# Patient Record
Sex: FEMALE | Race: WHITE | NOT HISPANIC OR LATINO | Employment: OTHER | URBAN - NONMETROPOLITAN AREA
[De-identification: names, ages, dates, MRNs, and addresses within clinical notes are randomized per-mention and may not be internally consistent; named-entity substitution may affect disease eponyms.]

---

## 2017-02-20 DIAGNOSIS — I10 BENIGN ESSENTIAL HYPERTENSION: ICD-10-CM

## 2017-02-20 RX ORDER — LISINOPRIL 20 MG/1
20 TABLET ORAL DAILY
Qty: 30 TABLET | Refills: 0 | Status: SHIPPED | OUTPATIENT
Start: 2017-02-20 | End: 2017-08-04

## 2017-02-20 NOTE — TELEPHONE ENCOUNTER
linsinopril (PRINIVIL, ZESTRIL) 20 MG Tablet      Last Written Prescription Date: 11/25/2016  Last Fill Quantity: 30, # refills: 0  Last Office Visit with G, P or [facility] prescribing provider: 08/19/2016  Next 5 appointments (look out 90 days)     Feb 23, 2017 10:40 AM CST   Office Visit with Addis Del Rio MD   [facility] ([facility])    [address] [place] [id number]   [phone]                   Potassium   Date Value Ref Range Status   10/13/2015 4.2 3.4 - 5.3 mmol/L Final     Creatinine   Date Value Ref Range Status   10/13/2015 0.67 0.52 - 1.04 mg/dL Final     BP Readings from Last 3 Encounters:   10/11/16 126/81   10/07/16 134/76   09/02/16 130/74

## 2017-02-22 ENCOUNTER — RADIANT APPOINTMENT (OUTPATIENT)
Dept: MAMMOGRAPHY | Facility: CLINIC | Age: 67
End: 2017-02-22
Attending: FAMILY MEDICINE
Payer: COMMERCIAL

## 2017-02-22 DIAGNOSIS — Z12.31 VISIT FOR SCREENING MAMMOGRAM: ICD-10-CM

## 2017-02-22 PROCEDURE — G0202 SCR MAMMO BI INCL CAD: HCPCS | Mod: TC

## 2017-02-23 ENCOUNTER — OFFICE VISIT (OUTPATIENT)
Dept: FAMILY MEDICINE | Facility: CLINIC | Age: 67
End: 2017-02-23
Payer: COMMERCIAL

## 2017-02-23 VITALS
BODY MASS INDEX: 32.25 KG/M2 | HEART RATE: 83 BPM | HEIGHT: 63 IN | OXYGEN SATURATION: 98 % | DIASTOLIC BLOOD PRESSURE: 84 MMHG | WEIGHT: 182 LBS | TEMPERATURE: 98.4 F | SYSTOLIC BLOOD PRESSURE: 128 MMHG

## 2017-02-23 DIAGNOSIS — I10 BENIGN ESSENTIAL HYPERTENSION: ICD-10-CM

## 2017-02-23 DIAGNOSIS — J20.9 ACUTE BRONCHITIS WITH SYMPTOMS > 10 DAYS: ICD-10-CM

## 2017-02-23 DIAGNOSIS — I47.10 PAROXYSMAL SUPRAVENTRICULAR TACHYCARDIA (H): Primary | ICD-10-CM

## 2017-02-23 PROCEDURE — 99214 OFFICE O/P EST MOD 30 MIN: CPT | Performed by: FAMILY MEDICINE

## 2017-02-23 RX ORDER — AZITHROMYCIN 250 MG/1
TABLET, FILM COATED ORAL
Qty: 6 TABLET | Refills: 0 | Status: SHIPPED | OUTPATIENT
Start: 2017-02-23 | End: 2017-07-18

## 2017-02-23 NOTE — PROGRESS NOTES
SUBJECTIVE:                                                    Rosita King is a 66 year old female who presents to clinic today for the following health issues:      Hypertension Follow-up      Outpatient blood pressures are not being checked.    Low Salt Diet: not monitoring salt   On  lisinopril and metoprolol-gets lightheaded at times when stands. Just doesn't feel well.     Paroxysmal supraventricular tachycardia- She has seen cardiology, was put on metoprolol. She still has symptoms ,which varies. Can last hours or days, can go a week without. She saw Dr Saravia EP specialist and they discussed doing an ablation. She would like to discuss this with me as she doesn't quite understand it. She has the booklet they gave her.    BP Readings from Last 6 Encounters:   02/23/17 128/84   10/11/16 126/81   10/07/16 134/76   09/02/16 130/74   08/19/16 136/86   08/16/16 116/66       RESPIRATORY SYMPTOMS      Duration: 2 weeks    Description  nasal congestion, rhinorrhea, sore throat, facial pain/pressure, cough, chills, fatigue/malaise, hoarse voice, conjunctival irritation, nausea and diarrhea    Severity: moderate    Accompanying signs and symptoms: None    History (predisposing factors):  none    Precipitating or alleviating factors: None    Therapies tried and outcome:  rest and fluids, nyquil, sara selzter plus , Vicks   For the past two weeks she has had nasal congestion and a productive cough. She feels cold all the time and is short of breath. However, it has started to get better the last few days. No fever.     Problem list and histories reviewed & adjusted, as indicated.  Additional history: as documented    BP Readings from Last 3 Encounters:   02/23/17 128/84   10/11/16 126/81   10/07/16 134/76    Wt Readings from Last 3 Encounters:   02/23/17 182 lb (82.6 kg)   10/11/16 182 lb 3.2 oz (82.6 kg)   08/19/16 179 lb 12.8 oz (81.6 kg)            ROS:  C: NEGATIVE for fever, chills, change in weight  E/M:  "NEGATIVE for ear, mouth and throat problems  R: NEGATIVE for significant cough or SOB  CV: NEGATIVE for chest pain, palpitations or peripheral edema    OBJECTIVE:                                                    /84 (BP Location: Left arm, Patient Position: Chair, Cuff Size: Adult Regular)  Pulse 83  Temp 98.4  F (36.9  C) (Tympanic)  Ht 5' 3\" (1.6 m)  Wt 182 lb (82.6 kg)  SpO2 98%  BMI 32.24 kg/m2  Body mass index is 32.24 kg/(m^2).  GENERAL: alert and no distress, chills  HENT: Left ear canal and TM normal, right side has cerumen but ok. nose without ulcers or lesions. A few small ulcerations on inferior lip.   NECK: no adenopathy, no asymmetry, masses, or scars and thyroid normal to palpation  RESP: lungs clear to auscultation - no rales, rhonchi or wheezes  CV: regular rate and rhythm, normal S1 S2, no S3 or S4, no murmur, click or rub,  MS: no gross musculoskeletal defects noted, no edema         ASSESSMENT/PLAN:                                                    Rosita was seen today for hypertension and uri.    Diagnoses and all orders for this visit:    Paroxysmal supraventricular tachycardia (H)  Discussed what ablation entails, with pictures.   Have asked her to keep a diary of symptoms and see me back    Benign essential hypertension  I think this is a little too well controlled. See orthostatic blood pressures.  Will stop lisinopril and see how she does    Acute bronchitis with symptoms > 10 days  -     azithromycin (ZITHROMAX) 250 MG tablet; Two tablets first day, then one tablet daily for four days.      Patient Instructions   Restart the metoprolol at 50 mg a day  Stop the lisinopril    Keep a diary of your heart -chart when you feel the tachycardia come on and how long it lasts and how you feel    Antibiotic for your cough    Recheck 4-6 weeks, bring diary in      This document serves as a record of the services and decisions personally performed and made by Addis Del Rio MD. It was " created on her behalf by Megha Topete, a trained medical scribe. The creation of this document is based the provider's statements to the medical scribe.  Megha Topete 11:46 AM 2/23/2017    Provider:   The information in this document, created by the medical scribe for me, accurately reflects the services I personally performed and the decisions made by me. I have reviewed and approved this document for accuracy prior to leaving the patient care area.  Addis Del Rio MD 11:46 AM 2/23/2017      Addis Del Rio MD  [facility]

## 2017-02-23 NOTE — PATIENT INSTRUCTIONS
Restart the metoprolol at 50 mg a day  Stop the lisinopril    Keep a diary of your heart -chart when you feel the tachycardia come on and how long it lasts and how you feel    Antibiotic for your cough    Recheck 4-6 weeks, bring diary in

## 2017-02-23 NOTE — MR AVS SNAPSHOT
After Visit Summary   2/23/2017    Rosita King    MRN: [id number]           Patient Information     Date Of Birth          1950        Visit Information        Provider Department      2/23/2017 10:40 AM Addis Del Rio MD [facility]        Today's Diagnoses     Paroxysmal supraventricular tachycardia (H)    -  1    Benign essential hypertension        Acute bronchitis with symptoms > 10 days          Care Instructions    Restart the metoprolol at 50 mg a day  Stop the lisinopril    Keep a diary of your heart -chart when you feel the tachycardia come on and how long it lasts and how you feel    Antibiotic for your cough    Recheck 4-6 weeks, bring diary in        Follow-ups after your visit        Who to contact     If you have questions or need follow up information about today's clinic visit or your schedule please contact [facility] directly at [phone].  Normal or non-critical lab and imaging results will be communicated to you by MyChart, letter or phone within 4 business days after the clinic has received the results. If you do not hear from us within 7 days, please contact the clinic through [employer] artshart or phone. If you have a critical or abnormal lab result, we will notify you by phone as soon as possible.  Submit refill requests through [employer] or call your pharmacy and they will forward the refill request to us. Please allow 3 business days for your refill to be completed.          Additional Information About Your Visit        MyChart Information     [employer] gives you secure access to your electronic health record. If you see a primary care provider, you can also send messages to your care team and make appointments. If you have questions, please call your primary care clinic.  If you do not have a primary care provider, please call [phone] and they will assist you.        Care EveryWhere ID     This is your Care EveryWhere ID. This could  "be used by other organizations to access your [facility] medical records  [id number]        Your Vitals Were     Pulse Temperature Height Pulse Oximetry BMI (Body Mass Index)       83 98.4  F (36.9  C) (Tympanic) 5' 3\" (1.6 m) 98% 32.24 kg/m2        Blood Pressure from Last 3 Encounters:   02/23/17 128/84   10/11/16 126/81   10/07/16 134/76    Weight from Last 3 Encounters:   02/23/17 182 lb (82.6 kg)   10/11/16 182 lb 3.2 oz (82.6 kg)   08/19/16 179 lb 12.8 oz (81.6 kg)              Today, you had the following     No orders found for display         Today's Medication Changes          These changes are accurate as of: 2/23/17 11:50 AM.  If you have any questions, ask your nurse or doctor.               Start taking these medicines.        Dose/Directions    azithromycin 250 MG tablet   Commonly known as:  ZITHROMAX   Used for:  Acute bronchitis with symptoms > 10 days   Started by:  Addis Del Rio MD        Two tablets first day, then one tablet daily for four days.   Quantity:  6 tablet   Refills:  0            Where to get your medicines      These medications were sent to [facility] [address] [place] [address]  [address] [place], [place] [id number]     Phone:  [phone]     azithromycin 250 MG tablet                Primary Care Provider Office Phone # Fax #    Addis Del Rio [phone]       [facility] [address] [facility] [address]        Thank you!     Thank you for choosing [facility]  for your care. Our goal is always to provide you with excellent care. Hearing back from our patients is one way we can continue to improve our services. Please take a few minutes to complete the written survey that you may receive in the mail after your visit with us. Thank you!             Your Updated Medication List - Protect others around you: Learn how to safely use, store and throw away your medicines at www.disposemymeds.org.          This " list is accurate as of: 2/23/17 11:50 AM.  Always use your most recent med list.                   Brand Name Dispense Instructions for use    ALEVE 220 MG capsule   Generic drug:  naproxen sodium      Take 220 mg by mouth 2 times daily (with meals) Reported on 2/23/2017       azithromycin 250 MG tablet    ZITHROMAX    6 tablet    Two tablets first day, then one tablet daily for four days.       CENTRUM SILVER per tablet      Take 1 tablet by mouth daily       lisinopril 20 MG tablet    PRINIVIL/ZESTRIL    30 tablet    Take 1 tablet (20 mg) by mouth daily (Needs lab work)       metoprolol 50 MG 24 hr tablet    TOPROL-XL    93 tablet    Take 1 tablet (50 mg) by mouth daily       omeprazole 20 MG CR capsule    priLOSEC    90 capsule    Take 1 capsule (20 mg) by mouth daily       VITAMIN D (CHOLECALCIFEROL) PO      Take 500 Units by mouth daily

## 2017-02-23 NOTE — NURSING NOTE
"Chief Complaint   Patient presents with     Hypertension     went off medication     URI       Initial /84 (BP Location: Left arm, Patient Position: Chair, Cuff Size: Adult Regular)  Pulse 83  Temp 98.4  F (36.9  C) (Tympanic)  Ht 5' 3\" (1.6 m)  Wt 182 lb (82.6 kg)  SpO2 98%  BMI 32.24 kg/m2 Estimated body mass index is 32.24 kg/(m^2) as calculated from the following:    Height as of this encounter: 5' 3\" (1.6 m).    Weight as of this encounter: 182 lb (82.6 kg).  Medication Reconciliation: complete    Health Maintenance that is potentially due pending provider review:  NONE    n/a    "

## 2017-07-18 ENCOUNTER — OFFICE VISIT (OUTPATIENT)
Dept: FAMILY MEDICINE | Facility: CLINIC | Age: 67
End: 2017-07-18
Payer: COMMERCIAL

## 2017-07-18 VITALS
HEART RATE: 66 BPM | BODY MASS INDEX: 33.48 KG/M2 | RESPIRATION RATE: 18 BRPM | SYSTOLIC BLOOD PRESSURE: 138 MMHG | TEMPERATURE: 98.3 F | DIASTOLIC BLOOD PRESSURE: 82 MMHG | WEIGHT: 189 LBS

## 2017-07-18 DIAGNOSIS — B37.2 YEAST DERMATITIS: ICD-10-CM

## 2017-07-18 DIAGNOSIS — A69.20 ERYTHEMA MIGRANS (LYME DISEASE): Primary | ICD-10-CM

## 2017-07-18 PROCEDURE — 99214 OFFICE O/P EST MOD 30 MIN: CPT | Performed by: NURSE PRACTITIONER

## 2017-07-18 RX ORDER — DOXYCYCLINE HYCLATE 100 MG
100 TABLET ORAL 2 TIMES DAILY
Qty: 28 TABLET | Refills: 0 | Status: SHIPPED | OUTPATIENT
Start: 2017-07-18 | End: 2017-08-01

## 2017-07-18 NOTE — PATIENT INSTRUCTIONS
Will treat for lyme disease with doxycycline twice a day for 14 days     See me back in 2 weeks for a recheck will get labs then     Sooner if worsening symptoms       Doxycycline twice a day for 2 weeks

## 2017-07-18 NOTE — NURSING NOTE
"Chief Complaint   Patient presents with     Insect Bites     abdomen x 3-4 days       Initial /82  Pulse 66  Temp 98.3  F (36.8  C) (Tympanic)  Resp 18  Wt 189 lb (85.7 kg)  Breastfeeding? No  BMI 33.48 kg/m2 Estimated body mass index is 33.48 kg/(m^2) as calculated from the following:    Height as of 2/23/17: 5' 3\" (1.6 m).    Weight as of this encounter: 189 lb (85.7 kg).  Medication Reconciliation: complete    Health Maintenance that is potentially due pending provider review:  DEXA, Hep C Screen, Advanced Directive, PCV 13    Possibly completing today per provider review.    "

## 2017-07-18 NOTE — MR AVS SNAPSHOT
After Visit Summary   7/18/2017    Rosita King    MRN: [id number]           Patient Information     Date Of Birth          1950        Visit Information        Provider Department      7/18/2017 12:00 PM Jenn Manzano NP [facility]        Today's Diagnoses     Erythema migrans (Lyme disease)    -  1    Yeast dermatitis          Care Instructions    Will treat for lyme disease with doxycycline twice a day for 14 days     See me back in 2 weeks for a recheck will get labs then     Sooner if worsening symptoms       Doxycycline twice a day for 2 weeks           Follow-ups after your visit        Who to contact     If you have questions or need follow up information about today's clinic visit or your schedule please contact [facility] directly at [phone].  Normal or non-critical lab and imaging results will be communicated to you by [employer], letter or phone within 4 business days after the clinic has received the results. If you do not hear from us within 7 days, please contact the clinic through [employer] or phone. If you have a critical or abnormal lab result, we will notify you by phone as soon as possible.  Submit refill requests through [employer] or call your pharmacy and they will forward the refill request to us. Please allow 3 business days for your refill to be completed.          Additional Information About Your Visit        MyChart Information     [employer] gives you secure access to your electronic health record. If you see a primary care provider, you can also send messages to your care team and make appointments. If you have questions, please call your primary care clinic.  If you do not have a primary care provider, please call [phone] and they will assist you.        Care EveryWhere ID     This is your Care EveryWhere ID. This could be used by other organizations to access your [place] medical records  [id number]        Your Vitals  Were     Pulse Temperature Respirations Breastfeeding? BMI (Body Mass Index)       66 98.3  F (36.8  C) (Tympanic) 18 No 33.48 kg/m2        Blood Pressure from Last 3 Encounters:   07/18/17 138/82   02/23/17 128/84   10/11/16 126/81    Weight from Last 3 Encounters:   07/18/17 189 lb (85.7 kg)   02/23/17 182 lb (82.6 kg)   10/11/16 182 lb 3.2 oz (82.6 kg)              Today, you had the following     No orders found for display         Today's Medication Changes          These changes are accurate as of: 7/18/17 12:20 PM.  If you have any questions, ask your nurse or doctor.               Start taking these medicines.        Dose/Directions    doxycycline 100 MG tablet   Commonly known as:  VIBRA-TABS   Used for:  Erythema migrans (Lyme disease)   Started by:  Jenn Maznano NP        Dose:  100 mg   Take 1 tablet (100 mg) by mouth 2 times daily for 14 days   Quantity:  28 tablet   Refills:  0            Where to get your medicines      These medications were sent to [facility] [address] [place] [address]  [address] [address]     Phone:  [phone]     doxycycline 100 MG tablet                Primary Care Provider Office Phone # Fax #    Addis Del Rio [phone]       [facility] [address] [place] [address]        Equal Access to Services     GERA LUBIN AH: Hadii jd tidwell hadasho Soniranjan, waaxda luqadaha, qaybta kaalmada adeegyada, waxay vincent terrazas . So [facility] [phone].    ATENCIÓN: Si habla español, tiene a adams disposición servicios gratuitos de asistencia lingüística. Mona al [phone].    We comply with applicable federal civil rights laws and [place] laws. We do not discriminate on the basis of race, color, national origin, age, disability sex, sexual orientation or gender identity.            Thank you!     Thank you for choosing [facility]  for your care. Our goal is always to  provide you with excellent care. Hearing back from our patients is one way we can continue to improve our services. Please take a few minutes to complete the written survey that you may receive in the mail after your visit with us. Thank you!             Your Updated Medication List - Protect others around you: Learn how to safely use, store and throw away your medicines at www.disposemymeds.org.          This list is accurate as of: 7/18/17 12:20 PM.  Always use your most recent med list.                   Brand Name Dispense Instructions for use Diagnosis    ALEVE 220 MG capsule   Generic drug:  naproxen sodium      Take 220 mg by mouth 2 times daily (with meals) Reported on 2/23/2017        CENTRUM SILVER per tablet      Take 1 tablet by mouth daily        doxycycline 100 MG tablet    VIBRA-TABS    28 tablet    Take 1 tablet (100 mg) by mouth 2 times daily for 14 days    Erythema migrans (Lyme disease)       lisinopril 20 MG tablet    PRINIVIL/ZESTRIL    30 tablet    Take 1 tablet (20 mg) by mouth daily (Needs lab work)    Benign essential hypertension       metoprolol 50 MG 24 hr tablet    TOPROL-XL    93 tablet    Take 1 tablet (50 mg) by mouth daily    Benign essential hypertension       omeprazole 20 MG CR capsule    priLOSEC    90 capsule    Take 1 capsule (20 mg) by mouth daily    Gastroesophageal reflux disease without esophagitis       VITAMIN D (CHOLECALCIFEROL) PO      Take 500 Units by mouth daily

## 2017-07-18 NOTE — PROGRESS NOTES
SUBJECTIVE:                                                    Rosita King is a 66 year old female who presents to clinic today for the following health issues:       Insect Bite      Duration: 3-4 days    Description (location/character/radiation): red cierra on abdomen thinks due to insect    Intensity:  mild    Accompanying signs and symptoms: none    History (similar episodes/previous evaluation): None    Precipitating or alleviating factors: None    Therapies tried and outcome: None       Her with spouse  She does not recall removing tick   Reports mild headache  Afebrile  No muscle or joint pain   Otherwise feels well   Problem list and histories reviewed & adjusted, as indicated.  Additional history: as documented    Patient Active Problem List   Diagnosis     CARDIOVASCULAR SCREENING; LDL GOAL LESS THAN 130     Vitamin D deficiency     ASHLEY (obstructive sleep apnea)     Generalized anxiety disorder     Hyperlipidemia LDL goal <130     Diverticulosis of large intestine without hemorrhage     H. pylori infection     Essential hypertension     Essential hypertension with goal blood pressure less than 140/90     Gastroesophageal reflux disease without esophagitis     Paroxysmal supraventricular tachycardia (H)     Past Surgical History:   Procedure Laterality Date     CARPAL TUNNEL RELEASE RT/LT Bilateral 2006, 2009     COLONOSCOPY N/A 11/25/2015    Procedure: COLONOSCOPY;  Surgeon: Mina Roy MD;  Location: [facility]     ESOPHAGOSCOPY, GASTROSCOPY, DUODENOSCOPY (EGD), COMBINED N/A 11/25/2015    Procedure: COMBINED ESOPHAGOSCOPY, GASTROSCOPY, DUODENOSCOPY (EGD), BIOPSY SINGLE OR MULTIPLE;  Surgeon: Mina Roy MD;  Location: [facility]     TONSILLECTOMY & ADENOIDECTOMY  age 13       Social History   Substance Use Topics     Smoking status: Former Smoker     Smokeless tobacco: Never Used     Alcohol use Yes      Comment: ocassional     Family History   Problem Relation Age of Onset     Liver  Disease Mother      Depression/Anxiety Father      DIABETES Father      DIABETES Sister      Liver Disease Sister              Reviewed and updated as needed this visit by clinical staffTobacco  Allergies  Med Hx  Surg Hx  Fam Hx  Soc Hx      Reviewed and updated as needed this visit by Provider         ROS:  Constitutional, HEENT, cardiovascular, pulmonary, gi and gu systems are negative, except as otherwise noted.      OBJECTIVE:                                                    /82  Pulse 66  Temp 98.3  F (36.8  C) (Tympanic)  Resp 18  Wt 189 lb (85.7 kg)  Breastfeeding? No  BMI 33.48 kg/m2  Body mass index is 33.48 kg/(m^2).  GENERAL APPEARANCE: healthy, alert and no distress  HENT: ear canals and TM's normal and nose and mouth without ulcers or lesions  RESP: lungs clear to auscultation - no rales, rhonchi or wheezes  CV: regular rates and rhythm, normal S1 S2, no S3 or S4 and no murmur, click or rub  ABDOMEN: soft, nontender, without hepatosplenomegaly or masses and bowel sounds normal  MS: extremities normal- no gross deformities noted  SKIN: she has an annular rash on her lower abdomen with central clearing, in the center there is a small indurated area consistent with a insect bite  She also has a some maceration in this same area which is consistent with a yeast dermatitis             Diagnostic test results:  Diagnostic Test Results:  none        ASSESSMENT/PLAN:                                                    1. Erythema migrans (Lyme disease)  Will treat with doxycycline 14 day course  She will see me back in 2 weeks and will check lyme test at this time   - doxycycline (VIBRA-TABS) 100 MG tablet; Take 1 tablet (100 mg) by mouth 2 times daily for 14 days  Dispense: 28 tablet; Refill: 0    2. Yeast dermatitis  She will use nystatin that she has at home      Patient Instructions   Will treat for lyme disease with doxycycline twice a day for 14 days     See me back in 2 weeks for a  recheck will get labs then     Sooner if worsening symptoms       Doxycycline twice a day for 2 weeks       Jenn Manzano NP  [facility]

## 2017-08-04 ENCOUNTER — OFFICE VISIT (OUTPATIENT)
Dept: FAMILY MEDICINE | Facility: CLINIC | Age: 67
End: 2017-08-04
Payer: COMMERCIAL

## 2017-08-04 VITALS
OXYGEN SATURATION: 98 % | DIASTOLIC BLOOD PRESSURE: 82 MMHG | HEART RATE: 51 BPM | WEIGHT: 193 LBS | BODY MASS INDEX: 34.19 KG/M2 | TEMPERATURE: 98.1 F | SYSTOLIC BLOOD PRESSURE: 136 MMHG | RESPIRATION RATE: 16 BRPM

## 2017-08-04 DIAGNOSIS — Z11.59 NEED FOR HEPATITIS C SCREENING TEST: ICD-10-CM

## 2017-08-04 DIAGNOSIS — A69.20 ERYTHEMA MIGRANS (LYME DISEASE): Primary | ICD-10-CM

## 2017-08-04 DIAGNOSIS — I47.10 PAROXYSMAL SUPRAVENTRICULAR TACHYCARDIA (H): ICD-10-CM

## 2017-08-04 DIAGNOSIS — I10 BENIGN ESSENTIAL HYPERTENSION: ICD-10-CM

## 2017-08-04 LAB
ANION GAP SERPL CALCULATED.3IONS-SCNC: 6 MMOL/L (ref 3–14)
BUN SERPL-MCNC: 17 MG/DL (ref 7–30)
CALCIUM SERPL-MCNC: 9.1 MG/DL (ref 8.5–10.1)
CHLORIDE SERPL-SCNC: 104 MMOL/L (ref 94–109)
CO2 SERPL-SCNC: 27 MMOL/L (ref 20–32)
CREAT SERPL-MCNC: 0.65 MG/DL (ref 0.52–1.04)
GFR SERPL CREATININE-BSD FRML MDRD: ABNORMAL ML/MIN/1.7M2
GLUCOSE SERPL-MCNC: 124 MG/DL (ref 70–99)
POTASSIUM SERPL-SCNC: 4 MMOL/L (ref 3.4–5.3)
SODIUM SERPL-SCNC: 137 MMOL/L (ref 133–144)

## 2017-08-04 PROCEDURE — 36415 COLL VENOUS BLD VENIPUNCTURE: CPT | Performed by: NURSE PRACTITIONER

## 2017-08-04 PROCEDURE — 86803 HEPATITIS C AB TEST: CPT | Performed by: NURSE PRACTITIONER

## 2017-08-04 PROCEDURE — 86618 LYME DISEASE ANTIBODY: CPT | Performed by: NURSE PRACTITIONER

## 2017-08-04 PROCEDURE — 80048 BASIC METABOLIC PNL TOTAL CA: CPT | Performed by: NURSE PRACTITIONER

## 2017-08-04 PROCEDURE — 99213 OFFICE O/P EST LOW 20 MIN: CPT | Performed by: NURSE PRACTITIONER

## 2017-08-04 NOTE — MR AVS SNAPSHOT
After Visit Summary   8/4/2017    Rosita King    MRN: [id number]           Patient Information     Date Of Birth          1950        Visit Information        Provider Department      8/4/2017 8:00 AM Jenn Manzano NP [facility]        Today's Diagnoses     Erythema migrans (Lyme disease)    -  1    Paroxysmal supraventricular tachycardia (H)        Need for hepatitis C screening test        Benign essential hypertension          Care Instructions    Rash is resolved   Will check lyme test     Recommend seeing cardiology again for the sinus V tach         Schedule full physical with Dr. Alcocer       Will get labs today           Follow-ups after your visit        Who to contact     If you have questions or need follow up information about today's clinic visit or your schedule please contact [facility] directly at [phone].  Normal or non-critical lab and imaging results will be communicated to you by ViaSathart, letter or phone within 4 business days after the clinic has received the results. If you do not hear from us within 7 days, please contact the clinic through ViaSathart or phone. If you have a critical or abnormal lab result, we will notify you by phone as soon as possible.  Submit refill requests through Cookapp or call your pharmacy and they will forward the refill request to us. Please allow 3 business days for your refill to be completed.          Additional Information About Your Visit        [employer] Information     [employer] gives you secure access to your electronic health record. If you see a primary care provider, you can also send messages to your care team and make appointments. If you have questions, please call your primary care clinic.  If you do not have a primary care provider, please call [phone] and they will assist you.        Care EveryWhere ID     This is your Care EveryWhere ID. This could be used by other organizations to  access your [place] medical records  [id number]        Your Vitals Were     Pulse Temperature Respirations Pulse Oximetry BMI (Body Mass Index)       51 98.1  F (36.7  C) (Tympanic) 16 98% 34.19 kg/m2        Blood Pressure from Last 3 Encounters:   08/04/17 136/82   07/18/17 138/82   02/23/17 128/84    Weight from Last 3 Encounters:   08/04/17 193 lb (87.5 kg)   07/18/17 189 lb (85.7 kg)   02/23/17 182 lb (82.6 kg)              We Performed the Following     **Hepatitis C Screen Reflex to RNA FUTURE anytime     Basic metabolic panel  (Ca, Cl, CO2, Creat, Gluc, K, Na, BUN)     Lyme Disease Kylah with reflex to WB Serum          Today's Medication Changes          These changes are accurate as of: 8/4/17  8:20 AM.  If you have any questions, ask your nurse or doctor.               Stop taking these medicines if you haven't already. Please contact your care team if you have questions.     lisinopril 20 MG tablet   Commonly known as:  PRINIVIL/ZESTRIL   Stopped by:  Jenn Manzano, NAKUL                    Primary Care Provider Office Phone # Fax #    Addis Del Rio [phone]       [facility] [address] [place] [id number]        Equal Access to Services     GERA LUBIN AH: Hadii jd ku hadasho Soomaali, waaxda luqadaha, qaybta kaalmada adeegyada, jane kaur hayporsche corrigan. So [facility] [phone].    ATENCIÓN: Si habla español, tiene a adams disposición servicios gratuitos de asistencia lingüística. Llame al [phone].    We comply with applicable federal civil rights laws and [place] laws. We do not discriminate on the basis of race, color, national origin, age, disability sex, sexual orientation or gender identity.            Thank you!     Thank you for choosing [facility]  for your care. Our goal is always to provide you with excellent care. Hearing back from our patients is one way we can continue to improve our services. Please take a few  minutes to complete the written survey that you may receive in the mail after your visit with us. Thank you!             Your Updated Medication List - Protect others around you: Learn how to safely use, store and throw away your medicines at www.disposemymeds.org.          This list is accurate as of: 8/4/17  8:20 AM.  Always use your most recent med list.                   Brand Name Dispense Instructions for use Diagnosis    ALEVE 220 MG capsule   Generic drug:  naproxen sodium      Take 220 mg by mouth 2 times daily (with meals) Reported on 2/23/2017        CENTRUM SILVER per tablet      Take 1 tablet by mouth daily        metoprolol 50 MG 24 hr tablet    TOPROL-XL    93 tablet    Take 1 tablet (50 mg) by mouth daily    Benign essential hypertension       omeprazole 20 MG CR capsule    priLOSEC    90 capsule    Take 1 capsule (20 mg) by mouth daily    Gastroesophageal reflux disease without esophagitis       VITAMIN D (CHOLECALCIFEROL) PO      Take 500 Units by mouth daily

## 2017-08-04 NOTE — NURSING NOTE
"Chief Complaint   Patient presents with     RECHECK       Initial /82 (BP Location: Right arm, Patient Position: Chair, Cuff Size: Adult Large)  Pulse 51  Temp 98.1  F (36.7  C) (Tympanic)  Resp 16  Wt 193 lb (87.5 kg)  SpO2 98%  BMI 34.19 kg/m2 Estimated body mass index is 34.19 kg/(m^2) as calculated from the following:    Height as of 2/23/17: 5' 3\" (1.6 m).    Weight as of this encounter: 193 lb (87.5 kg).  Medication Reconciliation: complete    Health Maintenance that is potentially due pending provider review:  Hep C, Advanced Directive, Dexa Scan, Pneumococcal, Tetanus    Will discuss with provider.    Is there anyone who you would like to be able to receive your results? No  If yes have patient fill out SU      "

## 2017-08-04 NOTE — PROGRESS NOTES
SUBJECTIVE:                                                    Rosita King is a 66 year old female who presents to clinic today for the following health issues:      Recheck for lymes  Patient is feeling better, finished antibiotics.  Rash resolved 2 days after starting the antibiotic     Patient reports that she has symptoms supraventricular tachycardia despite betablocker therapy   She needs to see cardiology again regarding this  She denies any chest pain       Problem list and histories reviewed & adjusted, as indicated.  Additional history: as documented    Patient Active Problem List   Diagnosis     CARDIOVASCULAR SCREENING; LDL GOAL LESS THAN 130     Vitamin D deficiency     ASHLEY (obstructive sleep apnea)     Generalized anxiety disorder     Hyperlipidemia LDL goal <130     Diverticulosis of large intestine without hemorrhage     H. pylori infection     Essential hypertension     Essential hypertension with goal blood pressure less than 140/90     Gastroesophageal reflux disease without esophagitis     Paroxysmal supraventricular tachycardia (H)     Past Surgical History:   Procedure Laterality Date     CARPAL TUNNEL RELEASE RT/LT Bilateral 2006, 2009     COLONOSCOPY N/A 11/25/2015    Procedure: COLONOSCOPY;  Surgeon: Mina Roy MD;  Location: [facility]     ESOPHAGOSCOPY, GASTROSCOPY, DUODENOSCOPY (EGD), COMBINED N/A 11/25/2015    Procedure: COMBINED ESOPHAGOSCOPY, GASTROSCOPY, DUODENOSCOPY (EGD), BIOPSY SINGLE OR MULTIPLE;  Surgeon: Mina Roy MD;  Location: [facility]     TONSILLECTOMY & ADENOIDECTOMY  age 13       Social History   Substance Use Topics     Smoking status: Former Smoker     Smokeless tobacco: Never Used     Alcohol use Yes      Comment: ocassional     Family History   Problem Relation Age of Onset     Liver Disease Mother      Depression/Anxiety Father      DIABETES Father      DIABETES Sister      Liver Disease Sister              Reviewed and updated as needed this  visit by clinical staff       Reviewed and updated as needed this visit by Provider         ROS:  Constitutional, HEENT, cardiovascular, pulmonary, gi and gu systems are negative, except as otherwise noted.      OBJECTIVE:                                                    /82 (BP Location: Right arm, Patient Position: Chair, Cuff Size: Adult Large)  Pulse 51  Temp 98.1  F (36.7  C) (Tympanic)  Resp 16  Wt 193 lb (87.5 kg)  SpO2 98%  BMI 34.19 kg/m2  Body mass index is 34.19 kg/(m^2).  GENERAL APPEARANCE: healthy, alert and no distress  RESP: lungs clear to auscultation - no rales, rhonchi or wheezes  CV: regular rates and rhythm, normal S1 S2, no S3 or S4 and no murmur, click or rub  SKIN: no suspicious lesions or rashes    Diagnostic test results:  Diagnostic Test Results:  Pending        ASSESSMENT/PLAN:                                                    1. Erythema migrans (Lyme disease)  Rash resolved  - Lyme Disease Kylah with reflex to WB Serum    2. Paroxysmal supraventricular tachycardia (H)  Still with symptoms   Recommend that she cardiology again     3. Need for hepatitis C screening test  Labs today   - **Hepatitis C Screen Reflex to RNA FUTURE anytime    4. Benign essential hypertension  Labs today   - Basic metabolic panel  (Ca, Cl, CO2, Creat, Gluc, K, Na, BUN)      Patient Instructions   Rash is resolved   Will check lyme test     Recommend seeing cardiology again for the sinus V tach         Schedule full physical with Dr. Alcocer       Will get labs today       Jenn Manzano NP  [facility]

## 2017-08-04 NOTE — PATIENT INSTRUCTIONS
Rash is resolved   Will check lyme test     Recommend seeing cardiology again for the sinus V tach         Schedule full physical with Dr. Alcocer       Will get labs today

## 2017-08-07 LAB
B BURGDOR IGG+IGM SER QL: 0.06 (ref 0–0.89)
HCV AB SERPL QL IA: NORMAL

## 2017-08-24 ENCOUNTER — OFFICE VISIT (OUTPATIENT)
Dept: FAMILY MEDICINE | Facility: CLINIC | Age: 67
End: 2017-08-24
Payer: COMMERCIAL

## 2017-08-24 VITALS
HEART RATE: 62 BPM | DIASTOLIC BLOOD PRESSURE: 84 MMHG | RESPIRATION RATE: 16 BRPM | BODY MASS INDEX: 33.83 KG/M2 | OXYGEN SATURATION: 97 % | SYSTOLIC BLOOD PRESSURE: 138 MMHG | WEIGHT: 191 LBS | TEMPERATURE: 97.4 F

## 2017-08-24 DIAGNOSIS — Z00.01 ENCOUNTER FOR GENERAL ADULT MEDICAL EXAMINATION WITH ABNORMAL FINDINGS: Primary | ICD-10-CM

## 2017-08-24 DIAGNOSIS — I10 ESSENTIAL HYPERTENSION WITH GOAL BLOOD PRESSURE LESS THAN 140/90: ICD-10-CM

## 2017-08-24 DIAGNOSIS — I47.10 PAROXYSMAL SUPRAVENTRICULAR TACHYCARDIA (H): ICD-10-CM

## 2017-08-24 DIAGNOSIS — L90.0 LICHEN SCLEROSUS: ICD-10-CM

## 2017-08-24 DIAGNOSIS — N39.41 URGE INCONTINENCE OF URINE: ICD-10-CM

## 2017-08-24 DIAGNOSIS — R73.9 ELEVATED BLOOD SUGAR: ICD-10-CM

## 2017-08-24 DIAGNOSIS — N89.8 VAGINAL ITCHING: ICD-10-CM

## 2017-08-24 DIAGNOSIS — G47.33 OSA (OBSTRUCTIVE SLEEP APNEA): ICD-10-CM

## 2017-08-24 DIAGNOSIS — Z13.6 CARDIOVASCULAR SCREENING; LDL GOAL LESS THAN 130: ICD-10-CM

## 2017-08-24 DIAGNOSIS — K21.9 GASTROESOPHAGEAL REFLUX DISEASE WITHOUT ESOPHAGITIS: ICD-10-CM

## 2017-08-24 LAB
ALBUMIN UR-MCNC: NEGATIVE MG/DL
APPEARANCE UR: CLEAR
BILIRUB UR QL STRIP: NEGATIVE
CHOLEST SERPL-MCNC: 272 MG/DL
COLOR UR AUTO: YELLOW
GLUCOSE UR STRIP-MCNC: NEGATIVE MG/DL
HBA1C MFR BLD: 5.5 % (ref 4.3–6)
HDLC SERPL-MCNC: 57 MG/DL
HGB UR QL STRIP: NEGATIVE
KETONES UR STRIP-MCNC: NEGATIVE MG/DL
LDLC SERPL CALC-MCNC: 192 MG/DL
LEUKOCYTE ESTERASE UR QL STRIP: NEGATIVE
NITRATE UR QL: NEGATIVE
NONHDLC SERPL-MCNC: 215 MG/DL
PH UR STRIP: 6.5 PH (ref 5–7)
SOURCE: NORMAL
SP GR UR STRIP: 1.01 (ref 1–1.03)
SPECIMEN SOURCE: NORMAL
TRIGL SERPL-MCNC: 117 MG/DL
UROBILINOGEN UR STRIP-ACNC: 0.2 EU/DL (ref 0.2–1)
WET PREP SPEC: NORMAL

## 2017-08-24 PROCEDURE — G0438 PPPS, INITIAL VISIT: HCPCS | Performed by: FAMILY MEDICINE

## 2017-08-24 PROCEDURE — 83036 HEMOGLOBIN GLYCOSYLATED A1C: CPT | Performed by: FAMILY MEDICINE

## 2017-08-24 PROCEDURE — G0009 ADMIN PNEUMOCOCCAL VACCINE: HCPCS | Performed by: FAMILY MEDICINE

## 2017-08-24 PROCEDURE — 90670 PCV13 VACCINE IM: CPT | Performed by: FAMILY MEDICINE

## 2017-08-24 PROCEDURE — 36415 COLL VENOUS BLD VENIPUNCTURE: CPT | Performed by: FAMILY MEDICINE

## 2017-08-24 PROCEDURE — 99214 OFFICE O/P EST MOD 30 MIN: CPT | Mod: 25 | Performed by: FAMILY MEDICINE

## 2017-08-24 PROCEDURE — 80061 LIPID PANEL: CPT | Performed by: FAMILY MEDICINE

## 2017-08-24 PROCEDURE — 87210 SMEAR WET MOUNT SALINE/INK: CPT | Performed by: FAMILY MEDICINE

## 2017-08-24 PROCEDURE — 81003 URINALYSIS AUTO W/O SCOPE: CPT | Performed by: FAMILY MEDICINE

## 2017-08-24 RX ORDER — CLOBETASOL PROPIONATE 0.5 MG/G
CREAM TOPICAL
Qty: 30 G | Refills: 1 | Status: SHIPPED | OUTPATIENT
Start: 2017-08-24 | End: 2017-08-24

## 2017-08-24 RX ORDER — TRIAMCINOLONE ACETONIDE 1 MG/G
OINTMENT TOPICAL
Qty: 45 G | Refills: 1 | Status: SHIPPED | OUTPATIENT
Start: 2017-08-24 | End: 2019-02-21

## 2017-08-24 NOTE — PROGRESS NOTES
SUBJECTIVE:   Rosita King is a 66 year old female who presents for Preventive Visit.    Are you in the first 12 months of your Medicare Part B coverage?  No    Healthy Habits:    Do you get at least three servings of calcium containing foods daily (dairy, green leafy vegetables, etc.)? yes    Amount of exercise or daily activities, outside of work: 15 minutes    Problems taking medications regularly No    Medication side effects: No    Have you had an eye exam in the past two years? yes    Do you see a dentist twice per year? yes    Do you have sleep apnea, excessive snoring or daytime drowsiness?yes    COGNITIVE SCREEN  1) Repeat 3 items (Banana, Sunrise, Chair)    2) Clock draw: NORMAL  3) 3 item recall: Recalls 2 objects   Results: NORMAL clock, 1-2 items recalled: COGNITIVE IMPAIRMENT LESS LIKELY    Mini-CogTM Copyright SEAN Mora. Licensed by the author for use in [facility]; reprinted with permission (pearl@[facility]). All rights reserved.        Reflux- omeprazole  She takes her omeprazole once a day, and it works well. She has tried going off of it, but her reflux comes back.       Tachycardia/Hypertension- Metoprolol 50 mg  BP Readings from Last 6 Encounters:   08/24/17 138/84   08/04/17 136/82   07/18/17 138/82   02/23/17 128/84   10/11/16 126/81   10/07/16 134/76     Pulse Readings from Last 6 Encounters:   08/24/17 62   08/04/17 51   07/18/17 66   02/23/17 83   10/11/16 57   10/07/16 60   She is to follow with a cardiologist, but has not made an appointment. Last appointment was 1 year ago, and ablation therapy was considered.   She has been monitoring her BPs at home, but they have been elevated at home (150-160). She brought her machine in to be calibrated today, and it was different (higher) from the clinic machine.   She states that she frequently gets headaches from her blood pressures.  She notes that she was previously on lisinopril as well, but stopped it as her orthostatics were  too low.        Hyperlipidemia-  Recent Labs   Lab Test  10/13/15   0945   CHOL  218*   HDL  58   LDL  143*   TRIG  84   CHOLHDLRATIO  3.8   Labs drawn today.     Elevated blood sugar-  2017: 124 (was not fasting)  2015: 101  Labs drawn today. She notes a family history of diabetes, including her father and sister.     Vaginal Symptoms      Duration: 1 year    Description  itching and burning    Intensity:  moderate    Accompanying signs and symptoms (fever/dysuria/abdominal or back pain): None    History  Sexually active: yes, single partner, contraception - not needed  Possibility of pregnancy: No  Recent antibiotic use: YES    Precipitating or alleviating factors: None    Therapies tried and outcome: none   Outcome: none    She has been having a lot of itching in her vaginal area. She has a rash that seems to be getting worse.     Urinary frequency-  She notes that she cannot hold her urine when she has to go, and has started to leak. She denies any stress incontinence.     Sleep apnea-  She uses her CPAP 1-2 times a week. She doesn't like how loud it is and how uncomfortable it is. She has not seen a sleep doctor in about 10 years.     Reviewed and updated as needed this visit by clinical staffTobacco  Allergies  Meds  Problems  Med Hx  Surg Hx  Fam Hx  Soc Hx          Reviewed and updated as needed this visit by Provider  Allergies  Meds  Problems        Social History   Substance Use Topics     Smoking status: Former Smoker     Smokeless tobacco: Never Used     Alcohol use Yes      Comment: ocassional       The patient does not drink >3 drinks per day nor >7 drinks per week.    Today's PHQ-2 Score:   PHQ-2 ( 1999 Pfizer) 8/24/2017 2/23/2017   Q1: Little interest or pleasure in doing things 0 0   Q2: Feeling down, depressed or hopeless 0 0   PHQ-2 Score 0 0       Do you feel safe in your environment - Yes    Do you have a Health Care Directive?: No: Advance care planning was reviewed with patient;  patient declined at this time.      Current providers sharing in care for this patient include: Patient Care Team:  Addis Del Rio MD as PCP - General (Family Practice)      Hearing impairment: No    Ability to successfully perform activities of daily living: Yes, no assistance needed     Fall risk:       Home safety:  none identified      The following health maintenance items are reviewed in Epic and correct as of today:  Health Maintenance   Topic Date Due     ADVANCE DIRECTIVE PLANNING Q5 YRS  10/17/2005     DEXA SCAN SCREENING (SYSTEM ASSIGNED)  10/17/2015     TETANUS IMMUNIZATION (SYSTEM ASSIGNED)  06/09/2016     INFLUENZA VACCINE (SYSTEM ASSIGNED)  09/01/2017     FALL RISK ASSESSMENT  02/23/2018     PNEUMOCOCCAL (2 of 2 - PPSV23) 08/24/2018     MAMMO SCREEN Q2 YR (SYSTEM ASSIGNED)  02/22/2019     LIPID SCREEN Q5 YR FEMALE (SYSTEM ASSIGNED)  08/24/2022     COLON CANCER SCREEN (SYSTEM ASSIGNED)  11/25/2025     HEPATITIS C SCREENING  Completed     BP Readings from Last 3 Encounters:   08/24/17 138/84   08/04/17 136/82   07/18/17 138/82    Wt Readings from Last 3 Encounters:   08/24/17 191 lb (86.6 kg)   08/04/17 193 lb (87.5 kg)   07/18/17 189 lb (85.7 kg)            Mammogram Screening: Patient over age 50, mutual decision to screen reflected in health maintenance.      ROS:  C: NEGATIVE for fever, chills, change in weight  INTEGUMENTARY/SKIN: NEGATIVE for worrisome rashes, moles or lesions  E/M: NEGATIVE for ear, mouth and throat problems  R: NEGATIVE for significant cough or SOB  CV: NEGATIVE for chest pain, palpitations or peripheral edema  GI: NEGATIVE for nausea, abdominal pain, heartburn, or change in bowel habits  : POSITIVE for frequency, urgency, vaginal itching.   MUSCULOSKELETAL: NEGATIVE for significant arthralgias or myalgia  Neuro: POSITIVE for headaches.    OBJECTIVE:   /84 (BP Location: Right arm, Patient Position: Chair, Cuff Size: Adult Large)  Pulse 62  Temp 97.4  F (36.3  C)  "(Tympanic)  Resp 16  Wt 191 lb (86.6 kg)  SpO2 97%  BMI 33.83 kg/m2 Estimated body mass index is 33.83 kg/(m^2) as calculated from the following:    Height as of 2/23/17: 5' 3\" (1.6 m).    Weight as of this encounter: 191 lb (86.6 kg).  EXAM:   GENERAL: healthy, alert and no distress  EYES: Eyes grossly normal to inspection, PERRL and conjunctivae and sclerae normal  HENT: ear canals and TM's normal, nose and mouth without ulcers or lesions  NECK: no adenopathy, no asymmetry, masses, or scars and thyroid normal to palpation  RESP: lungs clear to auscultation - no rales, rhonchi or wheezes  BREAST: normal without masses, tenderness or nipple discharge and no palpable axillary masses or adenopathy  CV: regular rate and rhythm, normal S1 S2, no S3 or S4, no murmur, click or rub, no peripheral edema and peripheral pulses strong  ABDOMEN: soft, nontender, no hepatosplenomegaly, no masses and bowel sounds normal   (female): normal cervix, adnexae, and uterus without masses. Whitish skin in perineum with pink, cracked skin surrounding.    and rectal exam: Erythema around anus.   MS: no gross musculoskeletal defects noted, no edema  SKIN: no suspicious lesions or rashes  NEURO: Normal strength and tone, mentation intact and speech normal  PSYCH: mentation appears normal, affect normal/bright    ASSESSMENT / PLAN:   Rosita was seen today for physical.    Diagnoses and all orders for this visit:    Encounter for general adult medical examination with abnormal findings  -     Pneumococcal vaccine 13 valent PCV13 IM (Prevnar) [66813]  -     ADMIN: Vaccine, Initial (83736)    Paroxysmal supraventricular tachycardia (H)  Continue metoprolol, see cardiology    ASHLEY (obstructive sleep apnea)  -     SLEEP EVALUATION & MANAGEMENT REFERRAL - ADULT; Future    Vaginal itching  -     *UA reflex to Microscopic and Culture ([facility] (except [place] and [place])  -     Wet prep  -     triamcinolone (KENALOG) 0.1 % " "ointment; Apply sparingly to affected area two times daily for 14 days.    Essential hypertension with goal blood pressure less than 140/90  Continue metoprolol    Urge incontinence of urine  -     PHYSICAL THERAPY REFERRAL    Gastroesophageal reflux disease without esophagitis  Continue PPI    CARDIOVASCULAR SCREENING; LDL GOAL LESS THAN 130  -     Lipid panel reflex to direct LDL    Elevated blood sugar  -     Hemoglobin A1c    Lichen sclerosus  -     Discontinue: clobetasol (TEMOVATE) 0.05 % cream; Apply sparingly to affected area 1-2 tmes daily, if expensive for patient, will change to triamcinolone (contact me)  -     triamcinolone (KENALOG) 0.1 % ointment; Apply sparingly to affected area two times daily for 14 days.        End of Life Planning:  Patient currently has an advanced directive: No.  I have verified the patient's ability to prepare an advanced directive/make health care decisions.  Literature was provided to assist patient in preparing an advanced directive.    COUNSELING:  Reviewed preventive health counseling, as reflected in patient instructions       Regular exercise       Vision screening       Immunizations    Vaccinated for: Pneumococcal           Advanced Planning           Estimated body mass index is 33.83 kg/(m^2) as calculated from the following:    Height as of 2/23/17: 5' 3\" (1.6 m).    Weight as of this encounter: 191 lb (86.6 kg).  Weight management plan: Discussed healthy diet and exercise guidelines and patient will follow up in 12 months in clinic to re-evaluate.   reports that she has quit smoking. She has never used smokeless tobacco.    Appropriate preventive services were discussed with this patient, including applicable screening as appropriate for cardiovascular disease, diabetes, osteopenia/osteoporosis, and glaucoma.  As appropriate for age/gender, discussed screening for colorectal cancer, prostate cancer, breast cancer, and cervical cancer. Checklist reviewing " preventive services available has been given to the patient.    Reviewed patients plan of care and provided an AVS. The Basic Care Plan (routine screening as documented in Health Maintenance) for Rosita meets the Care Plan requirement. This Care Plan has been established and reviewed with the Patient.    Counseling Resources:  ATP IV Guidelines  Pooled Cohorts Equation Calculator  Breast Cancer Risk Calculator  FRAX Risk Assessment  ICSI Preventive Guidelines  Dietary Guidelines for Americans, 2010  USDA's MyPlate  ASA Prophylaxis  Lung CA Screening    This document serves as a record of the services and decisions personally performed and made by Addis Del Rio MD. It was created on her behalf by Megha Topete, a trained medical scribe. The creation of this document is based the provider's statements to the medical scribe.  Megha Topete 9:55 AM 8/24/2017    Provider:   The information in this document, created by the medical scribe for me, accurately reflects the services I personally performed and the decisions made by me. I have reviewed and approved this document for accuracy prior to leaving the patient care area.  Addis Del Rio MD 9:55 AM 8/24/2017    Addis Del Rio MD  [facility]

## 2017-08-24 NOTE — PATIENT INSTRUCTIONS
Schedule Appointment with Sleep doctor to update CPAP.   Make appointment with EP specialist at Cardiology. I recommend Dr. Glynn.     Urge Incontinence- We have some medications to help with this if it is really disrupting your daily activities.  You can also try doing keggle exercises when you have the urge to go.    Schedule PT for pelvic floor strengthening.    Complete advanced directives paperwork and bring into clinic. Resources are available on the [employer] website.     Apply the antifungal cream and steroid cream to creases in groin, around anus, and in vaginal area. Use this as needed in the future as well.       Preventive Health Recommendations  Female Ages 65 +    Yearly exam:     See your health care provider every year in order to  o Review health changes.   o Discuss preventive care.    o Review your medicines if your doctor has prescribed any.      You no longer need a yearly Pap test unless you've had an abnormal Pap test in the past 10 years. If you have vaginal symptoms, such as bleeding or discharge, be sure to talk with your provider about a Pap test.      Every 1 to 2 years, have a mammogram.  If you are over 69, talk with your health care provider about whether or not you want to continue having screening mammograms.      Every 10 years, have a colonoscopy. Or, have a yearly FIT test (stool test). These exams will check for colon cancer.       Have a cholesterol test every 5 years, or more often if your doctor advises it.       Have a diabetes test (fasting glucose) every three years. If you are at risk for diabetes, you should have this test more often.       At age 65, have a bone density scan (DEXA) to check for osteoporosis (brittle bone disease).    Shots:    Get a flu shot each year.    Get a tetanus shot every 10 years.    Talk to your doctor about your pneumonia vaccines. There are now two you should receive - Pneumovax (PPSV 23) and Prevnar (PCV 13).    Talk to your doctor about the  shingles vaccine.    Talk to your doctor about the hepatitis B vaccine.    Nutrition:     Eat at least 5 servings of fruits and vegetables each day.      Eat whole-grain bread, whole-wheat pasta and brown rice instead of white grains and rice.      Talk to your provider about Calcium and Vitamin D.     Lifestyle    Exercise at least 150 minutes a week (30 minutes a day, 5 days a week). This will help you control your weight and prevent disease.      Limit alcohol to one drink per day.      No smoking.       Wear sunscreen to prevent skin cancer.       See your dentist twice a year for an exam and cleaning.    See your eye doctor every 1 to 2 years to screen for conditions such as glaucoma, macular degeneration, cataracts, etc   What Are Snoring and Obstructive Sleep Apnea?  If you ve ever had a stuffed-up nose, you know the feeling of trying to breathe through a very narrow passageway. This is what happens in your throat when you snore. While you sleep, structures in your throat partially block your air passage, making the passage narrow and hard to breathe through. If the entire passage becomes blocked and you can t breathe at all, you have sleep apnea.      Snoring Obstructive sleep apnea   Snoring  If your throat structures are too large or the muscles relax too much during sleep, the air passage may be partially blocked. As air from the nose or mouth passes around this blockage, the throat structures vibrate, causing the familiar sound of snoring. At times, this sound can be so loud that snorers wake up others, or even themselves, during the night. Snoring gets worse as more and more of the air passage is blocked.  Obstructive sleep apnea  If the structures completely block the throat, air can t flow to the lungs at all. This is called apnea (meaning  no breathing ). Since the lungs aren t getting fresh air, the brain tells the body to wake up just enough to tighten the muscles and unblock the air passage. With  a loud gasp, breathing begins again. This process may be repeated over and over again throughout the night, making your sleep fragmented with a lighter stage of sleep. Even though you do not remember waking up many times during the night to a lighter sleep, you feel tired the next day. The lack of sleep and fresh air can also strain your lungs, heart, and other organs, leading to problems such as high blood pressure, heart attack, or stroke.  Problems in the nose and jaw  Problems in the structure of the nose may obstruct breathing. A crooked (deviated) septum or swollen turbinates can make snoring worse or lead to apnea. Also, a receding jaw may make the tongue sit too far back, so it s more likely to block the airway when you re asleep.        Date Last Reviewed: 7/18/2015 2000-2017 [employer]. [address] [place] [address]. All rights reserved. This information is not intended as a substitute for professional medical care. Always follow your healthcare professional's instructions.        Obstructive Sleep Apnea  Obstructive sleep apnea is a condition that causes your air passages to become narrowed or blocked during sleep. As a result, breathing stops for short periods. Your body wakes up enough for breathing to begin again, though you don't remember it. The cycle of stopped breathing and brief awakenings can repeat dozens of times a night. This prevents the body from getting to the deeper stages of sleep that are needed for good rest and may cause your body's oxygen level to fall.  Signs of sleep apnea include loud snoring, noisy breathing, and gasping sounds during sleep. Daytime symptoms include waking up tired after a full night's sleep, waking up with headaches, feeling very sleepy or falling asleep during the day, and having problems with memory or concentration.  Risk factors for sleep apnea include:  Being overweight  Being a man, or a woman in menopause  Smoking  Using  alcohol or sedating medicines  Having enlarged structures in the nose or throat  Home care  Lifestyle changes that can help treat snoring and sleep apnea include the following:  If you are overweight, lose weight. Talk to your healthcare provider about a weight-loss plan for you.  Avoid alcohol for 3 to 4 hours before bedtime. Avoid sedating medications. Ask your healthcare provider about the medicines you take.  If you smoke, talk to your healthcare provider about ways to quit.  Sleep on your side. This can help prevent gravity from pulling relaxed throat tissues into your breathing passages.  If you have allergies or sinus problems that block your nose, ask your healthcare provider for help.  Follow-up care  Follow up with your healthcare provider, or as advised. A diagnosis of sleep apnea is made with a sleep study. Your healthcare provider can tell you more about this test.  When to seek medical advice  Sleep apnea can make you more likely to have certain health problems. These include high blood pressure, heart attack, stroke, and sexual dysfunction. If you have sleep apnea, talk to your healthcare provider about the best treatments for you.  Date Last Reviewed: 4/1/2017 2000-2017 [employer]. [address] [place] [id number]. All rights reserved. This information is not intended as a substitute for professional medical care. Always follow your healthcare professional's instructions.        Treating Incontinence in Women: Nonsurgical Methods    The best treatment for you will depend on the type of incontinence you have. Your symptoms, age, and any underlying problems that are found also affect your treatment. While some types of incontinence may eventually require surgery, nonsurgical treatments may be effective in many cases. Nonsurgical treatments include lifestyle changes, muscle-strengthening exercises, and medicines.  Nonsurgical Treatments  Treatment for stress urinary  incontinence includes:  Bladder training  Lifestyle changes such as weight loss and increased activity if incontinence is due to being overweight  Medicines, if bladder training has not helped  Pelvic floor muscle exercises  Lifestyle changes  Losing weight. Excess weight puts extra pressure on the pelvic floor muscles. Exercising and eating right can help you lose weight. This helps other treatments work better.  Making certain diet changes. Some foods may make you need to urinate more, so it may be good to avoid them. These include caffeinated drinks and alcohol. Ask your healthcare provider whether these or other diet changes might be helpful.  Quitting smoking. Smoking can lead to a chronic cough that strains pelvic floor muscles. Smoking may also damage the bladder and urethra.  Pelvic floor musle exercises  There are exercises you can do to help strengthen your pelvic floor muscles. The pelvic floor muscles act as a sling to help hold the bladder and urethra in place. These muscles also help keep the urethra closed. Weak pelvic floor muscles may allow urine to leak. To strengthen the pelvic floor muscles, do the exercises daily. In a few months, the muscles will be stronger and tighter. This can help prevent urine leakage.  Date Last Reviewed: 1/1/2017 2000-2017 [employer]. [address] [place] [address]. All rights reserved. This information is not intended as a substitute for professional medical care. Always follow your healthcare professional's instructions.        Treating Incontinence in Women: Special Therapies     During biofeedback, sensors send signals from your pelvic floor muscles to a computer screen.     Your doctor will discuss your options for treating your urinary incontinence. These depend on the cause of your problem and any other health issues you have. Usually behavioral changes are tried first, followed by various medicines. If these methods are unsuccessful,  one or more of the therapies described below may be part of your treatment plan.  Biofeedback  This technique is taught by a nurse or physical therapist. During the therapy, a small sensor is placed in your vagina or rectum. Another sensor is placed on your stomach. Other types of sensors are also available. These sensors read signals from the pelvic floor muscles. When you contract or relax your muscles, these signals are shown as images on a computer screen. Using the images, you can learn to relax or contract certain muscles. This can help you strengthen and better control these muscles. And it can help you learn pelvic floor muscle exercises.  Electrical stimulation  This is a painless therapy that uses a tiny amount of electric current. It helps strengthen very weak or damaged pelvic floor muscles. The electric current is sent through the muscles of the pelvic floor and bladder. This causes the muscles to contract. In time, this helps make the muscles stronger.  Stimulator implants  This technique is used to treat urge incontinence. A small device is implanted under the skin near the stomach. This device gives off mild electrical signals. These block extra signals that are being sent to the bladder muscle. This helps the bladder work more normally.  Date Last Reviewed: 1/1/2017 2000-2017 [employer]. [address] [place] [address], [place] [address]. All rights reserved. This information is not intended as a substitute for professional medical care. Always follow your healthcare professional's instructions.

## 2017-08-24 NOTE — NURSING NOTE
"Chief Complaint   Patient presents with     Physical       Initial /84 (BP Location: Right arm, Patient Position: Chair, Cuff Size: Adult Large)  Pulse 62  Temp 97.4  F (36.3  C) (Tympanic)  Resp 16  Wt 191 lb (86.6 kg)  SpO2 97%  BMI 33.83 kg/m2 Estimated body mass index is 33.83 kg/(m^2) as calculated from the following:    Height as of 2/23/17: 5' 3\" (1.6 m).    Weight as of this encounter: 191 lb (86.6 kg).  Medication Reconciliation: complete    Health Maintenance that is potentially due pending provider review:    Advanced Directive, Dexa Scan, Pneumococcal, tetnus  Will discuss with provider.    Is there anyone who you would like to be able to receive your results? No  If yes have patient fill out SU      "

## 2017-08-24 NOTE — NURSING NOTE
Screening Questionnaire for Adult Immunization    Are you sick today?   No   Do you have allergies to medications, food, a vaccine component or latex?   Penicillins, Pravastatin, Sumvastin Sulfa drugs   Have you ever had a serious reaction after receiving a vaccination?   No   Do you have a long-term health problem with heart disease, lung disease, asthma, kidney disease, metabolic disease (e.g. diabetes), anemia, or other blood disorder?   No   Do you have cancer, leukemia, HIV/AIDS, or any other immune system problem?   No   In the past 3 months, have you taken medications that affect  your immune system, such as prednisone, other steroids, or anticancer drugs; drugs for the treatment of rheumatoid arthritis, Crohn s disease, or psoriasis; or have you had radiation treatments?   No   Have you had a seizure, or a brain or other nervous system problem?   No   During the past year, have you received a transfusion of blood or blood     products, or been given immune (gamma) globulin or antiviral drug?   No   For women: Are you pregnant or is there a chance you could become        pregnant during the next month?   No   Have you received any vaccinations in the past 4 weeks?   No     Immunization questionnaire was positive for at least one answer.  Notified Dr. Alcocer.        Per orders of Dr. Alcocer, injection of PREVAR 13 given by Julia Linton. Patient instructed to remain in clinic for 15 minutes afterwards, and to report any adverse reaction to me immediately.       Screening performed by Julia Linton on 8/24/2017 at 10:50 AM.

## 2017-08-24 NOTE — MR AVS SNAPSHOT
After Visit Summary   8/24/2017    Rosita King    MRN: [id number]           Patient Information     Date Of Birth          1950        Visit Information        Provider Department      8/24/2017 9:40 AM Addis Del Rio MD [facility] [place] [facility] [place]        Today's Diagnoses     Encounter for general adult medical examination with abnormal findings    -  1    Paroxysmal supraventricular tachycardia (H)        ASHLEY (obstructive sleep apnea)        Vaginal itching        Essential hypertension with goal blood pressure less than 140/90        Urge incontinence of urine        Gastroesophageal reflux disease without esophagitis        CARDIOVASCULAR SCREENING; LDL GOAL LESS THAN 130        Elevated blood sugar        Lichen sclerosus          Care Instructions    Schedule Appointment with Sleep doctor to update CPAP.   Make appointment with EP specialist at Cardiology. I recommend Dr. Glynn.     Urge Incontinence- We have some medications to help with this if it is really disrupting your daily activities.  You can also try doing keggle exercises when you have the urge to go.      Complete advanced directives paperwork and bring into clinic. Resources are available on the [employer] website.     Apply the antifungal cream and steroid cream to creases in groin, around anus, and in vaginal area.       Preventive Health Recommendations  Female Ages 65 +    Yearly exam:     See your health care provider every year in order to  o Review health changes.   o Discuss preventive care.    o Review your medicines if your doctor has prescribed any.      You no longer need a yearly Pap test unless you've had an abnormal Pap test in the past 10 years. If you have vaginal symptoms, such as bleeding or discharge, be sure to talk with your provider about a Pap test.      Every 1 to 2 years, have a mammogram.  If you are over 69, talk with your health care provider about whether or not you want to continue having  screening mammograms.      Every 10 years, have a colonoscopy. Or, have a yearly FIT test (stool test). These exams will check for colon cancer.       Have a cholesterol test every 5 years, or more often if your doctor advises it.       Have a diabetes test (fasting glucose) every three years. If you are at risk for diabetes, you should have this test more often.       At age 65, have a bone density scan (DEXA) to check for osteoporosis (brittle bone disease).    Shots:    Get a flu shot each year.    Get a tetanus shot every 10 years.    Talk to your doctor about your pneumonia vaccines. There are now two you should receive - Pneumovax (PPSV 23) and Prevnar (PCV 13).    Talk to your doctor about the shingles vaccine.    Talk to your doctor about the hepatitis B vaccine.    Nutrition:     Eat at least 5 servings of fruits and vegetables each day.      Eat whole-grain bread, whole-wheat pasta and brown rice instead of white grains and rice.      Talk to your provider about Calcium and Vitamin D.     Lifestyle    Exercise at least 150 minutes a week (30 minutes a day, 5 days a week). This will help you control your weight and prevent disease.      Limit alcohol to one drink per day.      No smoking.       Wear sunscreen to prevent skin cancer.       See your dentist twice a year for an exam and cleaning.    See your eye doctor every 1 to 2 years to screen for conditions such as glaucoma, macular degeneration, cataracts, etc   What Are Snoring and Obstructive Sleep Apnea?  If you ve ever had a stuffed-up nose, you know the feeling of trying to breathe through a very narrow passageway. This is what happens in your throat when you snore. While you sleep, structures in your throat partially block your air passage, making the passage narrow and hard to breathe through. If the entire passage becomes blocked and you can t breathe at all, you have sleep apnea.      Snoring Obstructive sleep apnea   Snoring  If your throat  structures are too large or the muscles relax too much during sleep, the air passage may be partially blocked. As air from the nose or mouth passes around this blockage, the throat structures vibrate, causing the familiar sound of snoring. At times, this sound can be so loud that snorers wake up others, or even themselves, during the night. Snoring gets worse as more and more of the air passage is blocked.  Obstructive sleep apnea  If the structures completely block the throat, air can t flow to the lungs at all. This is called apnea (meaning  no breathing ). Since the lungs aren t getting fresh air, the brain tells the body to wake up just enough to tighten the muscles and unblock the air passage. With a loud gasp, breathing begins again. This process may be repeated over and over again throughout the night, making your sleep fragmented with a lighter stage of sleep. Even though you do not remember waking up many times during the night to a lighter sleep, you feel tired the next day. The lack of sleep and fresh air can also strain your lungs, heart, and other organs, leading to problems such as high blood pressure, heart attack, or stroke.  Problems in the nose and jaw  Problems in the structure of the nose may obstruct breathing. A crooked (deviated) septum or swollen turbinates can make snoring worse or lead to apnea. Also, a receding jaw may make the tongue sit too far back, so it s more likely to block the airway when you re asleep.        Date Last Reviewed: 7/18/2015 2000-2017 [employer]. [address] [place] [address], [place] [address]. All rights reserved. This information is not intended as a substitute for professional medical care. Always follow your healthcare professional's instructions.        Obstructive Sleep Apnea  Obstructive sleep apnea is a condition that causes your air passages to become narrowed or blocked during sleep. As a result, breathing stops for short periods. Your body  wakes up enough for breathing to begin again, though you don't remember it. The cycle of stopped breathing and brief awakenings can repeat dozens of times a night. This prevents the body from getting to the deeper stages of sleep that are needed for good rest and may cause your body's oxygen level to fall.  Signs of sleep apnea include loud snoring, noisy breathing, and gasping sounds during sleep. Daytime symptoms include waking up tired after a full night's sleep, waking up with headaches, feeling very sleepy or falling asleep during the day, and having problems with memory or concentration.  Risk factors for sleep apnea include:  Being overweight  Being a man, or a woman in menopause  Smoking  Using alcohol or sedating medicines  Having enlarged structures in the nose or throat  Home care  Lifestyle changes that can help treat snoring and sleep apnea include the following:  If you are overweight, lose weight. Talk to your healthcare provider about a weight-loss plan for you.  Avoid alcohol for 3 to 4 hours before bedtime. Avoid sedating medications. Ask your healthcare provider about the medicines you take.  If you smoke, talk to your healthcare provider about ways to quit.  Sleep on your side. This can help prevent gravity from pulling relaxed throat tissues into your breathing passages.  If you have allergies or sinus problems that block your nose, ask your healthcare provider for help.  Follow-up care  Follow up with your healthcare provider, or as advised. A diagnosis of sleep apnea is made with a sleep study. Your healthcare provider can tell you more about this test.  When to seek medical advice  Sleep apnea can make you more likely to have certain health problems. These include high blood pressure, heart attack, stroke, and sexual dysfunction. If you have sleep apnea, talk to your healthcare provider about the best treatments for you.  Date Last Reviewed: 4/1/2017 2000-2017 [employer]  [address] [place] [address]. All rights reserved. This information is not intended as a substitute for professional medical care. Always follow your healthcare professional's instructions.        Treating Incontinence in Women: Nonsurgical Methods    The best treatment for you will depend on the type of incontinence you have. Your symptoms, age, and any underlying problems that are found also affect your treatment. While some types of incontinence may eventually require surgery, nonsurgical treatments may be effective in many cases. Nonsurgical treatments include lifestyle changes, muscle-strengthening exercises, and medicines.  Nonsurgical Treatments  Treatment for stress urinary incontinence includes:  Bladder training  Lifestyle changes such as weight loss and increased activity if incontinence is due to being overweight  Medicines, if bladder training has not helped  Pelvic floor muscle exercises  Lifestyle changes  Losing weight. Excess weight puts extra pressure on the pelvic floor muscles. Exercising and eating right can help you lose weight. This helps other treatments work better.  Making certain diet changes. Some foods may make you need to urinate more, so it may be good to avoid them. These include caffeinated drinks and alcohol. Ask your healthcare provider whether these or other diet changes might be helpful.  Quitting smoking. Smoking can lead to a chronic cough that strains pelvic floor muscles. Smoking may also damage the bladder and urethra.  Pelvic floor musle exercises  There are exercises you can do to help strengthen your pelvic floor muscles. The pelvic floor muscles act as a sling to help hold the bladder and urethra in place. These muscles also help keep the urethra closed. Weak pelvic floor muscles may allow urine to leak. To strengthen the pelvic floor muscles, do the exercises daily. In a few months, the muscles will be stronger and tighter. This can help prevent urine  leakage.  Date Last Reviewed: 1/1/2017 2000-2017 [employer]. [address] [place] [address], [place] [address]. All rights reserved. This information is not intended as a substitute for professional medical care. Always follow your healthcare professional's instructions.        Treating Incontinence in Women: Special Therapies     During biofeedback, sensors send signals from your pelvic floor muscles to a computer screen.     Your doctor will discuss your options for treating your urinary incontinence. These depend on the cause of your problem and any other health issues you have. Usually behavioral changes are tried first, followed by various medicines. If these methods are unsuccessful, one or more of the therapies described below may be part of your treatment plan.  Biofeedback  This technique is taught by a nurse or physical therapist. During the therapy, a small sensor is placed in your vagina or rectum. Another sensor is placed on your stomach. Other types of sensors are also available. These sensors read signals from the pelvic floor muscles. When you contract or relax your muscles, these signals are shown as images on a computer screen. Using the images, you can learn to relax or contract certain muscles. This can help you strengthen and better control these muscles. And it can help you learn pelvic floor muscle exercises.  Electrical stimulation  This is a painless therapy that uses a tiny amount of electric current. It helps strengthen very weak or damaged pelvic floor muscles. The electric current is sent through the muscles of the pelvic floor and bladder. This causes the muscles to contract. In time, this helps make the muscles stronger.  Stimulator implants  This technique is used to treat urge incontinence. A small device is implanted under the skin near the stomach. This device gives off mild electrical signals. These block extra signals that are being sent to the bladder muscle. This  "helps the bladder work more normally.  Date Last Reviewed: 1/1/2017 2000-2017 [employer]. [address] [place] [address], [place] [address]. All rights reserved. This information is not intended as a substitute for professional medical care. Always follow your healthcare professional's instructions.                  Follow-ups after your visit        Additional Services     PHYSICAL THERAPY REFERRAL       *This therapy referral will be filtered to a centralized scheduling office at [facility] and the patient will receive a call to schedule an appointment at a [facility] location most convenient for them. *     [facility] provides Physical Therapy evaluation and treatment and many specialty services across the [facility] system.  If requesting a specialty program, please choose from the list below.    If you have not heard from the scheduling office within 2 business days, please call [phone] for all locations, with the exception of [place], please call [phone].  Treatment: Evaluation & Treatment  Special Instructions/Modalities:   Special Programs: Incontinence Pelvic Floor Program    Please be aware that coverage of these services is subject to the terms and limitations of your health insurance plan.  Call member services at your health plan with any benefit or coverage questions.      **Note to Provider:  If you are referring outside of [facility] for the therapy appointment, please list the name of the location in the \"special instructions\" above, print the referral and give to the patient to schedule the appointment.            SLEEP EVALUATION & MANAGEMENT REFERRAL - ADULT       Please be aware that coverage of these services is subject to the terms and limitations of your health insurance plan.  Call member services at your health plan with any benefit or coverage questions.      Please bring the following to your appointment:    >>   List of current " medications   >>   This referral request   >>   Any documents/labs given to you for this referral    [facility] Lab  Ph [phone] (Age 2 and up)                  Future tests that were ordered for you today     Open Future Orders        Priority Expected Expires Ordered    SLEEP EVALUATION & MANAGEMENT REFERRAL - ADULT Routine  8/24/2018 8/24/2017            Who to contact     If you have questions or need follow up information about today's clinic visit or your schedule please contact [facility] directly at [phone].  Normal or non-critical lab and imaging results will be communicated to you by [email or web address], letter or phone within 4 business days after the clinic has received the results. If you do not hear from us within 7 days, please contact the clinic through [employer] or phone. If you have a critical or abnormal lab result, we will notify you by phone as soon as possible.  Submit refill requests through [employer] or call your pharmacy and they will forward the refill request to us. Please allow 3 business days for your refill to be completed.          Additional Information About Your Visit        [email or web address] Information     [employer] gives you secure access to your electronic health record. If you see a primary care provider, you can also send messages to your care team and make appointments. If you have questions, please call your primary care clinic.  If you do not have a primary care provider, please call [phone] and they will assist you.        Care EveryWhere ID     This is your Care EveryWhere ID. This could be used by other organizations to access your [facility] medical records  [id number]        Your Vitals Were     Pulse Temperature Respirations Pulse Oximetry BMI (Body Mass Index)       62 97.4  F (36.3  C) (Tympanic) 16 97% 33.83 kg/m2        Blood Pressure from Last 3 Encounters:   08/24/17 138/84   08/04/17 136/82   07/18/17 138/82    Weight from Last 3  Encounters:   08/24/17 191 lb (86.6 kg)   08/04/17 193 lb (87.5 kg)   07/18/17 189 lb (85.7 kg)              We Performed the Following     *UA reflex to Microscopic and Culture ([facility] (except [place] and [place])     Hemoglobin A1c     Lipid panel reflex to direct LDL     PHYSICAL THERAPY REFERRAL     Wet prep          Today's Medication Changes          These changes are accurate as of: 8/24/17 10:43 AM.  If you have any questions, ask your nurse or doctor.               Start taking these medicines.        Dose/Directions    clobetasol 0.05 % cream   Commonly known as:  TEMOVATE   Used for:  Lichen sclerosus   Started by:  Addis Del Rio MD        Apply sparingly to affected area 1-2 tmes daily, if expensive for patient, will change to triamcinolone (contact me)   Quantity:  30 g   Refills:  1            Where to get your medicines      These medications were sent to [facility] [address] [place] [address]  [address] [facility] [address]     Phone:  [phone]     clobetasol 0.05 % cream                Primary Care Provider Office Phone # Fax #    Addis Del Rio [phone]       [address] [facility] [address]        Equal Access to Services     GERA LUBIN AH: Hadii jd ku hadasho Soomaali, waaxda luqadaha, qaybta kaalmada adeegyada, waxay darylin haymorgann ogpal corrigan. So [facility] [phone].    ATENCIÓN: Si habla español, tiene a adams disposición servicios gratuitos de asistencia lingüística. Llame al [phone].    We comply with applicable federal civil rights laws and [place] laws. We do not discriminate on the basis of race, color, national origin, age, disability sex, sexual orientation or gender identity.            Thank you!     Thank you for choosing [facility]  for your care. Our goal is always to provide you with excellent care. Hearing back from our patients is one way we can continue to improve our  services. Please take a few minutes to complete the written survey that you may receive in the mail after your visit with us. Thank you!             Your Updated Medication List - Protect others around you: Learn how to safely use, store and throw away your medicines at www.disposemymeds.org.          This list is accurate as of: 8/24/17 10:43 AM.  Always use your most recent med list.                   Brand Name Dispense Instructions for use Diagnosis    ALEVE 220 MG capsule   Generic drug:  naproxen sodium      Take 220 mg by mouth 2 times daily (with meals) Reported on 2/23/2017        CENTRUM SILVER per tablet      Take 1 tablet by mouth daily        clobetasol 0.05 % cream    TEMOVATE    30 g    Apply sparingly to affected area 1-2 tmes daily, if expensive for patient, will change to triamcinolone (contact me)    Lichen sclerosus       metoprolol 50 MG 24 hr tablet    TOPROL-XL    93 tablet    Take 1 tablet (50 mg) by mouth daily    Benign essential hypertension       omeprazole 20 MG CR capsule    priLOSEC    90 capsule    Take 1 capsule (20 mg) by mouth daily    Gastroesophageal reflux disease without esophagitis       VITAMIN D (CHOLECALCIFEROL) PO      Take 500 Units by mouth daily

## 2017-10-16 DIAGNOSIS — K21.9 GASTROESOPHAGEAL REFLUX DISEASE WITHOUT ESOPHAGITIS: ICD-10-CM

## 2017-10-17 DIAGNOSIS — I10 BENIGN ESSENTIAL HYPERTENSION: ICD-10-CM

## 2017-10-17 RX ORDER — METOPROLOL SUCCINATE 50 MG/1
50 TABLET, EXTENDED RELEASE ORAL DAILY
Qty: 93 TABLET | Refills: 1 | Status: SHIPPED | OUTPATIENT
Start: 2017-10-17 | End: 2018-11-08

## 2017-10-17 NOTE — TELEPHONE ENCOUNTER
Toprol XL       Last Written Prescription Date: 6/17/16  Last Fill Quantity: 93, # refills: 3    Last Office Visit with [facility] or [facility] prescribing provider:  8/24/17 Dr. House- RX was last written by Dr. DAQUAN Kelsey Cardiologist  [facility]     Future Office Visit:        BP Readings from Last 3 Encounters:   08/24/17 138/84   08/04/17 136/82   07/18/17 138/82     
Clear bilaterally, pupils equal, round and reactive to light.

## 2017-11-27 ENCOUNTER — ALLIED HEALTH/NURSE VISIT (OUTPATIENT)
Dept: FAMILY MEDICINE | Facility: CLINIC | Age: 67
End: 2017-11-27
Payer: COMMERCIAL

## 2017-11-27 DIAGNOSIS — Z23 NEED FOR PROPHYLACTIC VACCINATION AND INOCULATION AGAINST INFLUENZA: Primary | ICD-10-CM

## 2017-11-27 PROCEDURE — 99207 ZZC NO CHARGE NURSE ONLY: CPT

## 2017-11-27 PROCEDURE — 90471 IMMUNIZATION ADMIN: CPT

## 2017-11-27 PROCEDURE — 90662 IIV NO PRSV INCREASED AG IM: CPT

## 2017-11-27 NOTE — MR AVS SNAPSHOT
After Visit Summary   11/27/2017    Rosita King    MRN: [id number]           Patient Information     Date Of Birth          1950        Visit Information        Provider Department      11/27/2017 11:30 AM FL PI [facility]CO/LPN [facility]        Today's Diagnoses     Need for prophylactic vaccination and inoculation against influenza    -  1       Follow-ups after your visit        Who to contact     If you have questions or need follow up information about today's clinic visit or your schedule please contact [facility] directly at [phone].  Normal or non-critical lab and imaging results will be communicated to you by [employer], letter or phone within 4 business days after the clinic has received the results. If you do not hear from us within 7 days, please contact the clinic through [employer] or phone. If you have a critical or abnormal lab result, we will notify you by phone as soon as possible.  Submit refill requests through [employer] or call your pharmacy and they will forward the refill request to us. Please allow 3 business days for your refill to be completed.          Additional Information About Your Visit        MyChart Information     [employer] gives you secure access to your electronic health record. If you see a primary care provider, you can also send messages to your care team and make appointments. If you have questions, please call your primary care clinic.  If you do not have a primary care provider, please call [phone] and they will assist you.        Care EveryWhere ID     This is your Care EveryWhere ID. This could be used by other organizations to access your [facility] medical records  [id number]         Blood Pressure from Last 3 Encounters:   08/24/17 138/84   08/04/17 136/82   07/18/17 138/82    Weight from Last 3 Encounters:   08/24/17 191 lb (86.6 kg)   08/04/17 193 lb (87.5 kg)   07/18/17 189 lb (85.7 kg)              We Performed the  Following     FLU VACCINE, INCREASED ANTIGEN, PRESV FREE, AGE 65+ [61906]     Vaccine Administration, Initial [[id number]]        Primary Care Provider Office Phone # Fax #    Addis Del Rio [phone] [address] [place] [address]        Equal Access to Services     PASQUALEKIARA AMEE : Hadii aad ku hadasho Soomaali, waaxda luqadaha, qaybta kaalmada adeegyada, waxay darylin haymorgann adekathe chip laigor corrigan. So [facility] [phone].    ATENCIÓN: Si habla español, tiene a adams disposición servicios gratuitos de asistencia lingüística. [facility] [phone].    We comply with applicable federal civil rights laws and [place] laws. We do not discriminate on the basis of race, color, national origin, age, disability, sex, sexual orientation, or gender identity.            Thank you!     Thank you for choosing [facility]  for your care. Our goal is always to provide you with excellent care. Hearing back from our patients is one way we can continue to improve our services. Please take a few minutes to complete the written survey that you may receive in the mail after your visit with us. Thank you!             Your Updated Medication List - Protect others around you: Learn how to safely use, store and throw away your medicines at www.disposemymeds.org.          This list is accurate as of: 11/27/17 11:30 AM.  Always use your most recent med list.                   Brand Name Dispense Instructions for use Diagnosis    ALEVE 220 MG capsule   Generic drug:  naproxen sodium      Take 220 mg by mouth 2 times daily (with meals) Reported on 2/23/2017        CENTRUM SILVER per tablet      Take 1 tablet by mouth daily        metoprolol 50 MG 24 hr tablet    TOPROL-XL    93 tablet    Take 1 tablet (50 mg) by mouth daily    Benign essential hypertension       omeprazole 20 MG CR capsule    priLOSEC    90 capsule    TAKE ONE CAPSULE BY MOUTH ONCE DAILY    Gastroesophageal reflux disease without esophagitis        triamcinolone 0.1 % ointment    KENALOG    45 g    Apply sparingly to affected area two times daily for 14 days.    Vaginal itching, Lichen sclerosus       VITAMIN D (CHOLECALCIFEROL) PO      Take 500 Units by mouth daily

## 2018-01-08 ENCOUNTER — MYC MEDICAL ADVICE (OUTPATIENT)
Dept: FAMILY MEDICINE | Facility: CLINIC | Age: 68
End: 2018-01-08

## 2018-01-08 DIAGNOSIS — L29.9 ITCHY SCALP: Primary | ICD-10-CM

## 2018-01-08 RX ORDER — FLUOCINONIDE TOPICAL SOLUTION USP, 0.05% 0.5 MG/ML
SOLUTION TOPICAL
Qty: 60 ML | Refills: 3 | Status: SHIPPED | OUTPATIENT
Start: 2018-01-08 | End: 2018-06-06

## 2018-04-10 ENCOUNTER — HOSPITAL ENCOUNTER (OUTPATIENT)
Dept: CARDIOLOGY | Facility: CLINIC | Age: 68
Discharge: HOME | End: 2018-04-10
Attending: INTERNAL MEDICINE | Admitting: INTERNAL MEDICINE
Payer: MEDICARE

## 2018-04-10 ENCOUNTER — OFFICE VISIT (OUTPATIENT)
Dept: CARDIOLOGY | Facility: CLINIC | Age: 68
End: 2018-04-10
Payer: COMMERCIAL

## 2018-04-10 VITALS
DIASTOLIC BLOOD PRESSURE: 70 MMHG | OXYGEN SATURATION: 95 % | SYSTOLIC BLOOD PRESSURE: 127 MMHG | HEART RATE: 68 BPM | WEIGHT: 196 LBS | BODY MASS INDEX: 34.72 KG/M2

## 2018-04-10 DIAGNOSIS — I47.10 PAROXYSMAL SUPRAVENTRICULAR TACHYCARDIA (H): ICD-10-CM

## 2018-04-10 DIAGNOSIS — I47.10 PAROXYSMAL SUPRAVENTRICULAR TACHYCARDIA (H): Primary | ICD-10-CM

## 2018-04-10 PROCEDURE — 93005 ELECTROCARDIOGRAM TRACING: CPT

## 2018-04-10 PROCEDURE — 99213 OFFICE O/P EST LOW 20 MIN: CPT | Performed by: INTERNAL MEDICINE

## 2018-04-10 PROCEDURE — 93010 ELECTROCARDIOGRAM REPORT: CPT | Performed by: INTERNAL MEDICINE

## 2018-04-10 NOTE — PROGRESS NOTES
Service Date: 04/10/2018      Addis Del Rio MD    [facility]    [address] [address]      RE: Rosita King   MRN: [id number]   : 1950      Dear Dr. Del Rio:      Thank you for allowing me to participate in the care of this very delightful patient.  As you know, Jenna is a 67-year-old female with a history of palpitation along with other nonspecific symptoms such as headache and shortness of breath and was documented to have a narrow-complex tachycardia that lasted for about 4 hours found on Zio Patch monitor.  She appeared to be in atrial tachycardia, rate of 130-150 beats per minute.  She saw my partner, Dr. Saravia, who recommended her to be on beta blocker and consider ablation if it was not effective.      Since then, she has continued to have symptoms as described above, especially after given the fact that her  was diagnosed with atrial fibrillation and stroke.  The patient thought she may have atrial fibrillation as well.  I told her that she does not have atrial fibrillation but I think it is quite reasonable to repeat another Zio Patch monitor as her symptoms occur on a daily basis but she does not know for sure.  Sometimes she complains of shortness of breath as well.  The patient strikes me as quite anxious and after this discussion, especially the fact that she does not have atrial fibrillation from the monitor she wore, she was quite reassured.  I asked her to write all the symptoms that she was having when she wore a Zio Patch monitor and to see the correlation between her symptoms and the arrhythmias.  The patient did complain of palpitations today at this office visit but EKG demonstrated sinus rhythm.  I asked her to continue beta blocker and will call the patient regarding result of the Zio Patch monitor.      Sincerely,         JOSE BUTLER MD             D: 04/10/2018   T: 04/10/2018   MT: FRAN      Name:     ROSITA KING   MRN:       [id number]        Account:      [id number]   :      1950           Service Date: 04/10/2018      Document: [id number]

## 2018-04-10 NOTE — MR AVS SNAPSHOT
After Visit Summary   4/10/2018    Rosita King    MRN: [id number]           Patient Information     Date Of Birth          1950        Visit Information        Provider Department      4/10/2018 10:30 AM Manuel Glynn MD [facility]        Today's Diagnoses     Paroxysmal supraventricular tachycardia (H)    -  1       Follow-ups after your visit        Additional Services     Follow-Up with Cardiac Advanced Practice Provider                 Your next 10 appointments already scheduled     Apr 10, 2018  1:15 PM CDT   ecg with [facility]   [facility] ([facility])    [address] [address]   [phone]            Apr 17, 2018  1:00 PM CDT   ZIOPATCH MONITOR with [facility]   [facility] ([facility])    [address] [address]   [phone]            Apr 25, 2018  9:30 AM CDT   Women's Health Eval with Padma Barnes, PT   [facility] ([facility])    [address] [place] [address]   [phone]              Future tests that were ordered for you today     Open Future Orders        Priority Expected Expires Ordered    Follow-Up with Cardiac Advanced Practice Provider Routine 4/10/2019 4/11/2019 4/10/2018    Zio Patch Monitor Routine 4/17/2018 4/10/2019 4/10/2018    EKG 12-LEAD CLINIC READ ([facility])- to be scheduled Routine 4/10/2018 4/10/2019 4/10/2018            Who to contact     If you have questions or need follow up information about today's clinic visit or your schedule please contact [facility] directly at [phone].  Normal or non-critical lab and imaging results will be communicated to you by MyChart, letter or phone within 4 business days after the clinic has received the results. If you do not hear from us  within 7 days, please contact the clinic through [employer] or phone. If you have a critical or abnormal lab result, we will notify you by phone as soon as possible.  Submit refill requests through [employer] or call your pharmacy and they will forward the refill request to us. Please allow 3 business days for your refill to be completed.          Additional Information About Your Visit        [employer] Information     [employer] gives you secure access to your electronic health record. If you see a primary care provider, you can also send messages to your care team and make appointments. If you have questions, please call your primary care clinic.  If you do not have a primary care provider, please call [phone] and they will assist you.        Care EveryWhere ID     This is your Care EveryWhere ID. This could be used by other organizations to access your [facility] medical records  [id number]        Your Vitals Were     Pulse Pulse Oximetry BMI (Body Mass Index)             68 95% 34.72 kg/m2          Blood Pressure from Last 3 Encounters:   04/10/18 127/70   08/24/17 138/84   08/04/17 136/82    Weight from Last 3 Encounters:   04/10/18 88.9 kg (196 lb)   08/24/17 86.6 kg (191 lb)   08/04/17 87.5 kg (193 lb)               Primary Care Provider Office Phone # Fax #    Addis Del Rio [phone]       [address] [place] [id number]        Equal Access to Services     [facility] AH: Hadii aad ku hadasho Soomaali, waaxda luqadaha, qaybta kaalmada adeegyada, jane terrazas . So [facility] [phone].    ATENCIÓN: Si habla español, tiene a adams disposición servicios gratuitos de asistencia lingüística. Llame al [phone].    We comply with applicable federal civil rights laws and [place] laws. We do not discriminate on the basis of race, color, national origin, age, disability, sex, sexual orientation, or gender identity.            Thank you!     Thank you for choosing  [facility]  for your care. Our goal is always to provide you with excellent care. Hearing back from our patients is one way we can continue to improve our services. Please take a few minutes to complete the written survey that you may receive in the mail after your visit with us. Thank you!             Your Updated Medication List - Protect others around you: Learn how to safely use, store and throw away your medicines at www.disposemymeds.org.          This list is accurate as of 4/10/18 12:30 PM.  Always use your most recent med list.                   Brand Name Dispense Instructions for use Diagnosis    ALEVE 220 MG capsule   Generic drug:  naproxen sodium      Take 220 mg by mouth 2 times daily (with meals) Reported on 2/23/2017        CENTRUM SILVER per tablet      Take 1 tablet by mouth daily        fluocinonide 0.05 % solution    LIDEX    60 mL    Apply sparingly to affected area twice daily as needed.  Do not apply to face.    Itchy scalp       metoprolol succinate 50 MG 24 hr tablet    TOPROL-XL    93 tablet    Take 1 tablet (50 mg) by mouth daily    Benign essential hypertension       omeprazole 20 MG CR capsule    priLOSEC    90 capsule    TAKE ONE CAPSULE BY MOUTH ONCE DAILY    Gastroesophageal reflux disease without esophagitis       triamcinolone 0.1 % ointment    KENALOG    45 g    Apply sparingly to affected area two times daily for 14 days.    Vaginal itching, Lichen sclerosus       VITAMIN D (CHOLECALCIFEROL) PO      Take 500 Units by mouth daily

## 2018-04-10 NOTE — LETTER
4/10/2018      Addis Del Rio MD  [address] [address]      RE: Rosita King       Dear Colleague,    I had the pleasure of seeing Rosita King in the [facility].    Service Date: 04/10/2018      Addis Del Rio MD    [facility]    [address] [address]      RE: Rosita King   MRN: [id number]   : 1950      Dear Dr. Del Rio:      Thank you for allowing me to participate in the care of this very delightful patient.  As you know, Jenna is a 67-year-old female with a history of palpitation along with other nonspecific symptoms such as headache and shortness of breath and was documented to have a narrow-complex tachycardia that lasted for about 4 hours found on Zio Patch monitor.  She appeared to be in atrial tachycardia, rate of 130-150 beats per minute.  She saw my partner, Dr. Saravia, who recommended her to be on beta blocker and consider ablation if it was not effective.      Since then, she has continued to have symptoms as described above, especially after given the fact that her  was diagnosed with atrial fibrillation and stroke.  The patient thought she may have atrial fibrillation as well.  I told her that she does not have atrial fibrillation but I think it is quite reasonable to repeat another Zio Patch monitor as her symptoms occur on a daily basis but she does not know for sure.  Sometimes she complains of shortness of breath as well.  The patient strikes me as quite anxious and after this discussion, especially the fact that she does not have atrial fibrillation from the monitor she wore, she was quite reassured.  I asked her to write all the symptoms that she was having when she wore a Zio Patch monitor and to see the correlation between her symptoms and the arrhythmias.  The patient did complain of palpitations today at this office visit but EKG demonstrated sinus rhythm.  I asked her to continue beta  blocker and will call the patient regarding result of the Zio Patch monitor.      Sincerely,         JOSE BUTLER MD             D: 04/10/2018   T: 04/10/2018   MT: FRAN      Name:     MICHAEL MUNOZ   MRN:      [id number]        Account:      [id number]   :      1950           Service Date: 04/10/2018      Document: [id number]           Outpatient Encounter Prescriptions as of 4/10/2018   Medication Sig Dispense Refill     fluocinonide (LIDEX) 0.05 % solution Apply sparingly to affected area twice daily as needed.  Do not apply to face. 60 mL 3     metoprolol (TOPROL-XL) 50 MG 24 hr tablet Take 1 tablet (50 mg) by mouth daily 93 tablet 1     omeprazole (PRILOSEC) 20 MG CR capsule TAKE ONE CAPSULE BY MOUTH ONCE DAILY 90 capsule 1     triamcinolone (KENALOG) 0.1 % ointment Apply sparingly to affected area two times daily for 14 days. 45 g 1     naproxen sodium (ALEVE) 220 MG capsule Take 220 mg by mouth 2 times daily (with meals) Reported on 2017       Multiple Vitamins-Minerals (CENTRUM SILVER) per tablet Take 1 tablet by mouth daily       VITAMIN D, CHOLECALCIFEROL, PO Take 500 Units by mouth daily        No facility-administered encounter medications on file as of 4/10/2018.        Again, thank you for allowing me to participate in the care of your patient.      Sincerely,    Jose Reyes MD     [facility]

## 2018-04-10 NOTE — PROGRESS NOTES
HPI and Plan:   See dictation  [id number]  Orders Placed This Encounter   Procedures     EKG 12-LEAD CLINIC READ ([facility])- to be scheduled     Zio Patch Monitor       No orders of the defined types were placed in this encounter.      There are no discontinued medications.      Encounter Diagnosis   Name Primary?     Paroxysmal supraventricular tachycardia (H) Yes       CURRENT MEDICATIONS:  Current Outpatient Prescriptions   Medication Sig Dispense Refill     fluocinonide (LIDEX) 0.05 % solution Apply sparingly to affected area twice daily as needed.  Do not apply to face. 60 mL 3     metoprolol (TOPROL-XL) 50 MG 24 hr tablet Take 1 tablet (50 mg) by mouth daily 93 tablet 1     omeprazole (PRILOSEC) 20 MG CR capsule TAKE ONE CAPSULE BY MOUTH ONCE DAILY 90 capsule 1     triamcinolone (KENALOG) 0.1 % ointment Apply sparingly to affected area two times daily for 14 days. 45 g 1     naproxen sodium (ALEVE) 220 MG capsule Take 220 mg by mouth 2 times daily (with meals) Reported on 2/23/2017       Multiple Vitamins-Minerals (CENTRUM SILVER) per tablet Take 1 tablet by mouth daily       VITAMIN D, CHOLECALCIFEROL, PO Take 500 Units by mouth daily          ALLERGIES     Allergies   Allergen Reactions     Penicillins      Pravastatin      Other reaction(s): Myalgia     Simvastatin      Other reaction(s): Myalgia     Sulfa Drugs        PAST MEDICAL HISTORY:  No past medical history on file.    PAST SURGICAL HISTORY:  Past Surgical History:   Procedure Laterality Date     CARPAL TUNNEL RELEASE RT/LT Bilateral 2006, 2009     COLONOSCOPY N/A 11/25/2015    Procedure: COLONOSCOPY;  Surgeon: Mina Roy MD;  Location: [facility]     ESOPHAGOSCOPY, GASTROSCOPY, DUODENOSCOPY (EGD), COMBINED N/A 11/25/2015    Procedure: COMBINED ESOPHAGOSCOPY, GASTROSCOPY, DUODENOSCOPY (EGD), BIOPSY SINGLE OR MULTIPLE;  Surgeon: Mina Roy MD;  Location: [facility]     TONSILLECTOMY & ADENOIDECTOMY  age 13       FAMILY  HISTORY:  Family History   Problem Relation Age of Onset     Liver Disease Mother      Depression/Anxiety Father      DIABETES Father      DIABETES Sister      Liver Disease Sister        SOCIAL HISTORY:  Social History     Social History     Marital status:      Spouse name: N/A     Number of children: N/A     Years of education: N/A     Social History Main Topics     Smoking status: Former Smoker     Smokeless tobacco: Never Used     Alcohol use Yes      Comment: ocassional     Drug use: No     Sexual activity: No     Other Topics Concern     Parent/Sibling W/ Cabg, Mi Or Angioplasty Before 65f 55m? Yes     Father with MI at 55     Social History Narrative       Review of Systems:  Skin:  Positive for itching     Eyes:  Positive for visual blurring;glasses    ENT:  Negative      Respiratory:  Positive for shortness of breath;dyspnea on exertion     Cardiovascular:    Positive for;dizziness;fatigue    Gastroenterology: Positive for heartburn    Genitourinary:  Negative      Musculoskeletal:  Negative      Neurologic:  Negative      Psychiatric:  Negative      Heme/Lymph/Imm:  Negative      Endocrine:  Negative        Physical Exam:  Vitals: /70 (BP Location: Right arm, Patient Position: Sitting, Cuff Size: Adult Regular)  Pulse 68  Wt 88.9 kg (196 lb)  SpO2 95%  BMI 34.72 kg/m2    Constitutional:           Skin:             Head:           Eyes:           Lymph:      ENT:           Neck:           Respiratory:            Cardiac:                                                           GI:           Extremities and Muscular Skeletal:                 Neurological:           Psych:           CC  No referring provider defined for this encounter.

## 2018-04-10 NOTE — LETTER
4/10/2018    Addis Del Rio MD  [address] [facility] [address]    RE: Rosita King       Dear Colleague,    I had the pleasure of seeing Rosita King in the [facility].    HPI and Plan:   See dictation  [id number]  Orders Placed This Encounter   Procedures     EKG 12-LEAD CLINIC READ ([facility])- to be scheduled     Zio Patch Monitor       No orders of the defined types were placed in this encounter.      There are no discontinued medications.      Encounter Diagnosis   Name Primary?     Paroxysmal supraventricular tachycardia (H) Yes       CURRENT MEDICATIONS:  Current Outpatient Prescriptions   Medication Sig Dispense Refill     fluocinonide (LIDEX) 0.05 % solution Apply sparingly to affected area twice daily as needed.  Do not apply to face. 60 mL 3     metoprolol (TOPROL-XL) 50 MG 24 hr tablet Take 1 tablet (50 mg) by mouth daily 93 tablet 1     omeprazole (PRILOSEC) 20 MG CR capsule TAKE ONE CAPSULE BY MOUTH ONCE DAILY 90 capsule 1     triamcinolone (KENALOG) 0.1 % ointment Apply sparingly to affected area two times daily for 14 days. 45 g 1     naproxen sodium (ALEVE) 220 MG capsule Take 220 mg by mouth 2 times daily (with meals) Reported on 2/23/2017       Multiple Vitamins-Minerals (CENTRUM SILVER) per tablet Take 1 tablet by mouth daily       VITAMIN D, CHOLECALCIFEROL, PO Take 500 Units by mouth daily          ALLERGIES     Allergies   Allergen Reactions     Penicillins      Pravastatin      Other reaction(s): Myalgia     Simvastatin      Other reaction(s): Myalgia     Sulfa Drugs        PAST MEDICAL HISTORY:  No past medical history on file.    PAST SURGICAL HISTORY:  Past Surgical History:   Procedure Laterality Date     CARPAL TUNNEL RELEASE RT/LT Bilateral 2006, 2009     COLONOSCOPY N/A 11/25/2015    Procedure: COLONOSCOPY;  Surgeon: Mina Roy MD;  [facility]: [facility]     ESOPHAGOSCOPY, GASTROSCOPY, DUODENOSCOPY (EGD), COMBINED N/A  11/25/2015    Procedure: COMBINED ESOPHAGOSCOPY, GASTROSCOPY, DUODENOSCOPY (EGD), BIOPSY SINGLE OR MULTIPLE;  Surgeon: Mina Roy MD;  Location: [facility]     TONSILLECTOMY & ADENOIDECTOMY  age 13       FAMILY HISTORY:  Family History   Problem Relation Age of Onset     Liver Disease Mother      Depression/Anxiety Father      DIABETES Father      DIABETES Sister      Liver Disease Sister        SOCIAL HISTORY:  Social History     Social History     Marital status:      Spouse name: N/A     Number of children: N/A     Years of education: N/A     Social History Main Topics     Smoking status: Former Smoker     Smokeless tobacco: Never Used     Alcohol use Yes      Comment: ocassional     Drug use: No     Sexual activity: No     Other Topics Concern     Parent/Sibling W/ Cabg, Mi Or Angioplasty Before 65f 55m? Yes     Father with MI at 55     Social History Narrative       Review of Systems:  Skin:  Positive for itching     Eyes:  Positive for visual blurring;glasses    ENT:  Negative      Respiratory:  Positive for shortness of breath;dyspnea on exertion     Cardiovascular:    Positive for;dizziness;fatigue    Gastroenterology: Positive for heartburn    Genitourinary:  Negative      Musculoskeletal:  Negative      Neurologic:  Negative      Psychiatric:  Negative      Heme/Lymph/Imm:  Negative      Endocrine:  Negative        Physical Exam:  Vitals: /70 (BP Location: Right arm, Patient Position: Sitting, Cuff Size: Adult Regular)  Pulse 68  Wt 88.9 kg (196 lb)  SpO2 95%  BMI 34.72 kg/m2    Constitutional:           Skin:             Head:           Eyes:           Lymph:      ENT:           Neck:           Respiratory:            Cardiac:                                                           GI:           Extremities and Muscular Skeletal:                 Neurological:           Psych:           CC  No referring provider defined for this encounter.                Thank you for allowing me  to participate in the care of your patient.      Sincerely,     Manuel Reyes MD     [facility]    cc:   No referring provider defined for this encounter.

## 2018-04-17 ENCOUNTER — HOSPITAL ENCOUNTER (OUTPATIENT)
Dept: CARDIOLOGY | Facility: CLINIC | Age: 68
Discharge: HOME | End: 2018-04-17
Attending: INTERNAL MEDICINE | Admitting: INTERNAL MEDICINE
Payer: MEDICARE

## 2018-04-17 DIAGNOSIS — I47.10 PAROXYSMAL SUPRAVENTRICULAR TACHYCARDIA (H): ICD-10-CM

## 2018-04-17 PROCEDURE — 0296T ZIO PATCH HOLTER: CPT

## 2018-04-17 PROCEDURE — 0298T ZZC EXT ECG > 48HR TO 21 DAY REVIEW AND INTERPRETATN: CPT | Performed by: INTERNAL MEDICINE

## 2018-04-25 ENCOUNTER — HOSPITAL ENCOUNTER (OUTPATIENT)
Dept: PHYSICAL THERAPY | Facility: CLINIC | Age: 68
Setting detail: THERAPIES SERIES
End: 2018-04-25
Attending: FAMILY MEDICINE
Payer: MEDICARE

## 2018-04-25 PROCEDURE — 97161 PT EVAL LOW COMPLEX 20 MIN: CPT | Mod: GP | Performed by: PHYSICAL THERAPIST

## 2018-04-25 PROCEDURE — G8988 SELF CARE GOAL STATUS: HCPCS | Mod: GP,CI | Performed by: PHYSICAL THERAPIST

## 2018-04-25 PROCEDURE — 40000841 ZZH STATISTIC WOMEN'S HEALTH VISIT: Performed by: PHYSICAL THERAPIST

## 2018-04-25 PROCEDURE — G8987 SELF CARE CURRENT STATUS: HCPCS | Mod: GP,CL | Performed by: PHYSICAL THERAPIST

## 2018-04-25 PROCEDURE — 97110 THERAPEUTIC EXERCISES: CPT | Mod: GP | Performed by: PHYSICAL THERAPIST

## 2018-04-25 PROCEDURE — 97535 SELF CARE MNGMENT TRAINING: CPT | Mod: GP | Performed by: PHYSICAL THERAPIST

## 2018-04-25 NOTE — PROGRESS NOTES
[facility]          OUTPATIENT PHYSICAL THERAPY ORTHOPEDIC EVALUATION  PLAN OF TREATMENT FOR OUTPATIENT REHABILITATION  (COMPLETE FOR INITIAL CLAIMS ONLY)  Patient's Last Name, First Name, M.I.  YOB: 1950  TyraraiRosita  MATT    Provider s Name:  [facility]   Medical Record No.  [id number]   Start of Care Date:  04/25/18   Onset Date:  08/24/17   Type:     _X__PT   ___OT   ___SLP Medical Diagnosis:  Urge incontinence of urine     PT Diagnosis:  Impaired function at the Pelvic Floor Muscle   Visits from SOC:  1      _________________________________________________________________________________  Plan of Treatment/Functional Goals:  joint mobilization, manual therapy, motor coordination training, neuromuscular re-education, strengthening, stretching     Biofeedback, Electrical stimulation     Goals  Goal Identifier: STG  Goal Description: 1) Pt will improve use of PFM to report making it to bathroom for 60% of strong urges in daytime, in 3 weeks.  Target Date: 05/16/18    Goal Identifier: STG  Goal Description: 2)Pt will report no leaking overnite with getting to bathroom, in 4 weeks.  Target Date: 05/23/18    Goal Identifier: STG  Goal Description: 3)Pt will report drinking 16+ oz of water per day, in 4 weeks.  Target Date: 05/23/18    Goal Identifier: LTG  Goal Description: 4) Pt will report 6/7 days with no leaking in 8 weeks.  Target Date: 06/20/18    Goal Identifier: LTG  Goal Description: 5)Pt will be indep in HEP to prevent return of symptoms in 8 weeks.  Target Date: 06/20/18             Therapy Frequency:  1 time/week  Predicted Duration of Therapy Intervention:  8 weeks, weaning to every other week for up to 6 sessions    Padma Barnes, PT                 I CERTIFY THE NEED FOR THESE SERVICES FURNISHED UNDER        THIS PLAN OF TREATMENT AND WHILE UNDER MY CARE     (Physician co-signature of this document indicates review and certification of  the therapy plan).                       Certification Date From:  04/25/18   Certification Date To:  06/20/18    Referring Provider:  Addis Del Rio MD    Initial Assessment        See Epic Evaluation Start of Care Date: 04/25/18

## 2018-04-25 NOTE — PROGRESS NOTES
"Physical Therapy Initial Pelvic Floor Muscle Evaluation   04/25/18 0900   General Information   Type of Visit Initial OP Ortho PT Evaluation   Start of Care Date 04/25/18   Referring Physician Addis Del Rio MD   Patient/Family Goals Statement Get rid of leaking   Orders Evaluate and Treat   Date of Order 08/24/17   Insurance Type Medicare  ([employer])   Medical Diagnosis Urge Incontinence of Urine   Surgical/Medical history reviewed Yes   Precautions/Limitations no known precautions/limitations   General Information Comments PMHx: Carpal Tunnel surgery; bladder control, smoking in past, Cardiac \"issues\" not sure what is going on - Afib?   Body Part(s)   Body Part(s) Lumbar Spine/SI;Pelvic Floor Dysfunction   Presentation and Etiology   Pertinent history of current problem (include personal factors and/or comorbidities that impact the POC) States at least a 1 yr h/o bladder leaking, to the point of wearing a pad all the time.  Did speak to MD about this at well check up 6 mos ago, and was unable to fit into her schedule until now.   Impairments P. Bowel or bladder problems   Functional Limitations perform activities of daily living;perform desired leisure / sports activities   Symptom Location Pelvic Floor Muscles   How/Where did it occur From insidious onset   Onset date of current episode/exacerbation 08/24/17   Chronicity Chronic   Frequency of pain/symptoms B. Intermittent   Pain/symptoms are: Worse during the night   Pain/symptoms exacerbated by M. Other   Pain exacerbation comment Strong urges; starts dripping when feels an urge   Pain/symptoms eased by A. Sitting   Progression of symptoms since onset: Worsened   Current Level of Function   Current Community Support Family/friend caregiver   Patient role/employment history C. Homemaker   Living environment [place]   Fall Risk Screen   Fall screen completed by PT   Have you fallen 2 or more times in the past year? No   Have you fallen and had an injury in " "the past year? No   Is patient a fall risk? No   Lumbar Spine/SI Objective Findings   Observation No acute distress   Integumentary WNL, no anomalies   Posture Mild Fwd Head, o/w WFL   Gait/Locomotion WNL   Flexion ROM WFL - no c/o pain   Extension ROM 20* - does naturally upon standing d/t \"stiffness\"   Pelvic Floor Dysfunction Questions   Regular exercise No   Fluid intake-glasses/day (one glass/cup = 8oz Water = 8 oz/day   Caffeinated beverages-glasses/day Coffee = 4-5 c/day,  7Up = 12oz/day   Alcoholic beverages - glasses/day Beer = 12 cans/week (\"depends on activity\")   Recent diet change? No   How long can you delay the need to urinate?  3-5 mins   How many times do you wake to urinate at night?   2   How often do you urinate during the day?   Eveyr 1-2 hours   Can you stop the flow of urine when on the toilet?  No   Is the volume of urine passed usually  Medium   Do you have the sensation that you need to go to the toilet?  Yes   Do you empty your bladder frequently, before you experience the urge to pass urine?  Yes   Do you have \"triggers\" that make you feel you can't wait to go to the toilet?  Yes   Number of bladder infections last year?  0   Frequency of bowel movements:  Daily   Consistency of stool?  Soft   Do you ignore the urge to defecate?  No   Pelvic Floor Dysfunction Objective Findings   Pain-pelvic dysfunction (Dull back pain)   Type of Storage Problem frequency;urgency;urge incontinence;stress incontinence   Type of Emptying Problem incomplete emptying   Protection needed Pad;Number of pads per day   Power (MMT at [facility]) Formal and manual assessment of PFM not completed d/t time constraints of session.    Medications Meds for urge incontinence   Pad Poise - stage 2   Number of pads 1   Meds for urge incontinence Pt was not given any meds for her urges   Planned Therapy Interventions   Planned Therapy Interventions joint mobilization;manual therapy;motor coordination training;neuromuscular " re-education;strengthening;stretching   Planned Modality Interventions   Planned Modality Interventions Biofeedback;Electrical stimulation   Clinical Impression   Criteria for Skilled Therapeutic Interventions Met yes, treatment indicated   PT Diagnosis Impaired function at the Pelvic Floor Muscle   Influenced by the following impairments functional weakness (per subjective report of symptoms), poor knowledge of how to use PFM functionally, poor fluid intake habits, poor void habits   Functional limitations due to impairments Urge Incontinence primary, and stress incontinence with sneezing   Clinical Presentation Evolving/Changing   Clinical Presentation Rationale Long term issue with leaking, fairly predictable pattern to leaking, minimal comorbidities   Clinical Decision Making (Complexity) Low complexity   Therapy Frequency 1 time/week   Predicted Duration of Therapy Intervention (days/wks) 8 weeks, weaning to every other week for up to 6 sessions   Risk & Benefits of therapy have been explained Yes   Patient, Family & other staff in agreement with plan of care Yes   Clinical Impression Comments Pt presents with long h/o (> 1 yr) of leaking urine with stronger urges.  Has had significant problem making it when gets up at nite, and has worsened to point where she needs to wear a pad all day long now. Pt could benefit from skilled PT to learn helpful tips and routines to gain continence, and to improve fuinctional use and strength in PFM to meet set goals.   Education Assessment   Preferred Learning Style Listening;Reading;Demonstration;Pictures/video   Barriers to Learning No barriers   Ortho Goal 1   Goal Identifier STG   Goal Description 1) Pt will improve use of PFM to report making it to bathroom for 60% of strong urges in daytime, in 3 weeks.   Target Date 05/16/18   Ortho Goal 2   Goal Identifier STG   Goal Description 2)Pt will report no leaking overnite with getting to bathroom, in 4 weeks.   Target Date  05/23/18   Ortho Goal 3   Goal Identifier STG   Goal Description 3)Pt will report drinking 16+ oz of water per day, in 4 weeks.   Target Date 05/23/18   Ortho Goal 4   Goal Identifier LTG   Goal Description 4) Pt will report 6/7 days with no leaking in 8 weeks.   Target Date 06/20/18   Ortho Goal 5   Goal Identifier LTG   Goal Description 5)Pt will be indep in HEP to prevent return of symptoms in 8 weeks.   Target Date 06/20/18   Total Evaluation Time   Total Evaluation Time 35   Therapy Certification   Certification date from 04/25/18   Certification date to 06/20/18   Medical Diagnosis Urge incontinence of urine   Thank you for the referral of this patient.  Padma Barnes, PT, MA  #[id number]

## 2018-05-08 ENCOUNTER — TELEPHONE (OUTPATIENT)
Dept: CARDIOLOGY | Facility: CLINIC | Age: 68
End: 2018-05-08

## 2018-05-08 NOTE — TELEPHONE ENCOUNTER
"Message  Received: Today       Manuel Glynn MD  P [facility] Heart Ep Nurse                     Please inform pt of unremarkable zio. No evidence of AF nor any significant arrhythmias.      Writer called pt with results. Pt asked if she should continue BB. Per Dr. Glynn's last clinical note on 4/10/18:  \" asked her to continue beta blocker and will call the patient regarding result of the Zio Patch monitor.\" Pt will continue BB and verbalized understanding. SEDA Joshua RN.  "

## 2018-05-09 ENCOUNTER — HOSPITAL ENCOUNTER (OUTPATIENT)
Dept: PHYSICAL THERAPY | Facility: CLINIC | Age: 68
Setting detail: THERAPIES SERIES
End: 2018-05-09
Attending: FAMILY MEDICINE
Payer: MEDICARE

## 2018-05-09 PROCEDURE — 97110 THERAPEUTIC EXERCISES: CPT | Mod: GP,XU | Performed by: PHYSICAL THERAPIST

## 2018-05-09 PROCEDURE — 90911 ZZHC PT BIOFEEDBACK (PFM): CPT | Mod: GP | Performed by: PHYSICAL THERAPIST

## 2018-05-09 PROCEDURE — 40000841 ZZH STATISTIC WOMEN'S HEALTH VISIT: Performed by: PHYSICAL THERAPIST

## 2018-05-22 ENCOUNTER — HOSPITAL ENCOUNTER (OUTPATIENT)
Dept: PHYSICAL THERAPY | Facility: CLINIC | Age: 68
Setting detail: THERAPIES SERIES
End: 2018-05-22
Attending: FAMILY MEDICINE
Payer: MEDICARE

## 2018-05-22 PROCEDURE — 90911 ZZHC PT BIOFEEDBACK (PFM): CPT | Mod: GP | Performed by: PHYSICAL THERAPIST

## 2018-05-22 PROCEDURE — 97110 THERAPEUTIC EXERCISES: CPT | Mod: GP | Performed by: PHYSICAL THERAPIST

## 2018-05-22 PROCEDURE — 40000841 ZZH STATISTIC WOMEN'S HEALTH VISIT: Performed by: PHYSICAL THERAPIST

## 2018-06-06 ENCOUNTER — HOSPITAL ENCOUNTER (OUTPATIENT)
Dept: PHYSICAL THERAPY | Facility: CLINIC | Age: 68
Setting detail: THERAPIES SERIES
End: 2018-06-06
Attending: FAMILY MEDICINE
Payer: MEDICARE

## 2018-06-06 ENCOUNTER — OFFICE VISIT (OUTPATIENT)
Dept: FAMILY MEDICINE | Facility: CLINIC | Age: 68
End: 2018-06-06
Payer: COMMERCIAL

## 2018-06-06 VITALS
HEIGHT: 63 IN | DIASTOLIC BLOOD PRESSURE: 70 MMHG | HEART RATE: 72 BPM | SYSTOLIC BLOOD PRESSURE: 122 MMHG | TEMPERATURE: 97.7 F | BODY MASS INDEX: 34.2 KG/M2 | WEIGHT: 193 LBS

## 2018-06-06 DIAGNOSIS — I10 ESSENTIAL HYPERTENSION: ICD-10-CM

## 2018-06-06 DIAGNOSIS — H26.9 CATARACT OF BOTH EYES, UNSPECIFIED CATARACT TYPE: ICD-10-CM

## 2018-06-06 DIAGNOSIS — G47.33 OSA (OBSTRUCTIVE SLEEP APNEA): ICD-10-CM

## 2018-06-06 DIAGNOSIS — Z01.818 PREOP GENERAL PHYSICAL EXAM: Primary | ICD-10-CM

## 2018-06-06 PROCEDURE — 99214 OFFICE O/P EST MOD 30 MIN: CPT | Performed by: NURSE PRACTITIONER

## 2018-06-06 PROCEDURE — 97110 THERAPEUTIC EXERCISES: CPT | Mod: GP | Performed by: PHYSICAL THERAPIST

## 2018-06-06 PROCEDURE — 40000841 ZZH STATISTIC WOMEN'S HEALTH VISIT: Performed by: PHYSICAL THERAPIST

## 2018-06-06 PROCEDURE — 90911 ZZHC PT BIOFEEDBACK (PFM): CPT | Mod: GP | Performed by: PHYSICAL THERAPIST

## 2018-06-06 NOTE — PROGRESS NOTES
[facility]  [address] [place] [id number]  [phone]  Dept: [phone]    PRE-OP EVALUATION:  Today's date: 2018    Rosita King (: 1950) presents for pre-operative evaluation assessment as requested by Dr. Jose Marinelli.  She requires evaluation and anesthesia risk assessment prior to undergoing surgery/procedure for treatment of Cataracts.    Fax number for surgical facility: [phone]  Primary Physician: Addis Del Rio  Type of Anesthesia Anticipated: to be determined    Patient has a Health Care Directive or Living Will:  NO    Preop Questions 2018   Who is doing your surgery? Dr. Jose Marinelli    What are you having done? Cataract   Date of Surgery/Procedure: 2018 L  / 2018 R   Facility or Hospital where procedure/surgery will be performed: [facility]   1.  Do you have a history of Heart attack, stroke, stent, coronary bypass surgery, or other heart surgery? No   2.  Do you ever have any pain or discomfort in your chest? No   3.  Do you have a history of  Heart Failure? No   4.   Are you troubled by shortness of breath when:  walking on a level surface, or up a slight hill, or at night? YES - cardiac and pulmonary work up unremarkable for cause   5.  Do you currently have a cold, bronchitis or other respiratory infection? No   6.  Do you have a cough, shortness of breath, or wheezing? No   7.  Do you sometimes get pains in the calves of your legs when you walk? No   8. Do you or anyone in your family have previous history of blood clots? No   9.  Do you or does anyone in your family have a serious bleeding problem such as prolonged bleeding following surgeries or cuts? No   10. Have you ever had problems with anemia or been told to take iron pills? No   11. Have you had any abnormal blood loss such as black, tarry or bloody stools, or abnormal vaginal bleeding? No   12. Have you ever had a blood transfusion? No   13. Have you or  any of your relatives ever had problems with anesthesia? No   14. Do you have sleep apnea, excessive snoring or daytime drowsiness? YES - sleep apnea-does not use mask.    15. Do you have any prosthetic heart valves? No   16. Do you have prosthetic joints? No   17. Is there any chance that you may be pregnant? No         HPI:     HPI related to upcoming procedure: blurry vision with cataract of both eyes in need of cataract removal      HYPERTENSION - Patient has longstanding history of HTN , currently denies any symptoms referable to elevated blood pressure. Specifically denies chest pain, palpitations, dyspnea, orthopnea, PND or peripheral edema. Blood pressure readings have been in normal range. Current medication regimen is as listed below. Patient denies any side effects of medication.                                                                                                                                                                                          .  SLEEP PROBLEM - Patient has a history of sleep apnea currently untreated-unable to tolerate CPAP, has appointment with sleep medicine 6/11/2018.                                                                                                                                   .    MEDICAL HISTORY:     Patient Active Problem List    Diagnosis Date Noted     Lichen sclerosus 08/24/2017     Priority: Medium     Paroxysmal supraventricular tachycardia (H) 02/23/2017     Priority: Medium     Gastroesophageal reflux disease without esophagitis 08/22/2016     Priority: Medium     Essential hypertension with goal blood pressure less than 140/90 08/21/2016     Priority: Medium     Diverticulosis of large intestine without hemorrhage 12/24/2015     Priority: Medium     H. pylori infection 12/24/2015     Priority: Medium     Essential hypertension 12/24/2015     Priority: Medium     Vitamin D deficiency 10/12/2015     Priority: Medium     ASHLEY (obstructive  sleep apnea) 10/12/2015     Priority: Medium     Since 2006       Generalized anxiety disorder 10/12/2015     Priority: Medium     Diagnosis updated by automated process. Provider to review and confirm.       Hyperlipidemia LDL goal <130 10/12/2015     Priority: Medium     CARDIOVASCULAR SCREENING; LDL GOAL LESS THAN 130 02/03/2015     Priority: Medium      History reviewed. No pertinent past medical history.  Past Surgical History:   Procedure Laterality Date     CARPAL TUNNEL RELEASE RT/LT Bilateral 2006, 2009     COLONOSCOPY N/A 11/25/2015    Procedure: COLONOSCOPY;  Surgeon: Mina Roy MD;  Location: [facility]     ESOPHAGOSCOPY, GASTROSCOPY, DUODENOSCOPY (EGD), COMBINED N/A 11/25/2015    Procedure: COMBINED ESOPHAGOSCOPY, GASTROSCOPY, DUODENOSCOPY (EGD), BIOPSY SINGLE OR MULTIPLE;  Surgeon: Mina Roy MD;  Location: [facility]     TONSILLECTOMY & ADENOIDECTOMY  age 13     Current Outpatient Prescriptions   Medication Sig Dispense Refill     metoprolol (TOPROL-XL) 50 MG 24 hr tablet Take 1 tablet (50 mg) by mouth daily 93 tablet 1     Multiple Vitamins-Minerals (CENTRUM SILVER) per tablet Take 1 tablet by mouth daily       omeprazole (PRILOSEC) 20 MG CR capsule TAKE ONE CAPSULE BY MOUTH ONCE DAILY 90 capsule 1     triamcinolone (KENALOG) 0.1 % ointment Apply sparingly to affected area two times daily for 14 days. 45 g 1     VITAMIN D, CHOLECALCIFEROL, PO Take 500 Units by mouth daily        OTC products: None, except as noted above    Allergies   Allergen Reactions     Penicillins      Pravastatin      Other reaction(s): Myalgia     Simvastatin      Other reaction(s): Myalgia     Sulfa Drugs       Latex Allergy: NO    Social History   Substance Use Topics     Smoking status: Former Smoker     Smokeless tobacco: Never Used     Alcohol use Yes      Comment: ocassional     History   Drug Use No       REVIEW OF SYSTEMS:   CONSTITUTIONAL: NEGATIVE for fever, chills, change in  "weight  INTEGUMENTARY/SKIN: NEGATIVE for worrisome rashes, moles or lesions  EYES: NEGATIVE for vision changes or irritation  ENT/MOUTH: NEGATIVE for ear, mouth and throat problems  RESP: NEGATIVE for significant cough or SOB  CV: NEGATIVE for chest pain, palpitations or peripheral edema  GI: NEGATIVE for nausea, abdominal pain, heartburn, or change in bowel habits  : NEGATIVE for frequency, dysuria, or hematuria  MUSCULOSKELETAL: NEGATIVE for significant arthralgias or myalgia  NEURO: NEGATIVE for weakness, dizziness or paresthesias  ENDOCRINE: NEGATIVE for temperature intolerance, skin/hair changes  HEME: NEGATIVE for bleeding problems  PSYCHIATRIC: NEGATIVE for changes in mood or affect    EXAM:   /70 (Cuff Size: Adult Large)  Pulse 72  Temp 97.7  F (36.5  C) (Tympanic)  Ht 5' 3\" (1.6 m)  Wt 193 lb (87.5 kg)  BMI 34.19 kg/m2    GENERAL APPEARANCE: healthy, alert and no distress     EYES: EOMI, PERRL     HENT: ear canals and TM's normal and nose and mouth without ulcers or lesions     NECK: no adenopathy, no asymmetry, masses, or scars and thyroid normal to palpation     RESP: lungs clear to auscultation - no rales, rhonchi or wheezes     CV: regular rates and rhythm, normal S1 S2, no S3 or S4 and no murmur, click or rub     ABDOMEN:  soft, nontender, no HSM or masses and bowel sounds normal     MS: extremities normal- no gross deformities noted, no evidence of inflammation in joints, FROM in all extremities.     SKIN: no suspicious lesions or rashes     NEURO: Normal strength and tone, sensory exam grossly normal, mentation intact and speech normal     PSYCH: mentation appears normal. and affect normal/bright    DIAGNOSTICS:   No labs or EKG required for low risk surgery (cataract, skin procedure, breast biopsy, etc)    Recent Labs   Lab Test  08/24/17   1051  08/04/17   0816  10/13/15   0945   HGB   --    --   13.5   PLT   --    --   265   NA   --   137  140   POTASSIUM   --   4.0  4.2   CR   --   " 0.65  0.67   A1C  5.5   --    --      IMPRESSION:   Reason for surgery/procedure: cataract of both eyes in need of cataract removal    The proposed surgical procedure is considered LOW risk.    REVISED CARDIAC RISK INDEX  The patient has the following serious cardiovascular risks for perioperative complications such as (MI, PE, VFib and 3  AV Block):  No serious cardiac risks  INTERPRETATION: 0 risks: Class I (very low risk - 0.4% complication rate)    The patient has the following additional risks for perioperative complications:  No identified additional risks      ICD-10-CM    1. Preop general physical exam Z01.818    2. Cataract of both eyes, unspecified cataract type H26.9    3. ASHLEY (obstructive sleep apnea) G47.33    4. Essential hypertension I10        RECOMMENDATIONS:       Cardiovascular Risk  Patient is already on a Beta Blocker. Continue Betablocker therapy after surgery, using Beta blocker order set as necessary for NPO status.      Obstructive Sleep Apnea (or suspected sleep apnea)  Hospital staff are advised to monitor for sleep related oxygen desaturations due to suspicion of ASHLEY      --Patient is to take all scheduled medications on the day of surgery EXCEPT for modifications listed below.    APPROVAL GIVEN to proceed with proposed procedure, without further diagnostic evaluation       Signed Electronically by: JESUS Montero CNP    Copy of this evaluation report is provided to requesting physician.    Thong Preop Guidelines    Revised Cardiac Risk Index

## 2018-06-06 NOTE — PATIENT INSTRUCTIONS
Take your Metoprolol the morning of your procedure  Before Your Surgery      Call your surgeon if there is any change in your health. This includes signs of a cold or flu (such as a sore throat, runny nose, cough, rash or fever).    Do not smoke, drink alcohol or take over the counter medicine (unless your surgeon or primary care doctor tells you to) for the 24 hours before and after surgery.    If you take prescribed drugs: Follow your doctor s orders about which medicines to take and which to stop until after surgery.    Eating and drinking prior to surgery: follow the instructions from your surgeon    Take a shower or bath the night before surgery. Use the soap your surgeon gave you to gently clean your skin. If you do not have soap from your surgeon, use your regular soap. Do not shave or scrub the surgery site.  Wear clean pajamas and have clean sheets on your bed.

## 2018-06-06 NOTE — MR AVS SNAPSHOT
After Visit Summary   6/6/2018    Rosita King    MRN: [id number]           Patient Information     Date Of Birth          1950        Visit Information        Provider Department      6/6/2018 10:00 AM Caron Randle APRN CNP [facility]        Today's Diagnoses     Preop general physical exam    -  1      Care Instructions    Take your Metoprolol the morning of your procedure  Before Your Surgery      Call your surgeon if there is any change in your health. This includes signs of a cold or flu (such as a sore throat, runny nose, cough, rash or fever).    Do not smoke, drink alcohol or take over the counter medicine (unless your surgeon or primary care doctor tells you to) for the 24 hours before and after surgery.    If you take prescribed drugs: Follow your doctor s orders about which medicines to take and which to stop until after surgery.    Eating and drinking prior to surgery: follow the instructions from your surgeon    Take a shower or bath the night before surgery. Use the soap your surgeon gave you to gently clean your skin. If you do not have soap from your surgeon, use your regular soap. Do not shave or scrub the surgery site.  Wear clean pajamas and have clean sheets on your bed.           Follow-ups after your visit        Your next 10 appointments already scheduled     Jun 11, 2018 11:00 AM CDT   New Sleep Patient with Danis Rodírguez MD   [facility] ([facility])    [address]  [facility] [address]   [phone]            Jun 27, 2018  9:30 AM CDT   Women's Health Treatment with Padma Barnes PT   [facility] ([facility])    [address] [place] [address]   [phone]              Who to contact     If you have questions or need follow up information about today's clinic visit or your schedule please contact [facility]  "[place] directly at [phone].  Normal or non-critical lab and imaging results will be communicated to you by MyChart, letter or phone within 4 business days after the clinic has received the results. If you do not hear from us within 7 days, please contact the clinic through [employer] or phone. If you have a critical or abnormal lab result, we will notify you by phone as soon as possible.  Submit refill requests through [employer] or call your pharmacy and they will forward the refill request to us. Please allow 3 business days for your refill to be completed.          Additional Information About Your Visit        [employer] Information     [employer] gives you secure access to your electronic health record. If you see a primary care provider, you can also send messages to your care team and make appointments. If you have questions, please call your primary care clinic.  If you do not have a primary care provider, please call [phone] and they will assist you.        Care EveryWhere ID     This is your Care EveryWhere ID. This could be used by other organizations to access your [place] medical records  [id number]        Your Vitals Were     Pulse Temperature Height BMI (Body Mass Index)          72 97.7  F (36.5  C) (Tympanic) 5' 3\" (1.6 m) 34.19 kg/m2         Blood Pressure from Last 3 Encounters:   06/06/18 122/70   04/10/18 127/70   08/24/17 138/84    Weight from Last 3 Encounters:   06/06/18 193 lb (87.5 kg)   04/10/18 196 lb (88.9 kg)   08/24/17 191 lb (86.6 kg)              Today, you had the following     No orders found for display         Today's Medication Changes          These changes are accurate as of 6/6/18 10:35 AM.  If you have any questions, ask your nurse or doctor.               Stop taking these medicines if you haven't already. Please contact your care team if you have questions.     ALEVE 220 MG capsule   Generic drug:  naproxen sodium   Stopped by:  Caron Randle APRN CNP     "       fluocinonide 0.05 % solution   Commonly known as:  LIDEX   Stopped by:  Caron Randle APRN CNP                    Primary Care Provider Office Phone # Fax #    Addis Del Rio [phone] [address] [place] [address]        Equal Access to Services     [facility] campus[facility]OH[facility] : Hadii aad ku hadasho Soomaali, waaxda luqadaha, qaybta kaalmada adeegyada, waxay idiin hayaan adeeg khmichaelsh la'porsche . So [facility] [phone].    ATENCIÓN: Si habla español, tiene a adams disposición servicios gratuitos de asistencia lingüística. [facility] [phone].    We comply with applicable federal civil rights laws and [place] laws. We do not discriminate on the basis of race, color, national origin, age, disability, sex, sexual orientation, or gender identity.            Thank you!     Thank you for choosing [facility]  for your care. Our goal is always to provide you with excellent care. Hearing back from our patients is one way we can continue to improve our services. Please take a few minutes to complete the written survey that you may receive in the mail after your visit with us. Thank you!             Your Updated Medication List - Protect others around you: Learn how to safely use, store and throw away your medicines at www.disposemymeds.org.          This list is accurate as of 6/6/18 10:35 AM.  Always use your most recent med list.                   Brand Name Dispense Instructions for use Diagnosis    CENTRUM SILVER per tablet      Take 1 tablet by mouth daily        metoprolol succinate 50 MG 24 hr tablet    TOPROL-XL    93 tablet    Take 1 tablet (50 mg) by mouth daily    Benign essential hypertension       omeprazole 20 MG CR capsule    priLOSEC    90 capsule    TAKE ONE CAPSULE BY MOUTH ONCE DAILY    Gastroesophageal reflux disease without esophagitis       triamcinolone 0.1 % ointment    KENALOG    45 g    Apply sparingly to affected area two times daily for 14 days.     Vaginal itching, Lichen sclerosus       VITAMIN D (CHOLECALCIFEROL) PO      Take 500 Units by mouth daily

## 2018-06-11 ENCOUNTER — OFFICE VISIT (OUTPATIENT)
Dept: SLEEP MEDICINE | Facility: CLINIC | Age: 68
End: 2018-06-11
Payer: COMMERCIAL

## 2018-06-11 DIAGNOSIS — G47.33 OSA (OBSTRUCTIVE SLEEP APNEA): Primary | ICD-10-CM

## 2018-06-11 PROCEDURE — 99214 OFFICE O/P EST MOD 30 MIN: CPT | Performed by: FAMILY MEDICINE

## 2018-06-11 NOTE — MR AVS SNAPSHOT
After Visit Summary   6/11/2018    Rosita King    MRN: [id number]           Patient Information     Date Of Birth          1950        Visit Information        Provider Department      6/11/2018 11:00 AM Danis Rodríguez MD [facility]        Today's Diagnoses     ASHLEY (obstructive sleep apnea)    -  1       Follow-ups after your visit        Follow-up notes from your care team     Return if symptoms worsen or fail to improve.      Your next 10 appointments already scheduled     Jun 27, 2018  9:30 AM CDT   Women's Health Treatment with Padma Barnes PT   [facility] ([facility])    [address] [place] [id number] [phone]              Who to contact     If you have questions or need follow up information about today's clinic visit or your schedule please contact [facility] directly at [phone].  Normal or non-critical lab and imaging results will be communicated to you by MyChart, letter or phone within 4 business days after the clinic has received the results. If you do not hear from us within 7 days, please contact the clinic through Spinlister[employer] or phone. If you have a critical or abnormal lab result, we will notify you by phone as soon as possible.  Submit refill requests through [employer] or call your pharmacy and they will forward the refill request to us. Please allow 3 business days for your refill to be completed.          Additional Information About Your Visit        MyChart Information     Better [employer] gives you secure access to your electronic health record. If you see a primary care provider, you can also send messages to your care team and make appointments. If you have questions, please call your primary care clinic.  If you do not have a primary care provider, please call [phone] and they will assist you.        Care EveryWhere ID     This is your Care EveryWhere ID. This  could be used by other organizations to access your [facility] medical records  [id number]         Blood Pressure from Last 3 Encounters:   06/06/18 122/70   04/10/18 127/70   08/24/17 138/84    Weight from Last 3 Encounters:   06/06/18 87.5 kg (193 lb)   04/10/18 88.9 kg (196 lb)   08/24/17 86.6 kg (191 lb)              We Performed the Following     Comprehensive DME        Primary Care Provider Office Phone # Fax #    Addis Del Rio [phone] [address] [place] [id number]        Equal Access to Services     [facility]: Hadii aad ku hadasho Soomaali, waaxda luqadaha, qaybta kaalmada adekatheyada, jane cardenas adekathe terrazas . So [facility] [phone].    ATENCIÓN: Si habla español, tiene a adams disposición servicios gratuitos de asistencia lingüística. [facility] [phone].    We comply with applicable federal civil rights laws and [place] laws. We do not discriminate on the basis of race, color, national origin, age, disability, sex, sexual orientation, or gender identity.            Thank you!     Thank you for choosing [facility]  for your care. Our goal is always to provide you with excellent care. Hearing back from our patients is one way we can continue to improve our services. Please take a few minutes to complete the written survey that you may receive in the mail after your visit with us. Thank you!             Your Updated Medication List - Protect others around you: Learn how to safely use, store and throw away your medicines at www.disposemymeds.org.          This list is accurate as of 6/11/18 11:32 AM.  Always use your most recent med list.                   Brand Name Dispense Instructions for use Diagnosis    CENTRUM SILVER per tablet      Take 1 tablet by mouth daily        metoprolol succinate 50 MG 24 hr tablet    TOPROL-XL    93 tablet    Take 1 tablet (50 mg) by mouth daily    Benign essential hypertension       omeprazole 20 MG CR  capsule    priLOSEC    90 capsule    TAKE ONE CAPSULE BY MOUTH ONCE DAILY    Gastroesophageal reflux disease without esophagitis       triamcinolone 0.1 % ointment    KENALOG    45 g    Apply sparingly to affected area two times daily for 14 days.    Vaginal itching, Lichen sclerosus       VITAMIN D (CHOLECALCIFEROL) PO      Take 500 Units by mouth daily

## 2018-06-11 NOTE — PROGRESS NOTES
"  Sleep Consultation:    Date on this visit: 6/11/2018    Rosita King is self-referred for a sleep consultation regarding establishing care for ASHLEY.    Primary Physician: Addis Del Rio     CC: \"I need to restart using my CPAP and need new supplies.\"    HPI:  Rosita King is a pleasant 68 yo F with history of severe ASHLEY, BJ, HTN, lichen sclerosus, GERD who presents to establish care for ASHLEY and to restart treatment.  She is joined by her  Derrick diggs.    Diagnosed on PSG at 7/27/2006 with undocumented weight, AHI 84.7, RDI 88.9, SpO2 remained above 90%, CPAP 7 optimal.  She was started on CPAP, she believes at 8 cm H2O.  It is unclear how long she used her CPAP, but has not used if for a number of years.  She would like to retry her CPAP and needs new supplies.  Her reasons to restart CPAP is to wake up less at night and feel less tired during the day.    Her current CPAP is on auto-titrate 5-8 cm H2O, but does not have any recent usage data.    She estimates getting ~ 6 hours of sleep per night, but with frequent awakenings.    Rosita denies any sleep walking and dream enactment.    Patient's [place] Sleepiness score 8/24 inconsistent with excessive  daytime sleepiness.      Allergies:    Allergies   Allergen Reactions     Penicillins      Pravastatin      Other reaction(s): Myalgia     Simvastatin      Other reaction(s): Myalgia     Sulfa Drugs        Medications:    Current Outpatient Prescriptions   Medication Sig Dispense Refill     metoprolol (TOPROL-XL) 50 MG 24 hr tablet Take 1 tablet (50 mg) by mouth daily 93 tablet 1     Multiple Vitamins-Minerals (CENTRUM SILVER) per tablet Take 1 tablet by mouth daily       omeprazole (PRILOSEC) 20 MG CR capsule TAKE ONE CAPSULE BY MOUTH ONCE DAILY 90 capsule 1     triamcinolone (KENALOG) 0.1 % ointment Apply sparingly to affected area two times daily for 14 days. 45 g 1     VITAMIN D, CHOLECALCIFEROL, PO Take 500 Units by mouth daily    "       Problem List:  Patient Active Problem List    Diagnosis Date Noted     Lichen sclerosus 08/24/2017     Priority: Medium     Paroxysmal supraventricular tachycardia (H) 02/23/2017     Priority: Medium     Gastroesophageal reflux disease without esophagitis 08/22/2016     Priority: Medium     Essential hypertension with goal blood pressure less than 140/90 08/21/2016     Priority: Medium     Diverticulosis of large intestine without hemorrhage 12/24/2015     Priority: Medium     H. pylori infection 12/24/2015     Priority: Medium     Essential hypertension 12/24/2015     Priority: Medium     Vitamin D deficiency 10/12/2015     Priority: Medium     ASHLEY (obstructive sleep apnea) 10/12/2015     Priority: Medium     Since 2006       Generalized anxiety disorder 10/12/2015     Priority: Medium     Diagnosis updated by automated process. Provider to review and confirm.       Hyperlipidemia LDL goal <130 10/12/2015     Priority: Medium     CARDIOVASCULAR SCREENING; LDL GOAL LESS THAN 130 02/03/2015     Priority: Medium        Past Medical/Surgical History:  No past medical history on file.  Past Surgical History:   Procedure Laterality Date     CARPAL TUNNEL RELEASE RT/LT Bilateral 2006, 2009     COLONOSCOPY N/A 11/25/2015    Procedure: COLONOSCOPY;  Surgeon: Mina Roy MD;  Location: [facility]     ESOPHAGOSCOPY, GASTROSCOPY, DUODENOSCOPY (EGD), COMBINED N/A 11/25/2015    Procedure: COMBINED ESOPHAGOSCOPY, GASTROSCOPY, DUODENOSCOPY (EGD), BIOPSY SINGLE OR MULTIPLE;  Surgeon: Mina Roy MD;  Location: [facility]     TONSILLECTOMY & ADENOIDECTOMY  age 13       Social History:  Social History     Social History     Marital status:      Spouse name: N/A     Number of children: N/A     Years of education: N/A     Occupational History     Not on file.     Social History Main Topics     Smoking status: Former Smoker     Smokeless tobacco: Never Used     Alcohol use Yes      Comment: ocassional     Drug  use: No     Sexual activity: No     Other Topics Concern     Parent/Sibling W/ Cabg, Mi Or Angioplasty Before 65f 55m? Yes     Father with MI at 55     Social History Narrative       Family History:  Family History   Problem Relation Age of Onset     Liver Disease Mother      Depression/Anxiety Father      DIABETES Father      DIABETES Sister      Liver Disease Sister        Review of Systems:  A complete review of systems reviewed by me is negative with the exeption of what has been mentioned in the history of present illness.  CONSTITUTIONAL:  POSITIVE for  weight gain  EYES:  POSITIVE for changes in vision  ENT: NEGATIVE for ear pain, sore throat, sinus pain, post-nasal drip, runny nose, bloody nose  CARDIAC:  POSITIVE for  fast heart beats, fluttering in chest and breathlessness when lying flat  NEUROLOGIC: NEGATIVE headaches, weakness or numbness in the arms or legs.  DERMATOLOGIC:  POSITIVE for  rashes  PULMONARY:  POSITIVE for  SOB at rest, SOB with activity and dry cough  GASTROINTESTINAL: NEGATIVE for nausea or vomitting, loose or watery stools, fat or grease in stools, constipation, abdominal pain, bowel movements black in color or blood noted.  GENITOURINARY:  POSITIVE for  urinating more frequently than usual  MUSCULOSKELETAL:  POSITIVE for  bone or joint pain  ENDOCRINE: NEGATIVE for increased thirst or urination, diabetes.  LYMPHATIC: NEGATIVE for swollen lymph nodes, lumps or bumps in the breasts or nipple discharge.    Physical Examination:  Vitals: There were no vitals taken for this visit.  BMI= There is no height or weight on file to calculate BMI.  Weight 193 lbs, /82, O2 96%, pulse 55.     No flowsheet data found.    GENERAL APPEARANCE: healthy, alert, no distress and over weight  RESP: lungs clear to auscultation - no rales, rhonchi or wheezes  CV: regular rates and rhythm, normal S1 S2, no S3 or S4 and no murmur, click or rub  PSYCH: mentation appears normal and affect  normal/bright    Impression/Plan:    Rosita King is a pleasant 68 yo F with history of severe ASHLEY, BJ, HTN, lichen sclerosus, GERD who presents to establish care for ASHLEY and to restart treatment.    1.)  Severe ASHLEY   - PSG at 7/27/2006 with undocumented weight, AHI 84.7, RDI 88.9, SpO2 remained above 90%, CPAP 7 optimal.   - Given no new cardiac or pulmonary concerns, did not see strong indication for repeat sleep testing.   - Will arrange new supplies, mask fitting, continue CPAP on auto-titrate 5-8 cm H2O.    Plan as given to patient as above.    I have spent 30 minutes with this patient today in which greater than 50% of this time was spent in the counseling / coordination of care regarding ASHLEY.    Polysomnography reviewed.  Obstructive sleep apnea reviewed.  Complications of untreated sleep apnea were reviewed.    Danis Rodríguez MD    CC: No ref. provider found

## 2018-06-22 ENCOUNTER — DOCUMENTATION ONLY (OUTPATIENT)
Dept: SLEEP MEDICINE | Facility: CLINIC | Age: 68
End: 2018-06-22
Payer: COMMERCIAL

## 2018-06-22 NOTE — PROGRESS NOTES
Patient came to [facility] for mask fitting appointment on June 22, 2018. Patient requested to switch masks because NEEDED NEWER STYLE.  Patient tried on the followings masks: AIRFIT P10 AND AIRFIT N20.   Patient selected  Resmed, type AIRFIT N20 Nasal mask Small. PATIENT ALSO RECEIVED NEW TUBE.

## 2018-07-11 ENCOUNTER — HOSPITAL ENCOUNTER (OUTPATIENT)
Dept: PHYSICAL THERAPY | Facility: CLINIC | Age: 68
Setting detail: THERAPIES SERIES
End: 2018-07-11
Attending: FAMILY MEDICINE
Payer: MEDICARE

## 2018-07-11 PROCEDURE — 97110 THERAPEUTIC EXERCISES: CPT | Mod: GP | Performed by: PHYSICAL THERAPIST

## 2018-07-11 PROCEDURE — 40000841 ZZH STATISTIC WOMEN'S HEALTH VISIT: Performed by: PHYSICAL THERAPIST

## 2018-07-11 PROCEDURE — 90911 ZZHC PT BIOFEEDBACK (PFM): CPT | Mod: GP | Performed by: PHYSICAL THERAPIST

## 2018-07-11 PROCEDURE — G8988 SELF CARE GOAL STATUS: HCPCS | Mod: GP,CI | Performed by: PHYSICAL THERAPIST

## 2018-07-11 PROCEDURE — G8987 SELF CARE CURRENT STATUS: HCPCS | Mod: GP,CJ | Performed by: PHYSICAL THERAPIST

## 2018-07-11 NOTE — Clinical Note
Jenna Valencia Dr has had very sporadic visits and I had neglected to send you an updated progress report in July (oops).  Hence there are 2 recertifications for you to sign.  Sorry to inundate you with paperwork today. Please contact me with any questions. ~Tia

## 2018-07-18 ENCOUNTER — DOCUMENTATION ONLY (OUTPATIENT)
Dept: SLEEP MEDICINE | Facility: CLINIC | Age: 68
End: 2018-07-18
Payer: COMMERCIAL

## 2018-07-18 NOTE — PROGRESS NOTES
Patient came to [facility] for mask fitting appointment on July 18, 2018. Patient requested to switch masks because soreness/skin irritation .  Patient tried on the followings masks: WISP S/M AND L.   Patient selected  [employer]ics, type WISP Nasal mask Large. PATIENT MAY BE INTERESTED IN TRYING A FULL FACE IN THE FUTURE.

## 2018-09-19 ENCOUNTER — HOSPITAL ENCOUNTER (OUTPATIENT)
Dept: PHYSICAL THERAPY | Facility: CLINIC | Age: 68
Setting detail: THERAPIES SERIES
End: 2018-09-19
Attending: FAMILY MEDICINE
Payer: MEDICARE

## 2018-09-19 PROCEDURE — 90911 ZZHC PT BIOFEEDBACK (PFM): CPT | Mod: GP | Performed by: PHYSICAL THERAPIST

## 2018-09-19 PROCEDURE — 40000841 ZZH STATISTIC WOMEN'S HEALTH VISIT: Performed by: PHYSICAL THERAPIST

## 2018-09-19 PROCEDURE — 97535 SELF CARE MNGMENT TRAINING: CPT | Mod: GP | Performed by: PHYSICAL THERAPIST

## 2018-09-19 PROCEDURE — G8988 SELF CARE GOAL STATUS: HCPCS | Mod: GP,CI | Performed by: PHYSICAL THERAPIST

## 2018-09-19 PROCEDURE — G8987 SELF CARE CURRENT STATUS: HCPCS | Mod: GP,CJ | Performed by: PHYSICAL THERAPIST

## 2018-09-19 PROCEDURE — 97110 THERAPEUTIC EXERCISES: CPT | Mod: GP | Performed by: PHYSICAL THERAPIST

## 2018-09-19 NOTE — PROGRESS NOTES
Physical Therapy Progress Note and Medicare RECERTIFICATION    Rosita King  1950    Session Number: 6/6 (, [employer]) since start of care.    Reasons for Continuing Treatment:   Pt has had a regression in her PFM activation, isolation and strength of recruitment.  She has had stronger urges and more leaking of urine since not making regular PT sessions.  Pt could benefit from restarting her routine appts and Home Exercise Program.     Frequency/Duration  1 times per every 2 weeks for 8 weeks for a total of 4 visits.    Recertification Period  8/20/18 - 11/14/18    Physician Signature:    Date:    X_______________________________________________________    Physician Name: Addis Del Rio MD    I certify the need for these services furnished under this plan of treatment and while under my care. Physician co-signature of this document indicates review and certification of the therapy plan.  This signature may be written on paper, or electronically signed within EPIC.          09/19/18 0800   Signing Clinician's Name / Credentials   Signing clinician's name / credentials Padma Barnes, PT MA #[id number]   Session Number   Session Number 6/6 (, [employer])   Progress Note/Recertification   Progress Note Due Date 11/14/18   Progress Note Completed Date 09/19/18   Recertification Due Date 11/14/18   Ortho Goal 1   Goal Identifier STG   Goal Description 1) Pt will improve use of PFM to report making it to bathroom for 60% of strong urges in daytime, in 3 weeks. Met: is making it to bathroom at least 60% of the time.    Target Date 05/16/18   Date Met 06/06/18   Ortho Goal 2   Goal Identifier STG   Goal Description  2)Pt will report no leaking overnite with getting to bathroom, in 4 weeks. Met: pt states she is now able to get to bathroom overnite without leaking.   (Cont x 2 weeks)   Target Date 06/20/18   Date Met 07/11/18   Ortho Goal 3   Goal Identifier STG   Goal Description 3)Pt will report drinking 16+  "oz of water per day, in 4 weeks.  Met: getting at least 1 water bottle per day now.   Target Date 05/23/18   Date Met 06/06/18   Ortho Goal 4   Goal Identifier LTG   Goal Description 4) Pt will report 6/7 days with no leaking in 8 weeks.   Target Date 06/20/18   Ortho Goal 5   Goal Identifier LTG   Goal Description 5)Pt will be indep in HEP to prevent return of symptoms in 8 weeks.   Target Date 06/20/18   Subjective Report   Subjective Report Pt has life stress and feels burdened by this program. Wants to know what long term to expect, and should she \"stay with it.\"    Objective Measure 1   Objective Measure Leaking   Details Everyday is leaking   Objective Measure 2   Objective Measure Pad Use   Details 1; incontinence/bladder pad #2-3   Objective Measure 3   Objective Measure Biofeedback   Objective Measure 4   Objective Measure Void Times   Details About every 2-3 hours, unless has an episode of going every 1 hour (usually in the evening).    Objective Measure 5   Objective Measure Fluid Intake   Details Coffee = 6c/morning, Beer = 1-3cans/day, Water = minimal, takes with pills and glass before bed   Biofeedback   Minutes 15   Skilled Intervention PFM BF   Patient Response Pt shows very different activation and relaxation pattern than 1.5 mos ago. Improved relaxation, mildly worse intensity of activation.   Treatment Detail Pt in supported supine position and vaginal probe surface EMG electrode placed. BF administered using the TruLeaf MR20 device and Synergy 3D software. Pt guided through quick and hold contractions using BF to enhance quality and control of muscle contractions.   Therapeutic Procedure/exercise   Minutes 15   Skilled Intervention Exer: strengthening, isolation, awareness; progression of HEP   Patient Response Pt admits \"not doing any exers since last session.\"  Able to demo correct HEP, and states \"I know what I have to do, but just can't find time to do it.\"    Treatment Detail Pt completed " "10 reps each of quicks and holds (4 seconds).  Worked on repeated contractions of holds with breathing cues as pt tended to hold her breath. Attempted problem solving to figure out how pt could fit her exers into her day more routinely.  Suggested doing in car at a stop light or when she is a passenger, doing during commercials while watching tv, and while trying to fall asleep at nite.    Self Care/home Management   Minutes 30   Skilled Intervention Self-care: recommended fluid intake, void times,  and steps to control urge   Patient Response Pt states frustration at \"trying to fit this all into my already busy schedule.\"    Treatment Detail Attempted to educate pt on how subtle changes can become routine and will start to automatically exercise, drink more water and get routine urges.  REviewed and re-educated on steps to control the urge.  Educated on how pt could \"cut back\" on coffee and beer, but also dilute her fluid intake with an extra glass of water in the evening when she is starting to get her stronger urges.  Instructed in timed voids of every 2 hours using her phone as her alert, for 1 week and if not getting strong urges or leaks, then add 10 mins on this 2 hours for 2-3 days and working her way up tto 3 hour voids.    Plan   Home program \"Remember 3 Things: squeeze as often as you can think about it, drink water, and try to void every 2 hours.\"   Plan for next session Re-starting PT as pt has been busy and not able to \"stay on track\" with her appts or HEP.  Will plan to see pt 1x every 2 weeks for up to 4 more sessions.    Total Session Time   Timed Code Treatment Minutes 45 (2SCHM, TE)   Total Treatment Time (sum of timed and untimed services) 60 (BF, 2SCHM,TE)   Thank you for the referral of this patient.  Padma Barnes, PT, MA  #[id number]    "

## 2018-09-19 NOTE — PROGRESS NOTES
Physical Therapy Progress Note and Medicare RECERTIFICATION    Rosita King  1950    Session Number: 5/6 (, [employer] [facility]) since start of care.    Reasons for Continuing Treatment:   Pt has not yet made expected gains and is having difficulty incorporating her Home Exers into her daily routine. Continues to leak and have strong urges.     Frequency/Duration  1 times per every other week for 8 weeks for a total of 4 visits.    Recertification Period  6/20/18 - 8/20/18    Physician Signature:    Date:    X_______________________________________________________    Physician Name: Addis House MD    I certify the need for these services furnished under this plan of treatment and while under my care. Physician co-signature of this document indicates review and certification of the therapy plan.  This signature may be written on paper, or electronically signed within EPIC.           07/11/18 0900   Signing Clinician's Name / Credentials   Signing clinician's name / credentials Padma Barnes, PT MA #[id number]   Session Number   Session Number 5/6 (, [employer] [facility])   Progress Note/Recertification   Progress Note Due Date 06/20/18   Progress Note Completed Date 07/11/18   Recertification Due Date 06/20/18   Ortho Goal 1   Goal Identifier STG   Goal Description 1) Pt will improve use of PFM to report making it to bathroom for 60% of strong urges in daytime, in 3 weeks. Met: is making it to bathroom at least 60% of the time.    Target Date 05/16/18   Date Met 06/06/18   Ortho Goal 2   Goal Identifier STG   Goal Description  2)Pt will report no leaking overnite with getting to bathroom, in 4 weeks. Met: pt states she is now able to get to bathroom overnite without leaking.   (Cont x 2 weeks)   Target Date 06/20/18   Date Met 07/11/18   Ortho Goal 3   Goal Identifier STG   Goal Description 3)Pt will report drinking 16+ oz of water per day, in 4 weeks.  Met: getting at least 1 water bottle per day now.   Target Date  "05/23/18   Date Met 06/06/18   Ortho Goal 4   Goal Identifier LTG   Goal Description 4) Pt will report 6/7 days with no leaking in 8 weeks.   Target Date 06/20/18   Ortho Goal 5   Goal Identifier LTG   Goal Description 5)Pt will be indep in HEP to prevent return of symptoms in 8 weeks.   Target Date 06/20/18   Subjective Report   Subjective Report \"It has been a long month, with everything, not just pelvic floor stuff.\"  States was doing really great with the leaking, but the last one week was \"very bad.\"  Reports her diet went to the way side, she had family members staying with her for the 4th holiday, and had a few other health issues (breathing machine, eye surgery).  Pt also mentioned that she may not have been drinking as much water as she was doing previously.    Objective Measure 1   Objective Measure Leaking   Details Getting little leaks \"all the time\". Reports 2 weeks prior does not remember even having leaks.    Objective Measure 2   Objective Measure Pad Use   Details up to 2 per day   Objective Measure 3   Objective Measure Biofeedback   Details Abdominals: not monitored; PFM: Max = 30uV, Avg = 23uV, Rest = 7uV (avg), and endurance = 10 sec   Objective Measure 4   Objective Measure Void Times   Details 1x during nite, not leaking on the way. Last 3-4 days has felt it has been every 1 hour going.  Was going every 3-5 hours the week before.    Biofeedback   Minutes 15   Skilled Intervention PFM BF   Patient Response Continues to have difficulty with relaxation of PFM today.  Was able to demo down to 2uV, but was fleeting and unable to maintain.   Treatment Detail Pt in supported supine position and vaginal probe surface EMG electrode placed. BF administered using the Auctomatic device and Synergy 3D software. Pt guided through quick and hold contractions using BF to enhance quality and control of muscle contractions.   Therapeutic Procedure/exercise   Minutes 39   Skilled Intervention Exer: " "strengthening, stretching; progression of HEP   Patient Response Pt had c/o \"feet pain\" with walking to bathroom at nite. Additionally unable to fully relax PFM after contractions - found to have LE decreased flexibility (R) worse than (L).    Treatment Detail Completed PFM quicks and holds, and cued to focus on relaxation. Taught palm warming, diaphragm breathing, and 4-7-8 breathing patterns for relaxation.  Instructed in HS, piriformis, adductor and gastroc stretching.    Self Care/home Management   Minutes 6   Skilled Intervention Self-Care: fluid intake, void times, toilet posture/habits   Patient Response Pt agrees her fluid intake and diet may have played large role in increased leaking/urges.    Treatment Detail Reviewed fluid intake and bladder irritants. Instructed in toileting posture and habits to improve fully emptying bladder at each void.    Plan   Home program Given h/o for all stretching as above, and for relaxation techniques.    Plan for next session Cont PT for 3 more sessions over next 6 weeks. Cont to work on relaxation, stretching, and habits for optimal voiding.   Total Session Time   Timed Code Treatment Minutes 45 (3TE)   Total Treatment Time (sum of timed and untimed services) 55 (BF,3TE)   Thank you for the referral of this patient.  Padma Barnes, PT, MA  #[id number]    "

## 2018-10-03 ENCOUNTER — HOSPITAL ENCOUNTER (OUTPATIENT)
Dept: PHYSICAL THERAPY | Facility: CLINIC | Age: 68
Setting detail: THERAPIES SERIES
End: 2018-10-03
Attending: FAMILY MEDICINE
Payer: MEDICARE

## 2018-10-03 PROCEDURE — 90911 ZZHC PT BIOFEEDBACK (PFM): CPT | Mod: GP | Performed by: PHYSICAL THERAPIST

## 2018-10-03 PROCEDURE — 40000841 ZZH STATISTIC WOMEN'S HEALTH VISIT: Performed by: PHYSICAL THERAPIST

## 2018-10-03 PROCEDURE — 97110 THERAPEUTIC EXERCISES: CPT | Mod: GP | Performed by: PHYSICAL THERAPIST

## 2018-10-24 ENCOUNTER — ALLIED HEALTH/NURSE VISIT (OUTPATIENT)
Dept: FAMILY MEDICINE | Facility: CLINIC | Age: 68
End: 2018-10-24
Payer: COMMERCIAL

## 2018-10-24 DIAGNOSIS — Z23 NEED FOR PROPHYLACTIC VACCINATION AND INOCULATION AGAINST INFLUENZA: Primary | ICD-10-CM

## 2018-10-24 PROCEDURE — G0008 ADMIN INFLUENZA VIRUS VAC: HCPCS

## 2018-10-24 PROCEDURE — 90662 IIV NO PRSV INCREASED AG IM: CPT

## 2018-10-24 NOTE — MR AVS SNAPSHOT
After Visit Summary   10/24/2018    Rosita King    MRN: [id number]           Patient Information     Date Of Birth          1950        Visit Information        Provider Department      10/24/2018 3:15 PM [place] [facility]        Today's Diagnoses     Need for prophylactic vaccination and inoculation against influenza    -  1       Follow-ups after your visit        Your next 10 appointments already scheduled     Oct 31, 2018 10:30 AM CDT   Women's Health Treatment with Padma Barnes PT   [facility] ([facility])    [address] [place] [id number] [phone]              Who to contact     If you have questions or need follow up information about today's clinic visit or your schedule please contact [facility] directly at [phone].  Normal or non-critical lab and imaging results will be communicated to you by [employer], letter or phone within 4 business days after the clinic has received the results. If you do not hear from us within 7 days, please contact the clinic through [employer] or phone. If you have a critical or abnormal lab result, we will notify you by phone as soon as possible.  Submit refill requests through [employer] or call your pharmacy and they will forward the refill request to us. Please allow 3 business days for your refill to be completed.          Additional Information About Your Visit        MyChart Information     [employer] gives you secure access to your electronic health record. If you see a primary care provider, you can also send messages to your care team and make appointments. If you have questions, please call your primary care clinic.  If you do not have a primary care provider, please call [phone] and they will assist you.        Care EveryWhere ID     This is your Care EveryWhere ID. This could be used by other organizations to access your [facility]  records  [id number]         Blood Pressure from Last 3 Encounters:   06/06/18 122/70   04/10/18 127/70   08/24/17 138/84    Weight from Last 3 Encounters:   06/06/18 193 lb (87.5 kg)   04/10/18 196 lb (88.9 kg)   08/24/17 191 lb (86.6 kg)              We Performed the Following     FLU VACCINE, INCREASED ANTIGEN, PRESV FREE, AGE 65+ [86232]     Vaccine Administration, Initial [83041]        Primary Care Provider Office Phone # Fax #    Addis Del Rio [phone]       [address] [place] [address]        Equal Access to Services     KIARA LUBIN : Derrick Blanco, wapaula fernández, ashley hansonmawendy marc, jane corrigan. So [facility] [phone].    ATENCIÓN: Si habla español, tiene a adams disposición servicios gratuitos de asistencia lingüística. Llame al [phone].    We comply with applicable federal civil rights laws and [place] laws. We do not discriminate on the basis of race, color, national origin, age, disability, sex, sexual orientation, or gender identity.            Thank you!     Thank you for choosing [facility]  for your care. Our goal is always to provide you with excellent care. Hearing back from our patients is one way we can continue to improve our services. Please take a few minutes to complete the written survey that you may receive in the mail after your visit with us. Thank you!             Your Updated Medication List - Protect others around you: Learn how to safely use, store and throw away your medicines at www.disposemymeds.org.          This list is accurate as of 10/24/18  3:27 PM.  Always use your most recent med list.                   Brand Name Dispense Instructions for use Diagnosis    CENTRUM SILVER per tablet      Take 1 tablet by mouth daily        metoprolol succinate 50 MG 24 hr tablet    TOPROL-XL    93 tablet    Take 1 tablet (50 mg) by mouth daily    Benign essential hypertension        omeprazole 20 MG CR capsule    priLOSEC    90 capsule    TAKE ONE CAPSULE BY MOUTH ONCE DAILY    Gastroesophageal reflux disease without esophagitis       triamcinolone 0.1 % ointment    KENALOG    45 g    Apply sparingly to affected area two times daily for 14 days.    Vaginal itching, Lichen sclerosus       VITAMIN D (CHOLECALCIFEROL) PO      Take 500 Units by mouth daily

## 2018-10-24 NOTE — PROGRESS NOTES

## 2018-10-31 ENCOUNTER — HOSPITAL ENCOUNTER (OUTPATIENT)
Dept: PHYSICAL THERAPY | Facility: CLINIC | Age: 68
Setting detail: THERAPIES SERIES
End: 2018-10-31
Attending: FAMILY MEDICINE
Payer: MEDICARE

## 2018-10-31 PROCEDURE — G8987 SELF CARE CURRENT STATUS: HCPCS | Mod: GP,CK | Performed by: PHYSICAL THERAPIST

## 2018-10-31 PROCEDURE — G8988 SELF CARE GOAL STATUS: HCPCS | Mod: GP,CK | Performed by: PHYSICAL THERAPIST

## 2018-10-31 PROCEDURE — 90911 ZZHC PT BIOFEEDBACK (PFM): CPT | Mod: GP | Performed by: PHYSICAL THERAPIST

## 2018-10-31 PROCEDURE — 40000841 ZZH STATISTIC WOMEN'S HEALTH VISIT: Performed by: PHYSICAL THERAPIST

## 2018-10-31 PROCEDURE — 97110 THERAPEUTIC EXERCISES: CPT | Mod: GP | Performed by: PHYSICAL THERAPIST

## 2018-10-31 PROCEDURE — G8989 SELF CARE D/C STATUS: HCPCS | Mod: GP,CK | Performed by: PHYSICAL THERAPIST

## 2018-11-01 NOTE — PROGRESS NOTES
"PHYSICAL THERAPY DISCHARGE SUMMARY    DATE:  11/1/2018  NAME:  Rosita King           MR#:  [id number]  YOB: 1950  Diagnosis:  PFM Dysfunction with urge incontinence  Referring Physician:  Addis House MD    Patient was seen from 4/25/18 to 10/31/18.    Session Number: 8 (, [employer])      Subjective Report: Somedays have been really bad and somedays are really good.  If wearing CPAP at nite, then will not get up and will stay dry all nite.  If not wearing CPAP will get up 1x per nite and may have difficulty getting to toilet without a leak, but is usually making it to bathroom.  Been doing a majority of housework and yardwork by herself as  is ill/laid up with back pain.  Doing Steps to Control Urge - seems to be helping a little.       Objective Measurements:  Objective Measure: Leaking  Details: Still everyday, \"but not a whole lot.\"  States it depends on how tired out she is.   Objective Measure: Void Times  Details: Voids about every 4 hours.    Objective Measure: Fluid Intake  Details: Water = 12oz/day, Coffee = 2-3c/day  Objective Measure: Biofeedback  Details: Abdominals: not monitored.  PFM: Max = 23uV, Avg = 10.5uV, Rest = 1uV, and endurance = 8 sec  Objective Measure: FRANCISCA-6  Details: 6/18 (40% impairment)          Goals:    Goal Identifier STG   Goal Description 1) Pt will improve use of PFM to report making it to bathroom for 60% of strong urges in daytime, in 3 weeks. Met: is making it to bathroom at least 60% of the time.    Target Date 05/16/18   Date Met  06/06/18   Progress:     Goal Identifier STG   Goal Description   2)Pt will report no leaking overnite with getting to bathroom, in 4 weeks. Met: pt states she is now able to get to bathroom overnite without leaking.  (Cont x 2 weeks)   Target Date 06/20/18   Date Met  07/11/18   Progress:     Goal Identifier STG   Goal Description 3)Pt will report drinking 16+ oz of water per day, in 4 weeks.  Met: getting at least " "1 water bottle per day now.   Target Date 05/23/18   Date Met  06/06/18   Progress:     Goal Identifier LTG   Goal Description 4) Pt will report 6/7 days with no leaking in 8 weeks. Not Met: pt is still leaking every day.  (Will cont to work on this on her own at this time)   Target Date 10/31/18   Date Met      Progress:     Goal Identifier LTG   Goal Description 5)Pt will be indep in HEP to prevent return of symptoms in 8 weeks.  Met: pt knows HEP, but states she is \"just not so good at doing it d/t being so busy.\"  (Will cont with HEP on her own at this time.)   Target Date 10/31/18   Date Met  10/31/18   Progress:         Progress Toward Goals:   Progress this reporting period: Pt has not fully met goals, but has not yet been able to fully engage in her HEP.  Will cont on her own to get HEP more routine.    Plan:  Discharge from therapy. Cont on her own with HEP.  May \"re-visit\" PT in future if not seeing improvement on her own.       Reason for Discharge:  Patient chooses to discontinue therapy.        Thank you for the referral of this patient.    Padma Barnes, PT, MA  #[id number]          "

## 2018-11-08 DIAGNOSIS — I10 BENIGN ESSENTIAL HYPERTENSION: ICD-10-CM

## 2018-11-08 DIAGNOSIS — K21.9 GASTROESOPHAGEAL REFLUX DISEASE WITHOUT ESOPHAGITIS: ICD-10-CM

## 2018-11-08 NOTE — TELEPHONE ENCOUNTER
"Requested Prescriptions   Pending Prescriptions Disp Refills     metoprolol succinate (TOPROL-XL) 50 MG 24 hr tablet [Pharmacy Med Name: METOPROLOL ER 50MG  TAB] 6 tablet 30     Sig: TAKE ONE TABLET BY MOUTH ONCE DAILY    Beta-Blockers Protocol Passed    11/8/2018 11:51 AM       Passed - Blood pressure under 140/90 in past 12 months    BP Readings from Last 3 Encounters:   06/06/18 122/70   04/10/18 127/70   08/24/17 138/84                Passed - Patient is age 6 or older       Passed - Recent (12 mo) or future (30 days) visit within the authorizing provider's specialty    Patient had office visit in the last 12 months or has a visit in the next 30 days with authorizing provider or within the authorizing provider's specialty.  See \"Patient Info\" tab in inbasket, or \"Choose Columns\" in Meds & Orders section of the refill encounter.      Last Written Prescription Date:  10/17/17  Last Fill Quantity: 93,  # refills: 1   Last office visit: 10/24/2018 with prescribing provider:     Future Office Visit:                omeprazole (PRILOSEC) 20 MG CR capsule [Pharmacy Med Name: OMEPRAZOLE 20MG CAP] 90 capsule 1     Sig: TAKE ONE CAPSULE BY MOUTH ONCE DAILY    PPI Protocol Passed    11/8/2018 11:51 AM       Passed - Not on Clopidogrel (unless Pantoprazole ordered)       Passed - No diagnosis of osteoporosis on record       Passed - Recent (12 mo) or future (30 days) visit within the authorizing provider's specialty    Patient had office visit in the last 12 months or has a visit in the next 30 days with authorizing provider or within the authorizing provider's specialty.  See \"Patient Info\" tab in inbasket, or \"Choose Columns\" in Meds & Orders section of the refill encounter.      Last Written Prescription Date:  10/6/17  Last Fill Quantity: 90,  # refills: 1   Last office visit: 10/24/2018 with prescribing provider:     Future Office Visit:               Passed - Patient is age 18 or older       Passed - No active pregnacy " on record       Passed - No positive pregnancy test in past 12 months

## 2018-11-09 RX ORDER — METOPROLOL SUCCINATE 50 MG/1
TABLET, EXTENDED RELEASE ORAL
Qty: 90 TABLET | Refills: 0 | Status: SHIPPED | OUTPATIENT
Start: 2018-11-09 | End: 2019-04-23

## 2019-02-21 ENCOUNTER — OFFICE VISIT (OUTPATIENT)
Dept: FAMILY MEDICINE | Facility: CLINIC | Age: 69
End: 2019-02-21
Payer: COMMERCIAL

## 2019-02-21 ENCOUNTER — ANCILLARY PROCEDURE (OUTPATIENT)
Dept: GENERAL RADIOLOGY | Facility: CLINIC | Age: 69
End: 2019-02-21
Attending: FAMILY MEDICINE
Payer: COMMERCIAL

## 2019-02-21 VITALS
TEMPERATURE: 101 F | HEART RATE: 86 BPM | OXYGEN SATURATION: 94 % | HEIGHT: 63 IN | DIASTOLIC BLOOD PRESSURE: 84 MMHG | WEIGHT: 190 LBS | SYSTOLIC BLOOD PRESSURE: 160 MMHG | RESPIRATION RATE: 18 BRPM | BODY MASS INDEX: 33.66 KG/M2

## 2019-02-21 DIAGNOSIS — J10.1 INFLUENZA A: Primary | ICD-10-CM

## 2019-02-21 DIAGNOSIS — R05.9 COUGH: ICD-10-CM

## 2019-02-21 DIAGNOSIS — R07.0 THROAT PAIN: ICD-10-CM

## 2019-02-21 LAB
DEPRECATED S PYO AG THROAT QL EIA: NORMAL
FLUAV+FLUBV AG SPEC QL: NEGATIVE
FLUAV+FLUBV AG SPEC QL: POSITIVE
SPECIMEN SOURCE: ABNORMAL
SPECIMEN SOURCE: NORMAL

## 2019-02-21 PROCEDURE — 87804 INFLUENZA ASSAY W/OPTIC: CPT | Mod: 59 | Performed by: FAMILY MEDICINE

## 2019-02-21 PROCEDURE — 99213 OFFICE O/P EST LOW 20 MIN: CPT | Performed by: FAMILY MEDICINE

## 2019-02-21 PROCEDURE — 71046 X-RAY EXAM CHEST 2 VIEWS: CPT | Mod: FY

## 2019-02-21 PROCEDURE — 87880 STREP A ASSAY W/OPTIC: CPT | Performed by: FAMILY MEDICINE

## 2019-02-21 PROCEDURE — 87081 CULTURE SCREEN ONLY: CPT | Performed by: FAMILY MEDICINE

## 2019-02-21 RX ORDER — OSELTAMIVIR PHOSPHATE 75 MG/1
75 CAPSULE ORAL 2 TIMES DAILY
Qty: 10 CAPSULE | Refills: 0 | Status: SHIPPED | OUTPATIENT
Start: 2019-02-21 | End: 2019-04-23

## 2019-02-21 ASSESSMENT — MIFFLIN-ST. JEOR: SCORE: 1360.96

## 2019-02-21 NOTE — PROGRESS NOTES
SUBJECTIVE:   Rosita iKng is a 68 year old female who presents to clinic today for the following health issues:      RESPIRATORY SYMPTOMS      Duration: 3 days     Description  sore throat, cough, chills, ear pain bilateral, fatigue/malaise and hoarse voice    Severity: moderate    Accompanying signs and symptoms: chest is sore. Can't swallow anything, even water    History (predisposing factors):  None- but was at a [facility]     Precipitating or alleviating factors: None    Therapies tried and outcome:  rest and fluids, throat lozenges.         Problem list and histories reviewed & adjusted, as indicated.  Additional history: as documented    Patient Active Problem List   Diagnosis     CARDIOVASCULAR SCREENING; LDL GOAL LESS THAN 130     Vitamin D deficiency     ASHLEY (obstructive sleep apnea)     Generalized anxiety disorder     Hyperlipidemia LDL goal <130     Diverticulosis of large intestine without hemorrhage     H. pylori infection     Essential hypertension     Essential hypertension with goal blood pressure less than 140/90     Gastroesophageal reflux disease without esophagitis     Paroxysmal supraventricular tachycardia (H)     Lichen sclerosus     Past Surgical History:   Procedure Laterality Date     CARPAL TUNNEL RELEASE RT/LT Bilateral 2006, 2009     COLONOSCOPY N/A 11/25/2015    Procedure: COLONOSCOPY;  Surgeon: Mina Roy MD;  Location: [facility]     ESOPHAGOSCOPY, GASTROSCOPY, DUODENOSCOPY (EGD), COMBINED N/A 11/25/2015    Procedure: COMBINED ESOPHAGOSCOPY, GASTROSCOPY, DUODENOSCOPY (EGD), BIOPSY SINGLE OR MULTIPLE;  Surgeon: Mina Roy MD;  Location: [facility]     TONSILLECTOMY & ADENOIDECTOMY  age 13       Social History     Tobacco Use     Smoking status: Former Smoker     Smokeless tobacco: Never Used   Substance Use Topics     Alcohol use: Yes     Comment: ocassional     Family History   Problem Relation Age of Onset     Liver Disease Mother       "Depression/Anxiety Father      Diabetes Father      Diabetes Sister      Liver Disease Sister          Current Outpatient Medications   Medication Sig Dispense Refill     metoprolol succinate (TOPROL-XL) 50 MG 24 hr tablet TAKE ONE TABLET BY MOUTH ONCE DAILY 90 tablet 0     Multiple Vitamins-Minerals (CENTRUM SILVER) per tablet Take 1 tablet by mouth daily       omeprazole (PRILOSEC) 20 MG CR capsule TAKE ONE CAPSULE BY MOUTH ONCE DAILY 90 capsule 0     VITAMIN D, CHOLECALCIFEROL, PO Take 500 Units by mouth daily        Allergies   Allergen Reactions     Penicillins      Pravastatin      Other reaction(s): Myalgia     Simvastatin      Other reaction(s): Myalgia     Sulfa Drugs      Recent Labs   Lab Test 08/24/17  1051 08/04/17  0816 10/13/15  0945   A1C 5.5  --   --    *  --  143*   HDL 57  --  58   TRIG 117  --  84   ALT  --   --  35   CR  --  0.65 0.67   GFRESTIMATED  --  >90  Non  GFR Calc   89   GFRESTBLACK  --  >90   GFR Calc   >90   GFR Calc     POTASSIUM  --  4.0 4.2   TSH  --   --  1.28      BP Readings from Last 3 Encounters:   02/21/19 160/84   06/06/18 122/70   04/10/18 127/70    Wt Readings from Last 3 Encounters:   02/21/19 86.2 kg (190 lb)   06/06/18 87.5 kg (193 lb)   04/10/18 88.9 kg (196 lb)                  Labs reviewed in EPIC    Reviewed and updated as needed this visit by clinical staff       Reviewed and updated as needed this visit by Provider         ROS:  Constitutional, HEENT, cardiovascular, pulmonary, gi and gu systems are negative, except as otherwise noted.    OBJECTIVE:     /84 (Cuff Size: Adult Regular)   Pulse 86   Temp 101  F (38.3  C) (Tympanic)   Resp 18   Ht 1.6 m (5' 3\")   Wt 86.2 kg (190 lb)   SpO2 94%   Breastfeeding? No   BMI 33.66 kg/m    Body mass index is 33.66 kg/m .  GENERAL: alert and in some distress  EYES: Eyes grossly normal to inspection, PERRL and conjunctivae and sclerae normal  HENT: normal " cephalic/atraumatic, ear canals and TM's normal, rhinorrhea clear, oral mucous membranes moist, oropharxnx crowded and tonsillar erythema  NECK: no adenopathy, no asymmetry, masses, or scars and thyroid normal to palpation  RESP: lungs clear to auscultation - no rales, rhonchi or wheezes  CV: tachycardia, normal S1 S2, no S3 or S4 and no murmur, click or rub  ABDOMEN: soft, nontender  MS: no gross musculoskeletal defects noted, no edema      Results for orders placed or performed in visit on 02/21/19   Strep, Rapid Screen   Result Value Ref Range    Specimen Description Throat     Rapid Strep A Screen       NEGATIVE: No Group A streptococcal antigen detected by immunoassay, await culture report.   Influenza A/B antigen   Result Value Ref Range    Influenza A/B Agn Specimen Nasal     Influenza A Positive (A) NEG^Negative    Influenza B Negative NEG^Negative         CHEST TWO VIEWS 2/21/2019 9:11 AM      HISTORY:  Cough.     COMPARISON: 10/12/2015     FINDINGS: Heart size and pulmonary vascularity are within normal  limits. The lungs are clear. No pneumothorax or pleural effusion.                                                                       IMPRESSION: No radiographic evidence of acute chest abnormality.     ASSESSMENT/PLAN:       (J10.1) Influenza A  (primary encounter diagnosis)  Comment: -Suspect symptoms secondary to influenza A.  Tamiflu prescribed, common side effect discussed.   also provided with Tamiflu prophylactically.  Suggested continue well hydration, warm fluids and over-the-counter analgesia.  Instructed to go ER if symptoms worsen  Plan: oseltamivir (TAMIFLU) 75 MG capsule      (R07.0) Throat pain  Comment: Strep test came back negative  Plan: Strep, Rapid Screen, Beta strep group A culture      (R05) Cough  Comment: Chest x-ray findings discussed which came back unremarkable  Plan: Influenza A/B antigen, XR Chest 2 Views        Patient Instructions     Patient Education     Influenza  (Adult)    Influenza is also called the flu. It is a viral illness that affects the air passages of your lungs. It is different from the common cold. The flu can easily be passed from one to person to another. It may be spread through the air by coughing and sneezing. Or it can be spread by touching the sick person and then touching your own eyes, nose, or mouth.  The flu starts 1 to 3 days after you are exposed to the flu virus. It may last for 1 to 2 weeks but many people feel tired or fatigued for many weeks afterward. You usually don t need to take antibiotics unless you have a complication. This might be an ear or sinus infection or pneumonia.  Symptoms of the flu may be mild or severe. They can include extreme tiredness (wanting to stay in bed all day), chills, fevers, muscle aches, soreness with eye movement, headache, and a dry, hacking cough.  Home care  Follow these guidelines when caring for yourself at home:    Avoid being around cigarette smoke, whether yours or other people s.    Acetaminophen or ibuprofen will help ease your fever, muscle aches, and headache. Don t give aspirin to anyone younger than 18 who has the flu. Aspirin can harm the liver.    Nausea and loss of appetite are common with the flu. Eat light meals. Drink 6 to 8 glasses of liquids every day. Good choices are water, sport drinks, soft drinks without caffeine, juices, tea, and soup. Extra fluids will also help loosen secretions in your nose and lungs.    Over-the-counter cold medicines will not make the flu go away faster. But the medicines may help with coughing, sore throat, and congestion in your nose and sinuses. Don t use a decongestant if you have high blood pressure.    Stay home until your fever has been gone for at least 24 hours without using medicine to reduce fever.  Follow-up care  Follow up with your healthcare provider, or as advised, if you are not getting better over the next week.  If you are age 65 or older, talk  with your provider about getting a pneumococcal vaccine every 5 years. You should also get this vaccine if you have chronic asthma or COPD. All adults should get a flu vaccine every fall. Ask your provider about this.  When to seek medical advice  Call your healthcare provider right away if any of these occur:    Cough with lots of colored mucus (sputum) or blood in your mucus    Chest pain, shortness of breath, wheezing, or trouble breathing    Severe headache, or face, neck, or ear pain    New rash with fever    Fever of 100.4 F (38 C) or higher, or as directed by your healthcare provider    Confusion, behavior change, or seizure    Severe weakness or dizziness    You get a new fever or cough after getting better for a few days  Date Last Reviewed: 1/1/2017 2000-2018 [employer]. [address] [place] [address], [place] [address]. All rights reserved. This information is not intended as a substitute for professional medical care. Always follow your healthcare professional's instructions.               Kiran Marx MD  [facility]

## 2019-02-21 NOTE — PATIENT INSTRUCTIONS
Patient Education     Influenza (Adult)    Influenza is also called the flu. It is a viral illness that affects the air passages of your lungs. It is different from the common cold. The flu can easily be passed from one to person to another. It may be spread through the air by coughing and sneezing. Or it can be spread by touching the sick person and then touching your own eyes, nose, or mouth.  The flu starts 1 to 3 days after you are exposed to the flu virus. It may last for 1 to 2 weeks but many people feel tired or fatigued for many weeks afterward. You usually don t need to take antibiotics unless you have a complication. This might be an ear or sinus infection or pneumonia.  Symptoms of the flu may be mild or severe. They can include extreme tiredness (wanting to stay in bed all day), chills, fevers, muscle aches, soreness with eye movement, headache, and a dry, hacking cough.  Home care  Follow these guidelines when caring for yourself at home:    Avoid being around cigarette smoke, whether yours or other people s.    Acetaminophen or ibuprofen will help ease your fever, muscle aches, and headache. Don t give aspirin to anyone younger than 18 who has the flu. Aspirin can harm the liver.    Nausea and loss of appetite are common with the flu. Eat light meals. Drink 6 to 8 glasses of liquids every day. Good choices are water, sport drinks, soft drinks without caffeine, juices, tea, and soup. Extra fluids will also help loosen secretions in your nose and lungs.    Over-the-counter cold medicines will not make the flu go away faster. But the medicines may help with coughing, sore throat, and congestion in your nose and sinuses. Don t use a decongestant if you have high blood pressure.    Stay home until your fever has been gone for at least 24 hours without using medicine to reduce fever.  Follow-up care  Follow up with your healthcare provider, or as advised, if you are not getting better over the next  week.  If you are age 65 or older, talk with your provider about getting a pneumococcal vaccine every 5 years. You should also get this vaccine if you have chronic asthma or COPD. All adults should get a flu vaccine every fall. Ask your provider about this.  When to seek medical advice  Call your healthcare provider right away if any of these occur:    Cough with lots of colored mucus (sputum) or blood in your mucus    Chest pain, shortness of breath, wheezing, or trouble breathing    Severe headache, or face, neck, or ear pain    New rash with fever    Fever of 100.4 F (38 C) or higher, or as directed by your healthcare provider    Confusion, behavior change, or seizure    Severe weakness or dizziness    You get a new fever or cough after getting better for a few days  Date Last Reviewed: 1/1/2017 2000-2018 [employer]. [address] [place] [address], [place] [address]. All rights reserved. This information is not intended as a substitute for professional medical care. Always follow your healthcare professional's instructions.

## 2019-02-21 NOTE — NURSING NOTE
"Chief Complaint   Patient presents with     URI       Initial /84 (Cuff Size: Adult Regular)   Pulse 86   Temp 101  F (38.3  C) (Tympanic)   Resp 18   Ht 1.6 m (5' 3\")   Wt 86.2 kg (190 lb)   SpO2 94%   Breastfeeding? No   BMI 33.66 kg/m   Estimated body mass index is 33.66 kg/m  as calculated from the following:    Height as of this encounter: 1.6 m (5' 3\").    Weight as of this encounter: 86.2 kg (190 lb).    Patient presents to the clinic using No DME    Health Maintenance that is potentially due pending provider review:  NONE    n/a    Is there anyone who you would like to be able to receive your results? Not Applicable  If yes have patient fill out SU    "

## 2019-02-22 LAB
BACTERIA SPEC CULT: NORMAL
SPECIMEN SOURCE: NORMAL

## 2019-04-23 ENCOUNTER — OFFICE VISIT (OUTPATIENT)
Dept: FAMILY MEDICINE | Facility: CLINIC | Age: 69
End: 2019-04-23
Payer: COMMERCIAL

## 2019-04-23 VITALS
HEIGHT: 63 IN | SYSTOLIC BLOOD PRESSURE: 172 MMHG | OXYGEN SATURATION: 97 % | RESPIRATION RATE: 24 BRPM | HEART RATE: 53 BPM | WEIGHT: 196 LBS | BODY MASS INDEX: 34.73 KG/M2 | TEMPERATURE: 97.9 F | DIASTOLIC BLOOD PRESSURE: 80 MMHG

## 2019-04-23 DIAGNOSIS — E78.5 HYPERLIPIDEMIA LDL GOAL <130: ICD-10-CM

## 2019-04-23 DIAGNOSIS — I10 ESSENTIAL HYPERTENSION WITH GOAL BLOOD PRESSURE LESS THAN 140/90: ICD-10-CM

## 2019-04-23 DIAGNOSIS — I47.10 PAROXYSMAL SUPRAVENTRICULAR TACHYCARDIA (H): ICD-10-CM

## 2019-04-23 DIAGNOSIS — K21.9 GASTROESOPHAGEAL REFLUX DISEASE WITHOUT ESOPHAGITIS: ICD-10-CM

## 2019-04-23 DIAGNOSIS — H90.3 BILATERAL SENSORINEURAL HEARING LOSS: ICD-10-CM

## 2019-04-23 DIAGNOSIS — R41.82 FLUCTUATING MENTAL STATUS: ICD-10-CM

## 2019-04-23 DIAGNOSIS — R53.81 MALAISE: ICD-10-CM

## 2019-04-23 DIAGNOSIS — Z00.01 ENCOUNTER FOR GENERAL ADULT MEDICAL EXAMINATION WITH ABNORMAL FINDINGS: Primary | ICD-10-CM

## 2019-04-23 DIAGNOSIS — R29.898 WEAKNESS OF BOTH LOWER EXTREMITIES: ICD-10-CM

## 2019-04-23 DIAGNOSIS — R00.2 PALPITATIONS: ICD-10-CM

## 2019-04-23 DIAGNOSIS — L82.0 INFLAMED SEBORRHEIC KERATOSIS: ICD-10-CM

## 2019-04-23 DIAGNOSIS — Z23 NEED FOR PROPHYLACTIC VACCINATION AND INOCULATION AGAINST INFLUENZA: ICD-10-CM

## 2019-04-23 DIAGNOSIS — M79.10 MYALGIA: ICD-10-CM

## 2019-04-23 LAB
ALBUMIN SERPL-MCNC: 3.9 G/DL (ref 3.4–5)
ALP SERPL-CCNC: 63 U/L (ref 40–150)
ALT SERPL W P-5'-P-CCNC: 36 U/L (ref 0–50)
ANION GAP SERPL CALCULATED.3IONS-SCNC: 2 MMOL/L (ref 3–14)
AST SERPL W P-5'-P-CCNC: 19 U/L (ref 0–45)
BILIRUB SERPL-MCNC: 0.3 MG/DL (ref 0.2–1.3)
BUN SERPL-MCNC: 16 MG/DL (ref 7–30)
CALCIUM SERPL-MCNC: 8.9 MG/DL (ref 8.5–10.1)
CHLORIDE SERPL-SCNC: 105 MMOL/L (ref 94–109)
CHOLEST SERPL-MCNC: 271 MG/DL
CO2 SERPL-SCNC: 30 MMOL/L (ref 20–32)
CREAT SERPL-MCNC: 0.67 MG/DL (ref 0.52–1.04)
CRP SERPL-MCNC: <2.9 MG/L (ref 0–8)
ERYTHROCYTE [DISTWIDTH] IN BLOOD BY AUTOMATED COUNT: 13.5 % (ref 10–15)
ERYTHROCYTE [SEDIMENTATION RATE] IN BLOOD BY WESTERGREN METHOD: 6 MM/H (ref 0–30)
GFR SERPL CREATININE-BSD FRML MDRD: >90 ML/MIN/{1.73_M2}
GLUCOSE SERPL-MCNC: 90 MG/DL (ref 70–99)
HCT VFR BLD AUTO: 38.9 % (ref 35–47)
HDLC SERPL-MCNC: 58 MG/DL
HGB BLD-MCNC: 13.1 G/DL (ref 11.7–15.7)
LDLC SERPL CALC-MCNC: 182 MG/DL
MAGNESIUM SERPL-MCNC: 2.3 MG/DL (ref 1.6–2.3)
MCH RBC QN AUTO: 30.7 PG (ref 26.5–33)
MCHC RBC AUTO-ENTMCNC: 33.7 G/DL (ref 31.5–36.5)
MCV RBC AUTO: 91 FL (ref 78–100)
NONHDLC SERPL-MCNC: 213 MG/DL
PLATELET # BLD AUTO: 248 10E9/L (ref 150–450)
POTASSIUM SERPL-SCNC: 4.3 MMOL/L (ref 3.4–5.3)
PROT SERPL-MCNC: 6.9 G/DL (ref 6.8–8.8)
RBC # BLD AUTO: 4.27 10E12/L (ref 3.8–5.2)
SODIUM SERPL-SCNC: 137 MMOL/L (ref 133–144)
TRIGL SERPL-MCNC: 155 MG/DL
TSH SERPL DL<=0.005 MIU/L-ACNC: 1.72 MU/L (ref 0.4–4)
VIT B12 SERPL-MCNC: 619 PG/ML (ref 193–986)
WBC # BLD AUTO: 5.9 10E9/L (ref 4–11)

## 2019-04-23 PROCEDURE — 99397 PER PM REEVAL EST PAT 65+ YR: CPT | Mod: 25 | Performed by: FAMILY MEDICINE

## 2019-04-23 PROCEDURE — 85652 RBC SED RATE AUTOMATED: CPT | Performed by: FAMILY MEDICINE

## 2019-04-23 PROCEDURE — 36415 COLL VENOUS BLD VENIPUNCTURE: CPT | Performed by: FAMILY MEDICINE

## 2019-04-23 PROCEDURE — 80053 COMPREHEN METABOLIC PANEL: CPT | Performed by: FAMILY MEDICINE

## 2019-04-23 PROCEDURE — G0009 ADMIN PNEUMOCOCCAL VACCINE: HCPCS | Performed by: FAMILY MEDICINE

## 2019-04-23 PROCEDURE — 85027 COMPLETE CBC AUTOMATED: CPT | Performed by: FAMILY MEDICINE

## 2019-04-23 PROCEDURE — 90732 PPSV23 VACC 2 YRS+ SUBQ/IM: CPT | Performed by: FAMILY MEDICINE

## 2019-04-23 PROCEDURE — 82607 VITAMIN B-12: CPT | Performed by: FAMILY MEDICINE

## 2019-04-23 PROCEDURE — 83735 ASSAY OF MAGNESIUM: CPT | Performed by: FAMILY MEDICINE

## 2019-04-23 PROCEDURE — 80061 LIPID PANEL: CPT | Performed by: FAMILY MEDICINE

## 2019-04-23 PROCEDURE — 84443 ASSAY THYROID STIM HORMONE: CPT | Performed by: FAMILY MEDICINE

## 2019-04-23 PROCEDURE — 86140 C-REACTIVE PROTEIN: CPT | Performed by: FAMILY MEDICINE

## 2019-04-23 PROCEDURE — 99214 OFFICE O/P EST MOD 30 MIN: CPT | Mod: 25 | Performed by: FAMILY MEDICINE

## 2019-04-23 RX ORDER — METOPROLOL SUCCINATE 50 MG/1
50 TABLET, EXTENDED RELEASE ORAL DAILY
Qty: 90 TABLET | Refills: 3 | Status: SHIPPED | OUTPATIENT
Start: 2019-04-23 | End: 2019-06-19

## 2019-04-23 ASSESSMENT — PATIENT HEALTH QUESTIONNAIRE - PHQ9
SUM OF ALL RESPONSES TO PHQ QUESTIONS 1-9: 12
10. IF YOU CHECKED OFF ANY PROBLEMS, HOW DIFFICULT HAVE THESE PROBLEMS MADE IT FOR YOU TO DO YOUR WORK, TAKE CARE OF THINGS AT HOME, OR GET ALONG WITH OTHER PEOPLE: SOMEWHAT DIFFICULT
SUM OF ALL RESPONSES TO PHQ QUESTIONS 1-9: 12

## 2019-04-23 ASSESSMENT — ENCOUNTER SYMPTOMS
FREQUENCY: 1
DIARRHEA: 0
ARTHRALGIAS: 1
NERVOUS/ANXIOUS: 1
HEARTBURN: 1
PALPITATIONS: 1
NAUSEA: 0
HEADACHES: 1
EYE PAIN: 1
DYSURIA: 0
HEMATURIA: 0
CHILLS: 0
ABDOMINAL PAIN: 0
JOINT SWELLING: 0
DIZZINESS: 1
PARESTHESIAS: 1
HEMATOCHEZIA: 0
MYALGIAS: 1
SHORTNESS OF BREATH: 1
WEAKNESS: 1
SORE THROAT: 0
BREAST MASS: 0
FEVER: 0
CONSTIPATION: 0

## 2019-04-23 ASSESSMENT — ACTIVITIES OF DAILY LIVING (ADL): CURRENT_FUNCTION: NO ASSISTANCE NEEDED

## 2019-04-23 ASSESSMENT — MIFFLIN-ST. JEOR: SCORE: 1388.18

## 2019-04-23 NOTE — PROGRESS NOTES
"SUBJECTIVE:   Rosita King is a 68 year old female who presents for Preventive Visit.      Are you in the first 12 months of your Medicare coverage?  No    Healthy Habits:     In general, how would you rate your overall health?  Fair    Frequency of exercise:  None    Do you usually eat at least 4 servings of fruit and vegetables a day, include whole grains    & fiber and avoid regularly eating high fat or \"junk\" foods?  Yes    Taking medications regularly:  Yes    Medication side effects:  Other    Ability to successfully perform activities of daily living:  No assistance needed    Home Safety:  No safety concerns identified    Hearing Impairment:  Feel that people are mumbling or not speaking clearly and need to ask people to speak up or repeat themselves    In the past 6 months, have you been bothered by leaking of urine? Yes    In general, how would you rate your overall mental or emotional health?  Fair      PHQ-2 Total Score: 6    Additional concerns today:  No    Do you feel safe in your environment? No    Do you have a Health Care Directive? No: Advance care planning was reviewed with patient; patient declined at this time.      Fall risk  Fallen 2 or more times in the past year?: No  Any fall with injury in the past year?: No    Cognitive Screening   1) Repeat 3 items (Leader, Season, Table)    2) Clock draw: NORMAL  3) 3 item recall: Recalls 3 objects  Results: 0 items recalled: PROBABLE COGNITIVE IMPAIRMENT, **INFORM PROVIDER**    Mini-CogTM Copyright SEAN Mora. Licensed by the author for use in [facility]; reprinted with permission (pearl@.[email or web address]). All rights reserved.      Do you have sleep apnea, excessive snoring or daytime drowsiness?: yes    Reviewed and updated as needed this visit by clinical staff  Tobacco  Allergies  Meds  Problems  Med Hx  Surg Hx  Fam Hx  Soc Hx          Reviewed and updated as needed this visit by Provider  Tobacco  Allergies  Meds  Problems  Med Hx " " Surg Hx  Fam Hx        Social History     Tobacco Use     Smoking status: Former Smoker     Smokeless tobacco: Never Used   Substance Use Topics     Alcohol use: Yes     Comment: ocassional         Alcohol Use 4/23/2019   Prescreen: >3 drinks/day or >7 drinks/week? No   Prescreen: >3 drinks/day or >7 drinks/week? -           Hypertension Follow-up      Outpatient blood pressures are not being checked.    Low Salt Diet: not monitoring salt    shortness of breath -since last year, had stress test, followed by cardiology.     Arhythmia-on metoprolol. Had a zio patch. At times will feel like it races when she is active. Feels her head is cloudy.     Lately her head feels \"cloudy\". Has a hard time concentrating. Head feels heavy. Doesn't like to read anymore, too hard to concentrate.   Memory is poor.   Eyes are watery. Had cataracts out. But vision has been off.     Hearing is poor.    Has leg pain the last year, progressively worsening. Will take aleve which helps. Her legs throb, the whole leg. Feels weakness in her legs going up the stairs. Sometimes feels in her arms. When standing, the legs will throb, and with laying down at night. In muscles, calves and thighs.   Felt like it started with the knees, but now is whole leg, all the muscles.     gastroesophageal reflux disease-on omeprazole, was on twice a day. Down to one a day. Tried off of it and had a lot of     Numerous positive ROS as below. She just doesn't feel like herself, malaise. Feels off. Denies any depression.    Current providers sharing in care for this patient include:   Patient Care Team:  Addis Del Rio MD as PCP - General (Family Practice)  Kiran Marx MD as Assigned PCP    The following health maintenance items are reviewed in Epic and correct as of today:  Health Maintenance   Topic Date Due     ADVANCE DIRECTIVE PLANNING Q5 YRS  10/17/2005     ZOSTER IMMUNIZATION (2 of 3) 07/14/2011     DEXA SCAN SCREENING (SYSTEM ASSIGNED)  " 10/17/2015     DTAP/TDAP/TD IMMUNIZATION (2 - Td) 06/09/2016     MAMMO SCREEN Q2 YR (SYSTEM ASSIGNED)  02/22/2019     FALL RISK ASSESSMENT  02/21/2020     MEDICARE ANNUAL WELLNESS VISIT  04/23/2020     LIPID SCREEN Q5 YR FEMALE (SYSTEM ASSIGNED)  04/23/2024     COLON CANCER SCREEN (SYSTEM ASSIGNED)  11/25/2025     PHQ-2  Completed     INFLUENZA VACCINE  Completed     HEPATITIS C SCREENING  Completed     IPV IMMUNIZATION  Aged Out     MENINGITIS IMMUNIZATION  Aged Out     BP Readings from Last 3 Encounters:   04/23/19 172/80   02/21/19 160/84   06/06/18 122/70    Wt Readings from Last 3 Encounters:   04/23/19 88.9 kg (196 lb)   02/21/19 86.2 kg (190 lb)   06/06/18 87.5 kg (193 lb)                  Pneumonia Vaccine:Adults age 65+ who have not received previous Pneumovax (PPSV23) or PCV13 as an adult: Should first be given PCV13 AND then should be given PPSV23 6-12 months after PCV13  Mammogram Screening: Mammogram Screening: Patient over age 50, mutual decision to screen reflected in health maintenance.    Review of Systems   Constitutional: Negative for chills and fever.   HENT: Positive for hearing loss. Negative for congestion and sore throat.    Eyes: Positive for pain and visual disturbance.   Respiratory: Positive for shortness of breath.    Cardiovascular: Positive for chest pain and palpitations. Negative for peripheral edema.   Gastrointestinal: Positive for heartburn. Negative for abdominal pain, constipation, diarrhea, hematochezia and nausea.   Breasts:  Negative for tenderness, breast mass and discharge.   Genitourinary: Positive for frequency and urgency. Negative for dysuria, genital sores, hematuria, pelvic pain, vaginal bleeding and vaginal discharge.   Musculoskeletal: Positive for arthralgias and myalgias. Negative for joint swelling.   Skin: Positive for rash.   Neurological: Positive for dizziness, weakness, headaches and paresthesias.   Psychiatric/Behavioral: Positive for mood changes. The  "patient is nervous/anxious.          OBJECTIVE:   /80   Pulse 53   Temp 97.9  F (36.6  C) (Tympanic)   Resp 24   Ht 1.6 m (5' 3\")   Wt 88.9 kg (196 lb)   SpO2 97%   BMI 34.72 kg/m   Estimated body mass index is 34.72 kg/m  as calculated from the following:    Height as of this encounter: 1.6 m (5' 3\").    Weight as of this encounter: 88.9 kg (196 lb).  Physical Exam  GENERAL APPEARANCE: healthy, alert and no distress  EYES: Eyes grossly normal to inspection, PERRL and conjunctivae and sclerae normal  HENT: ear canals and TM's normal, nose and mouth without ulcers or lesions, oropharynx clear and oral mucous membranes moist  NECK: no adenopathy, no asymmetry, masses, or scars and thyroid normal to palpation  RESP: lungs clear to auscultation - no rales, rhonchi or wheezes  BREAST: normal without masses, tenderness or nipple discharge and no palpable axillary masses or adenopathy  CV: regular rate and rhythm, normal S1 S2, no S3 or S4, no murmur, click or rub, no peripheral edema and peripheral pulses strong  ABDOMEN: soft, nontender, no hepatosplenomegaly, no masses and bowel sounds normal  MS: no musculoskeletal defects are noted and gait is age appropriate without ataxia  SKIN: no suspicious lesions or rashes, there is a large irritated pasted on appearing rough lesion right flank consistent with seborrheic keratosis   NEURO: Normal strength and tone, sensory exam grossly normal, mentation intact, speech normal, cranial nerves 2-12 intact, gait normal including heel/toe/tandem walking, Romberg normal and rapid alternating movements normal  Psych: Mood:dysthymic,  Affect: appropriate, Speech:normal, Thought Processes: normal, Suicidal Ideation: No       ASSESSMENT / PLAN:   Rosita was seen today for physical.    Diagnoses and all orders for this visit:    Encounter for general adult medical examination with abnormal findings  -     Screening Pelvic / Breast Exam (): Medicare will cover a screening " pelvic and breast exam every 2 years (annually if high risk)    Gastroesophageal reflux disease without esophagitis  -     omeprazole (PRILOSEC) 20 MG DR capsule; TAKE ONE CAPSULE BY MOUTH ONCE DAILY  -     OFFICE/OUTPT VISIT,EST,LEVL IV    Paroxysmal supraventricular tachycardia (H)  -     TSH with free T4 reflex  -     Zio Patch Holter Adult Pediatric Greater than 48 hrs; Future  -     OFFICE/OUTPT VISIT,EST,LEVL IV    Weakness of both lower extremities  -     Comprehensive metabolic panel  -     ESR: Erythrocyte sedimentation rate  -     CRP, inflammation  -     Magnesium  -     OFFICE/OUTPT VISIT,EST,LEVL IV    Fluctuating mental status  -     Comprehensive metabolic panel  -     CBC with platelets  -     TSH with free T4 reflex  -     ESR: Erythrocyte sedimentation rate  -     CRP, inflammation  -     Magnesium  -     Vitamin B12  -     MR Brain w/o & w Contrast; Future  -     OFFICE/OUTPT VISIT,EST,LEVL IV    Palpitations  -     TSH with free T4 reflex  -     Zio Patch Holter Adult Pediatric Greater than 48 hrs; Future  -     OFFICE/OUTPT VISIT,EST,LEVL IV    Inflamed seborrheic keratosis  -     OFFICE/OUTPT VISIT,EST,LEVL IV    Bilateral sensorineural hearing loss  -     AUDIOLOGY ADULT REFERRAL  -     OFFICE/OUTPT VISIT,EST,LEVL IV    Essential hypertension with goal blood pressure less than 140/90    Hyperlipidemia LDL goal <130  -     Lipid panel reflex to direct LDL Non-fasting    Myalgia  -     ESR: Erythrocyte sedimentation rate  -     CRP, inflammation  -     Magnesium  -     Vitamin B12  -     OFFICE/OUTPT VISIT,EST,LEVL IV    Need for prophylactic vaccination and inoculation against influenza  -     Pneumococcal vaccine 23 valent PPSV23  (Pneumovax) [03093]  -     ADMIN PNEUMOVAX VACCINE (For MEDICARE Patients ONLY) []    Malaise  -     OFFICE/OUTPT VISIT,EST,LEVL IV    Other orders  -     metoprolol succinate ER (TOPROL-XL) 50 MG 24 hr tablet; Take 1 tablet (50 mg) by mouth daily  -      "SCREENING QUESTIONS FOR ADULT IMMUNIZATIONS    patient with multiple complaints today.   Will start with lab work  MRI brain.   Repeat Zio patch  I'd like to see her back in 2 weeks as she has so much going on and will recheck blood pressure. She was pretty worked up today. If still elevated, may need to add another agent   Can take off seborrheic keratosis at any time, didn't have time toda    End of Life Planning:  Patient currently has an advanced directive: No.  I have verified the patient's ablity to prepare an advanced directive/make health care decisions.  Literature was provided to assist patient in preparing an advanced directive.    COUNSELING:  Reviewed preventive health counseling, as reflected in patient instructions    BP Readings from Last 1 Encounters:   04/23/19 172/80     Estimated body mass index is 34.72 kg/m  as calculated from the following:    Height as of this encounter: 1.6 m (5' 3\").    Weight as of this encounter: 88.9 kg (196 lb).      Weight management plan: Discussed healthy diet and exercise guidelines     reports that she has quit smoking. She has never used smokeless tobacco.      Appropriate preventive services were discussed with this patient, including applicable screening as appropriate for cardiovascular disease, diabetes, osteopenia/osteoporosis, and glaucoma.  As appropriate for age/gender, discussed screening for colorectal cancer, prostate cancer, breast cancer, and cervical cancer. Checklist reviewing preventive services available has been given to the patient.    Reviewed patients plan of care and provided an AVS. The Basic Care Plan (routine screening as documented in Health Maintenance) for Rosita meets the Care Plan requirement. This Care Plan has been established and reviewed with the Patient.    Counseling Resources:  ATP IV Guidelines  Pooled Cohorts Equation Calculator  Breast Cancer Risk Calculator  FRAX Risk Assessment  ICSI Preventive Guidelines  Dietary " Guidelines for Americans, 2010  USDA's MyPlate  ASA Prophylaxis  Lung CA Screening    Addis Del Rio MD  [facility]    Identified Health Risks:  Answers for HPI/ROS submitted by the patient on 4/23/2019   Annual Exam:  If you checked off any problems, how difficult have these problems made it for you to do your work, take care of things at home, or get along with other people?: Somewhat difficult  PHQ9 TOTAL SCORE: 12      The patient was provided with suggestions to help her develop a healthy physical lifestyle.  She is at risk for lack of exercise and has been provided with information to increase physical activity for the benefit of her well-being.  The patient was provided with written information regarding signs of hearing loss.  Information on urinary incontinence and treatment options given to patient.  The patient was provided with suggestions to help her develop a healthy emotional lifestyle.

## 2019-04-23 NOTE — PATIENT INSTRUCTIONS
Labs today    MRI head    Hearing test      Patient Education   Personalized Prevention Plan  You are due for the preventive services outlined below.  Your care team is available to assist you in scheduling these services.  If you have already completed any of these items, please share that information with your care team to update in your medical record.  Health Maintenance Due   Topic Date Due     Discuss Advance Directive Planning  10/17/2005     Zoster (Shingles) Vaccine (2 of 3) 07/14/2011     Bone Density Screening (Dexa)  10/17/2015     Diptheria Tetanus Pertussis (DTAP/TDAP/TD) Vaccine (2 - Td) 06/09/2016     Annual Wellness Visit  08/24/2018     Pneumococcal Vaccine (2 of 2 - PPSV23) 08/24/2018     PHQ-2  01/01/2019     Mammogram - every 2 years  02/22/2019     Your Health Risk Assessment indicates you feel you are not in good health    A healthy lifestyle helps keep the body fit and the mind alert. It helps protect you from disease, helps you fight disease, and helps prevent chronic disease (disease that doesn't go away) from getting worse. This is important as you get older and begin to notice twinges in muscles and joints and a decline in the strength and stamina you once took for granted. A healthy lifestyle includes good healthcare, good nutrition, weight control, recreation, and regular exercise. Avoid harmful substances and do what you can to keep safe. Another part of a healthy lifestyle is stay mentally active and socially involved.    Good healthcare     Have a wellness visit every year.     If you have new symptoms, let us know right away. Don't wait until the next checkup.     Take medicines exactly as prescribed and keep your medicines in a safe place. Tell us if your medicine causes problems.   Healthy diet and weight control     Eat 3 or 4 small, nutritious, low-fat, high-fiber meals a day. Include a variety of fruits, vegetables, and whole-grain foods.     Make sure you get enough calcium in  your diet. Calcium, vitamin D, and exercise help prevent osteoporosis (bone thinning).     If you live alone, try eating with others when you can. That way you get a good meal and have company while you eat it.     Try to keep a healthy weight. If you eat more calories than your body uses for energy, it will be stored as fat and you will gain weight.     Recreation   Recreation is not limited to sports and team events. It includes any activity that provides relaxation, interest, enjoyment, and exercise. Recreation provides an outlet for physical, mental, and social energy. It can give a sense of worth and achievement. It can help you stay healthy.    Mental Exercise and Social Involvement  Mental and emotional health is as important as physical health. Keep in touch with friends and family. Stay as active as possible. Continue to learn and challenge yourself.   Things you can do to stay mentally active are:    Learn something new, like a foreign language or musical instrument.     Play SCRABBLE or do crossword puzzles. If you cannot find people to play these games with you at home, you can play them with others on your computer through the Internet.     Join a games club--anything from card games to chess or checkers or lawn bowling.     Start a new hobby.     Go back to school.     Volunteer.     Read.   Keep up with world events.    Exercise for a Healthier Heart     Exercise with a friend. When activity is fun, you're more likely to stick with it.   You may wonder how you can improve the health of your heart. If you re thinking about exercise, you re on the right track. You don t need to become an athlete, but you do need a certain amount of brisk exercise to help strengthen your heart. If you have been diagnosed with a heart condition, your doctor may recommend exercise to help stabilize your condition. To help make exercise a habit, choose safe, fun activities.  Be sure to check with your healthcare provider  before starting an exercise program.   Why exercise?  Exercising regularly offers many healthy rewards. It can help you do all of the following:    Improve your blood cholesterol level to help prevent further heart trouble    Lower your blood pressure to help prevent a stroke or heart attack    Control diabetes, or reduce your risk of getting this disease    Improve your heart and lung function    Reach and maintain a healthy weight    Make your muscles stronger and more limber so you can stay active    Prevent falls and fractures by slowing the loss of bone mass (osteoporosis)    Manage stress better    Reduce your blood pressure    Improve your sense of self and your body image  Exercise tips  Ease into your routine. Set small goals. Then build on them.  Exercise on most days. Aim for a total of 150 or more minutes of moderate to  vigorous intensity activity each week. Consider 40 minutes, 3 to 4 times a week. For best results, activity should last for 40 minutes on average. It is OK to work up to the 40 minute period over time. Examples of moderate-intensity activity is walking 1 mile in 15 minutes or 30 to 45 minutes of yard work.  Step up your daily activity level. Along with your exercise program, try being more active throughout the day. Walk instead of drive. Do more household tasks or yard work.  Choose one or more activities you enjoy. Walking is one of the easiest things you can do. You can also try swimming, riding a bike, dancing, or taking an exercise class.  Stop exercising and call your doctor if you:    Have chest pain or feel dizzy or lightheaded    Feel burning, tightness, pressure, or heaviness in your chest, neck, shoulders, back, or arms    Have unusual shortness of breath    Have increased joint or muscle pain    Have palpitations or an irregular heartbeat   Date Last Reviewed: 5/1/2016 2000-2018 [employer]. [address] [facility], [place] [address]. All rights reserved. This  information is not intended as a substitute for professional medical care. Always follow your healthcare professional's instructions.          Urinary Incontinence, Female (Adult)  Urinary incontinence means loss of control of the bladder. This problem affects many women, especially as they get older. If you have incontinence, you may be embarrassed to ask for help. But know that this problem can be treated.  Types of Incontinence  There are different types of incontinence. Two of the main types are described here. You can have more than one type.    Stress incontinence. With this type, urine leaks when pressure (stress) is put on the bladder. This may happen when you cough, sneeze, or laugh. Stress incontinence most often occurs because the pelvic floor muscles that support the bladder and urethra are weak. This can happen after pregnancy and vaginal childbirth or a hysterectomy. It can also be due to excess body weight or hormone changes.    Urge incontinence (also called overactive bladder). With this type, a sudden urge to urinate is felt often. This may happen even though there may not be much urine in the bladder. The need to urinate often during the night is common. Urge incontinence most often occurs because of bladder spasms. This may be due to bladder irritation or infection. Damage to bladder nerves or pelvic muscles, constipation, and certain medicines can also lead to urge incontinence.  Treatment of urinary incontinence depends on the cause. Further evaluation is needed to find the type you have. This will likely include an exam and certain tests. Based on the results, you and your healthcare provider can then plan treatment. Until a diagnosis is made, the home care tips below can help relieve symptoms.  Home care    Do pelvic floor muscle exercises, if they are prescribed. The pelvic floor muscles help support the bladder and urethra. Many women find that their symptoms improve when doing special  exercises that strengthen these muscles. To do the exercises contract the muscles you would use to stop your stream of urine, but do this when you re not urinating. Hold for 10 seconds, then relax. Repeat 10 to 20 times in a row, at least 3 times a day. Your provider may give you other instructions for how to do the exercises and how often.    Keep a bladder diary. This helps track how often and how much you urinate over a set period of time. Bring this diary with you to your next visit with the provider. The information can help your provider learn more about your bladder problem.    Lose weight, if advised to by your provider. Excess weight puts pressure on the bladder. Your provider can help you create a weight-loss plan that s right for you. This may include exercising more and making certain diet changes.    Don't consume foods and drinks that may irritate the bladder. These can include alcohol and caffeinated drinks.    Quit smoking. Smoking and other tobacco use can lead to chronic cough that strains the pelvic floor muscles. Smoking may also damage the bladder and urethra. Talk with your provider about treatments or methods you can use to quit smoking.    If drinking large amounts of fluid causes you to have symptoms, you may be advised to limit your fluid intake. You may also be advised to drink most of your fluids during the day and to limit fluids at night.    If you re worried about urine leakage or accidents, you may wear absorbent pads to catch urine. Change the pads often. This helps reduce discomfort. It may also reduce the risk of skin or bladder infections.  Follow-up care  Follow up with your healthcare provider, or as directed. It may take some to find the right treatment for your problem. Your treatment plan may include special therapies or medicines. Certain procedures or surgery may also be options. Be sure to discuss any questions you have with your provider.  When to seek medical advice   Call the healthcare provider right away if any of these occur:    Fever of 100.4 F (38 C) or higher, or as directed by your provider    Bladder pain or fullness    Abdominal swelling    Nausea or vomiting    Back pain    Weakness, dizziness or fainting  Date Last Reviewed: 10/1/2017    2091-2037 [employer]. [address] [place] [address]. All rights reserved. This information is not intended as a substitute for professional medical care. Always follow your healthcare professional's instructions.        Your Health Risk Assessment indicates you feel you are not in good emotional health.    Recreation   Recreation is not limited to sports and team events. It includes any activity that provides relaxation, interest, enjoyment, and exercise. Recreation provides an outlet for physical, mental, and social energy. It can give a sense of worth and achievement. It can help you stay healthy.    Mental Exercise and Social Involvement  Mental and emotional health is as important as physical health. Keep in touch with friends and family. Stay as active as possible. Continue to learn and challenge yourself.   Things you can do to stay mentally active are:    Learn something new, like a foreign language or musical instrument.     Play SCRABBLE or do crossword puzzles. If you cannot find people to play these games with you at home, you can play them with others on your computer through the Internet.     Join a games club--anything from card games to chess or checkers or lawn bowling.     Start a new hobby.     Go back to school.     Volunteer.     Read.   Keep up with world events.    Appointment : 05/10/2019 [facility] 9:40 AM

## 2019-04-23 NOTE — NURSING NOTE
"Chief Complaint   Patient presents with     Physical     wellness visit       Initial /80 (BP Location: Right arm)   Pulse 53   Temp 97.9  F (36.6  C) (Tympanic)   Resp 24   Ht 1.6 m (5' 3\")   Wt 88.9 kg (196 lb)   SpO2 97%   BMI 34.72 kg/m   Estimated body mass index is 34.72 kg/m  as calculated from the following:    Height as of this encounter: 1.6 m (5' 3\").    Weight as of this encounter: 88.9 kg (196 lb).    Patient presents to the clinic using No DME    Health Maintenance that is potentially due pending provider review:  Mammogram    Gave pt phone number/pended order to schedule mammo and/or colonoscopy(or FIT)    Is there anyone who you would like to be able to receive your results? No  If yes have patient fill out SU    "

## 2019-04-24 ASSESSMENT — PATIENT HEALTH QUESTIONNAIRE - PHQ9: SUM OF ALL RESPONSES TO PHQ QUESTIONS 1-9: 12

## 2019-05-02 ENCOUNTER — MYC MEDICAL ADVICE (OUTPATIENT)
Dept: FAMILY MEDICINE | Facility: CLINIC | Age: 69
End: 2019-05-02

## 2019-05-02 DIAGNOSIS — F40.240 CLAUSTROPHOBIA: Primary | ICD-10-CM

## 2019-05-06 ENCOUNTER — ANCILLARY PROCEDURE (OUTPATIENT)
Dept: MAMMOGRAPHY | Facility: CLINIC | Age: 69
End: 2019-05-06
Payer: COMMERCIAL

## 2019-05-06 ENCOUNTER — MYC MEDICAL ADVICE (OUTPATIENT)
Dept: FAMILY MEDICINE | Facility: CLINIC | Age: 69
End: 2019-05-06

## 2019-05-06 DIAGNOSIS — Z12.31 VISIT FOR SCREENING MAMMOGRAM: ICD-10-CM

## 2019-05-06 PROCEDURE — 77067 SCR MAMMO BI INCL CAD: CPT | Mod: TC | Performed by: FAMILY MEDICINE

## 2019-05-06 RX ORDER — LORAZEPAM 0.5 MG/1
TABLET ORAL
Qty: 4 TABLET | Refills: 0 | Status: SHIPPED | OUTPATIENT
Start: 2019-05-06 | End: 2019-06-03

## 2019-05-07 NOTE — TELEPHONE ENCOUNTER
Yes, why don't I see her after tests.Have her get her blood pressure checked as well, that was one of my concerns

## 2019-05-07 NOTE — TELEPHONE ENCOUNTER
Pt calling again. She has the monitor scheduled for May 15 and it comes off 6/11/19.  Hearing test is 6/3/19.  MRI 5/13/19.  Should all of these tests be done before she is seen again?  Ok to leave message [phone] or mychart

## 2019-05-13 ENCOUNTER — HOSPITAL ENCOUNTER (OUTPATIENT)
Dept: MRI IMAGING | Facility: CLINIC | Age: 69
Discharge: HOME | End: 2019-05-13
Attending: FAMILY MEDICINE | Admitting: FAMILY MEDICINE
Payer: MEDICARE

## 2019-05-13 DIAGNOSIS — R41.82 FLUCTUATING MENTAL STATUS: ICD-10-CM

## 2019-05-13 PROCEDURE — 25500064 ZZH RX 255 OP 636: Performed by: FAMILY MEDICINE

## 2019-05-13 PROCEDURE — 70553 MRI BRAIN STEM W/O & W/DYE: CPT

## 2019-05-13 PROCEDURE — A9585 GADOBUTROL INJECTION: HCPCS | Performed by: FAMILY MEDICINE

## 2019-05-13 RX ORDER — GADOBUTROL 604.72 MG/ML
8 INJECTION INTRAVENOUS ONCE
Status: COMPLETED | OUTPATIENT
Start: 2019-05-13 | End: 2019-05-13

## 2019-05-13 RX ADMIN — GADOBUTROL 8 ML: 604.72 INJECTION INTRAVENOUS at 12:28

## 2019-05-15 ENCOUNTER — HOSPITAL ENCOUNTER (OUTPATIENT)
Dept: CARDIOLOGY | Facility: CLINIC | Age: 69
Discharge: HOME | End: 2019-05-15
Attending: FAMILY MEDICINE | Admitting: FAMILY MEDICINE
Payer: MEDICARE

## 2019-05-15 DIAGNOSIS — I47.10 PAROXYSMAL SUPRAVENTRICULAR TACHYCARDIA (H): ICD-10-CM

## 2019-05-15 DIAGNOSIS — R00.2 PALPITATIONS: ICD-10-CM

## 2019-05-15 PROCEDURE — 0298T ZIO PATCH HOLTER ADULT PEDIATRIC GREATER THAN 48 HRS: CPT | Performed by: INTERNAL MEDICINE

## 2019-05-15 PROCEDURE — 0296T ZIO PATCH HOLTER ADULT PEDIATRIC GREATER THAN 48 HRS: CPT

## 2019-06-03 ENCOUNTER — OFFICE VISIT (OUTPATIENT)
Dept: OTOLARYNGOLOGY | Facility: CLINIC | Age: 69
End: 2019-06-03
Payer: COMMERCIAL

## 2019-06-03 ENCOUNTER — OFFICE VISIT (OUTPATIENT)
Dept: AUDIOLOGY | Facility: CLINIC | Age: 69
End: 2019-06-03
Payer: COMMERCIAL

## 2019-06-03 VITALS
WEIGHT: 196 LBS | TEMPERATURE: 98.2 F | HEART RATE: 78 BPM | DIASTOLIC BLOOD PRESSURE: 75 MMHG | BODY MASS INDEX: 34.72 KG/M2 | SYSTOLIC BLOOD PRESSURE: 165 MMHG

## 2019-06-03 DIAGNOSIS — H90.3 SENSORINEURAL HEARING LOSS, BILATERAL: Primary | ICD-10-CM

## 2019-06-03 DIAGNOSIS — H90.3 BILATERAL HIGH FREQUENCY SENSORINEURAL HEARING LOSS: Primary | ICD-10-CM

## 2019-06-03 DIAGNOSIS — K11.8 PAROTID MASS: ICD-10-CM

## 2019-06-03 PROCEDURE — 99203 OFFICE O/P NEW LOW 30 MIN: CPT | Mod: 25 | Performed by: OTOLARYNGOLOGY

## 2019-06-03 PROCEDURE — 92557 COMPREHENSIVE HEARING TEST: CPT | Performed by: AUDIOLOGIST

## 2019-06-03 PROCEDURE — 10021 FNA BX W/O IMG GDN 1ST LES: CPT | Performed by: OTOLARYNGOLOGY

## 2019-06-03 PROCEDURE — 88173 CYTOPATH EVAL FNA REPORT: CPT | Performed by: OTOLARYNGOLOGY

## 2019-06-03 PROCEDURE — 99207 ZZC NO CHARGE LOS: CPT | Performed by: AUDIOLOGIST

## 2019-06-03 PROCEDURE — 00000102 ZZHCL STATISTIC CYTO WRIGHT STAIN TC: Performed by: OTOLARYNGOLOGY

## 2019-06-03 PROCEDURE — 92550 TYMPANOMETRY & REFLEX THRESH: CPT | Performed by: AUDIOLOGIST

## 2019-06-03 ASSESSMENT — PAIN SCALES - GENERAL: PAINLEVEL: NO PAIN (0)

## 2019-06-03 NOTE — LETTER
6/3/2019         RE: Rosita King  [address]  [facility] [address]        Dear Colleague,    Thank you for referring your patient, Rosita King, to the [facility]. Please see a copy of my visit note below.        History of Present Illness - Rosita King is a 68 year old female who presents for evaluation of a left parotid mass that was found incidentally on an MRI Brain. She denies any pain or facial weakness. She initially had the workup for feeling of head congestion. She denies any prior head and neck masses.    Past Medical History -   Patient Active Problem List   Diagnosis     CARDIOVASCULAR SCREENING; LDL GOAL LESS THAN 130     Vitamin D deficiency     ASHLEY (obstructive sleep apnea)     Generalized anxiety disorder     Hyperlipidemia LDL goal <130     Diverticulosis of large intestine without hemorrhage     H. pylori infection     Essential hypertension     Essential hypertension with goal blood pressure less than 140/90     Gastroesophageal reflux disease without esophagitis     Paroxysmal supraventricular tachycardia (H)     Lichen sclerosus       Current Medications -   Current Outpatient Medications:      metoprolol succinate ER (TOPROL-XL) 50 MG 24 hr tablet, Take 1 tablet (50 mg) by mouth daily, Disp: 90 tablet, Rfl: 3     Multiple Vitamins-Minerals (CENTRUM SILVER) per tablet, Take 1 tablet by mouth daily, Disp: , Rfl:      omeprazole (PRILOSEC) 20 MG DR capsule, TAKE ONE CAPSULE BY MOUTH ONCE DAILY, Disp: 90 capsule, Rfl: 3     VITAMIN D, CHOLECALCIFEROL, PO, Take 500 Units by mouth daily , Disp: , Rfl:     Allergies -   Allergies   Allergen Reactions     Penicillins      Pravastatin      Other reaction(s): Myalgia     Simvastatin      Other reaction(s): Myalgia     Sulfa Drugs        Social History -   Social History     Socioeconomic History     Marital status:      Spouse name: None     Number of children: None     Years of education: None      Highest education level: None   Occupational History     None   Social Needs     Financial resource strain: None     Food insecurity:     Worry: None     Inability: None     Transportation needs:     Medical: None     Non-medical: None   Tobacco Use     Smoking status: Former Smoker     Smokeless tobacco: Never Used   Substance and Sexual Activity     Alcohol use: Yes     Comment: ocassional     Drug use: No     Sexual activity: Never   Lifestyle     Physical activity:     Days per week: None     Minutes per session: None     Stress: None   Relationships     Social connections:     Talks on phone: None     Gets together: None     Attends Holiness service: None     Active member of club or organization: None     Attends meetings of clubs or organizations: None     Relationship status: None     Intimate partner violence:     Fear of current or ex partner: None     Emotionally abused: None     Physically abused: None     Forced sexual activity: None   Other Topics Concern     Parent/sibling w/ CABG, MI or angioplasty before 65F 55M? Yes     Comment: Father with MI at 55   Social History Narrative     None       Family History -   Family History   Problem Relation Age of Onset     Liver Disease Mother      Depression/Anxiety Father      Diabetes Father      Diabetes Sister      Liver Disease Sister      Cancer Sister        Review of Systems - As per HPI and PMHx, otherwise 7 system review of the head and neck negative. 10+ system review negative.    Physical Exam  /75 (BP Location: Right arm, Patient Position: Chair, Cuff Size: Adult Regular)   Pulse 78   Temp 98.2  F (36.8  C)   Wt 88.9 kg (196 lb)   BMI 34.72 kg/m     General - The patient is well nourished and well developed, and appears to have good nutritional status.  Alert and oriented to person and place, answers questions and cooperates with examination appropriately.   Head and Face - Normocephalic and atraumatic, with no gross asymmetry noted of  the contour of the facial features.  The facial nerve is intact, with strong symmetric movements.  Voice and Breathing - The patient was breathing comfortably without the use of accessory muscles. There was no wheezing, stridor, or stertor.  The patients voice was clear and strong, and had appropriate pitch and quality.  Ears - Bilateral pinna and EACs with normal appearing overlying skin. Tympanic membrane intact with good mobility on pneumatic otoscopy bilaterally. Bony landmarks of the ossicular chain are normal. The tympanic membranes are normal in appearance. No retraction, perforation, or masses.  No fluid or purulence was seen in the external canal or the middle ear.   Eyes - Extraocular movements intact.  Sclera were not icteric or injected, conjunctiva were pink and moist.  Mouth - Examination of the oral cavity showed pink, healthy oral mucosa. No lesions or ulcerations noted.  The tongue was mobile and midline, and the dentition were in good condition.    Throat - The walls of the oropharynx were smooth, pink, moist, symmetric, and had no lesions or ulcerations.  The tonsillar pillars and soft palate were symmetric.  The uvula was midline on elevation.  Neck - Palpation of the face and neck reveals a 2cm firm mass in the left parotid tail. No cervical lymphadenopathy.  Nose - External contour is symmetric, no gross deflection or scars.  Nasal mucosa is pink and moist with no abnormal mucus.  The septum was midline and non-obstructive, turbinates of normal size and position.  No polyps, masses, or purulence noted on examination.      Procedure: Fine needle aspiration  Indication: (K11.9) Parotid mass  Comment: left parotid tail mass  Plan: Fine needle aspiration, Cytology non gyn      Procedure - After positioning the patient in a semi-supine position in the examination chair, I then cleansed the skin overlying the palpable mass with alcohol.  I then infiltrated the subdermal tissue overlying the mass with  2% lidocaine with 1:100,000 epinephrine.  The area was then prepped and draped in the normal sterile fashion.  All labeling and patient identifiers were once again confirmed.  I began by using a 25 gauge needle using Icelandic technique.  I made several passes through the mass.  The collected specimen was sent to cytopathology for immediate processing.  After all passes were complete, the patient was undraped and the skin cleaned off.  The patient tolerated the procedure without difficulty.      Audiogram 06/03/19  Bilateral mild to moderate high frequency SNHL.    Assessment - Rosita King is a 68 year old female with a left parotid mass. I have performed an FNA today,and will contact her with the results when available. I discussed that these masses usually require surgical removal, and we can discuss a surgical plan once pathology is reviewed.    I reassured her that aside from some bilateral high frequency SNHL, there is no sign of significant otologic trouble.       Dr. Romelia Busby MD  Otolaryngology  [facility]        Again, thank you for allowing me to participate in the care of your patient.        Sincerely,        Romelia Busby MD

## 2019-06-03 NOTE — PROGRESS NOTES
AUDIOLOGY REPORT    SUBJECTIVE:  Rosita King is a 68 year old female who was seen in the Audiology Clinic at [facility] for an audiologic evaluation, referred by DR. Busby.  No previous audiograms are available at today's appointment.  The patient reports a plugged sensation in both ears with decreased hearing. She reports difficulty hearing her family and in the car. The patient denies bilateral otalgia and history of noise exposure.     OBJECTIVE:  Abuse Screening:  Do you feel unsafe at home or work/school? No   Do you feel threatened by someone? No   Does anyone try to keep you from having contact with others, or doing things outside of your home? No   Physical signs of abuse present? No      Otoscopic exam indicates partial obstruction with cerumen bilaterally       Pure Tone Thresholds assessed using conventional audiometry with good  reliability from 250-8000 Hz bilaterally using circumaural headphones     RIGHT:  normal and mild sensorineural hearing loss    LEFT:    normal and mild sensorineural hearing loss    Tympanogram:    RIGHT: normal eardrum mobility ,1000 Hz ipsi/contra reflexes absent    LEFT:   normal eardrum mobility ,1000 Hz ipsi/contra reflexes absent    Speech Reception Threshold:    RIGHT: 15 dB HL    LEFT:   15 dB HL  Word Recognition Score:     RIGHT: 92% at 55 dB HL using NU-6 recorded word list.    LEFT:   96% at 55 dB HL using NU-6 recorded word list.      ASSESSMENT:   Normal hearing through 4000 Hz sloping to a mild high tone hearing loss bilaterally.     Today s results were discussed with the patient in detail.     PLAN: It is recommended that the patient be seen by Dr. Busby for medical evaluation of their ears and hearing evaluation. Patient was counseled regarding hearing loss and impact on communication.    Please call this clinic with questions regarding these results or recommendations.        Mayra Chowdary M.A. MARIAH-AAA  Clinical audiologist [place] #  [id number]  6/3/2019

## 2019-06-03 NOTE — PROGRESS NOTES
History of Present Illness - Rosita King is a 68 year old female who presents for evaluation of a left parotid mass that was found incidentally on an MRI Brain. She denies any pain or facial weakness. She initially had the workup for feeling of head congestion. She denies any prior head and neck masses.    Past Medical History -   Patient Active Problem List   Diagnosis     CARDIOVASCULAR SCREENING; LDL GOAL LESS THAN 130     Vitamin D deficiency     ASHLEY (obstructive sleep apnea)     Generalized anxiety disorder     Hyperlipidemia LDL goal <130     Diverticulosis of large intestine without hemorrhage     H. pylori infection     Essential hypertension     Essential hypertension with goal blood pressure less than 140/90     Gastroesophageal reflux disease without esophagitis     Paroxysmal supraventricular tachycardia (H)     Lichen sclerosus       Current Medications -   Current Outpatient Medications:      metoprolol succinate ER (TOPROL-XL) 50 MG 24 hr tablet, Take 1 tablet (50 mg) by mouth daily, Disp: 90 tablet, Rfl: 3     Multiple Vitamins-Minerals (CENTRUM SILVER) per tablet, Take 1 tablet by mouth daily, Disp: , Rfl:      omeprazole (PRILOSEC) 20 MG DR capsule, TAKE ONE CAPSULE BY MOUTH ONCE DAILY, Disp: 90 capsule, Rfl: 3     VITAMIN D, CHOLECALCIFEROL, PO, Take 500 Units by mouth daily , Disp: , Rfl:     Allergies -   Allergies   Allergen Reactions     Penicillins      Pravastatin      Other reaction(s): Myalgia     Simvastatin      Other reaction(s): Myalgia     Sulfa Drugs        Social History -   Social History     Socioeconomic History     Marital status:      Spouse name: None     Number of children: None     Years of education: None     Highest education level: None   Occupational History     None   Social Needs     Financial resource strain: None     Food insecurity:     Worry: None     Inability: None     Transportation needs:     Medical: None     Non-medical: None   Tobacco Use      Smoking status: Former Smoker     Smokeless tobacco: Never Used   Substance and Sexual Activity     Alcohol use: Yes     Comment: ocassional     Drug use: No     Sexual activity: Never   Lifestyle     Physical activity:     Days per week: None     Minutes per session: None     Stress: None   Relationships     Social connections:     Talks on phone: None     Gets together: None     Attends Worship service: None     Active member of club or organization: None     Attends meetings of clubs or organizations: None     Relationship status: None     Intimate partner violence:     Fear of current or ex partner: None     Emotionally abused: None     Physically abused: None     Forced sexual activity: None   Other Topics Concern     Parent/sibling w/ CABG, MI or angioplasty before 65F 55M? Yes     Comment: Father with MI at 55   Social History Narrative     None       Family History -   Family History   Problem Relation Age of Onset     Liver Disease Mother      Depression/Anxiety Father      Diabetes Father      Diabetes Sister      Liver Disease Sister      Cancer Sister        Review of Systems - As per HPI and PMHx, otherwise 7 system review of the head and neck negative. 10+ system review negative.    Physical Exam  /75 (BP Location: Right arm, Patient Position: Chair, Cuff Size: Adult Regular)   Pulse 78   Temp 98.2  F (36.8  C)   Wt 88.9 kg (196 lb)   BMI 34.72 kg/m    General - The patient is well nourished and well developed, and appears to have good nutritional status.  Alert and oriented to person and place, answers questions and cooperates with examination appropriately.   Head and Face - Normocephalic and atraumatic, with no gross asymmetry noted of the contour of the facial features.  The facial nerve is intact, with strong symmetric movements.  Voice and Breathing - The patient was breathing comfortably without the use of accessory muscles. There was no wheezing, stridor, or stertor.  The  patients voice was clear and strong, and had appropriate pitch and quality.  Ears - Bilateral pinna and EACs with normal appearing overlying skin. Tympanic membrane intact with good mobility on pneumatic otoscopy bilaterally. Bony landmarks of the ossicular chain are normal. The tympanic membranes are normal in appearance. No retraction, perforation, or masses.  No fluid or purulence was seen in the external canal or the middle ear.   Eyes - Extraocular movements intact.  Sclera were not icteric or injected, conjunctiva were pink and moist.  Mouth - Examination of the oral cavity showed pink, healthy oral mucosa. No lesions or ulcerations noted.  The tongue was mobile and midline, and the dentition were in good condition.    Throat - The walls of the oropharynx were smooth, pink, moist, symmetric, and had no lesions or ulcerations.  The tonsillar pillars and soft palate were symmetric.  The uvula was midline on elevation.  Neck - Palpation of the face and neck reveals a 2cm firm mass in the left parotid tail. No cervical lymphadenopathy.  Nose - External contour is symmetric, no gross deflection or scars.  Nasal mucosa is pink and moist with no abnormal mucus.  The septum was midline and non-obstructive, turbinates of normal size and position.  No polyps, masses, or purulence noted on examination.      Procedure: Fine needle aspiration  Indication: (K11.9) Parotid mass  Comment: left parotid tail mass  Plan: Fine needle aspiration, Cytology non gyn      Procedure - After positioning the patient in a semi-supine position in the examination chair, I then cleansed the skin overlying the palpable mass with alcohol.  I then infiltrated the subdermal tissue overlying the mass with 2% lidocaine with 1:100,000 epinephrine.  The area was then prepped and draped in the normal sterile fashion.  All labeling and patient identifiers were once again confirmed.  I began by using a 25 gauge needle using Swedish technique.  I made  several passes through the mass.  The collected specimen was sent to cytopathology for immediate processing.  After all passes were complete, the patient was undraped and the skin cleaned off.  The patient tolerated the procedure without difficulty.      Audiogram 06/03/19  Bilateral mild to moderate high frequency SNHL.    Assessment - Rosita King is a 68 year old female with a left parotid mass. I have performed an FNA today,and will contact her with the results when available. I discussed that these masses usually require surgical removal, and we can discuss a surgical plan once pathology is reviewed.    I reassured her that aside from some bilateral high frequency SNHL, there is no sign of significant otologic trouble.       Dr. Romelia Busby MD  Otolaryngology  [facility]

## 2019-06-03 NOTE — NURSING NOTE
"Initial /75 (BP Location: Right arm, Patient Position: Chair, Cuff Size: Adult Regular)   Pulse 78   Temp 98.2  F (36.8  C)   Wt 88.9 kg (196 lb)   BMI 34.72 kg/m   Estimated body mass index is 34.72 kg/m  as calculated from the following:    Height as of 4/23/19: 1.6 m (5' 3\").    Weight as of this encounter: 88.9 kg (196 lb). .    Benita Shah LPN    "

## 2019-06-06 DIAGNOSIS — K11.8 PAROTID MASS: Primary | ICD-10-CM

## 2019-06-06 LAB — COPATH REPORT: NORMAL

## 2019-06-11 ENCOUNTER — OFFICE VISIT (OUTPATIENT)
Dept: CARDIOLOGY | Facility: CLINIC | Age: 69
End: 2019-06-11
Payer: COMMERCIAL

## 2019-06-11 VITALS
WEIGHT: 195.4 LBS | OXYGEN SATURATION: 98 % | BODY MASS INDEX: 34.61 KG/M2 | DIASTOLIC BLOOD PRESSURE: 84 MMHG | HEART RATE: 57 BPM | SYSTOLIC BLOOD PRESSURE: 156 MMHG

## 2019-06-11 DIAGNOSIS — I10 ESSENTIAL HYPERTENSION: ICD-10-CM

## 2019-06-11 DIAGNOSIS — I47.10 PAROXYSMAL SUPRAVENTRICULAR TACHYCARDIA (H): Primary | ICD-10-CM

## 2019-06-11 PROCEDURE — 99214 OFFICE O/P EST MOD 30 MIN: CPT | Performed by: PHYSICIAN ASSISTANT

## 2019-06-11 RX ORDER — HYDROCHLOROTHIAZIDE 25 MG/1
25 TABLET ORAL DAILY
Qty: 30 TABLET | Refills: 5 | Status: SHIPPED | OUTPATIENT
Start: 2019-06-11 | End: 2019-06-19

## 2019-06-11 NOTE — PATIENT INSTRUCTIONS
At your visit today we reviewed your ZioPatch showing symptoms of lightheadedness/papitations/pounding that really correlated with just normal rhythm.  You did have many episodes of a fast heart rhythm coming from top of heart, mostly which were asymptomatic.  We're treating this with metoprolol. Don't think we can change the metoprolol dose due to episodes of very slow heart rates.      Medication Changes:  1. Change metoprolol XL 50 mg daily in AM to 25 mg in AM and 25 mg in PM  2. For high BP and evidence of fluid retention, start hydrochlorothiazide 12.5 mg daily (1/2 of the 25 mg tab)    Recommendations:  1. Get non-fasting blood work ([place]) in 1 week  2. When we talk about blood work, give me update on breathing, weight, BP and palpitations. We'll liekly increase your hydrochlorothiazide to 25 mg daily at that time depending on blood work    Follow-up:    See Arelis for cardiology follow up in 4-6 weeks but CALL Cardiology nurses Dominique & Ana Cristina @ [phone] for any issues, questions or concerns in the interim.      To schedule a future appointment, we kindly ask that you call cardiology scheduling at [phone] three months prior to requested visit date.    Important Phone Numbers for [facility]):    Cardiology [phone]  Lab [phone]  [facility]: [phone]  Nurses Dominique & Ana Cristina: [phone]

## 2019-06-11 NOTE — PROGRESS NOTES
"HPI:   I had the pleasure of seeing Jenna when she came for follow up of PSVT.  This 68 year old has seen Dr. Glynn, Dr. Saravia and Dr. Kelsey for her history of:    1. Paroxysmal SVT associated with palpitations, SOB and fatigue and first evaluated 2016. EF with normal EF. Zio showed likely AT back in 2016 that continued despite metoprolol. Subsequent ZioPatches with shorter episodes and evidence of pauses.  2. Benign Essential HTN  3. ASHLEY on CPAP   4. Left Parotid mass noted incidentally on Brain MRI done for hearing loss and \"fogginess.\" FNA done 6/3 (ENT Dr. Busby) and due for ultrasound-guided bx    Saw Dr. Glynn 4/2018 at which time c/o daily palpitations, including while getting EKG that showed SR.  ZioPatch to monitor for arrhythmias and correlate symptoms 4/2018 showed a brief run of PSVT, which was asymptomatic. Metoprolol was continued and annual follow was recommended.    Saw PCP 4/2019 and noted continued palpitations. TSH wnl. ZioPatch showed SVT, lasting up to 13 minutes. Symptoms correlated with AT, SR and occasional PACs. Also noted to have Wenkebach (2nd degree HB Type I - though labelled a type II) on 5/22 @ 2100, which was asymptomatic as well as an episode of bradycardia due to non-conducted PAC 5/25 @ 2330 while sleeping. This limits our ability to use higher doses of BB or AAD.    She recently saw her PCP for c/o \"mental fogginess.\" A brain MRI showed an incidental parotid mass, for which she has had FNA done and now is due to have an ultrasound-guided biopsy done on Tuesday 6/18.    Continues to note \"pounding,\" mostly in afternoon hours when she is sitting.  No dizziness, lightheadedness, syncope    Notes increased HARDWICK x 2 years, not worsening. Gets \"tired\" doing housework but no chest pain.    PFTs 2016 wnl.    ZioPatch 5/15-5/29/2019 ordered by her Primary showed predominately SR with average HR 72 bpm (range 45 bpm @ 0740 to 105 bpm @ 1718. 63 episodes SVT, with average  bpm (range " " bpm). 1 episode of 2nd degree HB Type I (per Dr. Saravia) and a 2.5\" pause on 5/25 @ 2337.    ZioPatch 4/2018 showed SR with 13 runs of PSVT (<17 beats) and symptoms which correlated with SR.    Echocardiogram 6/2016 showed EF 55-60% with grade I diastolic dysfunction. Normal RV function and size. 1+ TR but no other significant valvular abnormalities.     Nuclear Treadmill Stress Test 10/2015 showed breast attenuation artifact but a small area of anterior wall ischemia could not be excluded. Reached 79% of max predicted HR (not on BB).     Assessment & Plan:    1. SVT    Many episodes noted on ZioPatch, not terribly sx'c    Sxs typically correlated with SR. Occasionally with PACs and occasionally SVT, but most SVTs were asx'c    Per Dr. Saravia, avoid increasing AVN blocking agents/flecaindie given slow HRs/heart block.    PLAN:    If we find sxs correlate with SVT, could consider EPS/ablation. She agrees it really doesn't seem to correlate.    As sxs worse in afternoon, will adjust metoprolol XL slightly to 25 mg BID    See me back to see if any improvement      2. Benign Essential HTN    BP has been high since 2/2019    Could be worsening HARDWICK with housework and \"pounding\"    PLAN:    Start hydrochlorothiazide. Gave 25 mg tabs, with plans for 1/2 tab daily 12.5 mg    BMP 1 week ([place])    When call back re: blood work, get list of BPs and consider increasing hydrochlorothiazide to 25 mg daily. Will also find out if breathing/pounding improved    3. L Parotid Mass    For ultrasound guided bx next week    4. HARDWICK    As above, could be worse due to hypertension    Has some JVD/HJR on exam but belly soft, lungs clear and no edema    PLAN:    Hydrochlorothiazide as above    Contiue CPAP    Consider repeating echo for evaluation of right sided pressures if sxs don't improve      Arelis Deutsch PA-C, MSPAS      Orders Placed This Encounter   Procedures     Basic metabolic panel     Follow-Up with Cardiac Advanced " Practice Provider     Orders Placed This Encounter   Medications     hydrochlorothiazide (HYDRODIURIL) 25 MG tablet     Sig: Take 1 tablet (25 mg) by mouth daily Take 1/2 -1 tablet by mouth daily as directed by cardiology clinic     Dispense:  30 tablet     Refill:  5     There are no discontinued medications.      Encounter Diagnoses   Name Primary?     Paroxysmal supraventricular tachycardia (H) Yes     Essential hypertension        CURRENT MEDICATIONS:  Current Outpatient Medications   Medication Sig Dispense Refill     hydrochlorothiazide (HYDRODIURIL) 25 MG tablet Take 1 tablet (25 mg) by mouth daily Take 1/2 -1 tablet by mouth daily as directed by cardiology clinic 30 tablet 5     metoprolol succinate ER (TOPROL-XL) 50 MG 24 hr tablet Take 1 tablet (50 mg) by mouth daily 90 tablet 3     Multiple Vitamins-Minerals (CENTRUM SILVER) per tablet Take 1 tablet by mouth daily       omeprazole (PRILOSEC) 20 MG DR capsule TAKE ONE CAPSULE BY MOUTH ONCE DAILY 90 capsule 3     VITAMIN D, CHOLECALCIFEROL, PO Take 500 Units by mouth daily          ALLERGIES     Allergies   Allergen Reactions     Penicillins      Pravastatin      Other reaction(s): Myalgia     Simvastatin      Other reaction(s): Myalgia     Sulfa Drugs        PAST MEDICAL HISTORY:  No past medical history on file.    PAST SURGICAL HISTORY:  Past Surgical History:   Procedure Laterality Date     CARPAL TUNNEL RELEASE RT/LT Bilateral 2006, 2009     COLONOSCOPY N/A 11/25/2015    Procedure: COLONOSCOPY;  Surgeon: Mina Roy MD;  Location: [facility]     ESOPHAGOSCOPY, GASTROSCOPY, DUODENOSCOPY (EGD), COMBINED N/A 11/25/2015    Procedure: COMBINED ESOPHAGOSCOPY, GASTROSCOPY, DUODENOSCOPY (EGD), BIOPSY SINGLE OR MULTIPLE;  Surgeon: Mina Roy MD;  Location: [facility]     TONSILLECTOMY & ADENOIDECTOMY  age 13       FAMILY HISTORY:  Family History   Problem Relation Age of Onset     Liver Disease Mother      Depression/Anxiety Father      Diabetes  Father      Diabetes Sister      Liver Disease Sister      Cancer Sister        SOCIAL HISTORY:  Social History     Socioeconomic History     Marital status:      Spouse name: None     Number of children: None     Years of education: None     Highest education level: None   Occupational History     None   Social Needs     Financial resource strain: None     Food insecurity:     Worry: None     Inability: None     Transportation needs:     Medical: None     Non-medical: None   Tobacco Use     Smoking status: Former Smoker     Smokeless tobacco: Never Used   Substance and Sexual Activity     Alcohol use: Yes     Comment: ocassional     Drug use: No     Sexual activity: Never   Lifestyle     Physical activity:     Days per week: None     Minutes per session: None     Stress: None   Relationships     Social connections:     Talks on phone: None     Gets together: None     Attends [employer] service: None     Active member of club or organization: None     Attends meetings of clubs or organizations: None     Relationship status: None     Intimate partner violence:     Fear of current or ex partner: None     Emotionally abused: None     Physically abused: None     Forced sexual activity: None   Other Topics Concern     Parent/sibling w/ CABG, MI or angioplasty before 65F 55M? Yes     Comment: Father with MI at 55   Social History Narrative     None       Review of Systems:  Skin:  Positive for itching;rash   Eyes:  Positive for visual blurring;glasses  ENT:  Negative    Respiratory:  Positive for shortness of breath;dyspnea on exertion;sleep apnea;CPAP  Cardiovascular:    Positive for;palpitations;chest pain;fatigue  Gastroenterology: Negative    Genitourinary:  Positive for urinary frequency;urgency  Musculoskeletal:  Negative    Neurologic:  Negative    Psychiatric:  Positive for anxiety;depression  Heme/Lymph/Imm:  Negative    Endocrine:  Negative      Physical Exam:  Vitals: /84 (BP Location: Right arm,  Patient Position: Sitting, Cuff Size: Adult Regular)   Pulse 57   Wt 88.6 kg (195 lb 6.4 oz)   SpO2 98%   BMI 34.61 kg/m      Constitutional:  cooperative, alert and oriented, well developed, well nourished, in no acute distress        Skin:  warm and dry to the touch, no apparent skin lesions or masses noted        Head:  normocephalic, no masses or lesions        Eyes:  pupils equal and round;conjunctivae and lids unremarkable;sclera white        ENT:  no pallor or cyanosis, dentition good        Neck:  no carotid bruit JVP elevated;hepatojugular reflux      Chest:  normal breath sounds, clear to auscultation, normal A-P diameter, normal symmetry, normal respiratory excursion, no use of accessory muscles        Cardiac: regular rhythm, normal S1/S2, no S3 or S4, apical impulse not displaced, no murmurs, gallops or rubs   S4 no presence of murmur            Abdomen:  abdomen soft obese      Vascular:   pulses below the femoral arteries are diminished                                    Extremities and Back:  no deformities, clubbing, cyanosis, erythema observed;no edema        Neurological:  no gross motor deficits          Recent Lab Results:  LIPID RESULTS:  Lab Results   Component Value Date    CHOL 271 (H) 04/23/2019    HDL 58 04/23/2019     (H) 04/23/2019    TRIG 155 (H) 04/23/2019    CHOLHDLRATIO 3.8 10/13/2015       LIVER ENZYME RESULTS:  Lab Results   Component Value Date    AST 19 04/23/2019    ALT 36 04/23/2019       CBC RESULTS:  Lab Results   Component Value Date    WBC 5.9 04/23/2019    RBC 4.27 04/23/2019    HGB 13.1 04/23/2019    HCT 38.9 04/23/2019    MCV 91 04/23/2019    MCH 30.7 04/23/2019    MCHC 33.7 04/23/2019    RDW 13.5 04/23/2019     04/23/2019       BMP RESULTS:  Lab Results   Component Value Date     04/23/2019    POTASSIUM 4.3 04/23/2019    CHLORIDE 105 04/23/2019    CO2 30 04/23/2019    ANIONGAP 2 (L) 04/23/2019    GLC 90 04/23/2019    BUN 16 04/23/2019    CR 0.67  04/23/2019    GFRESTIMATED >90 04/23/2019    GFRESTBLACK >90 04/23/2019    CORINA 8.9 04/23/2019

## 2019-06-11 NOTE — LETTER
"6/11/2019    Addis Del Rio MD  [address] [facility] [address]    RE: Rosita GUTIERREZ Christine       Dear Colleague,    I had the pleasure of seeing Rosita King in the [facility].    HPI:   I had the pleasure of seeing Jenna when she came for follow up of PSVT.  This 68 year old has seen Dr. Glynn, Dr. Saravia and Dr. Kelsey for her history of:    1. Paroxysmal SVT associated with palpitations, SOB and fatigue and first evaluated 2016. EF with normal EF. Zio showed likely AT back in 2016 that continued despite metoprolol. Subsequent ZioPatches with shorter episodes and evidence of pauses.  2. Benign Essential HTN  3. ASHLEY on CPAP   4. Left Parotid mass noted incidentally on Brain MRI done for hearing loss and \"fogginess.\" FNA done 6/3 (ENT Dr. Busby) and due for ultrasound-guided bx    Saw Dr. Glynn 4/2018 at which time c/o daily palpitations, including while getting EKG that showed SR.  ZioPatch to monitor for arrhythmias and correlate symptoms 4/2018 showed a brief run of PSVT, which was asymptomatic. Metoprolol was continued and annual follow was recommended.    Saw PCP 4/2019 and noted continued palpitations. TSH wnl. ZioPatch showed SVT, lasting up to 13 minutes. Symptoms correlated with AT, SR and occasional PACs. Also noted to have Wenkebach (2nd degree HB Type I - though labelled a type II) on 5/22 @ 2100, which was asymptomatic as well as an episode of bradycardia due to non-conducted PAC 5/25 @ 2330 while sleeping. This limits our ability to use higher doses of BB or AAD.    She recently saw her PCP for c/o \"mental fogginess.\" A brain MRI showed an incidental parotid mass, for which she has had FNA done and now is due to have an ultrasound-guided biopsy done on Tuesday 6/18.    Continues to note \"pounding,\" mostly in afternoon hours when she is sitting.  No dizziness, lightheadedness, syncope    Notes increased HARDWICK x 2 years, not worsening. Gets \"tired\" doing housework but no " "chest pain.    PFTs 2016 wnl.    ZioPatch 5/15-5/29/2019 ordered by her Primary showed predominately SR with average HR 72 bpm (range 45 bpm @ 0740 to 105 bpm @ 1718. 63 episodes SVT, with average  bpm (range  bpm). 1 episode of 2nd degree HB Type I (per Dr. Saravia) and a 2.5\" pause on 5/25 @ 2337.    ZioPatch 4/2018 showed SR with 13 runs of PSVT (<17 beats) and symptoms which correlated with SR.    Echocardiogram 6/2016 showed EF 55-60% with grade I diastolic dysfunction. Normal RV function and size. 1+ TR but no other significant valvular abnormalities.     Nuclear Treadmill Stress Test 10/2015 showed breast attenuation artifact but a small area of anterior wall ischemia could not be excluded. Reached 79% of max predicted HR (not on BB).     Assessment & Plan:    1. SVT    Many episodes noted on ZioPatch, not terribly sx'c    Sxs typically correlated with SR. Occasionally with PACs and occasionally SVT, but most SVTs were asx'c    Per Dr. Saravia, avoid increasing AVN blocking agents/flecaindie given slow HRs/heart block.    PLAN:    If we find sxs correlate with SVT, could consider EPS/ablation. She agrees it really doesn't seem to correlate.    As sxs worse in afternoon, will adjust metoprolol XL slightly to 25 mg BID    See me back to see if any improvement      2. Benign Essential HTN    BP has been high since 2/2019    Could be worsening HARDWICK with housework and \"pounding\"    PLAN:    Start hydrochlorothiazide. Gave 25 mg tabs, with plans for 1/2 tab daily 12.5 mg    BMP 1 week ([place])    When call back re: blood work, get list of BPs and consider increasing hydrochlorothiazide to 25 mg daily. Will also find out if breathing/pounding improved    3. L Parotid Mass    For ultrasound guided bx next week    4. HARDWICK    As above, could be worse due to hypertension    Has some JVD/HJR on exam but belly soft, lungs clear and no edema    PLAN:    Hydrochlorothiazide as above    Contiue CPAP    Consider " repeating echo for evaluation of right sided pressures if sxs don't improve      Arelis Deutsch PA-C, MSPAS      Orders Placed This Encounter   Procedures     Basic metabolic panel     Follow-Up with Cardiac Advanced Practice Provider     Orders Placed This Encounter   Medications     hydrochlorothiazide (HYDRODIURIL) 25 MG tablet     Sig: Take 1 tablet (25 mg) by mouth daily Take 1/2 -1 tablet by mouth daily as directed by cardiology clinic     Dispense:  30 tablet     Refill:  5     There are no discontinued medications.      Encounter Diagnoses   Name Primary?     Paroxysmal supraventricular tachycardia (H) Yes     Essential hypertension        CURRENT MEDICATIONS:  Current Outpatient Medications   Medication Sig Dispense Refill     hydrochlorothiazide (HYDRODIURIL) 25 MG tablet Take 1 tablet (25 mg) by mouth daily Take 1/2 -1 tablet by mouth daily as directed by cardiology clinic 30 tablet 5     metoprolol succinate ER (TOPROL-XL) 50 MG 24 hr tablet Take 1 tablet (50 mg) by mouth daily 90 tablet 3     Multiple Vitamins-Minerals (CENTRUM SILVER) per tablet Take 1 tablet by mouth daily       omeprazole (PRILOSEC) 20 MG DR capsule TAKE ONE CAPSULE BY MOUTH ONCE DAILY 90 capsule 3     VITAMIN D, CHOLECALCIFEROL, PO Take 500 Units by mouth daily          ALLERGIES     Allergies   Allergen Reactions     Penicillins      Pravastatin      Other reaction(s): Myalgia     Simvastatin      Other reaction(s): Myalgia     Sulfa Drugs        PAST MEDICAL HISTORY:  No past medical history on file.    PAST SURGICAL HISTORY:  Past Surgical History:   Procedure Laterality Date     CARPAL TUNNEL RELEASE RT/LT Bilateral 2006, 2009     COLONOSCOPY N/A 11/25/2015    Procedure: COLONOSCOPY;  Surgeon: Mina Roy MD;  Location: [facility]     ESOPHAGOSCOPY, GASTROSCOPY, DUODENOSCOPY (EGD), COMBINED N/A 11/25/2015    Procedure: COMBINED ESOPHAGOSCOPY, GASTROSCOPY, DUODENOSCOPY (EGD), BIOPSY SINGLE OR MULTIPLE;  Surgeon: Mina Roy  Davon Moore MD;  Location: [facility]     TONSILLECTOMY & ADENOIDECTOMY  age 13       FAMILY HISTORY:  Family History   Problem Relation Age of Onset     Liver Disease Mother      Depression/Anxiety Father      Diabetes Father      Diabetes Sister      Liver Disease Sister      Cancer Sister        SOCIAL HISTORY:  Social History     Socioeconomic History     Marital status:      Spouse name: None     Number of children: None     Years of education: None     Highest education level: None   Occupational History     None   Social Needs     Financial resource strain: None     Food insecurity:     Worry: None     Inability: None     Transportation needs:     Medical: None     Non-medical: None   Tobacco Use     Smoking status: Former Smoker     Smokeless tobacco: Never Used   Substance and Sexual Activity     Alcohol use: Yes     Comment: ocassional     Drug use: No     Sexual activity: Never   Lifestyle     Physical activity:     Days per week: None     Minutes per session: None     Stress: None   Relationships     Social connections:     Talks on phone: None     Gets together: None     Attends Hinduism service: None     Active member of club or organization: None     Attends meetings of clubs or organizations: None     Relationship status: None     Intimate partner violence:     Fear of current or ex partner: None     Emotionally abused: None     Physically abused: None     Forced sexual activity: None   Other Topics Concern     Parent/sibling w/ CABG, MI or angioplasty before 65F 55M? Yes     Comment: Father with MI at 55   Social History Narrative     None       Review of Systems:  Skin:  Positive for itching;rash   Eyes:  Positive for visual blurring;glasses  ENT:  Negative    Respiratory:  Positive for shortness of breath;dyspnea on exertion;sleep apnea;CPAP  Cardiovascular:    Positive for;palpitations;chest pain;fatigue  Gastroenterology: Negative    Genitourinary:  Positive for urinary  frequency;urgency  Musculoskeletal:  Negative    Neurologic:  Negative    Psychiatric:  Positive for anxiety;depression  Heme/Lymph/Imm:  Negative    Endocrine:  Negative      Physical Exam:  Vitals: /84 (BP Location: Right arm, Patient Position: Sitting, Cuff Size: Adult Regular)   Pulse 57   Wt 88.6 kg (195 lb 6.4 oz)   SpO2 98%   BMI 34.61 kg/m       Constitutional:  cooperative, alert and oriented, well developed, well nourished, in no acute distress        Skin:  warm and dry to the touch, no apparent skin lesions or masses noted        Head:  normocephalic, no masses or lesions        Eyes:  pupils equal and round;conjunctivae and lids unremarkable;sclera white        ENT:  no pallor or cyanosis, dentition good        Neck:  no carotid bruit JVP elevated;hepatojugular reflux      Chest:  normal breath sounds, clear to auscultation, normal A-P diameter, normal symmetry, normal respiratory excursion, no use of accessory muscles        Cardiac: regular rhythm, normal S1/S2, no S3 or S4, apical impulse not displaced, no murmurs, gallops or rubs   S4 no presence of murmur            Abdomen:  abdomen soft obese      Vascular:   pulses below the femoral arteries are diminished                                    Extremities and Back:  no deformities, clubbing, cyanosis, erythema observed;no edema        Neurological:  no gross motor deficits          Recent Lab Results:  LIPID RESULTS:  Lab Results   Component Value Date    CHOL 271 (H) 04/23/2019    HDL 58 04/23/2019     (H) 04/23/2019    TRIG 155 (H) 04/23/2019    CHOLHDLRATIO 3.8 10/13/2015       LIVER ENZYME RESULTS:  Lab Results   Component Value Date    AST 19 04/23/2019    ALT 36 04/23/2019       CBC RESULTS:  Lab Results   Component Value Date    WBC 5.9 04/23/2019    RBC 4.27 04/23/2019    HGB 13.1 04/23/2019    HCT 38.9 04/23/2019    MCV 91 04/23/2019    MCH 30.7 04/23/2019    MCHC 33.7 04/23/2019    RDW 13.5 04/23/2019      04/23/2019       BMP RESULTS:  Lab Results   Component Value Date     04/23/2019    POTASSIUM 4.3 04/23/2019    CHLORIDE 105 04/23/2019    CO2 30 04/23/2019    ANIONGAP 2 (L) 04/23/2019    GLC 90 04/23/2019    BUN 16 04/23/2019    CR 0.67 04/23/2019    GFRESTIMATED >90 04/23/2019    GFRESTBLACK >90 04/23/2019    CORINA 8.9 04/23/2019                  Thank you for allowing me to participate in the care of your patient.    Sincerely,     Cassie Deutsch PA-C     [facility]

## 2019-06-17 ENCOUNTER — OFFICE VISIT (OUTPATIENT)
Dept: FAMILY MEDICINE | Facility: CLINIC | Age: 69
End: 2019-06-17
Payer: COMMERCIAL

## 2019-06-17 VITALS
DIASTOLIC BLOOD PRESSURE: 70 MMHG | HEART RATE: 53 BPM | TEMPERATURE: 98.6 F | OXYGEN SATURATION: 97 % | RESPIRATION RATE: 18 BRPM | WEIGHT: 195.4 LBS | SYSTOLIC BLOOD PRESSURE: 120 MMHG | BODY MASS INDEX: 34.62 KG/M2 | HEIGHT: 63 IN

## 2019-06-17 DIAGNOSIS — I10 ESSENTIAL HYPERTENSION WITH GOAL BLOOD PRESSURE LESS THAN 140/90: ICD-10-CM

## 2019-06-17 DIAGNOSIS — I47.10 PAROXYSMAL SUPRAVENTRICULAR TACHYCARDIA (H): Primary | ICD-10-CM

## 2019-06-17 DIAGNOSIS — R41.82 FLUCTUATING MENTAL STATUS: ICD-10-CM

## 2019-06-17 DIAGNOSIS — L40.9 PSORIASIS: ICD-10-CM

## 2019-06-17 DIAGNOSIS — H65.93 OME (OTITIS MEDIA WITH EFFUSION), BILATERAL: ICD-10-CM

## 2019-06-17 DIAGNOSIS — D49.0 PAROTID TUMOR: ICD-10-CM

## 2019-06-17 PROCEDURE — 99214 OFFICE O/P EST MOD 30 MIN: CPT | Performed by: FAMILY MEDICINE

## 2019-06-17 RX ORDER — CEFDINIR 300 MG/1
300 CAPSULE ORAL 2 TIMES DAILY
Qty: 20 CAPSULE | Refills: 0 | Status: SHIPPED | OUTPATIENT
Start: 2019-06-17 | End: 2019-07-19

## 2019-06-17 RX ORDER — TRIAMCINOLONE ACETONIDE 1 MG/G
OINTMENT TOPICAL 2 TIMES DAILY
Qty: 80 G | Refills: 1 | Status: SHIPPED | OUTPATIENT
Start: 2019-06-17 | End: 2019-11-26

## 2019-06-17 ASSESSMENT — ANXIETY QUESTIONNAIRES
7. FEELING AFRAID AS IF SOMETHING AWFUL MIGHT HAPPEN: NOT AT ALL
6. BECOMING EASILY ANNOYED OR IRRITABLE: SEVERAL DAYS
2. NOT BEING ABLE TO STOP OR CONTROL WORRYING: SEVERAL DAYS
7. FEELING AFRAID AS IF SOMETHING AWFUL MIGHT HAPPEN: NOT AT ALL
GAD7 TOTAL SCORE: 5
4. TROUBLE RELAXING: SEVERAL DAYS
5. BEING SO RESTLESS THAT IT IS HARD TO SIT STILL: NOT AT ALL
3. WORRYING TOO MUCH ABOUT DIFFERENT THINGS: SEVERAL DAYS
1. FEELING NERVOUS, ANXIOUS, OR ON EDGE: SEVERAL DAYS

## 2019-06-17 ASSESSMENT — PATIENT HEALTH QUESTIONNAIRE - PHQ9
10. IF YOU CHECKED OFF ANY PROBLEMS, HOW DIFFICULT HAVE THESE PROBLEMS MADE IT FOR YOU TO DO YOUR WORK, TAKE CARE OF THINGS AT HOME, OR GET ALONG WITH OTHER PEOPLE: SOMEWHAT DIFFICULT
SUM OF ALL RESPONSES TO PHQ QUESTIONS 1-9: 6
SUM OF ALL RESPONSES TO PHQ QUESTIONS 1-9: 6

## 2019-06-17 ASSESSMENT — MIFFLIN-ST. JEOR: SCORE: 1385.46

## 2019-06-17 NOTE — NURSING NOTE
"Chief Complaint   Patient presents with     Results     MRI results     /70 (Cuff Size: Adult Regular)   Pulse 53   Temp 98.6  F (37  C) (Tympanic)   Resp 18   Ht 1.6 m (5' 3\")   Wt 88.6 kg (195 lb 6.4 oz)   SpO2 97%   Breastfeeding? No   BMI 34.61 kg/m   Estimated body mass index is 34.61 kg/m  as calculated from the following:    Height as of this encounter: 1.6 m (5' 3\").    Weight as of this encounter: 88.6 kg (195 lb 6.4 oz).  bp completed using cuff size: regular      Health Maintenance that is potentially due pending provider review:    Health Maintenance Due   Topic Date Due     DEXA  1950     ADVANCED DIRECTIVE PLANNING  1950     ZOSTER IMMUNIZATION (2 of 3) 07/14/2011     DTAP/TDAP/TD IMMUNIZATION (2 - Td) 06/09/2016                "

## 2019-06-17 NOTE — PROGRESS NOTES
"Subjective     Rosita King is a 68 year old female who presents to clinic today for the following health issues:    HPI     Hypertension-  On metoprolol 50 mg daily, hydrochlorothiazide,   Has been monitoring BP at home, Readings are as follows:  147/75, 160/101, 148/93, 172/103, 149/80, 141/85, 159/82, 153/81, 175/82, 150/78, 162/87, 148/86, 139/81, 135/86, 132/83, 144/89   Hard to do any work- gets really out of breath and heart feels like it is pounding. HR    Following with cardiology.   Metoprolol was cut in half and has it bid  Started hydrochlorothiazide 12.5 mg, tomorrow goes up to 25 mg  Has lab drawn tomorrow    BP Readings from Last 6 Encounters:   06/18/19 127/79   06/17/19 120/70   06/11/19 156/84   06/03/19 165/75   04/23/19 172/80   02/21/19 160/84      Parotid mass-  Incidentally noted on MRI-  Saw ENT, initially biopsy was inconclusive. Has ultrasound guided biopsy tomorrow with Dr. Busby      Brain \"fogginess\". That may be somewhat better. MRI showed:                                                              IMPRESSION:    1. No evidence of acute infarct, hemorrhage, or herniation.  2. Mild diffuse parenchymal volume loss and white matter changes  likely due to chronic microvascular ischemic disease.   3. Partially visualized 2.2 cm mass in the left parotid gland. This  may represent a benign or malignant parotid lesion. Recommend  otolaryngology consultation.   4. Extra-axial 1 cm enhancing mass along the right cerebral convexity most likely representing a meningioma.        URSULA BARRON MD      Ears feel plugged. That has been ongoing for months. Really bothersome, mainly the left. No pain. No fever. No drainage. Still can hear.    Paroxysmal supraventricular tachycardia/palpitations    TSH was normal, Zio patch showed SVT, but no symptoms with it. She saw cardiology, she had a good synopsis per her note:     associated with palpitations, SOB and fatigue and first evaluated " "2016. EF with normal EF. Zio showed likely AT back in 2016 that continued despite metoprolol. Subsequent ZioPatches with shorter episodes and evidence of pauses.     ZioPatch 5/15-5/29/2019 ordered by her Primary showed predominately SR with average HR 72 bpm (range 45 bpm @ 0740 to 105 bpm @ 1718. 63 episodes SVT, with average  bpm (range  bpm). 1 episode of 2nd degree HB Type I (per Dr. Saravia) and a 2.5\" pause on 5/25 @ 2337.     ZioPatch 4/2018 showed SR with 13 runs of PSVT (<17 beats) and symptoms which correlated with SR.     Echocardiogram 6/2016 showed EF 55-60% with grade I diastolic dysfunction. Normal RV function and size. 1+ TR but no other significant valvular abnormalities.     Recommendation was to monitor, adjust metoprolol to 25 mg bid (instead of 50 mg daily)  Consider EPS/ablation    Psoriasis  Gets in her scalp, asks if there is something cheaper than Lidex solution    BP Readings from Last 3 Encounters:   06/17/19 120/70   06/11/19 156/84   06/03/19 165/75    Wt Readings from Last 3 Encounters:   06/17/19 88.6 kg (195 lb 6.4 oz)   06/11/19 88.6 kg (195 lb 6.4 oz)   06/03/19 88.9 kg (196 lb)                      Reviewed and updated as needed this visit by Provider         Review of Systems   ROS: 5 point ROS negative except as noted above in HPI, including Gen., Resp., CV, GI &  system review.         Objective    /70 (Cuff Size: Adult Regular)   Pulse 53   Temp 98.6  F (37  C) (Tympanic)   Resp 18   Ht 1.6 m (5' 3\")   Wt 88.6 kg (195 lb 6.4 oz)   SpO2 97%   Breastfeeding? No   BMI 34.61 kg/m    Body mass index is 34.61 kg/m .  Physical Exam   General: appears well, no distress  HEENT: TMs both have fluid, no erythema, and canals negative bilaterally, oropharynx with no erythema, no exudate, I can feel a subtle soft tissue swelling left parotid near ear  Neck: supple, no adenopathy  Heart: regular rate and rhythm, normal S1S2, no murmur  Lungs: clear to ascultation "   Skin: mild erythema at posterior scalp hairline    ASSESSMENT:  1. Paroxysmal supraventricular tachycardia (H)    2. Benign essential hypertension    3. Psoriasis    4. OME (otitis media with effusion), bilateral    5. Parotid tumor        PLAN:  Orders Placed This Encounter     triamcinolone (KENALOG) 0.1 % external ointment     cefdinir (OMNICEF) 300 MG capsule       Patient Instructions   Labs and biopsy tomorrow as planned      Antibiotic for the ears, keep me informed       Addis House MD

## 2019-06-18 ENCOUNTER — DOCUMENTATION ONLY (OUTPATIENT)
Dept: CARDIOLOGY | Facility: CLINIC | Age: 69
End: 2019-06-18

## 2019-06-18 ENCOUNTER — HOSPITAL ENCOUNTER (OUTPATIENT)
Dept: ULTRASOUND IMAGING | Facility: CLINIC | Age: 69
Discharge: HOME | End: 2019-06-18
Attending: OTOLARYNGOLOGY | Admitting: OTOLARYNGOLOGY
Payer: MEDICARE

## 2019-06-18 VITALS — DIASTOLIC BLOOD PRESSURE: 79 MMHG | SYSTOLIC BLOOD PRESSURE: 127 MMHG

## 2019-06-18 DIAGNOSIS — I10 ESSENTIAL HYPERTENSION: ICD-10-CM

## 2019-06-18 DIAGNOSIS — K11.8 PAROTID MASS: ICD-10-CM

## 2019-06-18 LAB
ANION GAP SERPL CALCULATED.3IONS-SCNC: 7 MMOL/L (ref 3–14)
BUN SERPL-MCNC: 17 MG/DL (ref 7–30)
CALCIUM SERPL-MCNC: 9.5 MG/DL (ref 8.5–10.1)
CHLORIDE SERPL-SCNC: 103 MMOL/L (ref 94–109)
CO2 SERPL-SCNC: 29 MMOL/L (ref 20–32)
CREAT SERPL-MCNC: 0.73 MG/DL (ref 0.52–1.04)
GFR SERPL CREATININE-BSD FRML MDRD: 84 ML/MIN/{1.73_M2}
GLUCOSE SERPL-MCNC: 128 MG/DL (ref 70–99)
POTASSIUM SERPL-SCNC: 4 MMOL/L (ref 3.4–5.3)
SODIUM SERPL-SCNC: 139 MMOL/L (ref 133–144)

## 2019-06-18 PROCEDURE — 88305 TISSUE EXAM BY PATHOLOGIST: CPT | Mod: 26 | Performed by: RADIOLOGY

## 2019-06-18 PROCEDURE — 88185 FLOWCYTOMETRY/TC ADD-ON: CPT | Performed by: RADIOLOGY

## 2019-06-18 PROCEDURE — 36415 COLL VENOUS BLD VENIPUNCTURE: CPT | Performed by: PHYSICIAN ASSISTANT

## 2019-06-18 PROCEDURE — 40001004 ZZHCL STATISTIC FLOW INT 9-15 ABY TC 88188: Performed by: RADIOLOGY

## 2019-06-18 PROCEDURE — 25000125 ZZHC RX 250: Performed by: RADIOLOGY

## 2019-06-18 PROCEDURE — 88184 FLOWCYTOMETRY/ TC 1 MARKER: CPT | Performed by: RADIOLOGY

## 2019-06-18 PROCEDURE — 88305 TISSUE EXAM BY PATHOLOGIST: CPT | Performed by: RADIOLOGY

## 2019-06-18 PROCEDURE — 76942 ECHO GUIDE FOR BIOPSY: CPT

## 2019-06-18 PROCEDURE — 80048 BASIC METABOLIC PNL TOTAL CA: CPT | Performed by: PHYSICIAN ASSISTANT

## 2019-06-18 RX ADMIN — LIDOCAINE HYDROCHLORIDE 5 ML: 10 INJECTION, SOLUTION EPIDURAL; INFILTRATION; INTRACAUDAL; PERINEURAL at 08:40

## 2019-06-18 ASSESSMENT — ANXIETY QUESTIONNAIRES: GAD7 TOTAL SCORE: 5

## 2019-06-18 NOTE — PROGRESS NOTES
RADIOLOGY PROCEDURE NOTE  Patient name: Rosita King  MRN: [id number]  : 1950    Pre-procedure diagnosis: Left parotid area lesion.  Post-procedure diagnosis: Same    Procedure Date/Time: 2019  9:11 AM  Procedure: US guided needle core biopsy.  Estimated blood loss: None  Specimen(s) collected with description: Four needle cores.  The patient tolerated the procedure well with no immediate complications.    See imaging dictation for procedural details.    Provider name: Felton [place]  Assistant(s):None

## 2019-06-18 NOTE — PROGRESS NOTES
Pls let pt know:    1) BMP fine on hydrochlorothiazide 12.5 mg daily (1/2 of 25 mg tabs)    2) How are BPs?    3) Any change in breathing/pounding?    Jerrod Infante    ADDENDUM: Pt called in with update on BPs and symptoms:  (All BPs are taken at least 60 minutes after hydrochlorothiazide)  6/12/19 160/101  6/13/19: 149/80  6/14/19: 141/85  6/15/19: 159/82      6/17/19  120/70 (in office)  States the first few days after seeing Arelis her symptoms were unchanged. The last several days, her symptoms have been much quieter but she has had a couple doctor appointments so hasn't been working hard. Will discuss with Arelis CISNEROS for further recommendations. Ana Cristina Cuellar RN Cardiology June 19, 2019, 10:06 AM

## 2019-06-18 NOTE — DISCHARGE INSTRUCTIONS
Biopsy Discharge Instructions  _____________________________________    Patient Name: Rosita King  Today's Date: June 18, 2019    If you have not received your results after 5 days, please call the doctor who ordered your test.    Diet and medicines    You may go back to your regular diet and medicines.    You may take pain relievers such as Advil (ibuprofen) or Tylenol (acetaminophen).    Activity    You may go back to your normal routine.    No heavy exercise for 24 hours.    Site care    The needle site may have mild bruising, soreness and swelling. This will go away in a few days.     For swelling and bruising, place an ice pack on the site. Never use ice directly on your skin. Use the pack for 20 minutes. Remove it for at least 30 minutes before re-using.    Call your doctor if you have:    Severe pain at the needle site.     Fever over 101  F (38.3  C), taken under the tongue.    Increased redness or swelling.    Fluid oozing or draining from the site.    Go to the emergency room or call 911 if:     You have bleeding that cannot be stopped with direct pressure.     You have trouble breathing.    Your neck swells.    If you have questions, call your hospital:      [facility] at [phone]

## 2019-06-18 NOTE — IP AVS SNAPSHOT
[facility]  [address] [place] [address]  Phone:  [phone]                                    After Visit Summary   6/18/2019    Rsoita King    MRN: [id number]           After Visit Summary Signature Page    I have received my discharge instructions, and my questions have been answered. I have discussed any challenges I see with this plan with the nurse or doctor.    ..........................................................................................................................................  Patient/Patient Representative Signature      ..........................................................................................................................................  Patient Representative Print Name and Relationship to Patient    ..................................................               ................................................  Date                                   Time    ..........................................................................................................................................  Reviewed by Signature/Title    ...................................................              ..............................................  Date                                               Time          22EPIC Rev 08/18

## 2019-06-19 LAB — COPATH REPORT: NORMAL

## 2019-06-19 RX ORDER — METOPROLOL SUCCINATE 50 MG/1
25 TABLET, EXTENDED RELEASE ORAL 2 TIMES DAILY
Qty: 90 TABLET | Refills: 3
Start: 2019-06-19 | End: 2020-04-30

## 2019-06-19 RX ORDER — HYDROCHLOROTHIAZIDE 25 MG/1
25 TABLET ORAL DAILY
Start: 2019-06-19 | End: 2019-06-20

## 2019-06-19 NOTE — PROGRESS NOTES
Thx for update. Pls increase hydrochlorothiazide to 25 mg daily (full tab). I have updated Epic.  Needs BMP in 7ish days (I have ordered).    Continue to monitor BP, pounding, etc    Thx -     ADDENDUM: Disc recommendations with patient. She was able to read new dose hydrochlorothiazide back to me. She will get lab [facility] next week. Prescription sent to pharmacy of choice. Ana Cristina Cuellar RN Cardiology June 20, 2019, 8:21 AM

## 2019-06-20 LAB — COPATH REPORT: NORMAL

## 2019-06-20 RX ORDER — HYDROCHLOROTHIAZIDE 25 MG/1
25 TABLET ORAL DAILY
Qty: 90 TABLET | Refills: 1 | Status: SHIPPED | OUTPATIENT
Start: 2019-06-20 | End: 2020-01-24

## 2019-07-01 ENCOUNTER — TELEPHONE (OUTPATIENT)
Dept: OTOLARYNGOLOGY | Facility: CLINIC | Age: 69
End: 2019-07-01

## 2019-07-01 NOTE — TELEPHONE ENCOUNTER
Reason for Call:  Other call back    Detailed comments: Patient was seen with Dr. Busby and had a FNA of her Thyroid she was told that she needed surgery. I am not finding any surgery orders,  patient knows that she is to follow up with Dr. Mae is she supposed to see Dr. Mae before her surgery. Please call and advise     Phone Number Patient can be reached at: Home number on file [phone]     Best Time: any    Can we leave a detailed message on this number? YES    Call taken on 7/1/2019 at 1:28 PM by Snow Robles

## 2019-07-03 DIAGNOSIS — I10 ESSENTIAL HYPERTENSION: ICD-10-CM

## 2019-07-03 LAB
ANION GAP SERPL CALCULATED.3IONS-SCNC: 5 MMOL/L (ref 3–14)
BUN SERPL-MCNC: 17 MG/DL (ref 7–30)
CALCIUM SERPL-MCNC: 9.5 MG/DL (ref 8.5–10.1)
CHLORIDE SERPL-SCNC: 104 MMOL/L (ref 94–109)
CO2 SERPL-SCNC: 29 MMOL/L (ref 20–32)
CREAT SERPL-MCNC: 0.74 MG/DL (ref 0.52–1.04)
GFR SERPL CREATININE-BSD FRML MDRD: 84 ML/MIN/{1.73_M2}
GLUCOSE SERPL-MCNC: 160 MG/DL (ref 70–99)
POTASSIUM SERPL-SCNC: 3.8 MMOL/L (ref 3.4–5.3)
SODIUM SERPL-SCNC: 138 MMOL/L (ref 133–144)

## 2019-07-03 PROCEDURE — 80048 BASIC METABOLIC PNL TOTAL CA: CPT | Performed by: PHYSICIAN ASSISTANT

## 2019-07-03 PROCEDURE — 36415 COLL VENOUS BLD VENIPUNCTURE: CPT | Performed by: PHYSICIAN ASSISTANT

## 2019-07-04 ENCOUNTER — DOCUMENTATION ONLY (OUTPATIENT)
Dept: CARDIOLOGY | Facility: CLINIC | Age: 69
End: 2019-07-04

## 2019-07-04 DIAGNOSIS — I10 ESSENTIAL HYPERTENSION: Primary | ICD-10-CM

## 2019-07-04 NOTE — PROGRESS NOTES
"Reviewed pt's blood work after increasing hydrochlorothiazide to 25 mg daily.  BMP looks good    - what are BPs running?  - any change in \"pounding\"?  - feeling OK?    Component      Latest Ref Rng & Units 4/23/2019 6/18/2019 7/3/2019   Sodium      133 - 144 mmol/L 137 139 138   Potassium      3.4 - 5.3 mmol/L 4.3 4.0 3.8   Chloride      94 - 109 mmol/L 105 103 104   Carbon Dioxide      20 - 32 mmol/L 30 29 29   Anion Gap      3 - 14 mmol/L 2 (L) 7 5   Glucose      70 - 99 mg/dL 90 128 (H) 160 (H)   Urea Nitrogen      7 - 30 mg/dL 16 17 17   Creatinine      0.52 - 1.04 mg/dL 0.67 0.73 0.74   GFR Estimate      >60 mL/min/1.73:m2 >90 84 84   GFR Estimate If Black      >60 mL/min/1.73:m2 >90 >90 >90   Calcium      8.5 - 10.1 mg/dL 8.9 9.5 9.5     ADDENDUM:  Monitor runs high but gives this RN some readings:  She thinks it is tending to go down.  7/2/19: 147/87 (before meds) and 157/82  7/3/19: 154/84 (before meds) and 157/81  7/4/19: 148/93 (before meds)   7/5/19: 152/85 (before meds)   \"I think I feel better but today I think a lot of it is in my head. I feel cloudy, my ears feel plugged like when you have a cold\" \"Shaking\" has improved. When asked for clarification, this is the \"pounding\" she has c/o at OV. Chest pressure and SOB continues. \"Sometimes I get so much pressure I have to drink a coke so I can burp\" Takes omeprazole every day (dx: GERD) and is very compliant but if she does not take it, will get heartburn at noc. Arelis Deutsch PA-C to advise. Kimberly Arredondo, RN Cardiology at [facility] July 5, 2019, 1:29 PM   "

## 2019-07-05 RX ORDER — LISINOPRIL 10 MG/1
10 TABLET ORAL DAILY
Qty: 30 TABLET | Refills: 1 | Status: SHIPPED | OUTPATIENT
Start: 2019-07-05 | End: 2019-07-30

## 2019-07-05 NOTE — PROGRESS NOTES
Currently on hydrochlorothiazide 25 and metoprolol XL 25 mg BID.    BP still too high.    Pls have her start lisinopril 10 mg daily. Pls add BMP before her appt with me at the end of the month    Keep track of BPs    Jerrod Infante    ADDENDUM:  Called patient with recommendations above. States understanding. Kimberly Arredondo RN Cardiology at [facility] July 5, 2019, 2:50 PM

## 2019-07-18 DIAGNOSIS — I10 ESSENTIAL HYPERTENSION: ICD-10-CM

## 2019-07-18 LAB
ANION GAP SERPL CALCULATED.3IONS-SCNC: 4 MMOL/L (ref 3–14)
BUN SERPL-MCNC: 18 MG/DL (ref 7–30)
CALCIUM SERPL-MCNC: 9.2 MG/DL (ref 8.5–10.1)
CHLORIDE SERPL-SCNC: 100 MMOL/L (ref 94–109)
CO2 SERPL-SCNC: 31 MMOL/L (ref 20–32)
CREAT SERPL-MCNC: 0.78 MG/DL (ref 0.52–1.04)
GFR SERPL CREATININE-BSD FRML MDRD: 77 ML/MIN/{1.73_M2}
GLUCOSE SERPL-MCNC: 140 MG/DL (ref 70–99)
POTASSIUM SERPL-SCNC: 3.8 MMOL/L (ref 3.4–5.3)
SODIUM SERPL-SCNC: 135 MMOL/L (ref 133–144)

## 2019-07-18 PROCEDURE — 36415 COLL VENOUS BLD VENIPUNCTURE: CPT | Performed by: PHYSICIAN ASSISTANT

## 2019-07-18 PROCEDURE — 80048 BASIC METABOLIC PNL TOTAL CA: CPT | Performed by: PHYSICIAN ASSISTANT

## 2019-07-18 NOTE — PROGRESS NOTES
Chief Complaint - Parotid mass    History of Present Illness - Rosita King is a 68 year old female with a left parotid mass. She had an MRI for troubles swallowing, thinking, etc. No dysphagia. However, she has to think about swallowing. She does have some left jaw pain, intermittent. The patient notes some ear plugging (like congestion), but both sides. No weakness, history of skin cancer or any cancer. The mass does not change with eating. No radiation exposure. The patient did smoke, but quit 12 years ago. MRI brain 5/13/19 showed a 2.2 cm left parotid mass. 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     Past Medical History -   Patient Active Problem List   Diagnosis     CARDIOVASCULAR SCREENING; LDL GOAL LESS THAN 130     Vitamin D deficiency     ASHLEY (obstructive sleep apnea)     Generalized anxiety disorder     Hyperlipidemia LDL goal <130     Diverticulosis of large intestine without hemorrhage     H. pylori infection     Essential hypertension     Essential hypertension with goal blood pressure less than 140/90     Gastroesophageal reflux disease without esophagitis     Paroxysmal supraventricular tachycardia (H)     Lichen sclerosus       Current Medications -   Current Outpatient Medications:      hydrochlorothiazide (HYDRODIURIL) 25 MG tablet, Take 1 tablet (25 mg) by mouth daily, Disp: 90 tablet, Rfl: 1     lisinopril (PRINIVIL/ZESTRIL) 10 MG tablet, Take 1 tablet (10 mg) by mouth daily, Disp: 30 tablet, Rfl: 1     metoprolol succinate ER (TOPROL-XL) 50 MG 24 hr tablet, Take 0.5 tablets (25 mg) by mouth 2 times daily, Disp: 90 tablet, Rfl: 3     Multiple Vitamins-Minerals (CENTRUM SILVER) per tablet, Take 1 tablet by mouth daily, Disp: , Rfl:      omeprazole (PRILOSEC) 20 MG DR capsule, TAKE ONE CAPSULE BY MOUTH ONCE DAILY, Disp: 90 capsule, Rfl: 3     triamcinolone (KENALOG) 0.1 % external ointment, Apply topically 2 times daily, Disp: 80 g, Rfl: 1     VITAMIN D, CHOLECALCIFEROL, PO, Take 500 Units  by mouth daily , Disp: , Rfl:     Allergies -   Allergies   Allergen Reactions     Penicillins      Pravastatin      Other reaction(s): Myalgia     Simvastatin      Other reaction(s): Myalgia     Sulfa Drugs        Social History -   Social History     Socioeconomic History     Marital status:      Spouse name: Not on file     Number of children: Not on file     Years of education: Not on file     Highest education level: Not on file   Occupational History     Not on file   Social Needs     Financial resource strain: Not on file     Food insecurity:     Worry: Not on file     Inability: Not on file     Transportation needs:     Medical: Not on file     Non-medical: Not on file   Tobacco Use     Smoking status: Former Smoker     Smokeless tobacco: Never Used   Substance and Sexual Activity     Alcohol use: Yes     Comment: ocassional     Drug use: No     Sexual activity: Not Currently     Partners: Female   Lifestyle     Physical activity:     Days per week: Not on file     Minutes per session: Not on file     Stress: Not on file   Relationships     Social connections:     Talks on phone: Not on file     Gets together: Not on file     Attends Jainism service: Not on file     Active member of club or organization: Not on file     Attends meetings of clubs or organizations: Not on file     Relationship status: Not on file     Intimate partner violence:     Fear of current or ex partner: Not on file     Emotionally abused: Not on file     Physically abused: Not on file     Forced sexual activity: Not on file   Other Topics Concern     Parent/sibling w/ CABG, MI or angioplasty before 65F 55M? Yes     Comment: Father with MI at 55   Social History Narrative     Not on file       Family History -   Family History   Problem Relation Age of Onset     Liver Disease Mother      Depression/Anxiety Father      Diabetes Father      Diabetes Sister      Liver Disease Sister      Cancer Sister        Review of Systems - As  "per HPI and PMHx, some ear plugging, throat tightness, otherwise 7 system review is negative.    Physical Exam  /70   Ht 1.6 m (5' 3\")   Wt 88.5 kg (195 lb)   BMI 34.54 kg/m    General - The patient is in no distress.  Alert and oriented x3, answers questions and cooperates with examination appropriately.   Voice and Breathing - The patient was breathing comfortably without the use of accessory muscles. There was no wheezing, stridor, or stertor.  The patients voice was clear and strong.  Head and Face - Normocephalic and atraumatic.    Eyes - Extraocular movements intact. Sclera were not icteric or injected, conjunctiva were pink and moist.  Neurologic - Cranial nerves II-XII are grossly intact. Specifically, the facial nerve is intact, House-Brackmann grade 1 of 6.   Mouth - Examination of the oral cavity showed pink, healthy oral mucosa. No lesions or ulcerations noted.  The tongue was mobile and protrudes midline.  Oropharynx - The walls of the oropharynx were smooth, symmetric, and had no lesions or ulcerations. The uvula was midline and the palate raised symmetrically.   Neck - Palpation of the left parotid gland reveals a 2 cm mass in the inferior and posterior aspect of the gland just inferior to the ear lobule. It is mobile, but firm. No overlying skin changes. No tenderness. The occipital, submental, submandibular, internal jugular chain, and supraclavicular nodes did not demonstrate any abnormal lymph nodes or masses. Palpation of the thyroid was soft and smooth, with no nodules or goiter appreciated.  The trachea was midline.  Skin - close examination of the scalp and skin on the side of the parotid mass reveals no suspicious lesions or skin cancers. No induration on palpation. No excess warmth.  Ears - The auricles appeared normal. The external auditory canals were nonedematous and nonerythematous. The tympanic membranes are normal in appearance, bony landmarks are intact.  No retraction, " perforation, or masses.  No fluid or purulence was seen in the external canal or the middle ear.         A/P - Rosita King is a 68 year old female with a left parotid mass. 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     Ultimately, this needs to be removed for definitive diagnosis and to prevent growth and complications such as malignant transformation.     We discussed surgery, the risks, benefits, and alternatives including, but not limited to: infection, bleeding, temporary or permanent facial numbness, weakness, incisional scar, facial depression or concavity of surgical bed. Because this is likely benign she prefers to wait and have it done with Dr. Salazar in [place] as it is much closer to where they live.          Julian Mae MD  Otolaryngology  [facility] ([place], [place])

## 2019-07-19 ENCOUNTER — OFFICE VISIT (OUTPATIENT)
Dept: OTOLARYNGOLOGY | Facility: CLINIC | Age: 69
End: 2019-07-19
Payer: COMMERCIAL

## 2019-07-19 VITALS
BODY MASS INDEX: 34.55 KG/M2 | HEIGHT: 63 IN | DIASTOLIC BLOOD PRESSURE: 70 MMHG | WEIGHT: 195 LBS | SYSTOLIC BLOOD PRESSURE: 140 MMHG

## 2019-07-19 DIAGNOSIS — K11.8 PAROTID MASS: Primary | ICD-10-CM

## 2019-07-19 PROCEDURE — 99214 OFFICE O/P EST MOD 30 MIN: CPT | Performed by: OTOLARYNGOLOGY

## 2019-07-19 ASSESSMENT — MIFFLIN-ST. JEOR: SCORE: 1383.64

## 2019-07-19 NOTE — LETTER
7/19/2019         RE: Rosita King  [address]  [facility] [address]        Dear Colleague,    Thank you for referring your patient, Rosita King, to the [facility]. Please see a copy of my visit note below.    Chief Complaint - Parotid mass    History of Present Illness - Rosita King is a 68 year old female with a left parotid mass. She had an MRI for troubles swallowing, thinking, etc. No dysphagia. However, she has to think about swallowing. She does have some left jaw pain, intermittent. The patient notes some ear plugging (like congestion), but both sides. No weakness, history of skin cancer or any cancer. The mass does not change with eating. No radiation exposure. The patient did smoke, but quit 12 years ago. MRI brain 5/13/19 showed a 2.2 cm left parotid mass. 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     Past Medical History -   Patient Active Problem List   Diagnosis     CARDIOVASCULAR SCREENING; LDL GOAL LESS THAN 130     Vitamin D deficiency     ASHLEY (obstructive sleep apnea)     Generalized anxiety disorder     Hyperlipidemia LDL goal <130     Diverticulosis of large intestine without hemorrhage     H. pylori infection     Essential hypertension     Essential hypertension with goal blood pressure less than 140/90     Gastroesophageal reflux disease without esophagitis     Paroxysmal supraventricular tachycardia (H)     Lichen sclerosus       Current Medications -   Current Outpatient Medications:      hydrochlorothiazide (HYDRODIURIL) 25 MG tablet, Take 1 tablet (25 mg) by mouth daily, Disp: 90 tablet, Rfl: 1     lisinopril (PRINIVIL/ZESTRIL) 10 MG tablet, Take 1 tablet (10 mg) by mouth daily, Disp: 30 tablet, Rfl: 1     metoprolol succinate ER (TOPROL-XL) 50 MG 24 hr tablet, Take 0.5 tablets (25 mg) by mouth 2 times daily, Disp: 90 tablet, Rfl: 3     Multiple Vitamins-Minerals (CENTRUM SILVER) per tablet, Take 1 tablet by mouth daily, Disp: , Rfl:       omeprazole (PRILOSEC) 20 MG DR capsule, TAKE ONE CAPSULE BY MOUTH ONCE DAILY, Disp: 90 capsule, Rfl: 3     triamcinolone (KENALOG) 0.1 % external ointment, Apply topically 2 times daily, Disp: 80 g, Rfl: 1     VITAMIN D, CHOLECALCIFEROL, PO, Take 500 Units by mouth daily , Disp: , Rfl:     Allergies -   Allergies   Allergen Reactions     Penicillins      Pravastatin      Other reaction(s): Myalgia     Simvastatin      Other reaction(s): Myalgia     Sulfa Drugs        Social History -   Social History     Socioeconomic History     Marital status:      Spouse name: Not on file     Number of children: Not on file     Years of education: Not on file     Highest education level: Not on file   Occupational History     Not on file   Social Needs     Financial resource strain: Not on file     Food insecurity:     Worry: Not on file     Inability: Not on file     Transportation needs:     Medical: Not on file     Non-medical: Not on file   Tobacco Use     Smoking status: Former Smoker     Smokeless tobacco: Never Used   Substance and Sexual Activity     Alcohol use: Yes     Comment: ocassional     Drug use: No     Sexual activity: Not Currently     Partners: Female   Lifestyle     Physical activity:     Days per week: Not on file     Minutes per session: Not on file     Stress: Not on file   Relationships     Social connections:     Talks on phone: Not on file     Gets together: Not on file     Attends Buddhist service: Not on file     Active member of club or organization: Not on file     Attends meetings of clubs or organizations: Not on file     Relationship status: Not on file     Intimate partner violence:     Fear of current or ex partner: Not on file     Emotionally abused: Not on file     Physically abused: Not on file     Forced sexual activity: Not on file   Other Topics Concern     Parent/sibling w/ CABG, MI or angioplasty before 65F 55M? Yes     Comment: Father with MI at 55   Social History Narrative      "Not on file       Family History -   Family History   Problem Relation Age of Onset     Liver Disease Mother      Depression/Anxiety Father      Diabetes Father      Diabetes Sister      Liver Disease Sister      Cancer Sister        Review of Systems - As per HPI and PMHx, some ear plugging, throat tightness, otherwise 7 system review is negative.    Physical Exam  /70   Ht 1.6 m (5' 3\")   Wt 88.5 kg (195 lb)   BMI 34.54 kg/m     General - The patient is in no distress.  Alert and oriented x3, answers questions and cooperates with examination appropriately.   Voice and Breathing - The patient was breathing comfortably without the use of accessory muscles. There was no wheezing, stridor, or stertor.  The patients voice was clear and strong.  Head and Face - Normocephalic and atraumatic.    Eyes - Extraocular movements intact. Sclera were not icteric or injected, conjunctiva were pink and moist.  Neurologic - Cranial nerves II-XII are grossly intact. Specifically, the facial nerve is intact, House-Brackmann grade 1 of 6.   Mouth - Examination of the oral cavity showed pink, healthy oral mucosa. No lesions or ulcerations noted.  The tongue was mobile and protrudes midline.  Oropharynx - The walls of the oropharynx were smooth, symmetric, and had no lesions or ulcerations. The uvula was midline and the palate raised symmetrically.   Neck - Palpation of the left parotid gland reveals a 2 cm mass in the inferior and posterior aspect of the gland just inferior to the ear lobule. It is mobile, but firm. No overlying skin changes. No tenderness. The occipital, submental, submandibular, internal jugular chain, and supraclavicular nodes did not demonstrate any abnormal lymph nodes or masses. Palpation of the thyroid was soft and smooth, with no nodules or goiter appreciated.  The trachea was midline.  Skin - close examination of the scalp and skin on the side of the parotid mass reveals no suspicious lesions or skin " cancers. No induration on palpation. No excess warmth.  Ears - The auricles appeared normal. The external auditory canals were nonedematous and nonerythematous. The tympanic membranes are normal in appearance, bony landmarks are intact.  No retraction, perforation, or masses.  No fluid or purulence was seen in the external canal or the middle ear.         A/P - Rosita King is a 68 year old female with a left parotid mass. 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     Ultimately, this needs to be removed for definitive diagnosis and to prevent growth and complications such as malignant transformation.     We discussed surgery, the risks, benefits, and alternatives including, but not limited to: infection, bleeding, temporary or permanent facial numbness, weakness, incisional scar, facial depression or concavity of surgical bed. Because this is likely benign she prefers to wait and have it done with Dr. Salazar in [place] as it is much closer to where they live.          Julian Mae MD  Otolaryngology  [facility]      Again, thank you for allowing me to participate in the care of your patient.        Sincerely,        Julian Mae MD

## 2019-07-19 NOTE — PATIENT INSTRUCTIONS
General Scheduling Information  To schedule your CT/MRI scan, please contact [facility] at [phone]   [address] [place] [address]    To schedule your Surgery, please contact our Specialty Schedulers at [phone]    ENT Clinic Locations Clinic Hours Telephone Number     [facility]  [address] [place] [address]   Tuesday:       8:00am -- 4:00pm    Wednesday:  8:00am - 4:00pm   To schedule an appointment with   Dr. Mae,   please contact our   Specialty Scheduling Department at:     [phone]       [facility]  [address] [place] [address]   Friday:          8:00am - 4:00pm         Urgent Care Locations Clinic Hours Telephone Numbers     [facility]  [address] [place] [address]     Monday-Friday:     11:00pm - 9:00pm    Saturday-Sunday:  9:00am - 5:00pm   [phone]     [facility]  [address] [place] [address]     Monday-Friday:      5:00pm - 9:00pm     Saturday-Sunday:  9:00am - 5:00pm   [phone]

## 2019-07-23 ENCOUNTER — OFFICE VISIT (OUTPATIENT)
Dept: CARDIOLOGY | Facility: CLINIC | Age: 69
End: 2019-07-23
Attending: PHYSICIAN ASSISTANT
Payer: COMMERCIAL

## 2019-07-23 VITALS
SYSTOLIC BLOOD PRESSURE: 124 MMHG | HEART RATE: 56 BPM | DIASTOLIC BLOOD PRESSURE: 68 MMHG | BODY MASS INDEX: 34.37 KG/M2 | WEIGHT: 194 LBS

## 2019-07-23 DIAGNOSIS — I47.10 PAROXYSMAL SUPRAVENTRICULAR TACHYCARDIA (H): ICD-10-CM

## 2019-07-23 DIAGNOSIS — I10 ESSENTIAL HYPERTENSION: Primary | ICD-10-CM

## 2019-07-23 PROCEDURE — 99213 OFFICE O/P EST LOW 20 MIN: CPT | Performed by: PHYSICIAN ASSISTANT

## 2019-07-23 NOTE — LETTER
"7/23/2019    Addis Del Rio MD  [address]  [id number]    RE: Rosita Caicedoronnierai       Dear Colleague,    I had the pleasure of seeing Rosita King in the [facility].    HPI:   I had the pleasure of seeing Jenna when she came for follow up of SVT and HTN.  This 68 year old has seen Dr. Glynn, Dr. Saarvia and Dr. Kelsey for her history of:     1. Paroxysmal SVT associated with palpitations, SOB and fatigue and first evaluated 2016. EF with normal EF. Zio showed likely AT back in 2016 that continued despite metoprolol. Subsequent ZioPatches with shorter episodes and evidence of pauses.  2. Benign Essential HTN with recent addition and adjustment in hydrochlorothiazide and addition of lisionpril.  3. ASHLEY on CPAP   4. Left Parotid mass noted incidentally on Brain MRI done for hearing loss and \"fogginess.\" FNA done 6/3 (ENT Dr. Busby) showed pleomorphic adenoma, likely benign, but needs surgery for definitive diagnosis.      I saw Rosita 6/2019 at which time we reviewed that her palpitations.  ZioPatch showed showed her symptoms of palpitations correlated with sinus rhythm, sinus rhythm with occasional PACs and occasionally atrial tachycardia.  The \"pounding\" in the afternoon hours correlated with sinus rhythm.  Due to some blocked PACs and slow heart rates at night, Dr. Saravia recommended avoiding increasing AV dania blocking agents or flecainide.  At that visit, I asked her to split up her metoprolol, taking metoprolol XL 25 mg twice daily.  For her blood pressure, I initiated hydrochlorothiazide 12.5 mg.  This was increased to 25 mg daily on 6/18 after blood work was reviewed.    On 7/4, BMP again looked good but blood pressure was still elevated and I started lisinopril 10 mg daily.  I had her continue HCTZ 25 and metoprolol XL 25 twice daily.    She has subsequently continued to be evaluated for a left parotid mass. She will have a full excision down the road but this " "isn't scheduled yet.      Interval History:  Pounding is much better. No dizziness. Did not notice a difference with switching the metoprolol to twice daily and is currently taking it all at once.    Still with occasional lightheadedness b/c \"foggy.\" This hasn't changed dramatically since all of the meds were adjusted. Her eyes are watery and she feels she can't focus some time.    Brings in list of BPs from home since starting the lisinopril 10 mg a few weeks ago. Range 90-150s, with vast majority in 120-140s, typically in 130s. Notes some episodes of \"arms like jello\" and \"shaky\" feelings but BPs and HRs are acceptable at these times. Doesn't check blood sugar. No pattern emerges when I look at these sxs.    Notes that sometimes after she swallows pills with sip of water she feels an upper bilateral chest pain. No regurgitation or \"acid.\" I asked her to take pills with full glass of water and contact me if continued to get that discomfort to consider stress testing.      Checked home BP cuff today and it's accurate.    Diagnostic Testing:    MarinooPatch 5/15-5/29/2019 ordered by her Primary showed predominately SR with average HR 72 bpm (range 45 bpm @ 0740 - 105 bpm @ 1718). 63 episodes SVT, with average  bpm (range  bpm). Longest episode lasted ~13 minutes.  1 episode of 2nd degree HB Type I (per Dr. Saravia, NOT Type II) and a 2.5\" pause on 5/25 @ 2337. Symptoms of palpitations typically correlated with sinus rhythm and occasional ectopy.  Rarely correlated with SVT.    ZioPatch 4/2018 showed SR with 13 runs of PSVT (<17 beats) and symptoms which correlated with SR.     Echocardiogram 6/2016 showed EF 55-60% with grade I diastolic dysfunction. Normal RV function and size. 1+ TR but no other significant valvular abnormalities.      Nuclear Treadmill Stress Test 10/2015 showed breast attenuation artifact but a small area of anterior wall ischemia could not be excluded. Reached 79% of max predicted HR (not " "on BB).     Component      Latest Ref Rng & Units 6/18/2019 7/3/2019 7/18/2019   Sodium      133 - 144 mmol/L 139 138 135   Potassium      3.4 - 5.3 mmol/L 4.0 3.8 3.8   Chloride      94 - 109 mmol/L 103 104 100   Carbon Dioxide      20 - 32 mmol/L 29 29 31   Anion Gap      3 - 14 mmol/L 7 5 4   Glucose      70 - 99 mg/dL 128 (H) 160 (H) 140 (H)   Urea Nitrogen      7 - 30 mg/dL 17 17 18   Creatinine      0.52 - 1.04 mg/dL 0.73 0.74 0.78   GFR Estimate      >60 mL/min/1.73:m2 84 84 77   GFR Estimate If Black      >60 mL/min/1.73:m2 >90 >90 89   Calcium      8.5 - 10.1 mg/dL 9.5 9.5 9.2     Assessment & Plan:    1.  Palpitations with history of SVT (atrial tachycardia)    ZioPatch, as above, showed symptoms typically correlated with sinus rhythm    Hesitant to increase AV dania blocking agents giving her 2.5-second pause while sleeping and episodes of Wenckebach (per Dr. Saravia)    PLAN:    Continue metoprolol.     See me 1 year but call if issues sooner      2.  Benign essential hypertension    SBP much better after adding hydrochlorothiazide 25 and lisinopril 10      PLAN:    Continue current medications    See me 1 year with repeat BMP, call if issues with high or low BPs beforehand    3.  \"Shaky\" feeling    As above, no correlation with low BPs or HRs. No arrhythmias to account for it    Not worse with new medications or change in timing of metoprolol      PLAN:    Recommend seeing Dr. Del Rio in f/u. ?blood sugar?    4.  Need for parotid surgery    No concerns from a CV standpoint. Echo 2016 with normal EF. Stress test 2014 without ischemia and no sxs to suggest changes      PLAN:    Would use IV BB during perioperative period if needed      Arelis Deutsch PA-C, MSPAS      Orders Placed This Encounter   Procedures     Follow-Up with Cardiac Advanced Practice Provider     EKG 12-lead complete w/read - Clinics (to be scheduled)     No orders of the defined types were placed in this encounter.    There are no " discontinued medications.      Encounter Diagnoses   Name Primary?     Paroxysmal supraventricular tachycardia (H)      Essential hypertension Yes       CURRENT MEDICATIONS:  Current Outpatient Medications   Medication Sig Dispense Refill     hydrochlorothiazide (HYDRODIURIL) 25 MG tablet Take 1 tablet (25 mg) by mouth daily 90 tablet 1     lisinopril (PRINIVIL/ZESTRIL) 10 MG tablet Take 1 tablet (10 mg) by mouth daily 30 tablet 1     metoprolol succinate ER (TOPROL-XL) 50 MG 24 hr tablet Take 0.5 tablets (25 mg) by mouth 2 times daily (Patient taking differently: Take 50 mg by mouth daily ) 90 tablet 3     Multiple Vitamins-Minerals (CENTRUM SILVER) per tablet Take 1 tablet by mouth daily       omeprazole (PRILOSEC) 20 MG DR capsule TAKE ONE CAPSULE BY MOUTH ONCE DAILY 90 capsule 3     triamcinolone (KENALOG) 0.1 % external ointment Apply topically 2 times daily 80 g 1     VITAMIN D, CHOLECALCIFEROL, PO Take 500 Units by mouth daily          ALLERGIES     Allergies   Allergen Reactions     Penicillins      Pravastatin      Other reaction(s): Myalgia     Simvastatin      Other reaction(s): Myalgia     Sulfa Drugs        PAST MEDICAL HISTORY:  No past medical history on file.    PAST SURGICAL HISTORY:  Past Surgical History:   Procedure Laterality Date     CARPAL TUNNEL RELEASE RT/LT Bilateral 2006, 2009     COLONOSCOPY N/A 11/25/2015    Procedure: COLONOSCOPY;  Surgeon: Mina Roy MD;  Location: [facility]     ESOPHAGOSCOPY, GASTROSCOPY, DUODENOSCOPY (EGD), COMBINED N/A 11/25/2015    Procedure: COMBINED ESOPHAGOSCOPY, GASTROSCOPY, DUODENOSCOPY (EGD), BIOPSY SINGLE OR MULTIPLE;  Surgeon: Mina Roy MD;  Location: [facility]     TONSILLECTOMY & ADENOIDECTOMY  age 13       FAMILY HISTORY:  Family History   Problem Relation Age of Onset     Liver Disease Mother      Depression/Anxiety Father      Diabetes Father      Diabetes Sister      Liver Disease Sister      Cancer Sister        SOCIAL  HISTORY:  Social History     Socioeconomic History     Marital status:      Spouse name: None     Number of children: None     Years of education: None     Highest education level: None   Occupational History     None   Social Needs     Financial resource strain: None     Food insecurity:     Worry: None     Inability: None     Transportation needs:     Medical: None     Non-medical: None   Tobacco Use     Smoking status: Former Smoker     Smokeless tobacco: Never Used   Substance and Sexual Activity     Alcohol use: Yes     Comment: ocassional     Drug use: No     Sexual activity: Not Currently     Partners: Female   Lifestyle     Physical activity:     Days per week: None     Minutes per session: None     Stress: None   Relationships     Social connections:     Talks on phone: None     Gets together: None     Attends Worship service: None     Active member of club or organization: None     Attends meetings of clubs or organizations: None     Relationship status: None     Intimate partner violence:     Fear of current or ex partner: None     Emotionally abused: None     Physically abused: None     Forced sexual activity: None   Other Topics Concern     Parent/sibling w/ CABG, MI or angioplasty before 65F 55M? Yes     Comment: Father with MI at 55   Social History Narrative     None       Review of Systems:  Skin:  not assessed     Eyes:  Positive for visual blurring;glasses  ENT:  Negative    Respiratory:  Positive for dyspnea on exertion;sleep apnea;CPAP  Cardiovascular:    Positive for;palpitations;fatigue;chest pain;lightheadedness  Gastroenterology: Negative    Genitourinary:  not assessed    Musculoskeletal:  Negative    Neurologic:  Positive for memory problems  Psychiatric:  Positive for anxiety;depression  Heme/Lymph/Imm:  Negative    Endocrine:  Negative      Physical Exam:  Vitals: /68   Pulse 56   Wt 88 kg (194 lb)   BMI 34.37 kg/m       Constitutional:  cooperative, alert and oriented,  well developed, well nourished, in no acute distress        Skin:  warm and dry to the touch, no apparent skin lesions or masses noted        Head:  normocephalic, no masses or lesions        Eyes:  pupils equal and round;conjunctivae and lids unremarkable;sclera white   watery eyes    ENT:  no pallor or cyanosis, dentition good        Neck:  not assessed this visit        Chest:  normal breath sounds, clear to auscultation, normal A-P diameter, normal symmetry, normal respiratory excursion, no use of accessory muscles        Cardiac: regular rhythm, normal S1/S2, no S3 or S4, apical impulse not displaced, no murmurs, gallops or rubs     no presence of murmur            Abdomen:  abdomen soft obese      Vascular:   pulses below the femoral arteries are diminished                                    Extremities and Back:  no deformities, clubbing, cyanosis, erythema observed;no edema        Neurological:  no gross motor deficits          Recent Lab Results:  LIPID RESULTS:  Lab Results   Component Value Date    CHOL 271 (H) 04/23/2019    HDL 58 04/23/2019     (H) 04/23/2019    TRIG 155 (H) 04/23/2019    CHOLHDLRATIO 3.8 10/13/2015       LIVER ENZYME RESULTS:  Lab Results   Component Value Date    AST 19 04/23/2019    ALT 36 04/23/2019       CBC RESULTS:  Lab Results   Component Value Date    WBC 5.9 04/23/2019    RBC 4.27 04/23/2019    HGB 13.1 04/23/2019    HCT 38.9 04/23/2019    MCV 91 04/23/2019    MCH 30.7 04/23/2019    MCHC 33.7 04/23/2019    RDW 13.5 04/23/2019     04/23/2019         Thank you for allowing me to participate in the care of your patient.    Sincerely,     Cassie Deutsch PA-C     [facility]

## 2019-07-23 NOTE — LETTER
"7/23/2019    Addis Del Rio MD  [address]  [id number]    RE: Rosita Caicedoronnierai       Dear Colleague,    I had the pleasure of seeing Rosita King in the [facility].    HPI:   I had the pleasure of seeing Jenna when she came for follow up of SVT and HTN.  This 68 year old has seen Dr. Glynn, Dr. Saravia and Dr. Kelsey for her history of:     1. Paroxysmal SVT associated with palpitations, SOB and fatigue and first evaluated 2016. EF with normal EF. Zio showed likely AT back in 2016 that continued despite metoprolol. Subsequent ZioPatches with shorter episodes and evidence of pauses.  2. Benign Essential HTN with recent addition and adjustment in hydrochlorothiazide and addition of lisionpril.  3. ASHLEY on CPAP   4. Left Parotid mass noted incidentally on Brain MRI done for hearing loss and \"fogginess.\" FNA done 6/3 (ENT Dr. Busby) showed pleomorphic adenoma, likely benign, but needs surgery for definitive diagnosis.      I saw Rosita 6/2019 at which time we reviewed that her palpitations.  ZioPatch showed showed her symptoms of palpitations correlated with sinus rhythm, sinus rhythm with occasional PACs and occasionally atrial tachycardia.  The \"pounding\" in the afternoon hours correlated with sinus rhythm.  Due to some blocked PACs and slow heart rates at night, Dr. Saravia recommended avoiding increasing AV dania blocking agents or flecainide.  At that visit, I asked her to split up her metoprolol, taking metoprolol XL 25 mg twice daily.  For her blood pressure, I initiated hydrochlorothiazide 12.5 mg.  This was increased to 25 mg daily on 6/18 after blood work was reviewed.    On 7/4, BMP again looked good but blood pressure was still elevated and I started lisinopril 10 mg daily.  I had her continue HCTZ 25 and metoprolol XL 25 twice daily.    She has subsequently continued to be evaluated for a left parotid mass. She will have a full excision down the road but this " "isn't scheduled yet.      Interval History:  Pounding is much better. No dizziness. Did not notice a difference with switching the metoprolol to twice daily and is currently taking it all at once.    Still with occasional lightheadedness b/c \"foggy.\" This hasn't changed dramatically since all of the meds were adjusted. Her eyes are watery and she feels she can't focus some time.    Brings in list of BPs from home since starting the lisinopril 10 mg a few weeks ago. Range 90-150s, with vast majority in 120-140s, typically in 130s. Notes some episodes of \"arms like jello\" and \"shaky\" feelings but BPs and HRs are acceptable at these times. Doesn't check blood sugar. No pattern emerges when I look at these sxs.    Notes that sometimes after she swallows pills with sip of water she feels an upper bilateral chest pain. No regurgitation or \"acid.\" I asked her to take pills with full glass of water and contact me if continued to get that discomfort to consider stress testing.      Checked home BP cuff today and it's accurate.    Diagnostic Testing:    MarinooPatch 5/15-5/29/2019 ordered by her Primary showed predominately SR with average HR 72 bpm (range 45 bpm @ 0740 - 105 bpm @ 1718). 63 episodes SVT, with average  bpm (range  bpm). Longest episode lasted ~13 minutes.  1 episode of 2nd degree HB Type I (per Dr. Saravia, NOT Type II) and a 2.5\" pause on 5/25 @ 2337. Symptoms of palpitations typically correlated with sinus rhythm and occasional ectopy.  Rarely correlated with SVT.    ZioPatch 4/2018 showed SR with 13 runs of PSVT (<17 beats) and symptoms which correlated with SR.     Echocardiogram 6/2016 showed EF 55-60% with grade I diastolic dysfunction. Normal RV function and size. 1+ TR but no other significant valvular abnormalities.      Nuclear Treadmill Stress Test 10/2015 showed breast attenuation artifact but a small area of anterior wall ischemia could not be excluded. Reached 79% of max predicted HR (not " "on BB).     Component      Latest Ref Rng & Units 6/18/2019 7/3/2019 7/18/2019   Sodium      133 - 144 mmol/L 139 138 135   Potassium      3.4 - 5.3 mmol/L 4.0 3.8 3.8   Chloride      94 - 109 mmol/L 103 104 100   Carbon Dioxide      20 - 32 mmol/L 29 29 31   Anion Gap      3 - 14 mmol/L 7 5 4   Glucose      70 - 99 mg/dL 128 (H) 160 (H) 140 (H)   Urea Nitrogen      7 - 30 mg/dL 17 17 18   Creatinine      0.52 - 1.04 mg/dL 0.73 0.74 0.78   GFR Estimate      >60 mL/min/1.73:m2 84 84 77   GFR Estimate If Black      >60 mL/min/1.73:m2 >90 >90 89   Calcium      8.5 - 10.1 mg/dL 9.5 9.5 9.2     Assessment & Plan:    1.  Palpitations with history of SVT (atrial tachycardia)    ZioPatch, as above, showed symptoms typically correlated with sinus rhythm    Hesitant to increase AV dania blocking agents giving her 2.5-second pause while sleeping and episodes of Wenckebach (per Dr. Saravia)    PLAN:    Continue metoprolol.     See me 1 year but call if issues sooner      2.  Benign essential hypertension    SBP much better after adding hydrochlorothiazide 25 and lisinopril 10      PLAN:    Continue current medications    See me 1 year with repeat BMP, call if issues with high or low BPs beforehand    3.  \"Shaky\" feeling    As above, no correlation with low BPs or HRs. No arrhythmias to account for it    Not worse with new medications or change in timing of metoprolol      PLAN:    Recommend seeing Dr. Del Rio in f/u. ?blood sugar?    4.  Need for parotid surgery    No concerns from a CV standpoint. Echo 2016 with normal EF. Stress test 2014 without ischemia and no sxs to suggest changes      PLAN:    Would use IV BB during perioperative period if needed      Arelis Deutsch PA-C, MSPAS      Orders Placed This Encounter   Procedures     Follow-Up with Cardiac Advanced Practice Provider     EKG 12-lead complete w/read - Clinics (to be scheduled)     No orders of the defined types were placed in this encounter.    There are no " discontinued medications.      Encounter Diagnoses   Name Primary?     Paroxysmal supraventricular tachycardia (H)      Essential hypertension Yes       CURRENT MEDICATIONS:  Current Outpatient Medications   Medication Sig Dispense Refill     hydrochlorothiazide (HYDRODIURIL) 25 MG tablet Take 1 tablet (25 mg) by mouth daily 90 tablet 1     lisinopril (PRINIVIL/ZESTRIL) 10 MG tablet Take 1 tablet (10 mg) by mouth daily 30 tablet 1     metoprolol succinate ER (TOPROL-XL) 50 MG 24 hr tablet Take 0.5 tablets (25 mg) by mouth 2 times daily (Patient taking differently: Take 50 mg by mouth daily ) 90 tablet 3     Multiple Vitamins-Minerals (CENTRUM SILVER) per tablet Take 1 tablet by mouth daily       omeprazole (PRILOSEC) 20 MG DR capsule TAKE ONE CAPSULE BY MOUTH ONCE DAILY 90 capsule 3     triamcinolone (KENALOG) 0.1 % external ointment Apply topically 2 times daily 80 g 1     VITAMIN D, CHOLECALCIFEROL, PO Take 500 Units by mouth daily          ALLERGIES     Allergies   Allergen Reactions     Penicillins      Pravastatin      Other reaction(s): Myalgia     Simvastatin      Other reaction(s): Myalgia     Sulfa Drugs        PAST MEDICAL HISTORY:  No past medical history on file.    PAST SURGICAL HISTORY:  Past Surgical History:   Procedure Laterality Date     CARPAL TUNNEL RELEASE RT/LT Bilateral 2006, 2009     COLONOSCOPY N/A 11/25/2015    Procedure: COLONOSCOPY;  Surgeon: Mina Roy MD;  Location: [facility]     ESOPHAGOSCOPY, GASTROSCOPY, DUODENOSCOPY (EGD), COMBINED N/A 11/25/2015    Procedure: COMBINED ESOPHAGOSCOPY, GASTROSCOPY, DUODENOSCOPY (EGD), BIOPSY SINGLE OR MULTIPLE;  Surgeon: Mina Roy MD;  Location: [facility]     TONSILLECTOMY & ADENOIDECTOMY  age 13       FAMILY HISTORY:  Family History   Problem Relation Age of Onset     Liver Disease Mother      Depression/Anxiety Father      Diabetes Father      Diabetes Sister      Liver Disease Sister      Cancer Sister        SOCIAL  HISTORY:  Social History     Socioeconomic History     Marital status:      Spouse name: None     Number of children: None     Years of education: None     Highest education level: None   Occupational History     None   Social Needs     Financial resource strain: None     Food insecurity:     Worry: None     Inability: None     Transportation needs:     Medical: None     Non-medical: None   Tobacco Use     Smoking status: Former Smoker     Smokeless tobacco: Never Used   Substance and Sexual Activity     Alcohol use: Yes     Comment: ocassional     Drug use: No     Sexual activity: Not Currently     Partners: Female   Lifestyle     Physical activity:     Days per week: None     Minutes per session: None     Stress: None   Relationships     Social connections:     Talks on phone: None     Gets together: None     Attends Quaker service: None     Active member of club or organization: None     Attends meetings of clubs or organizations: None     Relationship status: None     Intimate partner violence:     Fear of current or ex partner: None     Emotionally abused: None     Physically abused: None     Forced sexual activity: None   Other Topics Concern     Parent/sibling w/ CABG, MI or angioplasty before 65F 55M? Yes     Comment: Father with MI at 55   Social History Narrative     None       Review of Systems:  Skin:  not assessed     Eyes:  Positive for visual blurring;glasses  ENT:  Negative    Respiratory:  Positive for dyspnea on exertion;sleep apnea;CPAP  Cardiovascular:    Positive for;palpitations;fatigue;chest pain;lightheadedness  Gastroenterology: Negative    Genitourinary:  not assessed    Musculoskeletal:  Negative    Neurologic:  Positive for memory problems  Psychiatric:  Positive for anxiety;depression  Heme/Lymph/Imm:  Negative    Endocrine:  Negative      Physical Exam:  Vitals: /68   Pulse 56   Wt 88 kg (194 lb)   BMI 34.37 kg/m       Constitutional:  cooperative, alert and oriented,  well developed, well nourished, in no acute distress        Skin:  warm and dry to the touch, no apparent skin lesions or masses noted        Head:  normocephalic, no masses or lesions        Eyes:  pupils equal and round;conjunctivae and lids unremarkable;sclera white   watery eyes    ENT:  no pallor or cyanosis, dentition good        Neck:  not assessed this visit        Chest:  normal breath sounds, clear to auscultation, normal A-P diameter, normal symmetry, normal respiratory excursion, no use of accessory muscles        Cardiac: regular rhythm, normal S1/S2, no S3 or S4, apical impulse not displaced, no murmurs, gallops or rubs     no presence of murmur            Abdomen:  abdomen soft obese      Vascular:   pulses below the femoral arteries are diminished                                    Extremities and Back:  no deformities, clubbing, cyanosis, erythema observed;no edema        Neurological:  no gross motor deficits          Recent Lab Results:  LIPID RESULTS:  Lab Results   Component Value Date    CHOL 271 (H) 04/23/2019    HDL 58 04/23/2019     (H) 04/23/2019    TRIG 155 (H) 04/23/2019    CHOLHDLRATIO 3.8 10/13/2015       LIVER ENZYME RESULTS:  Lab Results   Component Value Date    AST 19 04/23/2019    ALT 36 04/23/2019       CBC RESULTS:  Lab Results   Component Value Date    WBC 5.9 04/23/2019    RBC 4.27 04/23/2019    HGB 13.1 04/23/2019    HCT 38.9 04/23/2019    MCV 91 04/23/2019    MCH 30.7 04/23/2019    MCHC 33.7 04/23/2019    RDW 13.5 04/23/2019     04/23/2019                 Thank you for allowing me to participate in the care of your patient.      Sincerely,     Cassie Deutsch PA-C     [facility]    cc:   Cassie Deutsch PA-C  [address]  [place] [address]

## 2019-07-23 NOTE — PROGRESS NOTES
"HPI:   I had the pleasure of seeing Jenna when she came for follow up of SVT and HTN.  This 68 year old has seen Dr. Glynn, Dr. Saravia and Dr. Kelesy for her history of:     1. Paroxysmal SVT associated with palpitations, SOB and fatigue and first evaluated 2016. EF with normal EF. Zio showed likely AT back in 2016 that continued despite metoprolol. Subsequent ZioPatches with shorter episodes and evidence of pauses.  2. Benign Essential HTN with recent addition and adjustment in hydrochlorothiazide and addition of lisionpril.  3. ASHLEY on CPAP   4. Left Parotid mass noted incidentally on Brain MRI done for hearing loss and \"fogginess.\" FNA done 6/3 (ENT Dr. Busby) showed pleomorphic adenoma, likely benign, but needs surgery for definitive diagnosis.      I saw Rosita 6/2019 at which time we reviewed that her palpitations.  ZioPatch showed showed her symptoms of palpitations correlated with sinus rhythm, sinus rhythm with occasional PACs and occasionally atrial tachycardia.  The \"pounding\" in the afternoon hours correlated with sinus rhythm.  Due to some blocked PACs and slow heart rates at night, Dr. Saravia recommended avoiding increasing AV dania blocking agents or flecainide.  At that visit, I asked her to split up her metoprolol, taking metoprolol XL 25 mg twice daily.  For her blood pressure, I initiated hydrochlorothiazide 12.5 mg.  This was increased to 25 mg daily on 6/18 after blood work was reviewed.    On 7/4, BMP again looked good but blood pressure was still elevated and I started lisinopril 10 mg daily.  I had her continue HCTZ 25 and metoprolol XL 25 twice daily.    She has subsequently continued to be evaluated for a left parotid mass. She will have a full excision down the road but this isn't scheduled yet.      Interval History:  Pounding is much better. No dizziness. Did not notice a difference with switching the metoprolol to twice daily and is currently taking it all at once.    Still with occasional " "lightheadedness b/c \"foggy.\" This hasn't changed dramatically since all of the meds were adjusted. Her eyes are watery and she feels she can't focus some time.    Brings in list of BPs from home since starting the lisinopril 10 mg a few weeks ago. Range 90-150s, with vast majority in 120-140s, typically in 130s. Notes some episodes of \"arms like jello\" and \"shaky\" feelings but BPs and HRs are acceptable at these times. Doesn't check blood sugar. No pattern emerges when I look at these sxs.    Notes that sometimes after she swallows pills with sip of water she feels an upper bilateral chest pain. No regurgitation or \"acid.\" I asked her to take pills with full glass of water and contact me if continued to get that discomfort to consider stress testing.      Checked home BP cuff today and it's accurate.    Diagnostic Testing:    ZioPatch 5/15-5/29/2019 ordered by her Primary showed predominately SR with average HR 72 bpm (range 45 bpm @ 0740 - 105 bpm @ 1718). 63 episodes SVT, with average  bpm (range  bpm). Longest episode lasted ~13 minutes.  1 episode of 2nd degree HB Type I (per Dr. Saravia, NOT Type II) and a 2.5\" pause on 5/25 @ 2337. Symptoms of palpitations typically correlated with sinus rhythm and occasional ectopy.  Rarely correlated with SVT.    ZioPatch 4/2018 showed SR with 13 runs of PSVT (<17 beats) and symptoms which correlated with SR.     Echocardiogram 6/2016 showed EF 55-60% with grade I diastolic dysfunction. Normal RV function and size. 1+ TR but no other significant valvular abnormalities.      Nuclear Treadmill Stress Test 10/2015 showed breast attenuation artifact but a small area of anterior wall ischemia could not be excluded. Reached 79% of max predicted HR (not on BB).     Component      Latest Ref Rng & Units 6/18/2019 7/3/2019 7/18/2019   Sodium      133 - 144 mmol/L 139 138 135   Potassium      3.4 - 5.3 mmol/L 4.0 3.8 3.8   Chloride      94 - 109 mmol/L 103 104 100   Carbon " "Dioxide      20 - 32 mmol/L 29 29 31   Anion Gap      3 - 14 mmol/L 7 5 4   Glucose      70 - 99 mg/dL 128 (H) 160 (H) 140 (H)   Urea Nitrogen      7 - 30 mg/dL 17 17 18   Creatinine      0.52 - 1.04 mg/dL 0.73 0.74 0.78   GFR Estimate      >60 mL/min/1.73:m2 84 84 77   GFR Estimate If Black      >60 mL/min/1.73:m2 >90 >90 89   Calcium      8.5 - 10.1 mg/dL 9.5 9.5 9.2     Assessment & Plan:    1.  Palpitations with history of SVT (atrial tachycardia)    ZioPatch, as above, showed symptoms typically correlated with sinus rhythm    Hesitant to increase AV dania blocking agents giving her 2.5-second pause while sleeping and episodes of Wenckebach (per Dr. Saravia)    PLAN:    Continue metoprolol.     See me 1 year but call if issues sooner      2.  Benign essential hypertension    SBP much better after adding hydrochlorothiazide 25 and lisinopril 10      PLAN:    Continue current medications    See me 1 year with repeat BMP, call if issues with high or low BPs beforehand    3.  \"Shaky\" feeling    As above, no correlation with low BPs or HRs. No arrhythmias to account for it    Not worse with new medications or change in timing of metoprolol      PLAN:    Recommend seeing Dr. Del Rio in f/u. ?blood sugar?    4.  Need for parotid surgery    No concerns from a CV standpoint. Echo 2016 with normal EF. Stress test 2014 without ischemia and no sxs to suggest changes      PLAN:    Would use IV BB during perioperative period if needed      Arelis Deutsch PA-C, MSPAS      Orders Placed This Encounter   Procedures     Follow-Up with Cardiac Advanced Practice Provider     EKG 12-lead complete w/read - Clinics (to be scheduled)     No orders of the defined types were placed in this encounter.    There are no discontinued medications.      Encounter Diagnoses   Name Primary?     Paroxysmal supraventricular tachycardia (H)      Essential hypertension Yes       CURRENT MEDICATIONS:  Current Outpatient Medications   Medication Sig " Dispense Refill     hydrochlorothiazide (HYDRODIURIL) 25 MG tablet Take 1 tablet (25 mg) by mouth daily 90 tablet 1     lisinopril (PRINIVIL/ZESTRIL) 10 MG tablet Take 1 tablet (10 mg) by mouth daily 30 tablet 1     metoprolol succinate ER (TOPROL-XL) 50 MG 24 hr tablet Take 0.5 tablets (25 mg) by mouth 2 times daily (Patient taking differently: Take 50 mg by mouth daily ) 90 tablet 3     Multiple Vitamins-Minerals (CENTRUM SILVER) per tablet Take 1 tablet by mouth daily       omeprazole (PRILOSEC) 20 MG DR capsule TAKE ONE CAPSULE BY MOUTH ONCE DAILY 90 capsule 3     triamcinolone (KENALOG) 0.1 % external ointment Apply topically 2 times daily 80 g 1     VITAMIN D, CHOLECALCIFEROL, PO Take 500 Units by mouth daily          ALLERGIES     Allergies   Allergen Reactions     Penicillins      Pravastatin      Other reaction(s): Myalgia     Simvastatin      Other reaction(s): Myalgia     Sulfa Drugs        PAST MEDICAL HISTORY:  No past medical history on file.    PAST SURGICAL HISTORY:  Past Surgical History:   Procedure Laterality Date     CARPAL TUNNEL RELEASE RT/LT Bilateral 2006, 2009     COLONOSCOPY N/A 11/25/2015    Procedure: COLONOSCOPY;  Surgeon: Mina Roy MD;  Location: [facility]     ESOPHAGOSCOPY, GASTROSCOPY, DUODENOSCOPY (EGD), COMBINED N/A 11/25/2015    Procedure: COMBINED ESOPHAGOSCOPY, GASTROSCOPY, DUODENOSCOPY (EGD), BIOPSY SINGLE OR MULTIPLE;  Surgeon: Mina Roy MD;  Location: [facility]     TONSILLECTOMY & ADENOIDECTOMY  age 13       FAMILY HISTORY:  Family History   Problem Relation Age of Onset     Liver Disease Mother      Depression/Anxiety Father      Diabetes Father      Diabetes Sister      Liver Disease Sister      Cancer Sister        SOCIAL HISTORY:  Social History     Socioeconomic History     Marital status:      Spouse name: None     Number of children: None     Years of education: None     Highest education level: None   Occupational History     None   Social  Needs     Financial resource strain: None     Food insecurity:     Worry: None     Inability: None     Transportation needs:     Medical: None     Non-medical: None   Tobacco Use     Smoking status: Former Smoker     Smokeless tobacco: Never Used   Substance and Sexual Activity     Alcohol use: Yes     Comment: ocassional     Drug use: No     Sexual activity: Not Currently     Partners: Female   Lifestyle     Physical activity:     Days per week: None     Minutes per session: None     Stress: None   Relationships     Social connections:     Talks on phone: None     Gets together: None     Attends Pentecostalism service: None     Active member of club or organization: None     Attends meetings of clubs or organizations: None     Relationship status: None     Intimate partner violence:     Fear of current or ex partner: None     Emotionally abused: None     Physically abused: None     Forced sexual activity: None   Other Topics Concern     Parent/sibling w/ CABG, MI or angioplasty before 65F 55M? Yes     Comment: Father with MI at 55   Social History Narrative     None       Review of Systems:  Skin:  not assessed     Eyes:  Positive for visual blurring;glasses  ENT:  Negative    Respiratory:  Positive for dyspnea on exertion;sleep apnea;CPAP  Cardiovascular:    Positive for;palpitations;fatigue;chest pain;lightheadedness  Gastroenterology: Negative    Genitourinary:  not assessed    Musculoskeletal:  Negative    Neurologic:  Positive for memory problems  Psychiatric:  Positive for anxiety;depression  Heme/Lymph/Imm:  Negative    Endocrine:  Negative      Physical Exam:  Vitals: /68   Pulse 56   Wt 88 kg (194 lb)   BMI 34.37 kg/m      Constitutional:  cooperative, alert and oriented, well developed, well nourished, in no acute distress        Skin:  warm and dry to the touch, no apparent skin lesions or masses noted        Head:  normocephalic, no masses or lesions        Eyes:  pupils equal and round;conjunctivae  and lids unremarkable;sclera white   watery eyes    ENT:  no pallor or cyanosis, dentition good        Neck:  not assessed this visit        Chest:  normal breath sounds, clear to auscultation, normal A-P diameter, normal symmetry, normal respiratory excursion, no use of accessory muscles        Cardiac: regular rhythm, normal S1/S2, no S3 or S4, apical impulse not displaced, no murmurs, gallops or rubs     no presence of murmur            Abdomen:  abdomen soft obese      Vascular:   pulses below the femoral arteries are diminished                                    Extremities and Back:  no deformities, clubbing, cyanosis, erythema observed;no edema        Neurological:  no gross motor deficits          Recent Lab Results:  LIPID RESULTS:  Lab Results   Component Value Date    CHOL 271 (H) 04/23/2019    HDL 58 04/23/2019     (H) 04/23/2019    TRIG 155 (H) 04/23/2019    CHOLHDLRATIO 3.8 10/13/2015       LIVER ENZYME RESULTS:  Lab Results   Component Value Date    AST 19 04/23/2019    ALT 36 04/23/2019       CBC RESULTS:  Lab Results   Component Value Date    WBC 5.9 04/23/2019    RBC 4.27 04/23/2019    HGB 13.1 04/23/2019    HCT 38.9 04/23/2019    MCV 91 04/23/2019    MCH 30.7 04/23/2019    MCHC 33.7 04/23/2019    RDW 13.5 04/23/2019     04/23/2019

## 2019-07-23 NOTE — PATIENT INSTRUCTIONS
"At your visit today we reviewed BP on all of the new medications and the \"pounding\" of your heart     Medication Changes:  No changes today to medication.  BPs look much better, from 90-150s, typically 130s.     Recommendations:  - OK to take pills all in AM BUT take with a FULL glass of water. If you are still getting that upper chest discomfort, let me know. [phone]    - See Dr. House re: the \"shaky\" feelings and \"arms like jello\" ... We've not correlated this with high or low BP, HR or any funny rhythms.    - Get eyes checked    Follow-up:    See me for cardiology follow up in 1 year but CALL Cardiology nurses Dominique & Ana Cristina @ [phone] for any issues, questions or concerns in the interim.      To schedule a future appointment, we kindly ask that you call cardiology scheduling at [phone] three months prior to requested visit date.    Important Phone Numbers for [facility]):    Cardiology [phone]  Lab [phone]  [facility]: [phone]  Nurses Dominique & Ana Cristina: [phone]      "

## 2019-07-30 ENCOUNTER — MYC MEDICAL ADVICE (OUTPATIENT)
Dept: CARDIOLOGY | Facility: CLINIC | Age: 69
End: 2019-07-30

## 2019-07-30 DIAGNOSIS — I10 ESSENTIAL HYPERTENSION: ICD-10-CM

## 2019-07-30 RX ORDER — LISINOPRIL 10 MG/1
10 TABLET ORAL DAILY
Qty: 90 TABLET | Refills: 3 | Status: SHIPPED | OUTPATIENT
Start: 2019-07-30 | End: 2020-08-14

## 2019-09-09 ENCOUNTER — APPOINTMENT (OUTPATIENT)
Dept: SLEEP MEDICINE | Facility: CLINIC | Age: 69
End: 2019-09-09
Payer: COMMERCIAL

## 2019-09-09 DIAGNOSIS — G47.33 OSA (OBSTRUCTIVE SLEEP APNEA): Primary | ICD-10-CM

## 2019-10-03 NOTE — PROGRESS NOTES
Chief Complaint   Patient presents with     Consult     parotid mass - had an MRI done     History of Present Illness  Rosita King is a 68 year old female with a left parotid mass. She was seen by Dr. Mae, but wanted surgery in [place].  She underwent an MRI for troubles swallowing and problems with mentation.  She was not having cordelia dysphagia or odynophagia. However, she has to think about swallowing. She does have some left jaw discomfort, intermittent. The patient notes some ear plugging (like congestion), but both sides.  She is having some confusion and mental fog.  She is also having some pressure at the back of her head and down into her neck.  She also reports some visual symptoms and heaviness and pressure in her eyes that improves when she is up her eyelids.  No facial weakness, history of skin cancer or any cancer. The mass does not change with eating. No radiation exposure. The patient did smoke, but quit 12 years ago. My review of the brain MRI from 5/13/19 showed a 2.2 cm left parotid mass. 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 The patient presents today to discuss surgical options.     Past Medical History  Patient Active Problem List   Diagnosis     CARDIOVASCULAR SCREENING; LDL GOAL LESS THAN 130     Vitamin D deficiency     ASHLEY (obstructive sleep apnea)     Generalized anxiety disorder     Hyperlipidemia LDL goal <130     Diverticulosis of large intestine without hemorrhage     H. pylori infection     Essential hypertension     Essential hypertension with goal blood pressure less than 140/90     Gastroesophageal reflux disease without esophagitis     Paroxysmal supraventricular tachycardia (H)     Lichen sclerosus     Current Medications     Current Outpatient Medications:      hydrochlorothiazide (HYDRODIURIL) 25 MG tablet, Take 1 tablet (25 mg) by mouth daily, Disp: 90 tablet, Rfl: 1     lisinopril (PRINIVIL/ZESTRIL) 10 MG tablet, Take 1 tablet (10 mg) by  mouth daily, Disp: 90 tablet, Rfl: 3     metoprolol succinate ER (TOPROL-XL) 50 MG 24 hr tablet, Take 0.5 tablets (25 mg) by mouth 2 times daily (Patient taking differently: Take 50 mg by mouth daily ), Disp: 90 tablet, Rfl: 3     omeprazole (PRILOSEC) 20 MG DR capsule, TAKE ONE CAPSULE BY MOUTH ONCE DAILY, Disp: 90 capsule, Rfl: 3     VITAMIN D, CHOLECALCIFEROL, PO, Take 500 Units by mouth daily , Disp: , Rfl:      Multiple Vitamins-Minerals (CENTRUM SILVER) per tablet, Take 1 tablet by mouth daily, Disp: , Rfl:      triamcinolone (KENALOG) 0.1 % external ointment, Apply topically 2 times daily (Patient not taking: Reported on 10/4/2019), Disp: 80 g, Rfl: 1    Allergies  Allergies   Allergen Reactions     Penicillins      Pravastatin      Other reaction(s): Myalgia     Simvastatin      Other reaction(s): Myalgia     Sulfa Drugs        Social History   Social History     Socioeconomic History     Marital status:      Spouse name: Not on file     Number of children: Not on file     Years of education: Not on file     Highest education level: Not on file   Occupational History     Not on file   Social Needs     Financial resource strain: Not on file     Food insecurity:     Worry: Not on file     Inability: Not on file     Transportation needs:     Medical: Not on file     Non-medical: Not on file   Tobacco Use     Smoking status: Former Smoker     Smokeless tobacco: Never Used   Substance and Sexual Activity     Alcohol use: Yes     Comment: ocassional     Drug use: No     Sexual activity: Not Currently     Partners: Female   Lifestyle     Physical activity:     Days per week: Not on file     Minutes per session: Not on file     Stress: Not on file   Relationships     Social connections:     Talks on phone: Not on file     Gets together: Not on file     Attends Hoahaoism service: Not on file     Active member of club or organization: Not on file     Attends meetings of clubs or organizations: Not on file      "Relationship status: Not on file     Intimate partner violence:     Fear of current or ex partner: Not on file     Emotionally abused: Not on file     Physically abused: Not on file     Forced sexual activity: Not on file   Other Topics Concern     Parent/sibling w/ CABG, MI or angioplasty before 65F 55M? Yes     Comment: Father with MI at 55   Social History Narrative     Not on file       Family History  Family History   Problem Relation Age of Onset     Liver Disease Mother      Depression/Anxiety Father      Diabetes Father      Diabetes Sister      Liver Disease Sister      Cancer Sister        Review of Systems  As per HPI and PMHx, otherwise 10+ comprehensive system review is negative.    Physical Exam  /71 (BP Location: Right arm, Patient Position: Chair, Cuff Size: Adult Large)   Pulse 69   Temp 97.2  F (36.2  C) (Tympanic)   Resp 16   Ht 1.6 m (5' 3\")   Wt 88.4 kg (194 lb 12.8 oz)   BMI 34.51 kg/m    GENERAL: Patient is a pleasant, cooperative 68 year old female in no acute distress.  HEAD: Normocephalic, atraumatic.  Hair and scalp are normal.  EYES: Pupils are equal, round, reactive to light and accommodation.  Extraocular movements are intact.  The sclera nonicteric without injection.  The extraocular structures are normal.  EARS: Normal shape and symmetry.  No tenderness when palpating the mastoid or tragal areas bilaterally.  Otoscopic exam reveals a moderate amount of cerumen bilaterally.  The bilateral tympanic membranes are round, intact without evidence of effusion, good landmarks.  No retraction, granulation, or drainage.  NOSE: Nares are patent.  Nasal mucosa is pink and moist.  Negative anterior rhinoscopy.  ORAL CAVITY: Mucous membranes are moist.  No oral cavity lesions.    NECK: Supple, trachea is midline.  Palpation of the tail of the left parotid gland reveals a roughly 2 cm mass. It is mobile, but firm. No overlying skin changes. No tenderness. Palpation of the occipital, " submental, submandibular, internal jugular chain, and supraclavicular nodes does not demonstrate any abnormal lymph nodes or masses bilaterally.  Palpation of the right parotid and bilateral submandibular areas reveals no masses.  No thyromegaly.    NEUROLOGIC: Cranial nerves II through XII are grossly intact.  Voice is strong.  Patient is House-Brackman I/VI bilaterally.  CARDIOVASCULAR: Extremities are warm and well-perfused.  No significant peripheral edema.  RESPIRATORY: Patient has nonlabored breathing without cough, wheeze, stridor.  PSYCHIATRIC: Patient is alert and oriented.  Mood and affect appear normal.  SKIN: Warm and dry.  No scalp, face, or neck lesions noted.    Assessment and Plan     ICD-10-CM    1. Parotid mass K11.8 NEUROLOGY ADULT REFERRAL     Case Request: LEFT SUPERFICIAL PAROTIDECTOMY WITH FACIAL NERVE MONITORING   2. Pressure in head R51 NEUROLOGY ADULT REFERRAL   3. Eye symptoms H57.9 NEUROLOGY ADULT REFERRAL   4. Concentration deficit R41.840 NEUROLOGY ADULT REFERRAL   5. History of migraine Z86.69 NEUROLOGY ADULT REFERRAL     It was my pleasure seeing Rosita King today in clinic.  The patient has a left parotid mass. 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  Ultimately, this needs to be removed for definitve diagnosis and to prevent growth and complications.     We did have a long discussion about her other symptoms I think are unrelated to her parotid mass including the pressure of the back of her head and into her neck, the heaviness and eye symptoms, the mental fog, the numbness of her ears and head.  She does have a history of migraine headache.  This very much could be related to an atypical migraine syndrome.  I do think a neurology consultation would be reasonable.  I did place a referral.    We discussed the risks, benefits, alternatives, options of left superficial parotidectomy with facial nerve monitoring including, but not limited to: Risk of  bleeding, risk of infection, risk of hematoma, risk of seroma, risk of postoperative pain, incision numbness, ipsilateral ear numbness, appearance of postoperative scar, injury to the facial nerve divisions that may be temporary/permanent, partial or complete, risk to the lingual nerve, hypoglossal nerve, accessory nerve, risk to the carotid sheath structures, potential need for additional procedures, risk of general anesthesia.  Patient voiced understanding and is willing to proceed.  We discussed the postoperative course and convalescence including 2 weeks of limited activity and lifting restriction.     Atif Salazar MD  Department of Otolarygology-Head and Neck Surgery  [facility]

## 2019-10-04 ENCOUNTER — OFFICE VISIT (OUTPATIENT)
Dept: OTOLARYNGOLOGY | Facility: CLINIC | Age: 69
End: 2019-10-04
Payer: COMMERCIAL

## 2019-10-04 VITALS
TEMPERATURE: 97.2 F | RESPIRATION RATE: 16 BRPM | SYSTOLIC BLOOD PRESSURE: 122 MMHG | WEIGHT: 194.8 LBS | HEART RATE: 69 BPM | BODY MASS INDEX: 34.52 KG/M2 | HEIGHT: 63 IN | DIASTOLIC BLOOD PRESSURE: 71 MMHG

## 2019-10-04 DIAGNOSIS — K11.8 PAROTID MASS: Primary | ICD-10-CM

## 2019-10-04 DIAGNOSIS — R51.9 PRESSURE IN HEAD: ICD-10-CM

## 2019-10-04 DIAGNOSIS — R41.840 CONCENTRATION DEFICIT: ICD-10-CM

## 2019-10-04 DIAGNOSIS — Z86.69 HISTORY OF MIGRAINE: ICD-10-CM

## 2019-10-04 DIAGNOSIS — H57.9 EYE SYMPTOMS: ICD-10-CM

## 2019-10-04 PROCEDURE — 99214 OFFICE O/P EST MOD 30 MIN: CPT | Performed by: OTOLARYNGOLOGY

## 2019-10-04 ASSESSMENT — MIFFLIN-ST. JEOR: SCORE: 1382.74

## 2019-10-04 NOTE — NURSING NOTE
"Chief Complaint   Patient presents with     Consult     parotid mass - had an MRI done       Initial /71 (BP Location: Right arm, Patient Position: Chair, Cuff Size: Adult Large)   Pulse 69   Temp 97.2  F (36.2  C) (Tympanic)   Resp 16   Ht 1.6 m (5' 3\")   Wt 88.4 kg (194 lb 12.8 oz)   BMI 34.51 kg/m   Estimated body mass index is 34.51 kg/m  as calculated from the following:    Height as of this encounter: 1.6 m (5' 3\").    Weight as of this encounter: 88.4 kg (194 lb 12.8 oz).  Medications and allergies reviewed.    Anatoly GARCIA CMA (AAMA)    "

## 2019-10-04 NOTE — PATIENT INSTRUCTIONS
Per physician instructions.    If you have questions or concerns on any instructions given to you by your provider today or if you need to schedule an appointment, you can reach us at [phone].    Thank you!

## 2019-10-04 NOTE — LETTER
10/4/2019         RE: Rosita King  [address]  [facility] [address]        Dear Colleague,    Thank you for referring your patient, Rosita King, to the [facility]. Please see a copy of my visit note below.    Chief Complaint   Patient presents with     Consult     parotid mass - had an MRI done     History of Present Illness  Rosita King is a 68 year old female with a left parotid mass. She was seen by Dr. Mae, but wanted surgery in [place].  She underwent an MRI for troubles swallowing and problems with mentation.  She was not having cordelia dysphagia or odynophagia. However, she has to think about swallowing. She does have some left jaw discomfort, intermittent. The patient notes some ear plugging (like congestion), but both sides.  She is having some confusion and mental fog.  She is also having some pressure at the back of her head and down into her neck.  She also reports some visual symptoms and heaviness and pressure in her eyes that improves when she is up her eyelids.  No  facial weakness, history of skin cancer or any cancer. The mass does not change with eating. No radiation exposure. The patient did smoke, but quit 12 years ago.  My review of the brain MRI from 5/13/19 showed a 2.2 cm left parotid mass. 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  The patient presents today to discuss surgical options.     Past Medical History  Patient Active Problem List   Diagnosis     CARDIOVASCULAR SCREENING; LDL GOAL LESS THAN 130     Vitamin D deficiency     ASHLEY (obstructive sleep apnea)     Generalized anxiety disorder     Hyperlipidemia LDL goal <130     Diverticulosis of large intestine without hemorrhage     H. pylori infection     Essential hypertension     Essential hypertension with goal blood pressure less than 140/90     Gastroesophageal reflux disease without esophagitis     Paroxysmal supraventricular tachycardia (H)     Lichen  sclerosus     Current Medications     Current Outpatient Medications:      hydrochlorothiazide (HYDRODIURIL) 25 MG tablet, Take 1 tablet (25 mg) by mouth daily, Disp: 90 tablet, Rfl: 1     lisinopril (PRINIVIL/ZESTRIL) 10 MG tablet, Take 1 tablet (10 mg) by mouth daily, Disp: 90 tablet, Rfl: 3     metoprolol succinate ER (TOPROL-XL) 50 MG 24 hr tablet, Take 0.5 tablets (25 mg) by mouth 2 times daily (Patient taking differently: Take 50 mg by mouth daily ), Disp: 90 tablet, Rfl: 3     omeprazole (PRILOSEC) 20 MG DR capsule, TAKE ONE CAPSULE BY MOUTH ONCE DAILY, Disp: 90 capsule, Rfl: 3     VITAMIN D, CHOLECALCIFEROL, PO, Take 500 Units by mouth daily , Disp: , Rfl:      Multiple Vitamins-Minerals (CENTRUM SILVER) per tablet, Take 1 tablet by mouth daily, Disp: , Rfl:      triamcinolone (KENALOG) 0.1 % external ointment, Apply topically 2 times daily (Patient not taking: Reported on 10/4/2019), Disp: 80 g, Rfl: 1    Allergies  Allergies   Allergen Reactions     Penicillins      Pravastatin      Other reaction(s): Myalgia     Simvastatin      Other reaction(s): Myalgia     Sulfa Drugs        Social History   Social History     Socioeconomic History     Marital status:      Spouse name: Not on file     Number of children: Not on file     Years of education: Not on file     Highest education level: Not on file   Occupational History     Not on file   Social Needs     Financial resource strain: Not on file     Food insecurity:     Worry: Not on file     Inability: Not on file     Transportation needs:     Medical: Not on file     Non-medical: Not on file   Tobacco Use     Smoking status: Former Smoker     Smokeless tobacco: Never Used   Substance and Sexual Activity     Alcohol use: Yes     Comment: ocassional     Drug use: No     Sexual activity: Not Currently     Partners: Female   Lifestyle     Physical activity:     Days per week: Not on file     Minutes per session: Not on file     Stress: Not on file  "  Relationships     Social connections:     Talks on phone: Not on file     Gets together: Not on file     Attends Congregational service: Not on file     Active member of club or organization: Not on file     Attends meetings of clubs or organizations: Not on file     Relationship status: Not on file     Intimate partner violence:     Fear of current or ex partner: Not on file     Emotionally abused: Not on file     Physically abused: Not on file     Forced sexual activity: Not on file   Other Topics Concern     Parent/sibling w/ CABG, MI or angioplasty before 65F 55M? Yes     Comment: Father with MI at 55   Social History Narrative     Not on file       Family History  Family History   Problem Relation Age of Onset     Liver Disease Mother      Depression/Anxiety Father      Diabetes Father      Diabetes Sister      Liver Disease Sister      Cancer Sister        Review of Systems  As per HPI and PMHx, otherwise 10+ comprehensive system review is negative.    Physical Exam  /71 (BP Location: Right arm, Patient Position: Chair, Cuff Size: Adult Large)   Pulse 69   Temp 97.2  F (36.2  C) (Tympanic)   Resp 16   Ht 1.6 m (5' 3\")   Wt 88.4 kg (194 lb 12.8 oz)   BMI 34.51 kg/m     GENERAL: Patient is a pleasant, cooperative 68 year old female in no acute distress.  HEAD: Normocephalic, atraumatic.  Hair and scalp are normal.  EYES: Pupils are equal, round, reactive to light and accommodation.  Extraocular movements are intact.  The sclera nonicteric without injection.  The extraocular structures are normal.  EARS: Normal shape and symmetry.  No tenderness when palpating the mastoid or tragal areas bilaterally.  Otoscopic exam reveals a moderate amount of cerumen bilaterally.  The bilateral tympanic membranes are round, intact without evidence of effusion, good landmarks.  No retraction, granulation, or drainage.  NOSE: Nares are patent.  Nasal mucosa is pink and moist.  Negative anterior rhinoscopy.  ORAL CAVITY: " Mucous membranes are moist.  No oral cavity lesions.    NECK: Supple, trachea is midline.  Palpation of the tail of the left parotid gland reveals a roughly 2 cm mass. It is mobile, but firm. No overlying skin changes. No tenderness. Palpation of the occipital, submental, submandibular, internal jugular chain, and supraclavicular nodes does not demonstrate any abnormal lymph nodes or masses bilaterally.  Palpation of the right parotid and bilateral submandibular areas reveals no masses.  No thyromegaly.    NEUROLOGIC: Cranial nerves II through XII are grossly intact.  Voice is strong.  Patient is House-Brackman I/VI bilaterally.  CARDIOVASCULAR: Extremities are warm and well-perfused.  No significant peripheral edema.  RESPIRATORY: Patient has nonlabored breathing without cough, wheeze, stridor.  PSYCHIATRIC: Patient is alert and oriented.  Mood and affect appear normal.  SKIN: Warm and dry.  No scalp, face, or neck lesions noted.    Assessment and Plan     ICD-10-CM    1. Parotid mass K11.8 NEUROLOGY ADULT REFERRAL     Case Request: LEFT SUPERFICIAL PAROTIDECTOMY WITH FACIAL NERVE MONITORING   2. Pressure in head R51 NEUROLOGY ADULT REFERRAL   3. Eye symptoms H57.9 NEUROLOGY ADULT REFERRAL   4. Concentration deficit R41.840 NEUROLOGY ADULT REFERRAL   5. History of migraine Z86.69 NEUROLOGY ADULT REFERRAL     It was my pleasure seeing Rosita King today in clinic.  The patient has a left parotid mass. 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  Ultimately, this needs to be removed for definitve diagnosis and to prevent growth and complications.     We did have a long discussion about her other symptoms I think are unrelated to her parotid mass including the pressure of the back of her head and into her neck, the heaviness and eye symptoms, the mental fog, the numbness of her ears and head.  She does have a history of migraine headache.  This very much could be related to an atypical  migraine syndrome.  I do think a neurology consultation would be reasonable.  I did place a referral.    We discussed the risks, benefits, alternatives, options of left superficial parotidectomy with facial nerve monitoring including, but not limited to: Risk of bleeding, risk of infection, risk of hematoma, risk of seroma, risk of postoperative pain, incision numbness, ipsilateral ear numbness, appearance of postoperative scar, injury to the facial nerve divisions that may be temporary/permanent, partial or complete, risk to the lingual nerve, hypoglossal nerve, accessory nerve, risk to the carotid sheath structures, potential need for additional procedures, risk of general anesthesia.  Patient voiced understanding and is willing to proceed.  We discussed the postoperative course and convalescence including 2 weeks of limited activity and lifting restriction.     Atif Salazar MD  Department of Otolarygology-Head and Neck Surgery  [facility]    Again, thank you for allowing me to participate in the care of your patient.        Sincerely,        Atif Salazar MD

## 2019-10-09 ENCOUNTER — TELEPHONE (OUTPATIENT)
Dept: OTOLARYNGOLOGY | Facility: CLINIC | Age: 69
End: 2019-10-09

## 2019-10-09 NOTE — TELEPHONE ENCOUNTER
Left message for patient to call back and schedule.  Needs to be done on a Thursday and needs [facility] facial nerve monitor.  Julissa Marquez CMA

## 2019-10-09 NOTE — TELEPHONE ENCOUNTER
Patient scheduled for 10-30-19 and she will get a pre-op with her Primary MD.  Forms mailed to patient.  Julissa Marquez CMA

## 2019-10-16 ENCOUNTER — IMMUNIZATION (OUTPATIENT)
Dept: FAMILY MEDICINE | Facility: CLINIC | Age: 69
End: 2019-10-16
Payer: COMMERCIAL

## 2019-10-16 PROCEDURE — 90662 IIV NO PRSV INCREASED AG IM: CPT

## 2019-10-16 PROCEDURE — G0008 ADMIN INFLUENZA VIRUS VAC: HCPCS

## 2019-10-25 ENCOUNTER — OFFICE VISIT (OUTPATIENT)
Dept: FAMILY MEDICINE | Facility: CLINIC | Age: 69
End: 2019-10-25
Payer: COMMERCIAL

## 2019-10-25 VITALS
DIASTOLIC BLOOD PRESSURE: 72 MMHG | OXYGEN SATURATION: 98 % | HEIGHT: 63 IN | WEIGHT: 193 LBS | SYSTOLIC BLOOD PRESSURE: 120 MMHG | TEMPERATURE: 97.9 F | RESPIRATION RATE: 18 BRPM | HEART RATE: 54 BPM | BODY MASS INDEX: 34.2 KG/M2

## 2019-10-25 DIAGNOSIS — K21.9 GASTROESOPHAGEAL REFLUX DISEASE WITHOUT ESOPHAGITIS: ICD-10-CM

## 2019-10-25 DIAGNOSIS — G47.33 OSA (OBSTRUCTIVE SLEEP APNEA): ICD-10-CM

## 2019-10-25 DIAGNOSIS — Z01.818 PREOP GENERAL PHYSICAL EXAM: Primary | ICD-10-CM

## 2019-10-25 DIAGNOSIS — E78.5 HYPERLIPIDEMIA LDL GOAL <130: ICD-10-CM

## 2019-10-25 DIAGNOSIS — D49.0 PAROTID TUMOR: ICD-10-CM

## 2019-10-25 DIAGNOSIS — I10 ESSENTIAL HYPERTENSION WITH GOAL BLOOD PRESSURE LESS THAN 140/90: ICD-10-CM

## 2019-10-25 PROCEDURE — 99214 OFFICE O/P EST MOD 30 MIN: CPT | Performed by: FAMILY MEDICINE

## 2019-10-25 ASSESSMENT — MIFFLIN-ST. JEOR: SCORE: 1369.57

## 2019-10-25 NOTE — NURSING NOTE
"Chief Complaint   Patient presents with     Pre-Op Exam     /72 (Cuff Size: Adult Regular)   Pulse 54   Temp 97.9  F (36.6  C) (Tympanic)   Resp 18   Ht 1.6 m (5' 3\")   Wt 87.5 kg (193 lb)   SpO2 98%   BMI 34.19 kg/m   Estimated body mass index is 34.19 kg/m  as calculated from the following:    Height as of this encounter: 1.6 m (5' 3\").    Weight as of this encounter: 87.5 kg (193 lb).  Patient presents to the clinic using No DME      Health Maintenance that is potentially due pending provider review:    Health Maintenance Due   Topic Date Due     DEXA  1950     ADVANCE CARE PLANNING  1950     ZOSTER IMMUNIZATION (2 of 3) 07/14/2011     DTAP/TDAP/TD IMMUNIZATION (2 - Td) 06/09/2016                "

## 2019-10-25 NOTE — PROGRESS NOTES
[address] [address]  [phone]  Dept: [phone]    PRE-OP EVALUATION:  Today's date: 10/25/2019    Rosita King (: 1950) presents for pre-operative evaluation assessment as requested by Dr. Salazar.  She requires evaluation and anesthesia risk assessment prior to undergoing surgery/procedure for treatment of LEFT SUPERFICIAL PAROTIDECTOMY WITH FACIAL NERVE MONITORING .    Fax number for surgical facility:   Primary Physician: Addis Alcocer  Type of Anesthesia Anticipated: General    Patient has a Health Care Directive or Living Will:  NO    Preop Questions 10/25/2019   Who is doing your surgery? dr john   What are you having done? lumpectomy   Date of Surgery/Procedure: 10 31 19   Facility or Hospital where procedure/surgery will be performed: [place]   1.  Do you have a history of Heart attack, stroke, stent, coronary bypass surgery, or other heart surgery? No   2.  Do you ever have any pain or discomfort in your chest? No   3.  Do you have a history of  Heart Failure? No   4.   Are you troubled by shortness of breath when:  walking on a level surface, or up a slight hill, or at night? No   5.  Do you currently have a cold, bronchitis or other respiratory infection? No   6.  Do you have a cough, shortness of breath, or wheezing? No   7.  Do you sometimes get pains in the calves of your legs when you walk? some, arthralgia related   8. Do you or anyone in your family have previous history of blood clots? No   9.  Do you or does anyone in your family have a serious bleeding problem such as prolonged bleeding following surgeries or cuts? No   10. Have you ever had problems with anemia or been told to take iron pills? No   11. Have you had any abnormal blood loss such as black, tarry or bloody stools, or abnormal vaginal bleeding? No   12. Have you ever had a blood transfusion? No   13. Have you or any of your relatives ever had problems with  anesthesia? No   14. Do you have sleep apnea, excessive snoring or daytime drowsiness? YES - using CPAP    15. Do you have any prosthetic heart valves? No   16. Do you have prosthetic joints? No   17. Is there any chance that you may be pregnant? No         HPI:     HPI related to upcoming procedure:   69-year-old female presents for a preop physical exam.  Patient is scheduled to have left superficial parotidectomy with facial nerve monitoring.  She requires evaluation and anesthesia risk assessment prior to undergoing surgery/procedure.  Past medical history significant for hypertension, hyperlipidemia, obstructive sleep apnea, gastroesophageal reflux disease and obesity.  Patient denies any fever, chills, cough, chest pain, shortness of breath, bowel/bladder or other relevant systemic symptoms.        MEDICAL HISTORY:     Patient Active Problem List    Diagnosis Date Noted     Parotid mass 10/09/2019     Priority: Medium     Added automatically from request for surgery [id number]       Lichen sclerosus 08/24/2017     Priority: Medium     Paroxysmal supraventricular tachycardia (H) 02/23/2017     Priority: Medium     Gastroesophageal reflux disease without esophagitis 08/22/2016     Priority: Medium     Essential hypertension with goal blood pressure less than 140/90 08/21/2016     Priority: Medium     Diverticulosis of large intestine without hemorrhage 12/24/2015     Priority: Medium     H. pylori infection 12/24/2015     Priority: Medium     Essential hypertension 12/24/2015     Priority: Medium     Vitamin D deficiency 10/12/2015     Priority: Medium     ASHLEY (obstructive sleep apnea) 10/12/2015     Priority: Medium     Severe ASHLEY   - PSG at 7/27/2006 with undocumented weight, AHI 84.7, RDI 88.9, SpO2 remained above 90%, CPAP 7 optimal.       Generalized anxiety disorder 10/12/2015     Priority: Medium     Diagnosis updated by automated process. Provider to review and confirm.       Hyperlipidemia LDL goal <130  10/12/2015     Priority: Medium     CARDIOVASCULAR SCREENING; LDL GOAL LESS THAN 130 02/03/2015     Priority: Medium      No past medical history on file.  Past Surgical History:   Procedure Laterality Date     CARPAL TUNNEL RELEASE RT/LT Bilateral 2006, 2009     COLONOSCOPY N/A 11/25/2015    Procedure: COLONOSCOPY;  Surgeon: Mina Roy MD;  Location: [facility]     ESOPHAGOSCOPY, GASTROSCOPY, DUODENOSCOPY (EGD), COMBINED N/A 11/25/2015    Procedure: COMBINED ESOPHAGOSCOPY, GASTROSCOPY, DUODENOSCOPY (EGD), BIOPSY SINGLE OR MULTIPLE;  Surgeon: Mina Roy MD;  Location: [facility]     TONSILLECTOMY & ADENOIDECTOMY  age 13     Current Outpatient Medications   Medication Sig Dispense Refill     hydrochlorothiazide (HYDRODIURIL) 25 MG tablet Take 1 tablet (25 mg) by mouth daily 90 tablet 1     lisinopril (PRINIVIL/ZESTRIL) 10 MG tablet Take 1 tablet (10 mg) by mouth daily 90 tablet 3     metoprolol succinate ER (TOPROL-XL) 50 MG 24 hr tablet Take 0.5 tablets (25 mg) by mouth 2 times daily (Patient taking differently: Take 50 mg by mouth daily ) 90 tablet 3     Multiple Vitamins-Minerals (CENTRUM SILVER) per tablet Take 1 tablet by mouth daily       omeprazole (PRILOSEC) 20 MG DR capsule TAKE ONE CAPSULE BY MOUTH ONCE DAILY 90 capsule 3     triamcinolone (KENALOG) 0.1 % external ointment Apply topically 2 times daily 80 g 1     VITAMIN D, CHOLECALCIFEROL, PO Take 500 Units by mouth daily        OTC products: None, except as noted above    Allergies   Allergen Reactions     Penicillins      Pravastatin      Other reaction(s): Myalgia     Simvastatin      Other reaction(s): Myalgia     Sulfa Drugs       Latex Allergy: NO    Social History     Tobacco Use     Smoking status: Former Smoker     Smokeless tobacco: Never Used   Substance Use Topics     Alcohol use: Yes     Comment: ocassional     History   Drug Use No       REVIEW OF SYSTEMS:   Constitutional, neuro, ENT, endocrine, pulmonary, cardiac,  "gastrointestinal, genitourinary, musculoskeletal, integument and psychiatric systems are negative, except as otherwise noted.    EXAM:   /72 (Cuff Size: Adult Regular)   Pulse 54   Temp 97.9  F (36.6  C) (Tympanic)   Resp 18   Ht 1.6 m (5' 3\")   Wt 87.5 kg (193 lb)   SpO2 98%   BMI 34.19 kg/m      GENERAL APPEARANCE: alert, active and no distress     EYES: EOMI, PERRL     HENT: ear canals and TM's normal and nose and mouth without ulcers or lesions     NECK: no adenopathy, no asymmetry, masses, or scars and thyroid normal to palpation     RESP: lungs clear to auscultation - no rales, rhonchi or wheezes     CV: regular rates and rhythm, normal S1 S2, no S3 or S4 and no murmur, click or rub     ABDOMEN:  soft, nontender, no HSM or masses and bowel sounds normal     MS: extremities normal- no gross deformities noted, no evidence of inflammation in joints, FROM in all extremities.     SKIN: no suspicious lesions or rashes     NEURO: Normal strength and tone, sensory exam grossly normal, mentation intact and speech normal     PSYCH: mentation appears normal. and affect normal/bright     LYMPHATICS: No cervical adenopathy    DIAGNOSTICS:   No labs or EKG required for low risk surgery (cataract, skin procedure, breast biopsy, etc)    Recent Labs   Lab Test 07/18/19  1005 07/03/19  1108  04/23/19  0942 08/24/17  1051  10/13/15  0945   HGB  --   --   --  13.1  --   --  13.5   PLT  --   --   --  248  --   --  265    138   < > 137  --    < > 140   POTASSIUM 3.8 3.8   < > 4.3  --    < > 4.2   CR 0.78 0.74   < > 0.67  --    < > 0.67   A1C  --   --   --   --  5.5  --   --     < > = values in this interval not displayed.        IMPRESSION:   Reason for surgery/procedure: Parotid tumor, benign/left superficial parotidectomy with facial nerve monitoring  Diagnosis/reason for consult: Preop    The proposed surgical procedure is considered LOW risk.    REVISED CARDIAC RISK INDEX  The patient has the following " serious cardiovascular risks for perioperative complications such as (MI, PE, VFib and 3  AV Block):  No serious cardiac risks  INTERPRETATION: 0 risks: Class I (very low risk - 0.4% complication rate)      ICD-10-CM    1. Preop general physical exam Z01.818    2. Essential hypertension with goal blood pressure less than 140/90 I10    3. Parotid tumor D49.0    4. Gastroesophageal reflux disease without esophagitis K21.9    5. ASHLEY (obstructive sleep apnea) G47.33    6. Hyperlipidemia LDL goal <130 E78.5          RECOMMENDATIONS:     --Consult hospital rounder / IM to assist post-op medical management      APPROVAL GIVEN to proceed with proposed procedure, without further diagnostic evaluation       Signed Electronically by: Kiran Marx MD    Copy of this evaluation report is provided to requesting physician.    [facility] Preop Guidelines    Revised Cardiac Risk Index

## 2019-10-29 ENCOUNTER — ANESTHESIA EVENT (OUTPATIENT)
Dept: SURGERY | Facility: CLINIC | Age: 69
End: 2019-10-29
Payer: MEDICARE

## 2019-10-30 ASSESSMENT — LIFESTYLE VARIABLES: TOBACCO_USE: 1

## 2019-10-30 ASSESSMENT — ENCOUNTER SYMPTOMS: DYSRHYTHMIAS: 1

## 2019-10-31 ENCOUNTER — ANESTHESIA (OUTPATIENT)
Dept: SURGERY | Facility: CLINIC | Age: 69
End: 2019-10-31
Payer: MEDICARE

## 2019-10-31 ENCOUNTER — HOSPITAL ENCOUNTER (OUTPATIENT)
Facility: CLINIC | Age: 69
Setting detail: OBSERVATION
Discharge: HOME | End: 2019-11-01
Attending: OTOLARYNGOLOGY | Admitting: OTOLARYNGOLOGY
Payer: MEDICARE

## 2019-10-31 DIAGNOSIS — K11.8 PAROTID MASS: ICD-10-CM

## 2019-10-31 DIAGNOSIS — Z98.890 HISTORY OF SUPERFICIAL PAROTIDECTOMY: Primary | ICD-10-CM

## 2019-10-31 LAB
ANION GAP SERPL CALCULATED.3IONS-SCNC: 7 MMOL/L (ref 3–14)
BUN SERPL-MCNC: 18 MG/DL (ref 7–30)
CALCIUM SERPL-MCNC: 8.9 MG/DL (ref 8.5–10.1)
CHLORIDE SERPL-SCNC: 105 MMOL/L (ref 94–109)
CO2 SERPL-SCNC: 27 MMOL/L (ref 20–32)
CREAT SERPL-MCNC: 0.72 MG/DL (ref 0.52–1.04)
ERYTHROCYTE [DISTWIDTH] IN BLOOD BY AUTOMATED COUNT: 12.5 % (ref 10–15)
GFR SERPL CREATININE-BSD FRML MDRD: 85 ML/MIN/{1.73_M2}
GLUCOSE SERPL-MCNC: 146 MG/DL (ref 70–99)
HCT VFR BLD AUTO: 35.4 % (ref 35–47)
HGB BLD-MCNC: 11.8 G/DL (ref 11.7–15.7)
MCH RBC QN AUTO: 30.6 PG (ref 26.5–33)
MCHC RBC AUTO-ENTMCNC: 33.3 G/DL (ref 31.5–36.5)
MCV RBC AUTO: 92 FL (ref 78–100)
PLATELET # BLD AUTO: 270 10E9/L (ref 150–450)
POTASSIUM SERPL-SCNC: 4.2 MMOL/L (ref 3.4–5.3)
RBC # BLD AUTO: 3.85 10E12/L (ref 3.8–5.2)
SODIUM SERPL-SCNC: 139 MMOL/L (ref 133–144)
TROPONIN I SERPL-MCNC: <0.015 UG/L (ref 0–0.04)
WBC # BLD AUTO: 7.3 10E9/L (ref 4–11)

## 2019-10-31 PROCEDURE — 84484 ASSAY OF TROPONIN QUANT: CPT | Performed by: PHYSICIAN ASSISTANT

## 2019-10-31 PROCEDURE — 25000128 H RX IP 250 OP 636: Performed by: NURSE ANESTHETIST, CERTIFIED REGISTERED

## 2019-10-31 PROCEDURE — 71000013 ZZH RECOVERY PHASE 1 LEVEL 1 EA ADDTL HR: Performed by: OTOLARYNGOLOGY

## 2019-10-31 PROCEDURE — 25800030 ZZH RX IP 258 OP 636: Performed by: OTOLARYNGOLOGY

## 2019-10-31 PROCEDURE — 99219 ZZC INITIAL OBSERVATION CARE,LEVL II: CPT | Performed by: INTERNAL MEDICINE

## 2019-10-31 PROCEDURE — 71000012 ZZH RECOVERY PHASE 1 LEVEL 1 FIRST HR: Performed by: OTOLARYNGOLOGY

## 2019-10-31 PROCEDURE — 25000125 ZZHC RX 250: Performed by: NURSE ANESTHETIST, CERTIFIED REGISTERED

## 2019-10-31 PROCEDURE — 25000125 ZZHC RX 250: Performed by: OTOLARYNGOLOGY

## 2019-10-31 PROCEDURE — 42415 EXCISE PAROTID GLAND/LESION: CPT | Performed by: OTOLARYNGOLOGY

## 2019-10-31 PROCEDURE — 37000009 ZZH ANESTHESIA TECHNICAL FEE, EACH ADDTL 15 MIN: Performed by: OTOLARYNGOLOGY

## 2019-10-31 PROCEDURE — 37000008 ZZH ANESTHESIA TECHNICAL FEE, 1ST 30 MIN: Performed by: OTOLARYNGOLOGY

## 2019-10-31 PROCEDURE — 36415 COLL VENOUS BLD VENIPUNCTURE: CPT | Performed by: PHYSICIAN ASSISTANT

## 2019-10-31 PROCEDURE — 40000306 ZZH STATISTIC PRE PROC ASSESS II: Performed by: OTOLARYNGOLOGY

## 2019-10-31 PROCEDURE — 99207 ZZC APP CREDIT; MD BILLING SHARED VISIT: CPT | Performed by: PHYSICIAN ASSISTANT

## 2019-10-31 PROCEDURE — 25000132 ZZH RX MED GY IP 250 OP 250 PS 637: Mod: GY | Performed by: OTOLARYNGOLOGY

## 2019-10-31 PROCEDURE — G0378 HOSPITAL OBSERVATION PER HR: HCPCS

## 2019-10-31 PROCEDURE — 80048 BASIC METABOLIC PNL TOTAL CA: CPT | Performed by: PHYSICIAN ASSISTANT

## 2019-10-31 PROCEDURE — 88307 TISSUE EXAM BY PATHOLOGIST: CPT | Performed by: OTOLARYNGOLOGY

## 2019-10-31 PROCEDURE — 36000058 ZZH SURGERY LEVEL 3 EA 15 ADDTL MIN: Performed by: OTOLARYNGOLOGY

## 2019-10-31 PROCEDURE — 95940 IONM IN OPERATNG ROOM 15 MIN: CPT | Performed by: OTOLARYNGOLOGY

## 2019-10-31 PROCEDURE — 93005 ELECTROCARDIOGRAM TRACING: CPT

## 2019-10-31 PROCEDURE — 85027 COMPLETE CBC AUTOMATED: CPT | Performed by: PHYSICIAN ASSISTANT

## 2019-10-31 PROCEDURE — 88307 TISSUE EXAM BY PATHOLOGIST: CPT | Mod: 26 | Performed by: OTOLARYNGOLOGY

## 2019-10-31 PROCEDURE — 36000056 ZZH SURGERY LEVEL 3 1ST 30 MIN: Performed by: OTOLARYNGOLOGY

## 2019-10-31 PROCEDURE — 27210794 ZZH OR GENERAL SUPPLY STERILE: Performed by: OTOLARYNGOLOGY

## 2019-10-31 PROCEDURE — 25800030 ZZH RX IP 258 OP 636: Performed by: NURSE ANESTHETIST, CERTIFIED REGISTERED

## 2019-10-31 PROCEDURE — 25000132 ZZH RX MED GY IP 250 OP 250 PS 637: Mod: GY | Performed by: PHYSICIAN ASSISTANT

## 2019-10-31 PROCEDURE — 25000128 H RX IP 250 OP 636: Performed by: OTOLARYNGOLOGY

## 2019-10-31 RX ORDER — DEXAMETHASONE SODIUM PHOSPHATE 4 MG/ML
INJECTION, SOLUTION INTRA-ARTICULAR; INTRALESIONAL; INTRAMUSCULAR; INTRAVENOUS; SOFT TISSUE PRN
Status: DISCONTINUED | OUTPATIENT
Start: 2019-10-31 | End: 2019-10-31

## 2019-10-31 RX ORDER — OXYCODONE HYDROCHLORIDE 5 MG/1
5 TABLET ORAL
Status: COMPLETED | OUTPATIENT
Start: 2019-10-31 | End: 2019-10-31

## 2019-10-31 RX ORDER — FENTANYL CITRATE 50 UG/ML
25-50 INJECTION, SOLUTION INTRAMUSCULAR; INTRAVENOUS
Status: DISCONTINUED | OUTPATIENT
Start: 2019-10-31 | End: 2019-10-31 | Stop reason: HOSPADM

## 2019-10-31 RX ORDER — FENTANYL CITRATE 50 UG/ML
25 INJECTION, SOLUTION INTRAMUSCULAR; INTRAVENOUS
Status: DISCONTINUED | OUTPATIENT
Start: 2019-10-31 | End: 2019-11-01 | Stop reason: HOSPADM

## 2019-10-31 RX ORDER — HYDROCHLOROTHIAZIDE 25 MG/1
25 TABLET ORAL DAILY
Status: DISCONTINUED | OUTPATIENT
Start: 2019-10-31 | End: 2019-11-01 | Stop reason: HOSPADM

## 2019-10-31 RX ORDER — ONDANSETRON 4 MG/1
4 TABLET, ORALLY DISINTEGRATING ORAL
Status: DISCONTINUED | OUTPATIENT
Start: 2019-10-31 | End: 2019-11-01 | Stop reason: HOSPADM

## 2019-10-31 RX ORDER — EPHEDRINE SULFATE 50 MG/ML
INJECTION, SOLUTION INTRAVENOUS PRN
Status: DISCONTINUED | OUTPATIENT
Start: 2019-10-31 | End: 2019-10-31

## 2019-10-31 RX ORDER — LIDOCAINE HYDROCHLORIDE AND EPINEPHRINE 10; 10 MG/ML; UG/ML
INJECTION, SOLUTION INFILTRATION; PERINEURAL PRN
Status: DISCONTINUED | OUTPATIENT
Start: 2019-10-31 | End: 2019-10-31 | Stop reason: HOSPADM

## 2019-10-31 RX ORDER — LIDOCAINE 40 MG/G
CREAM TOPICAL
Status: DISCONTINUED | OUTPATIENT
Start: 2019-10-31 | End: 2019-11-01 | Stop reason: HOSPADM

## 2019-10-31 RX ORDER — ONDANSETRON 4 MG/1
4 TABLET, ORALLY DISINTEGRATING ORAL EVERY 30 MIN PRN
Status: DISCONTINUED | OUTPATIENT
Start: 2019-10-31 | End: 2019-10-31 | Stop reason: HOSPADM

## 2019-10-31 RX ORDER — SODIUM CHLORIDE, SODIUM LACTATE, POTASSIUM CHLORIDE, CALCIUM CHLORIDE 600; 310; 30; 20 MG/100ML; MG/100ML; MG/100ML; MG/100ML
INJECTION, SOLUTION INTRAVENOUS CONTINUOUS
Status: DISCONTINUED | OUTPATIENT
Start: 2019-10-31 | End: 2019-11-01 | Stop reason: HOSPADM

## 2019-10-31 RX ORDER — KETAMINE HYDROCHLORIDE 10 MG/ML
INJECTION, SOLUTION INTRAMUSCULAR; INTRAVENOUS PRN
Status: DISCONTINUED | OUTPATIENT
Start: 2019-10-31 | End: 2019-10-31

## 2019-10-31 RX ORDER — EPINEPHRINE 1 MG/ML
INJECTION, SOLUTION, CONCENTRATE INTRAVENOUS PRN
Status: DISCONTINUED | OUTPATIENT
Start: 2019-10-31 | End: 2019-10-31 | Stop reason: HOSPADM

## 2019-10-31 RX ORDER — IBUPROFEN 400 MG/1
400 TABLET, FILM COATED ORAL EVERY 6 HOURS PRN
Status: DISCONTINUED | OUTPATIENT
Start: 2019-10-31 | End: 2019-11-01 | Stop reason: HOSPADM

## 2019-10-31 RX ORDER — LISINOPRIL 10 MG/1
10 TABLET ORAL DAILY
Status: DISCONTINUED | OUTPATIENT
Start: 2019-10-31 | End: 2019-11-01 | Stop reason: HOSPADM

## 2019-10-31 RX ORDER — SENNA AND DOCUSATE SODIUM 50; 8.6 MG/1; MG/1
1 TABLET, FILM COATED ORAL AT BEDTIME
Qty: 30 TABLET | Refills: 1 | Status: SHIPPED | OUTPATIENT
Start: 2019-10-31 | End: 2019-11-26

## 2019-10-31 RX ORDER — OXYCODONE HYDROCHLORIDE 5 MG/1
5 TABLET ORAL EVERY 4 HOURS PRN
Qty: 15 TABLET | Refills: 0 | Status: SHIPPED | OUTPATIENT
Start: 2019-10-31 | End: 2019-11-26

## 2019-10-31 RX ORDER — PROPOFOL 10 MG/ML
INJECTION, EMULSION INTRAVENOUS CONTINUOUS PRN
Status: DISCONTINUED | OUTPATIENT
Start: 2019-10-31 | End: 2019-10-31

## 2019-10-31 RX ORDER — LIDOCAINE HYDROCHLORIDE 10 MG/ML
INJECTION, SOLUTION INFILTRATION; PERINEURAL PRN
Status: DISCONTINUED | OUTPATIENT
Start: 2019-10-31 | End: 2019-10-31

## 2019-10-31 RX ORDER — ACETAMINOPHEN 325 MG/1
650 TABLET ORAL
Status: DISCONTINUED | OUTPATIENT
Start: 2019-10-31 | End: 2019-11-01 | Stop reason: HOSPADM

## 2019-10-31 RX ORDER — PROPOFOL 10 MG/ML
INJECTION, EMULSION INTRAVENOUS PRN
Status: DISCONTINUED | OUTPATIENT
Start: 2019-10-31 | End: 2019-10-31

## 2019-10-31 RX ORDER — NITROGLYCERIN 0.4 MG/1
0.4 TABLET SUBLINGUAL EVERY 5 MIN PRN
Status: DISCONTINUED | OUTPATIENT
Start: 2019-10-31 | End: 2019-11-01 | Stop reason: HOSPADM

## 2019-10-31 RX ORDER — ACETAMINOPHEN 325 MG/1
975 TABLET ORAL ONCE
Status: COMPLETED | OUTPATIENT
Start: 2019-10-31 | End: 2019-10-31

## 2019-10-31 RX ORDER — CALCIUM CARBONATE 500 MG/1
500 TABLET, CHEWABLE ORAL 2 TIMES DAILY PRN
Status: DISCONTINUED | OUTPATIENT
Start: 2019-10-31 | End: 2019-11-01 | Stop reason: HOSPADM

## 2019-10-31 RX ORDER — METOPROLOL SUCCINATE 50 MG/1
50 TABLET, EXTENDED RELEASE ORAL DAILY
Status: DISCONTINUED | OUTPATIENT
Start: 2019-10-31 | End: 2019-11-01 | Stop reason: HOSPADM

## 2019-10-31 RX ORDER — ACETAMINOPHEN 500 MG
1000 TABLET ORAL EVERY 4 HOURS PRN
Status: DISCONTINUED | OUTPATIENT
Start: 2019-10-31 | End: 2019-11-01 | Stop reason: HOSPADM

## 2019-10-31 RX ORDER — CLINDAMYCIN PHOSPHATE 900 MG/50ML
900 INJECTION, SOLUTION INTRAVENOUS
Status: DISCONTINUED | OUTPATIENT
Start: 2019-10-31 | End: 2019-11-01 | Stop reason: HOSPADM

## 2019-10-31 RX ORDER — ONDANSETRON 2 MG/ML
4 INJECTION INTRAMUSCULAR; INTRAVENOUS EVERY 30 MIN PRN
Status: DISCONTINUED | OUTPATIENT
Start: 2019-10-31 | End: 2019-10-31 | Stop reason: HOSPADM

## 2019-10-31 RX ORDER — ACETAMINOPHEN 500 MG
1000 TABLET ORAL EVERY 6 HOURS
Qty: 200 TABLET | Refills: 1 | Status: SHIPPED | OUTPATIENT
Start: 2019-10-31 | End: 2019-11-26

## 2019-10-31 RX ORDER — FENTANYL CITRATE 50 UG/ML
INJECTION, SOLUTION INTRAMUSCULAR; INTRAVENOUS PRN
Status: DISCONTINUED | OUTPATIENT
Start: 2019-10-31 | End: 2019-10-31

## 2019-10-31 RX ORDER — NALOXONE HYDROCHLORIDE 0.4 MG/ML
.1-.4 INJECTION, SOLUTION INTRAMUSCULAR; INTRAVENOUS; SUBCUTANEOUS
Status: DISCONTINUED | OUTPATIENT
Start: 2019-10-31 | End: 2019-11-01 | Stop reason: HOSPADM

## 2019-10-31 RX ORDER — ONDANSETRON 2 MG/ML
INJECTION INTRAMUSCULAR; INTRAVENOUS PRN
Status: DISCONTINUED | OUTPATIENT
Start: 2019-10-31 | End: 2019-10-31

## 2019-10-31 RX ORDER — OXYCODONE HYDROCHLORIDE 5 MG/1
5 TABLET ORAL EVERY 4 HOURS PRN
Status: DISCONTINUED | OUTPATIENT
Start: 2019-10-31 | End: 2019-11-01 | Stop reason: HOSPADM

## 2019-10-31 RX ORDER — CLINDAMYCIN PHOSPHATE 900 MG/50ML
900 INJECTION, SOLUTION INTRAVENOUS SEE ADMIN INSTRUCTIONS
Status: DISCONTINUED | OUTPATIENT
Start: 2019-10-31 | End: 2019-11-01 | Stop reason: HOSPADM

## 2019-10-31 RX ADMIN — Medication 100 MG: at 07:41

## 2019-10-31 RX ADMIN — ACETAMINOPHEN 975 MG: 325 TABLET, FILM COATED ORAL at 06:58

## 2019-10-31 RX ADMIN — OXYCODONE HYDROCHLORIDE 5 MG: 5 TABLET ORAL at 23:30

## 2019-10-31 RX ADMIN — MIDAZOLAM 2 MG: 1 INJECTION INTRAMUSCULAR; INTRAVENOUS at 07:35

## 2019-10-31 RX ADMIN — PROPOFOL 60 MG: 10 INJECTION, EMULSION INTRAVENOUS at 09:32

## 2019-10-31 RX ADMIN — DEXAMETHASONE SODIUM PHOSPHATE 4 MG: 4 INJECTION, SOLUTION INTRA-ARTICULAR; INTRALESIONAL; INTRAMUSCULAR; INTRAVENOUS; SOFT TISSUE at 08:00

## 2019-10-31 RX ADMIN — SODIUM CHLORIDE, POTASSIUM CHLORIDE, SODIUM LACTATE AND CALCIUM CHLORIDE 1000 ML: 600; 310; 30; 20 INJECTION, SOLUTION INTRAVENOUS at 07:15

## 2019-10-31 RX ADMIN — LIDOCAINE HYDROCHLORIDE 100 MG: 10 INJECTION, SOLUTION INFILTRATION; PERINEURAL at 07:41

## 2019-10-31 RX ADMIN — DEXMEDETOMIDINE 0.5 MCG/KG/HR: 100 INJECTION, SOLUTION, CONCENTRATE INTRAVENOUS at 07:45

## 2019-10-31 RX ADMIN — CLINDAMYCIN PHOSPHATE 900 MG: 900 INJECTION, SOLUTION INTRAVENOUS at 07:45

## 2019-10-31 RX ADMIN — FENTANYL CITRATE 50 MCG: 50 INJECTION, SOLUTION INTRAMUSCULAR; INTRAVENOUS at 07:38

## 2019-10-31 RX ADMIN — PROPOFOL 50 MG: 10 INJECTION, EMULSION INTRAVENOUS at 08:42

## 2019-10-31 RX ADMIN — EPHEDRINE SULFATE 5 MG: 50 INJECTION, SOLUTION INTRAVENOUS at 08:22

## 2019-10-31 RX ADMIN — METOPROLOL SUCCINATE 50 MG: 50 TABLET, EXTENDED RELEASE ORAL at 12:19

## 2019-10-31 RX ADMIN — OXYCODONE HYDROCHLORIDE 5 MG: 5 TABLET ORAL at 12:19

## 2019-10-31 RX ADMIN — HYDROMORPHONE HYDROCHLORIDE 0.5 MG: 1 INJECTION, SOLUTION INTRAMUSCULAR; INTRAVENOUS; SUBCUTANEOUS at 10:44

## 2019-10-31 RX ADMIN — CALCIUM CARBONATE (ANTACID) CHEW TAB 500 MG 500 MG: 500 CHEW TAB at 12:54

## 2019-10-31 RX ADMIN — KETAMINE HYDROCHLORIDE 30 MG: 10 INJECTION INTRAMUSCULAR; INTRAVENOUS at 07:41

## 2019-10-31 RX ADMIN — PROPOFOL 50 MG: 10 INJECTION, EMULSION INTRAVENOUS at 07:49

## 2019-10-31 RX ADMIN — SODIUM CHLORIDE, POTASSIUM CHLORIDE, SODIUM LACTATE AND CALCIUM CHLORIDE: 600; 310; 30; 20 INJECTION, SOLUTION INTRAVENOUS at 16:24

## 2019-10-31 RX ADMIN — OXYCODONE HYDROCHLORIDE 5 MG: 5 TABLET ORAL at 16:25

## 2019-10-31 RX ADMIN — LIDOCAINE HYDROCHLORIDE 1 ML: 10 INJECTION, SOLUTION EPIDURAL; INFILTRATION; INTRACAUDAL; PERINEURAL at 07:16

## 2019-10-31 RX ADMIN — PROPOFOL 150 MG: 10 INJECTION, EMULSION INTRAVENOUS at 07:41

## 2019-10-31 RX ADMIN — KETAMINE HYDROCHLORIDE 20 MG: 10 INJECTION INTRAMUSCULAR; INTRAVENOUS at 07:49

## 2019-10-31 RX ADMIN — ACETAMINOPHEN 1000 MG: 500 TABLET, FILM COATED ORAL at 16:25

## 2019-10-31 RX ADMIN — FENTANYL CITRATE 50 MCG: 50 INJECTION, SOLUTION INTRAMUSCULAR; INTRAVENOUS at 08:42

## 2019-10-31 RX ADMIN — EPHEDRINE SULFATE 10 MG: 50 INJECTION, SOLUTION INTRAVENOUS at 08:02

## 2019-10-31 RX ADMIN — ONDANSETRON 4 MG: 2 INJECTION INTRAMUSCULAR; INTRAVENOUS at 08:00

## 2019-10-31 RX ADMIN — NITROGLYCERIN 0.4 MG: 0.4 TABLET SUBLINGUAL at 12:58

## 2019-10-31 RX ADMIN — FENTANYL CITRATE 50 MCG: 50 INJECTION, SOLUTION INTRAMUSCULAR; INTRAVENOUS at 07:49

## 2019-10-31 RX ADMIN — PROPOFOL 200 MCG/KG/MIN: 10 INJECTION, EMULSION INTRAVENOUS at 07:45

## 2019-10-31 ASSESSMENT — MIFFLIN-ST. JEOR: SCORE: 1378.64

## 2019-10-31 NOTE — PLAN OF CARE
No further complaints of any chest discomfort.  Oxycodone and Tylenol for left sided neck discomfort with adequate pain relief.  KAILYN drain patent with small amount of bloody drainage noted, ice to area, IV fluids infusing , will monitor.

## 2019-10-31 NOTE — PROGRESS NOTES
Instructed to give Tums and nitroglycerin tablet. Given both. Chest pain relieved. Patient states that it was already easing, after the Tums. Plan is to monitor CP if returns get another EKG, NTG, follow troponins. Will monitor.

## 2019-10-31 NOTE — CONSULTS
[facility]  Consult Note - Hospitalist Service     Date of Admission:  10/31/2019  Consult Requested by: Dr. Atif Salazar  Reason for Consult: post-operative chest discomfort    Assessment & Plan   Rosita King is a 69 year old female admitted on 10/31/2019 following a left parotidectomy. She has history of hypertension, paroxysmal SVT, hyperlipidemia, obstructive sleep apnea and gastroesophageal reflux disease. She reported chest discomfort post-operatively so the hospital medicine service was consulted for further evaluation.    Post-operative Chest Discomfort  Chest discomfort immediately following surgery, some radiation up the neck/throat. Described as a pressure or indigestion. No further associated symptoms. History of similar pain over the past few years, most frequently associated with food or activity. Relieved after burping. Previous stress test in 2015 showed question of small area of ischemia vs attenuation, ultimately diagnosed with h.pylori at that time. Vital signs stable. EKG NSR no acute ST or T wave changes, poor R wave progression, similar to prior. Differential includes gastroesophageal reflux disease though cannot fully exclude atypical anginal equivalent vs. other. Cardiac risk factors include: former smoker, family history (dad in 60s), hyperlipidemia, hypertension. Gradually has been improving since arrival onto the floor and fully resolved after Tums/nitro x 1.  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 Salazar, 20 EBL, no complications. 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  ZioPatch has shown shorter episodes and some pauses.  - continue home metoprolol     Hypertension  Chronic. Blood pressures reviewed, stable post-operatively.   - continue home lisinopril, hydrochlorothiazide, metoprolol    Hyperlipidemia  Noted in history, not currently on medications.    Gastroesophageal reflux disease  Chronic.  - continue home omeprazole    The patient's care was discussed with the Attending Physician, Dr. Manish Mcnally and Patient.    Kelly Galo PA-C  [facility]  ______________________________________________________________________    Chief Complaint   Chest pain    History is obtained from the patient    History of Present Illness   Rosita King is a 69 year old female who presented for left parotidectomy.    She was found to have a L parotid mass with FNA showing pleomorphic adenoma. She underwent an uneventful left superficial parotidectomy with preservation of the left facial nerve with Dr. Atif Salazar.     Upon waking in the PACU she developed some chest discomfort which she apparently did not report until arrival on the med/surg floor. She describes this as a pressure starting in her chest and going up into her neck/throat with a fullness type sensation. She has had this fullness sensation in her neck for months, this is not really changed, along with dizziness. No further radiation of discomfort. She feels like she needs to burp. She denies any shortness of breath, palpitations, diaphoresis, nausea, lightheadedness or other concerning symptoms.     She has had this type of discmofort for years. Typically occurs after eating and is resolved by 7-up. At times it comes surrounding activity, she is unsure if this is consistently the case. Her father had a myocardial infarction in his 60s, no other family history that she knows of. She is a former smoker.     Patient denies fever, chills, myalgias, cough, shortness of breath, abdominal pain, vomiting,  changes in bowel habits, changes in urination, rash or wounds.    Review of Systems   The 10 point Review of Systems is negative other than noted in the HPI or here.     Past Medical History    I have reviewed this patient's medical history and updated it with pertinent information if needed.   Past Medical History:   Diagnosis Date     Essential hypertension with goal blood pressure less than 140/90 8/21/2016     Gastroesophageal reflux disease without esophagitis 8/22/2016     Hyperlipidemia LDL goal <130 10/12/2015     ASHLEY (obstructive sleep apnea) 10/12/2015    Severe ASHLEY  - PSG at 7/27/2006 with undocumented weight, AHI 84.7, RDI 88.9, SpO2 remained above 90%, CPAP 7 optimal.     Paroxysmal supraventricular tachycardia (H) 2/23/2017     Past Surgical History   I have reviewed this patient's surgical history and updated it with pertinent information if needed.  Past Surgical History:   Procedure Laterality Date     CARPAL TUNNEL RELEASE RT/LT Bilateral 2006, 2009     COLONOSCOPY N/A 11/25/2015    Procedure: COLONOSCOPY;  Surgeon: Mina Roy MD;  Location: [facility]     ESOPHAGOSCOPY, GASTROSCOPY, DUODENOSCOPY (EGD), COMBINED N/A 11/25/2015    Procedure: COMBINED ESOPHAGOSCOPY, GASTROSCOPY, DUODENOSCOPY (EGD), BIOPSY SINGLE OR MULTIPLE;  Surgeon: Mina Roy MD;  Location: [facility]     TONSILLECTOMY & ADENOIDECTOMY  age 13     Social History   I have reviewed this patient's social history and updated it with pertinent information if needed.  Social History     Tobacco Use     Smoking status: Former Smoker     Smokeless tobacco: Never Used   Substance Use Topics     Alcohol use: Yes     Comment: ocassional     Drug use: No     Family History   I have reviewed this patient's family history and updated it with pertinent information if needed.   Family History   Problem Relation Age of Onset     Liver Disease Mother      Depression/Anxiety Father      Diabetes Father      Diabetes Sister      Liver  Disease Sister      Cancer Sister      Medications   Current Facility-Administered Medications   Medication     acetaminophen (TYLENOL) tablet 650 mg     clindamycin (CLEOCIN) infusion 900 mg     clindamycin (CLEOCIN) infusion 900 mg     dexmedetomidine (PRECEDEX) 4 mcg/mL in sodium chloride 0.9 % 100 mL infusion     hydrochlorothiazide (HYDRODIURIL) tablet 25 mg     lactated ringers infusion     lidocaine (LMX4) kit     lidocaine 1 % 0.1-1 mL     lisinopril (PRINIVIL/ZESTRIL) tablet 10 mg     metoprolol succinate ER (TOPROL-XL) 24 hr tablet 50 mg     naloxone (NARCAN) injection 0.1-0.4 mg     [START ON 11/1/2019] omeprazole (priLOSEC) CR capsule 20 mg     ondansetron (ZOFRAN-ODT) ODT tab 4 mg     oxyCODONE (ROXICODONE) tablet 5 mg     sodium chloride (PF) 0.9% PF flush 3 mL     sodium chloride (PF) 0.9% PF flush 3 mL     Allergies   Allergies   Allergen Reactions     Penicillins      Pravastatin      Other reaction(s): Myalgia     Simvastatin      Other reaction(s): Myalgia     Sulfa Drugs      Physical Exam   Vital Signs: Temp: 97.7  F (36.5  C) Temp src: Oral BP: 122/57 Pulse: 75 Heart Rate: 69 Resp: 18 SpO2: 95 % O2 Device: None (Room air) Oxygen Delivery: 2 LPM  Weight: 195 lbs 0 oz    Constitutional: sitting up comfortably in bed, appears fatigued, alert, cooperative, no apparent distress, and appears stated age  Eyes: Lids and lashes normal, extra ocular muscles intact, sclera clear, conjunctiva normal  ENT: Normocephalic, atraumatic, left sided parotidectomy incision noted with drain in place, oral pharynx with moist mucous membranes.  Hematologic / Lymphatic: no cervical lymphadenopathy and no supraclavicular lymphadenopathy  Respiratory: No increased work of breathing, good air exchange, clear to auscultation bilaterally, no crackles or wheezing  Cardiovascular: regular rate and rhythm, and no murmur noted. No lower extremity edema.  GI: normal bowel sounds, soft, non-distended,  non-tender  Genitounirinary: deferred  Skin: normal skin color, texture, turgor. Incision appears appropriate given recent surgery.  Musculoskeletal: Normal bulk and tone. Moves all 4 extremities appropriately.  Neurologic: Awake, alert, oriented to name, place and time.    Neuropsychiatric: calm, pleasant, cooperative, appropriate thought process/content    Data   I personally reviewed the EKG tracing showing NSR with no acute ST or T wave changes, similar to prior from 4/2018.  No results found for this or any previous visit (from the past 24 hour(s)).

## 2019-10-31 NOTE — ANESTHESIA POSTPROCEDURE EVALUATION
Patient: Rosita King    Procedure(s):  LEFT SUPERFICIAL PAROTIDECTOMY WITH FACIAL NERVE MONITORING    Diagnosis:Parotid mass [K11.8]  Diagnosis Additional Information: No value filed.    Anesthesia Type:  General    Note:  Anesthesia Post Evaluation    Patient location during evaluation: Floor  Patient participation: Able to fully participate in evaluation  Level of consciousness: awake and alert  Pain management: adequate  Airway patency: patent  Cardiovascular status: acceptable and hemodynamically stable  Respiratory status: acceptable, room air and spontaneous ventilation  Hydration status: acceptable  PONV: none     Anesthetic complications: None          Last vitals:  Vitals:    10/31/19 1324 10/31/19 1347 10/31/19 1605   BP: 114/65  130/75   Pulse:   70   Resp: 18 18 15   Temp:   36.7  C (98  F)   SpO2:  92% 97%         Electronically Signed By: JESUS Morrison CRNA  October 31, 2019  5:58 PM

## 2019-10-31 NOTE — PROGRESS NOTES
"Patient started complaining of chest discomfort that she relates to her \"reflux.\" Rates it at a 4 upper chest radiating to both sides of her neck. Spoke with hospitalists and a consult is not in for this patient. Called Dr. Salazar and updated him on status. He ordered a hospital consult and a stat EKG. Did give patient her metoprolol that she didn't take this am along with an oxycodone for her surgical pain. AMELIA Hahn in seeing patient now. Her vitals have been stable. Had her on room air, but with the onset of CP placed her on 2 lpm. Will monitor.  "

## 2019-10-31 NOTE — PLAN OF CARE
Patient tolerating clear liquid diet. No further complaints of chest discomfort. Trop negative. Tele initiated. Vitals are stable. Placed on 2 lpm per NC to capnography until  can bring her CPA in from the car. Surgical site glued, no drainage, KAILYN has scant amount of red drainage. Not emptied due to amount in KAILYN. Will monitor.

## 2019-10-31 NOTE — ANESTHESIA CARE TRANSFER NOTE
Patient: Rosita GUTIERREZ Charomilena    Procedure(s):  LEFT SUPERFICIAL PAROTIDECTOMY WITH FACIAL NERVE MONITORING    Diagnosis: Parotid mass [K11.8]  Diagnosis Additional Information: No value filed.    Anesthesia Type:   General     Note:  Airway :Face Mask  Patient transferred to:PACU        Vitals: (Last set prior to Anesthesia Care Transfer)    CRNA VITALS  10/31/2019 0928 - 10/31/2019 1002      10/31/2019             Pulse:  68    SpO2:  96 %                Electronically Signed By: Carson Pérez  October 31, 2019  10:02 AM

## 2019-10-31 NOTE — OP NOTE
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     POSTOPERATIVE DIAGNOSES: Same.     PROCEDURE PERFORMED:   1. Left superficial parotidectomy with identification and preservation of the facial nerve  2. Facial nerve monitoring using NIM EMG  3. Superficial Muscular Aproneurotic System (SMAS) flap reconstruction of the parotidectomy defect    SURGEON: Atif Salazar MD     ASSISTANTS: Jose Montoya PA-C     BLOOD LOSS: 20 mL.    COMPLICATIONS: None.     SPECIMENS: Left parotid mass.     ANESTHESIA: General.    GRAFTS, IMPLANTS, DRAINS: Left neck 7 Fr round KAILYN drain.    INDICATIONS: Obtain diagnosis, treatment.    FINDINGS:   1. Left parotid mass in the parotid tail.    OPERATIVE TECHNIQUE: The patient was brought to the operating room and identified by name clinic number.  They were placed supinely on the operating room table and general endotracheal anesthesia was induced by the anesthesia service.  The bed was rotated 90 degrees and the patient was prepped and draped in standard fashion.  The facial nerve monitor was placed and confirmed functional.  A left modified Eduar incision was marked in a pre-existing skin crease and the incision was injected with 1% lidocaine 1:100,000 epinephrine.  After standard surgical pause, a 15 blade was used to make an incision superiorly through skin and subcu tinea tissues down to parotid fascia and inferiorly through skin and subcutaneous tissues through platysma.  The skin flap was raised out to the masseteric fascia.  There was a parotid mass in the parotid tail.  The great auricular nerve was identified and the posterior branch was preserved.      The parotid tail was elevated off the sternocleidomastoid muscle.  The posterior belly of the digastric was identified.  Preauricular dissection was then taken down to the bony cartilaginous junction to the tragal pointer superiorly.  Dissection was taken between these two points was  performed to identify the main trunk of the facial nerve and the pes anserinus.  The superficial parotid and the tumor were then dissected off the facial nerve branches both superiorly and inferiorly, which pedicled the specimen to the midfacial branches.  Extracapsular dissection had to be undertaken given the close proximity of the tumor to the mid facial nerve branches.  The specimen was then dissected off the mid facial branches and sent for pathologic analysis.  The parotid duct was identified and preserved.  Full facial nerve stimulation was seen proximally at 0.2 mA.      Following superficial parotidectomy, the patient did have a fairly obvious parotidectomy defect.  To reconstruct the defect and to improve the patient's aesthetic outcome, a Superficial Muscular Aproneurotic System (SMAS) flap was created to fill the parotidectomy defect.  The anterior and superior SMAS and parotid fascia were grasped with Kocher clamps.  The SMAS was dissected free off of the remaining parotid tissue using sharp dissection and bipolar cautery.  The SMAS flap was then rotated inferiorly into the parotidectomy defect.  The flap was secured with multiple interrupted 4-0 Vicryl sutures.  Placement of the flap allowed for adequate reconstruction of the defect left by the tumor and parotid resection.    The wound was copiously irrigated with normal saline.  Valsalva was performed and hemostasis was assured.  A 7 Fr round perforated KAILYN drain was placed in the wound.  The wound was then closed with deep interrupted 4-0 Monocryl sutures and Dermabond for skin.    This marked the end of the procedure.  The patient was then turned over to anesthesia for recovery where they were awakened, extubated, and transferred to the PACU in excellent condition.  There were no complications.  There was minimal blood loss.  All standard operating room protocol and universal precautions were used throughout the procedure.  Jose Montoya PA-C  assisted for the procedure and was required for patient positioning, prepping, draping, surgical retraction, excellent tissue handling, and closure.       Atif Salazar MD  Department of Otolarygology-Head and Neck Surgery  [facility]

## 2019-10-31 NOTE — PROGRESS NOTES
[facility] TRANSPORT NOTE  Data:   Reason for Transport:  Post-op Parotidectomy    Rosita King was transported from PACU via cart at 1130.  Patient was accompanied by Registered Nurse. Equipment used for transport: Oxygen  Nasal Cannula. Family was aware of reason for transport: yes    Action:  Report: received from al RN    Response:  Patient's condition upon return was stable.    Mary Kay Reynolds RN

## 2019-11-01 VITALS
BODY MASS INDEX: 34.55 KG/M2 | DIASTOLIC BLOOD PRESSURE: 74 MMHG | TEMPERATURE: 98.2 F | SYSTOLIC BLOOD PRESSURE: 145 MMHG | HEIGHT: 63 IN | HEART RATE: 74 BPM | OXYGEN SATURATION: 97 % | RESPIRATION RATE: 18 BRPM | WEIGHT: 195 LBS

## 2019-11-01 LAB — TROPONIN I SERPL-MCNC: <0.015 UG/L (ref 0–0.04)

## 2019-11-01 PROCEDURE — 25000132 ZZH RX MED GY IP 250 OP 250 PS 637: Mod: GY | Performed by: PHYSICIAN ASSISTANT

## 2019-11-01 PROCEDURE — 84484 ASSAY OF TROPONIN QUANT: CPT | Performed by: PHYSICIAN ASSISTANT

## 2019-11-01 PROCEDURE — 36415 COLL VENOUS BLD VENIPUNCTURE: CPT | Performed by: PHYSICIAN ASSISTANT

## 2019-11-01 PROCEDURE — 25000132 ZZH RX MED GY IP 250 OP 250 PS 637: Mod: GY | Performed by: OTOLARYNGOLOGY

## 2019-11-01 PROCEDURE — 99224 ZZC SUBSEQUENT OBSERVATION CARE,LEVEL I: CPT | Performed by: INTERNAL MEDICINE

## 2019-11-01 PROCEDURE — G0378 HOSPITAL OBSERVATION PER HR: HCPCS

## 2019-11-01 PROCEDURE — 25800030 ZZH RX IP 258 OP 636: Performed by: OTOLARYNGOLOGY

## 2019-11-01 PROCEDURE — 99207 ZZC CDG-CODE CATEGORY CHANGED: CPT | Performed by: INTERNAL MEDICINE

## 2019-11-01 RX ADMIN — LISINOPRIL 10 MG: 10 TABLET ORAL at 08:51

## 2019-11-01 RX ADMIN — HYDROCHLOROTHIAZIDE 25 MG: 25 TABLET ORAL at 08:51

## 2019-11-01 RX ADMIN — METOPROLOL SUCCINATE 50 MG: 50 TABLET, EXTENDED RELEASE ORAL at 08:51

## 2019-11-01 RX ADMIN — SODIUM CHLORIDE, POTASSIUM CHLORIDE, SODIUM LACTATE AND CALCIUM CHLORIDE: 600; 310; 30; 20 INJECTION, SOLUTION INTRAVENOUS at 02:36

## 2019-11-01 RX ADMIN — OMEPRAZOLE 20 MG: 20 CAPSULE, DELAYED RELEASE ORAL at 06:05

## 2019-11-01 RX ADMIN — ACETAMINOPHEN 1000 MG: 500 TABLET, FILM COATED ORAL at 08:51

## 2019-11-01 NOTE — PROGRESS NOTES
The patient is postoperative day 1 following left superficial parotidectomy.  Pain is well controlled.  Drain output overnight is 5 mL she is tolerating a diet.  No chest pain.  No difficulty breathing.    On exam, the left modified Eduar incision is clean, dry, intact.  No evidence of hematoma or seroma.  Left neck KAILYN has minimal serous drainage.  The AKILYN drain is removed.    The patient is doing well postop day 1 following left superficial parotidectomy.  She can discharge home today.  We discussed wound care and restrictions.  She will return to clinic on November 20 for recheck.  If there are any questions, feel free to contact me.    Atif Salazar MD  Department of Otolarygology-Head and Neck Surgery  [facility]

## 2019-11-01 NOTE — PROGRESS NOTES
[facility] Medicine Progress Note  Date of Service: 11/01/2019     Assessment & Plan   Rosita King is a 69 year old female admitted on 10/31/2019 following a left parotidectomy. She has history of hypertension, paroxysmal SVT, hyperlipidemia, obstructive sleep apnea and gastroesophageal reflux disease. She reported chest discomfort post-operatively so the hospital medicine service was consulted for further evaluation.    Post-operative Chest Discomfort    Chest discomfort immediately following surgery, some radiation up the neck/throat. Described as a pressure or indigestion. No further associated symptoms. History of similar pain over the past few years, most frequently associated with food or activity. Relieved after burping. Previous stress test in 2015 showed question of small area of ischemia vs attenuation, ultimately diagnosed with h.pylori at that time. Vital signs stable. EKG NSR no acute ST or T wave changes, poor R wave progression, similar to prior.  Cardiac risk factors include: former smoker, family history (dad in 60s), hyperlipidemia, hypertension.    Troponin I < 0.015 x 4. No further episodes. I have low suspicion for ACS. Possibly this is due to her hiatal hernia or esophageal spasm related to GERD. Appears stable for discharge home. Follow up with PCP.     S/p left parotidectomy for parotid adenoma 10/31/19  Underwent with Dr. Salazar, 20 EBL, no complications. 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 ZioPatch has shown shorter episodes and some pauses. Telemetry NSR this admission. Continue home metoprolol     Hypertension  Chronic. Blood pressures reviewed, stable post-operatively.   - continue home lisinopril, hydrochlorothiazide, metoprolol    Hyperlipidemia  Noted in history,  not currently on medications.    Gastroesophageal reflux disease  Chronic.  - continue home omeprazole    Discussion: Appears stable for home from medical standpoint.     Disposition: Anticipate discharge today per Dr. Salazar.     Attestation:  Total time: 10 minutes    Manish Mcnally MD  [facility] Medicine        Interval History   No recurrent chest pressure. Reports medications seemed stuch in her throat briefly this morning though this is not new. Reports that she gets epigastric pain that radiates across upper abdomen after eating at times and is a longstanding problem. No pain currently. No shortness of breath.     Physical Exam   Temp:  [97.5  F (36.4  C)-98.9  F (37.2  C)] 98.2  F (36.8  C)  Pulse:  [67-77] 74  Heart Rate:  [67-77] 67  Resp:  [15-28] 18  BP: (107-145)/(56-75) 145/74  SpO2:  [92 %-99 %] 97 %    Weights:   Vitals:    10/31/19 0634   Weight: 88.5 kg (195 lb)    Body mass index is 34.54 kg/m .    Constitutional: alert and oriented, NAD, nontoxic  CV: Regular, no murmur  Respiratory: CTA bilaterally  GI: Soft, mild epigastric tenderness to deep palpation otherwise non-tender, bowel sounds normoactive   Skin: Warm and dry    Data   Recent Labs   Lab 11/01/19  0002 10/31/19  1821 10/31/19  1309   WBC  --   --  7.3   HGB  --   --  11.8   MCV  --   --  92   PLT  --   --  270   NA  --   --  139   POTASSIUM  --   --  4.2   CHLORIDE  --   --  105   CO2  --   --  27   BUN  --   --  18   CR  --   --  0.72   ANIONGAP  --   --  7   CORINA  --   --  8.9   GLC  --   --  146*   TROPI <0.015 <0.015 <0.015  <0.015       Recent Labs   Lab 10/31/19  1309   *        Unresulted Labs Ordered in the Past 30 Days of this Admission     Date and Time Order Name Status Description    10/31/2019 0917 Surgical pathology exam In process            Imaging: No results found for this or any previous visit (from the past 24 hour(s)).     I reviewed all new labs and imaging results over the last 24 hours. I personally reviewed  no images or EKG's today.    Medications     dexmedetomidine Stopped (10/31/19 0932)     lactated ringers 100 mL/hr at 11/01/19 0236       clindamycin  900 mg Intravenous Pre-Op/Pre-procedure x 1 dose     clindamycin  900 mg Intravenous See Admin Instructions     hydrochlorothiazide  25 mg Oral Daily     lisinopril  10 mg Oral Daily     metoprolol succinate ER  50 mg Oral Daily     omeprazole  20 mg Oral QAM AC     sodium chloride (PF)  3 mL Intracatheter Q8H   Reviewed home meds for discharge     Manish Mcnally MD  [facility] Medicine

## 2019-11-01 NOTE — DISCHARGE SUMMARY
[facility] Discharge Summary    Rosita King MRN# [id number]   Age: 69 year old YOB: 1950     Date of Admission:  10/31/2019  Date of Discharge::  11/1/2019  Admitting Physician:  Atif Salazar MD  Discharge Physician:  Atif Salazar MD     Home clinic: [facility]          Admission Diagnoses:   Parotid mass [K11.8]          Discharge Diagnosis:   Parotid mass [K11.8]          Procedures:   Procedure(s): Left superficial parotidectomy with facial nerve monitoring       No other procedures performed during this admission           Medications Prior to Admission:     Medications Prior to Admission   Medication Sig Dispense Refill Last Dose     hydrochlorothiazide (HYDRODIURIL) 25 MG tablet Take 1 tablet (25 mg) by mouth daily (Patient taking differently: Take 25 mg by mouth every morning ) 90 tablet 1 10/30/2019 at 0800     lisinopril (PRINIVIL/ZESTRIL) 10 MG tablet Take 1 tablet (10 mg) by mouth daily (Patient taking differently: Take 10 mg by mouth every morning ) 90 tablet 3 10/30/2019 at 0800     metoprolol succinate ER (TOPROL-XL) 50 MG 24 hr tablet Take 0.5 tablets (25 mg) by mouth 2 times daily (Patient taking differently: Take 50 mg by mouth every morning ) 90 tablet 3 10/30/2019 at 0800     Multiple Vitamins-Minerals (CENTRUM SILVER) per tablet Take 1 tablet by mouth daily   Past Month at HELD     omeprazole (PRILOSEC) 20 MG DR capsule TAKE ONE CAPSULE BY MOUTH ONCE DAILY (Patient taking differently: Take 20 mg by mouth every morning (before breakfast) 20-30 minutes prior to breakfast) 90 capsule 3 10/30/2019 at 0800     VITAMIN D, CHOLECALCIFEROL, PO Take 1,000 Units by mouth daily    Past Month at HELD     triamcinolone (KENALOG) 0.1 % external ointment Apply topically 2 times daily 80 g 1 Unknown at Unknown time             Discharge Medications:     Current Discharge Medication List      START taking these medications    Details   acetaminophen (TYLENOL) 500 MG  tablet Take 2 tablets (1,000 mg) by mouth every 6 hours for 10 days Every 6 hours for post-op pain.  Qty: 200 tablet, Refills: 1    Associated Diagnoses: Parotid mass; History of superficial parotidectomy      oxyCODONE (ROXICODONE) 5 MG tablet Take 1 tablet (5 mg) by mouth every 4 hours as needed for severe pain Pain not controlled with acetaminophen.  Qty: 15 tablet, Refills: 0    Associated Diagnoses: Parotid mass; History of superficial parotidectomy      SENNA-docusate sodium (SENNA S) 8.6-50 MG tablet Take 1 tablet by mouth At Bedtime Use while taking narcotic pain medications.  Hold for diarrhea.  Qty: 30 tablet, Refills: 1    Associated Diagnoses: Parotid mass; History of superficial parotidectomy         CONTINUE these medications which have NOT CHANGED    Details   hydrochlorothiazide (HYDRODIURIL) 25 MG tablet Take 1 tablet (25 mg) by mouth daily  Qty: 90 tablet, Refills: 1    Comments: Increased dose  Associated Diagnoses: Essential hypertension      lisinopril (PRINIVIL/ZESTRIL) 10 MG tablet Take 1 tablet (10 mg) by mouth daily  Qty: 90 tablet, Refills: 3    Associated Diagnoses: Essential hypertension      metoprolol succinate ER (TOPROL-XL) 50 MG 24 hr tablet Take 0.5 tablets (25 mg) by mouth 2 times daily  Qty: 90 tablet, Refills: 3    Associated Diagnoses: Paroxysmal supraventricular tachycardia (H); Essential hypertension with goal blood pressure less than 140/90      Multiple Vitamins-Minerals (CENTRUM SILVER) per tablet Take 1 tablet by mouth daily      omeprazole (PRILOSEC) 20 MG DR capsule TAKE ONE CAPSULE BY MOUTH ONCE DAILY  Qty: 90 capsule, Refills: 3    Associated Diagnoses: Gastroesophageal reflux disease without esophagitis      VITAMIN D, CHOLECALCIFEROL, PO Take 1,000 Units by mouth daily       triamcinolone (KENALOG) 0.1 % external ointment Apply topically 2 times daily  Qty: 80 g, Refills: 1    Associated Diagnoses: Psoriasis                   Consultations:   Consultation during this  admission received from internal medicine.  No medical intervention was indicated.            Brief History of Illness:   The patient was incidentally found to have a left parotid mass during neurology work-up.  FNA suggested this to be a pleomorphic adenoma.  She presents for surgical excision for diagnostic and therapeutic reasons.       Hospital Course:   On 10/31/2019, the patient went to the operating room for a left superficial parotidectomy.  The patient tolerated the procedure well without any intraoperative complication.  The patient did have chest pain postoperatively.  The EKG did not reveal ischemic change.  Troponins were negative.  The patient otherwise did well without any perioperative events.  Her surgical drain was removed and met criteria for removal.  The patient was tolerating a general diet and her pain was under good control with oral pain medications, the patient was discharged in the hospital.           Discharge Instructions and Follow-Up:   Discharge diet: Advance to a regular diet as tolerated   Discharge activity: No heavy lifting for 2 week(s)   Discharge follow-up: Follow up with me on 11/20/2019   Wound care Do not submerge incision.  Can start using Vaseline on the incision after 1 week to help the glue dissolve           Discharge Disposition:   Discharged to home      Attestation:  I have reviewed today's vital signs, notes, medications, labs and imaging.    Atif Salazar MD

## 2019-11-01 NOTE — PROGRESS NOTES
Pts VSS, denies pain or discomfort at incision site. KAILYN to suction, out put 5cc of bright bloody drainage. I V infusing, SBA to bathroom. Using call light appropriately.  Plan to discharge later today.

## 2019-11-01 NOTE — DISCHARGE INSTRUCTIONS
Discharge Instructions for Neck Surgery    Recovery - Everyone recovers differently from a general anesthetic.  Symptoms such as fatigue, nausea, lightheadedness, and sometimes a low grade fever (up to 101 degrees) are not unusual.  As your body removes the anesthetic drugs from circulation, these symptoms will resolve.  The incision area will be sore after surgery, and a mild amount of swelling and redness is normal.  Use ice packs as needed.  There are no diet restrictions after a minor neck operation, and you can resume your home medications, except blood thinners such as aspirin or coumadin, which should not be taken for 1-5 days after surgery. Clarify the length of time with your surgeon.  Limit your activity to no strenuous activities until I see you for the first follow up visit, and sleep with your head and neck elevated.  I recommend no heavy lifting greater than 15-20 pounds for the first 10-14 days following surgery.  You can shower and let the water run over the incision after surgery. Do not scrub the incision, but pat it dry when you are finished. Also, do not submerge the incision in a tub until it is entirely healed (approximately 4 weeks following surgery).       Medications - You were sent home with narcotic pain medication.  If you can tolerate the discomfort during your recovery by using Tylenol or Ibuprofen (advil), please do so.  However, do not hesitate to use the stronger pain medication if needed.     Complications - Problems related to open neck operations are usually either due to infection or bleeding.  Signs of infection such as redness, increasing pain, swelling, pus at the incision, and fever should be reported as soon as possible.  The most feared complication is acute bleeding creating pressure on the throat or trachea (wind pipe).  If there is a fast growing swelling of the surgical area and difficulty breathing or swallowing (like being choked), this is an emergency.  If it is slow  but persistent, be taken to an emergency room.  If you feel like your breathing is being blocked - Call 911.    Follow up - If you have a drain in place, this can be taken out 24-48 hrs after surgery. We will review the pathology at your first follow-up if available, and I will examine the surgery site to make sure there are no troubles with healing.  Skin numbness around an incision on the neck is common, and usually slowly returns over the course of several weeks, and sometimes a few months.    If there are any questions or issues with the above, or if there are other issues that concern you, always feel free to call the clinic and I am happy to speak with you as soon as feasible.    Atif Salazar MD  Department of Otolarygology-Head and Neck Surgery  [facility]  [phone] or [phone] After hours, [facility] option

## 2019-11-01 NOTE — PLAN OF CARE
[facility] [facility] DISCHARGE NOTE    Patient discharged to home at 10:45 AM via wheel chair. Accompanied by spouse and staff. Discharge instructions reviewed with patient and spouse, opportunity offered to ask questions. Prescriptions sent to patients preferred pharmacy. All belongings sent with patient.    Camryn Gonzalez RN

## 2019-11-04 ENCOUNTER — TELEPHONE (OUTPATIENT)
Dept: FAMILY MEDICINE | Facility: CLINIC | Age: 69
End: 2019-11-04

## 2019-11-04 NOTE — TELEPHONE ENCOUNTER
ED/UC/IP follow up phone call: :viki iglesiasgordy; jaymiejareggie 11/1/19 Parotid Mass    RN please call to follow up.    Number of ED visits in past 12 months = 0

## 2019-11-04 NOTE — TELEPHONE ENCOUNTER
"Hospital/TCU/ED for chronic condition Discharge Protocol    \"Hi, my name is Radha Rapp RN, a registered nurse, and I am calling from [facility].  I am calling to follow up and see how things are going for you after your recent emergency visit/hospital/TCU stay.\"    Tell me how you are doing now that you are home?\" good      Discharge Instructions    \"Let's review your discharge instructions.  What is/are the follow-up recommendations?  Pt. Response: F/u with surgeon in 2 weeks    \"Has an appointment with your primary care provider been scheduled?\"   Yes. (confirm)    \"When you see the provider, I would recommend that you bring your medications with you.\"    Medications    \"Tell me what changed about your medicines when you discharged?\"    Changes to chronic meds?    0-1    \"What questions do you have about your medications?\"    None     New diagnoses of heart failure, COPD, diabetes, or MI?    No              Post Discharge Medication Reconciliation Status: discharge medications reconciled, continue medications without change.    Was MTM referral placed (*Make sure to put transitions as reason for referral)?   No    Call Summary    \"What questions or concerns do you have about your recent visit and your follow-up care?\"     none    \"If you have questions or things don't continue to improve, we encourage you contact us through the main clinic number (give number).  Even if the clinic is not open, triage nurses are available 24/7 to help you.     We would like you to know that our clinic has extended hours (provide information).  We also have urgent care (provide details on closest location and hours/contact info)\"      \"Thank you for your time and take care!\"       Post Discharge Medication Reconciliation Status: discharge medications reconciled, continue medications without change.      "

## 2019-11-05 LAB — COPATH REPORT: NORMAL

## 2019-11-07 ENCOUNTER — TELEPHONE (OUTPATIENT)
Dept: OTOLARYNGOLOGY | Facility: CLINIC | Age: 69
End: 2019-11-07

## 2019-11-07 NOTE — TELEPHONE ENCOUNTER
I spoke with the patient on the phone regarding the results of her final pathology.  The left parotid mass is consistent with a pleomorphic adenoma.  There was a focal extension to the margin, which was near the facial nerve.  I would consider this complete surgical excision.  At this time, I would recommend observation.  The patient is healing well per her report.  I will see her on 11/20/2019 for follow-up.  She knows to contact me with any problems or concerns.    Atif Salazar MD  Department of Otolarygology-Head and Neck Surgery  [facility]

## 2019-11-18 NOTE — PROGRESS NOTES
Chief Complaint   Patient presents with     Post-op Visit     History of Present Illness  Rosita King is a 69 year old female sent today for follow-up.  The patient went to the operating room on 10/31/2019 for a left superficial parotidectomy.  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  The patient returns today for follow-up.    Patient denies any problems with her incision.  No problems with facial movements or eye closure.  No significant pain.  She is somewhat concerned about her incision being a bit raised in the inferior limb.  All of the glue is dissolved.  She is having numbness.  She is otherwise doing well and has no ENT related concerns.    Past Medical History  Patient Active Problem List   Diagnosis     CARDIOVASCULAR SCREENING; LDL GOAL LESS THAN 130     Vitamin D deficiency     ASHLEY (obstructive sleep apnea)     Generalized anxiety disorder     Hyperlipidemia LDL goal <130     Diverticulosis of large intestine without hemorrhage     H. pylori infection     Essential hypertension with goal blood pressure less than 140/90     Gastroesophageal reflux disease without esophagitis     Paroxysmal supraventricular tachycardia (H)     Lichen sclerosus     Parotid mass     Urge incontinence     Other psoriasis     Intertrigo     Hiatal hernia     Carpal tunnel syndrome     Current Medications    Current Outpatient Medications:      hydrochlorothiazide (HYDRODIURIL) 25 MG tablet, Take 1 tablet (25 mg) by mouth daily (Patient taking differently: Take 25 mg by mouth every morning ), Disp: 90 tablet, Rfl: 1     lisinopril (PRINIVIL/ZESTRIL) 10 MG tablet, Take 1 tablet (10 mg) by mouth daily (Patient taking differently: Take 10 mg by mouth every morning ), Disp: 90 tablet, Rfl: 3     metoprolol succinate ER (TOPROL-XL) 50 MG 24 hr tablet, Take 0.5 tablets (25 mg) by mouth 2 times daily (Patient taking differently: Take 50 mg by mouth every morning ), Disp: 90 tablet, Rfl: 3     omeprazole (PRILOSEC) 20 MG   capsule, TAKE ONE CAPSULE BY MOUTH ONCE DAILY (Patient taking differently: Take 20 mg by mouth every morning (before breakfast) 20-30 minutes prior to breakfast), Disp: 90 capsule, Rfl: 3     VITAMIN D, CHOLECALCIFEROL, PO, Take 1,000 Units by mouth daily , Disp: , Rfl:      Multiple Vitamins-Minerals (CENTRUM SILVER) per tablet, Take 1 tablet by mouth daily, Disp: , Rfl:      SENNA-docusate sodium (SENNA S) 8.6-50 MG tablet, Take 1 tablet by mouth At Bedtime Use while taking narcotic pain medications.  Hold for diarrhea. (Patient not taking: Reported on 11/20/2019), Disp: 30 tablet, Rfl: 1     triamcinolone (KENALOG) 0.1 % external ointment, Apply topically 2 times daily (Patient not taking: Reported on 11/20/2019), Disp: 80 g, Rfl: 1    Allergies  Allergies   Allergen Reactions     Penicillins      Pravastatin      Other reaction(s): Myalgia     Simvastatin      Other reaction(s): Myalgia     Sulfa Drugs        Social History  Social History     Socioeconomic History     Marital status:      Spouse name: Not on file     Number of children: Not on file     Years of education: Not on file     Highest education level: Not on file   Occupational History     Not on file   Social Needs     Financial resource strain: Not on file     Food insecurity:     Worry: Not on file     Inability: Not on file     Transportation needs:     Medical: Not on file     Non-medical: Not on file   Tobacco Use     Smoking status: Former Smoker     Smokeless tobacco: Never Used   Substance and Sexual Activity     Alcohol use: Yes     Comment: ocassional     Drug use: No     Sexual activity: Not Currently     Partners: Female   Lifestyle     Physical activity:     Days per week: Not on file     Minutes per session: Not on file     Stress: Not on file   Relationships     Social connections:     Talks on phone: Not on file     Gets together: Not on file     Attends [employer] service: Not on file     Active member of club or organization:  "Not on file     Attends meetings of clubs or organizations: Not on file     Relationship status: Not on file     Intimate partner violence:     Fear of current or ex partner: Not on file     Emotionally abused: Not on file     Physically abused: Not on file     Forced sexual activity: Not on file   Other Topics Concern     Parent/sibling w/ CABG, MI or angioplasty before 65F 55M? Yes     Comment: Father with MI at 55   Social History Narrative     Not on file       Family History  Family History   Problem Relation Age of Onset     Liver Disease Mother      Depression/Anxiety Father      Diabetes Father      Diabetes Sister      Liver Disease Sister      Cancer Sister        Review of Systems  As per HPI and PMHx, otherwise 10 system review including the head and neck, constitutional, eyes, respiratory, GI, skin, neurologic, lymphatic, endocrine, and allergy systems is negative.    Physical Exam  /72   Pulse 57   Temp 97.8  F (36.6  C) (Oral)   Ht 1.6 m (5' 3\")   Wt 88.5 kg (195 lb)   BMI 34.54 kg/m    GENERAL: Patient is a pleasant, cooperative 69 year old female in no acute distress.  HEAD: Normocephalic, atraumatic.  Hair and scalp are normal.  EYES: Pupils are equal, round, reactive to light and accommodation.  Extraocular movements are intact.  The sclera nonicteric without injection.  The extraocular structures are normal.  EARS: Normal shape and symmetry.  No tenderness when palpating the mastoid or tragal areas bilaterally.  NOSE: Nares are patent.  Nasal mucosa is pink and moist.  Negative anterior rhinoscopy.  NECK: Supple, trachea is midline.  There is a left modified Eduar incision is clean, dry, intact.  No evidence of hematoma or seroma.  The remainder of the neck is without masses or lymphadenopathy.    NEUROLOGIC: Cranial nerves II through XII are grossly intact.  Voice is strong.  Patient is House-Brackman I/VI bilaterally.  CARDIOVASCULAR: Extremities are warm and well-perfused.  No " significant peripheral edema.  RESPIRATORY: Patient has nonlabored breathing without cough, wheeze, stridor.  PSYCHIATRIC: Patient is alert and oriented.  Mood and affect appear normal.  SKIN: Warm and dry.  No scalp, face, or neck lesions noted.    Assessment and Plan     ICD-10-CM    1. Parotid mass K11.8    2. History of superficial parotidectomy Z98.890    3. Postoperative state Z98.890       It was my pleasure seeing Rosita GUTIERREZ Charomilena today in clinic.  The patient is healing well after left superficial parotidectomy.  Pathology was benign.  I will have the patient return to see me in 6 months to recheck her incision.  She knows to contact me with problems or concerns.    Atif Salazar MD  Department of Otolarygology-Head and Neck Surgery  [facility]

## 2019-11-20 ENCOUNTER — OFFICE VISIT (OUTPATIENT)
Dept: OTOLARYNGOLOGY | Facility: CLINIC | Age: 69
End: 2019-11-20
Payer: COMMERCIAL

## 2019-11-20 VITALS
SYSTOLIC BLOOD PRESSURE: 132 MMHG | DIASTOLIC BLOOD PRESSURE: 72 MMHG | HEART RATE: 57 BPM | BODY MASS INDEX: 34.55 KG/M2 | HEIGHT: 63 IN | WEIGHT: 195 LBS | TEMPERATURE: 97.8 F

## 2019-11-20 DIAGNOSIS — Z98.890 POSTOPERATIVE STATE: ICD-10-CM

## 2019-11-20 DIAGNOSIS — K11.8 PAROTID MASS: Primary | ICD-10-CM

## 2019-11-20 DIAGNOSIS — Z98.890 HISTORY OF SUPERFICIAL PAROTIDECTOMY: ICD-10-CM

## 2019-11-20 PROCEDURE — 99024 POSTOP FOLLOW-UP VISIT: CPT | Performed by: OTOLARYNGOLOGY

## 2019-11-20 ASSESSMENT — MIFFLIN-ST. JEOR: SCORE: 1378.64

## 2019-11-20 NOTE — NURSING NOTE
"Chief Complaint   Patient presents with     Post-op Visit       Initial /72   Pulse 57   Temp 97.8  F (36.6  C) (Oral)   Ht 1.6 m (5' 3\")   Wt 88.5 kg (195 lb)   BMI 34.54 kg/m   Estimated body mass index is 34.54 kg/m  as calculated from the following:    Height as of this encounter: 1.6 m (5' 3\").    Weight as of this encounter: 88.5 kg (195 lb).  Medications and allergies reviewed.      Liliya GARCIA MA    "

## 2019-11-20 NOTE — LETTER
11/20/2019         RE: Rosita King  [address]  [facility] [address]        Dear Colleague,    Thank you for referring your patient, Rosita King, to the [facility]. Please see a copy of my visit note below.    Chief Complaint   Patient presents with     Post-op Visit     History of Present Illness  Rosita King is a 69 year old female sent today for follow-up.  The patient went to the operating room on 10/31/2019 for a left superficial parotidectomy.  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  The patient returns today for follow-up.    Patient denies any problems with her incision.  No problems with facial movements or eye closure.  No significant pain.  She is somewhat concerned about her incision being a bit raised in the inferior limb.  All of the glue is dissolved.  She is having numbness.  She is otherwise doing well and has no ENT related concerns.    Past Medical History  Patient Active Problem List   Diagnosis     CARDIOVASCULAR SCREENING; LDL GOAL LESS THAN 130     Vitamin D deficiency     ASHLEY (obstructive sleep apnea)     Generalized anxiety disorder     Hyperlipidemia LDL goal <130     Diverticulosis of large intestine without hemorrhage     H. pylori infection     Essential hypertension with goal blood pressure less than 140/90     Gastroesophageal reflux disease without esophagitis     Paroxysmal supraventricular tachycardia (H)     Lichen sclerosus     Parotid mass     Urge incontinence     Other psoriasis     Intertrigo     Hiatal hernia     Carpal tunnel syndrome     Current Medications    Current Outpatient Medications:      hydrochlorothiazide (HYDRODIURIL) 25 MG tablet, Take 1 tablet (25 mg) by mouth daily (Patient taking differently: Take 25 mg by mouth every morning ), Disp: 90 tablet, Rfl: 1     lisinopril (PRINIVIL/ZESTRIL) 10 MG tablet, Take 1 tablet (10 mg) by mouth daily (Patient taking differently: Take 10 mg by mouth every morning ), Disp:  90 tablet, Rfl: 3     metoprolol succinate ER (TOPROL-XL) 50 MG 24 hr tablet, Take 0.5 tablets (25 mg) by mouth 2 times daily (Patient taking differently: Take 50 mg by mouth every morning ), Disp: 90 tablet, Rfl: 3     omeprazole (PRILOSEC) 20 MG DR capsule, TAKE ONE CAPSULE BY MOUTH ONCE DAILY (Patient taking differently: Take 20 mg by mouth every morning (before breakfast) 20-30 minutes prior to breakfast), Disp: 90 capsule, Rfl: 3     VITAMIN D, CHOLECALCIFEROL, PO, Take 1,000 Units by mouth daily , Disp: , Rfl:      Multiple Vitamins-Minerals (CENTRUM SILVER) per tablet, Take 1 tablet by mouth daily, Disp: , Rfl:      SENNA-docusate sodium (SENNA S) 8.6-50 MG tablet, Take 1 tablet by mouth At Bedtime Use while taking narcotic pain medications.  Hold for diarrhea. (Patient not taking: Reported on 11/20/2019), Disp: 30 tablet, Rfl: 1     triamcinolone (KENALOG) 0.1 % external ointment, Apply topically 2 times daily (Patient not taking: Reported on 11/20/2019), Disp: 80 g, Rfl: 1    Allergies  Allergies   Allergen Reactions     Penicillins      Pravastatin      Other reaction(s): Myalgia     Simvastatin      Other reaction(s): Myalgia     Sulfa Drugs        Social History  Social History     Socioeconomic History     Marital status:      Spouse name: Not on file     Number of children: Not on file     Years of education: Not on file     Highest education level: Not on file   Occupational History     Not on file   Social Needs     Financial resource strain: Not on file     Food insecurity:     Worry: Not on file     Inability: Not on file     Transportation needs:     Medical: Not on file     Non-medical: Not on file   Tobacco Use     Smoking status: Former Smoker     Smokeless tobacco: Never Used   Substance and Sexual Activity     Alcohol use: Yes     Comment: ocassional     Drug use: No     Sexual activity: Not Currently     Partners: Female   Lifestyle     Physical activity:     Days per week: Not on  "file     Minutes per session: Not on file     Stress: Not on file   Relationships     Social connections:     Talks on phone: Not on file     Gets together: Not on file     Attends Confucianism service: Not on file     Active member of club or organization: Not on file     Attends meetings of clubs or organizations: Not on file     Relationship status: Not on file     Intimate partner violence:     Fear of current or ex partner: Not on file     Emotionally abused: Not on file     Physically abused: Not on file     Forced sexual activity: Not on file   Other Topics Concern     Parent/sibling w/ CABG, MI or angioplasty before 65F 55M? Yes     Comment: Father with MI at 55   Social History Narrative     Not on file       Family History  Family History   Problem Relation Age of Onset     Liver Disease Mother      Depression/Anxiety Father      Diabetes Father      Diabetes Sister      Liver Disease Sister      Cancer Sister        Review of Systems  As per HPI and PMHx, otherwise 10 system review including the head and neck, constitutional, eyes, respiratory, GI, skin, neurologic, lymphatic, endocrine, and allergy systems is negative.    Physical Exam  /72   Pulse 57   Temp 97.8  F (36.6  C) (Oral)   Ht 1.6 m (5' 3\")   Wt 88.5 kg (195 lb)   BMI 34.54 kg/m     GENERAL: Patient is a pleasant, cooperative 69 year old female in no acute distress.  HEAD: Normocephalic, atraumatic.  Hair and scalp are normal.  EYES: Pupils are equal, round, reactive to light and accommodation.  Extraocular movements are intact.  The sclera nonicteric without injection.  The extraocular structures are normal.  EARS: Normal shape and symmetry.  No tenderness when palpating the mastoid or tragal areas bilaterally.  NOSE: Nares are patent.  Nasal mucosa is pink and moist.  Negative anterior rhinoscopy.  NECK: Supple, trachea is midline.  There is a left modified Eduar incision is clean, dry, intact.  No evidence of hematoma or seroma.  " The remainder of the neck is without masses or lymphadenopathy.    NEUROLOGIC: Cranial nerves II through XII are grossly intact.  Voice is strong.  Patient is House-Brackman I/VI bilaterally.  CARDIOVASCULAR: Extremities are warm and well-perfused.  No significant peripheral edema.  RESPIRATORY: Patient has nonlabored breathing without cough, wheeze, stridor.  PSYCHIATRIC: Patient is alert and oriented.  Mood and affect appear normal.  SKIN: Warm and dry.  No scalp, face, or neck lesions noted.    Assessment and Plan     ICD-10-CM    1. Parotid mass K11.8    2. History of superficial parotidectomy Z98.890    3. Postoperative state Z98.890       It was my pleasure seeing Rosita King today in clinic.  The patient is healing well after left superficial parotidectomy.  Pathology was benign.  I will have the patient return to see me in 6 months to recheck her incision.  She knows to contact me with problems or concerns.    Atif Salazar MD  Department of Otolarygology-Head and Neck Surgery  [facility]        Again, thank you for allowing me to participate in the care of your patient.        Sincerely,        Atif Salazar MD

## 2019-11-26 ENCOUNTER — OFFICE VISIT (OUTPATIENT)
Dept: FAMILY MEDICINE | Facility: CLINIC | Age: 69
End: 2019-11-26
Payer: COMMERCIAL

## 2019-11-26 VITALS
SYSTOLIC BLOOD PRESSURE: 124 MMHG | OXYGEN SATURATION: 97 % | HEART RATE: 75 BPM | TEMPERATURE: 98.3 F | WEIGHT: 190 LBS | HEIGHT: 63 IN | BODY MASS INDEX: 33.66 KG/M2 | RESPIRATION RATE: 20 BRPM | DIASTOLIC BLOOD PRESSURE: 78 MMHG

## 2019-11-26 DIAGNOSIS — D11.0 PLEOMORPHIC ADENOMA OF PAROTID GLAND: ICD-10-CM

## 2019-11-26 DIAGNOSIS — G47.33 OSA (OBSTRUCTIVE SLEEP APNEA): ICD-10-CM

## 2019-11-26 DIAGNOSIS — R73.9 ELEVATED BLOOD SUGAR: ICD-10-CM

## 2019-11-26 DIAGNOSIS — H02.403 PTOSIS OF BOTH EYELIDS: ICD-10-CM

## 2019-11-26 DIAGNOSIS — Z01.818 PREOP GENERAL PHYSICAL EXAM: Primary | ICD-10-CM

## 2019-11-26 DIAGNOSIS — I10 ESSENTIAL HYPERTENSION WITH GOAL BLOOD PRESSURE LESS THAN 140/90: ICD-10-CM

## 2019-11-26 DIAGNOSIS — E78.5 HYPERLIPIDEMIA LDL GOAL <130: ICD-10-CM

## 2019-11-26 LAB
ALBUMIN SERPL-MCNC: 3.9 G/DL (ref 3.4–5)
ALP SERPL-CCNC: 61 U/L (ref 40–150)
ALT SERPL W P-5'-P-CCNC: 35 U/L (ref 0–50)
ANION GAP SERPL CALCULATED.3IONS-SCNC: 3 MMOL/L (ref 3–14)
AST SERPL W P-5'-P-CCNC: 19 U/L (ref 0–45)
BILIRUB SERPL-MCNC: 0.4 MG/DL (ref 0.2–1.3)
BUN SERPL-MCNC: 17 MG/DL (ref 7–30)
CALCIUM SERPL-MCNC: 9.2 MG/DL (ref 8.5–10.1)
CHLORIDE SERPL-SCNC: 102 MMOL/L (ref 94–109)
CO2 SERPL-SCNC: 31 MMOL/L (ref 20–32)
CREAT SERPL-MCNC: 0.75 MG/DL (ref 0.52–1.04)
ERYTHROCYTE [DISTWIDTH] IN BLOOD BY AUTOMATED COUNT: 12.7 % (ref 10–15)
GFR SERPL CREATININE-BSD FRML MDRD: 81 ML/MIN/{1.73_M2}
GLUCOSE SERPL-MCNC: 140 MG/DL (ref 70–99)
HBA1C MFR BLD: 5.8 % (ref 0–5.6)
HCT VFR BLD AUTO: 38.1 % (ref 35–47)
HGB BLD-MCNC: 12.5 G/DL (ref 11.7–15.7)
MCH RBC QN AUTO: 30.2 PG (ref 26.5–33)
MCHC RBC AUTO-ENTMCNC: 32.8 G/DL (ref 31.5–36.5)
MCV RBC AUTO: 92 FL (ref 78–100)
PLATELET # BLD AUTO: 265 10E9/L (ref 150–450)
POTASSIUM SERPL-SCNC: 3.9 MMOL/L (ref 3.4–5.3)
PROT SERPL-MCNC: 7.1 G/DL (ref 6.8–8.8)
RBC # BLD AUTO: 4.14 10E12/L (ref 3.8–5.2)
SODIUM SERPL-SCNC: 136 MMOL/L (ref 133–144)
WBC # BLD AUTO: 5.5 10E9/L (ref 4–11)

## 2019-11-26 PROCEDURE — 36415 COLL VENOUS BLD VENIPUNCTURE: CPT | Performed by: FAMILY MEDICINE

## 2019-11-26 PROCEDURE — 85027 COMPLETE CBC AUTOMATED: CPT | Performed by: FAMILY MEDICINE

## 2019-11-26 PROCEDURE — 80053 COMPREHEN METABOLIC PANEL: CPT | Performed by: FAMILY MEDICINE

## 2019-11-26 PROCEDURE — 83036 HEMOGLOBIN GLYCOSYLATED A1C: CPT | Performed by: FAMILY MEDICINE

## 2019-11-26 PROCEDURE — 99214 OFFICE O/P EST MOD 30 MIN: CPT | Performed by: FAMILY MEDICINE

## 2019-11-26 ASSESSMENT — MIFFLIN-ST. JEOR: SCORE: 1355.96

## 2019-11-26 NOTE — PROGRESS NOTES
[facility]  [address] [place] [id number]  [phone]  Dept: [phone]    PRE-OP EVALUATION:  Today's date: 2019    Rosita King (: 1950) presents for pre-operative evaluation assessment as requested by Dr. Avendaño.  She requires evaluation and anesthesia risk assessment prior to undergoing surgery/procedure for treatment of Bilateral eye lid repair .    Fax number for surgical facility: [phone]  Primary Physician: Addis Alcocer  Type of Anesthesia Anticipated: to be determined    Patient has a Health Care Directive or Living Will:  NO    Preop Questions 2019   Who is doing your surgery? Dr. Avendaño    [facility]   What are you having done? eye lids   Date of Surgery/Procedure: 2019   Facility or Hospital where procedure/surgery will be performed: [facility]   1.  Do you have a history of Heart attack, stroke, stent, coronary bypass surgery, or other heart surgery? No   2.  Do you ever have any pain or discomfort in your chest? No   3.  Do you have a history of  Heart Failure? No   4.   Are you troubled by shortness of breath when:  walking on a level surface, or up a slight hill, or at night? no   5.  Do you currently have a cold, bronchitis or other respiratory infection? No   6.  Do you have a cough, shortness of breath, or wheezing? No   7.  Do you sometimes get pains in the calves of your legs when you walk? No   8. Do you or anyone in your family have previous history of blood clots? No   9.  Do you or does anyone in your family have a serious bleeding problem such as prolonged bleeding following surgeries or cuts? No   10. Have you ever had problems with anemia or been told to take iron pills? No   11. Have you had any abnormal blood loss such as black, tarry or bloody stools, or abnormal vaginal bleeding? No   12. Have you ever had a blood transfusion? No   13.  Have you or any of your relatives ever had problems with anesthesia? No   14. Do you have sleep apnea, excessive snoring or daytime drowsiness? YES -has CPAP   15. Do you have any prosthetic heart valves? No   16. Do you have prosthetic joints? No   17. Is there any chance that you may be pregnant? No         HPI:     HPI related to upcoming procedure: excessive eyelid skin x years, water all the time, is always trying to lift them and straining       HYPERTENSION - Patient has longstanding history of HTN , currently denies any symptoms referable to elevated blood pressure. Specifically denies chest pain, palpitations, dyspnea, orthopnea, PND or peripheral edema. Blood pressure readings have been in normal range. Current medication regimen is as listed below. Patient denies any side effects of medication.     obstructive sleep apnea-on CPAP every night. No issues.     MEDICAL HISTORY:     Patient Active Problem List    Diagnosis Date Noted     Pleomorphic adenoma of parotid gland 11/26/2019     Priority: Medium     Parotid mass 10/09/2019     Priority: Medium     Added automatically from request for surgery [id number]       Lichen sclerosus 08/24/2017     Priority: Medium     Paroxysmal supraventricular tachycardia (H) 02/23/2017     Priority: Medium     Gastroesophageal reflux disease without esophagitis 08/22/2016     Priority: Medium     Essential hypertension with goal blood pressure less than 140/90 08/21/2016     Priority: Medium     Diverticulosis of large intestine without hemorrhage 12/24/2015     Priority: Medium     H. pylori infection 12/24/2015     Priority: Medium     Vitamin D deficiency 10/12/2015     Priority: Medium     ASHLEY (obstructive sleep apnea) 10/12/2015     Priority: Medium     Severe ASHLEY   - PSG at 7/27/2006 with undocumented weight, AHI 84.7, RDI 88.9, SpO2 remained above 90%, CPAP 7 optimal.       Generalized anxiety disorder 10/12/2015     Priority: Medium     Diagnosis updated by automated  process. Provider to review and confirm.       Hyperlipidemia LDL goal <130 10/12/2015     Priority: Medium     CARDIOVASCULAR SCREENING; LDL GOAL LESS THAN 130 02/03/2015     Priority: Medium     Intertrigo 05/19/2011     Priority: Medium     Urge incontinence 05/03/2010     Priority: Medium     Hiatal hernia 01/12/2009     Priority: Medium     Overview:   small sliding hiatal hernia noted on stress echo 2009       Carpal tunnel syndrome 11/27/2006     Priority: Medium     Other psoriasis 06/06/2006     Priority: Medium      Past Medical History:   Diagnosis Date     Essential hypertension with goal blood pressure less than 140/90 8/21/2016     Gastroesophageal reflux disease without esophagitis 8/22/2016     Hyperlipidemia LDL goal <130 10/12/2015     ASHLEY (obstructive sleep apnea) 10/12/2015    Severe ASHLEY  - PSG at 7/27/2006 with undocumented weight, AHI 84.7, RDI 88.9, SpO2 remained above 90%, CPAP 7 optimal.     Paroxysmal supraventricular tachycardia (H) 2/23/2017     Past Surgical History:   Procedure Laterality Date     CARPAL TUNNEL RELEASE RT/LT Bilateral 2006, 2009     COLONOSCOPY N/A 11/25/2015    Procedure: COLONOSCOPY;  Surgeon: Mina Roy MD;  Location: [facility]     ESOPHAGOSCOPY, GASTROSCOPY, DUODENOSCOPY (EGD), COMBINED N/A 11/25/2015    Procedure: COMBINED ESOPHAGOSCOPY, GASTROSCOPY, DUODENOSCOPY (EGD), BIOPSY SINGLE OR MULTIPLE;  Surgeon: Mina Roy MD;  Location: [facility]     PAROTIDECTOMY Left 10/31/2019    Procedure: LEFT SUPERFICIAL PAROTIDECTOMY WITH FACIAL NERVE MONITORING;  Surgeon: Atif Salazar MD;  Location: [facility]     TONSILLECTOMY & ADENOIDECTOMY  age 13     Current Outpatient Medications   Medication Sig Dispense Refill     hydrochlorothiazide (HYDRODIURIL) 25 MG tablet Take 1 tablet (25 mg) by mouth daily (Patient taking differently: Take 25 mg by mouth every morning ) 90 tablet 1     lisinopril (PRINIVIL/ZESTRIL) 10 MG tablet Take 1 tablet (10 mg) by mouth daily  "(Patient taking differently: Take 10 mg by mouth every morning ) 90 tablet 3     metoprolol succinate ER (TOPROL-XL) 50 MG 24 hr tablet Take 0.5 tablets (25 mg) by mouth 2 times daily (Patient taking differently: Take 50 mg by mouth every morning ) 90 tablet 3     Multiple Vitamins-Minerals (CENTRUM SILVER) per tablet Take 1 tablet by mouth daily       omeprazole (PRILOSEC) 20 MG DR capsule TAKE ONE CAPSULE BY MOUTH ONCE DAILY (Patient taking differently: Take 20 mg by mouth every morning (before breakfast) 20-30 minutes prior to breakfast) 90 capsule 3     VITAMIN D, CHOLECALCIFEROL, PO Take 1,000 Units by mouth daily        OTC products: None, except as noted above    Allergies   Allergen Reactions     Penicillins      Pravastatin      Other reaction(s): Myalgia     Simvastatin      Other reaction(s): Myalgia     Sulfa Drugs       Latex Allergy: NO    Social History     Tobacco Use     Smoking status: Former Smoker     Smokeless tobacco: Never Used   Substance Use Topics     Alcohol use: Yes     Comment: ocassional     History   Drug Use No       REVIEW OF SYSTEMS:   CONSTITUTIONAL: NEGATIVE for fever, chills, change in weight  ENT/MOUTH: NEGATIVE for ear, mouth and throat problems  RESP: NEGATIVE for significant cough or SOB  CV: NEGATIVE for chest pain, palpitations or peripheral edema  GI: NEGATIVE for nausea, abdominal pain, heartburn, or change in bowel habits  : No dysuria, urinary frequency or urgency.     EXAM:   /78 (BP Location: Right arm)   Pulse 75   Temp 98.3  F (36.8  C) (Tympanic)   Resp 20   Ht 1.6 m (5' 3\")   Wt 86.2 kg (190 lb)   SpO2 97%   BMI 33.66 kg/m    GENERAL APPEARANCE: healthy, alert and no distress  HENT: ear canals and TM's normal and nose and mouth without ulcers or lesions, ptosis of eyelids bilaterally, well healed scar anterior and posterior to the left pinna  RESP: lungs clear to auscultation - no rales, rhonchi or wheezes  CV: regular rate and rhythm, normal S1 S2, " no S3 or S4 and no murmur, click or rub   ABDOMEN: soft, nontender, no HSM or masses and bowel sounds normal  NEURO: Normal strength and tone, sensory exam grossly normal, mentation intact and speech normal    DIAGNOSTICS:   EKG: appears normal, NSR, normal axis, normal intervals, no acute ST/T changes c/w ischemia, no LVH by voltage criteria, poor R wave progression, consider pulmonary disease    Labs Resulted Today:   Results for orders placed or performed in visit on 11/26/19   Hemoglobin A1c     Status: Abnormal   Result Value Ref Range    Hemoglobin A1C 5.8 (H) 0 - 5.6 %   CBC with platelets     Status: None   Result Value Ref Range    WBC 5.5 4.0 - 11.0 10e9/L    RBC Count 4.14 3.8 - 5.2 10e12/L    Hemoglobin 12.5 11.7 - 15.7 g/dL    Hematocrit 38.1 35.0 - 47.0 %    MCV 92 78 - 100 fl    MCH 30.2 26.5 - 33.0 pg    MCHC 32.8 31.5 - 36.5 g/dL    RDW 12.7 10.0 - 15.0 %    Platelet Count 265 150 - 450 10e9/L       Recent Labs   Lab Test 10/31/19  1309 07/18/19  1005  04/23/19  0942 08/24/17  1051   HGB 11.8  --   --  13.1  --      --   --  248  --     135   < > 137  --    POTASSIUM 4.2 3.8   < > 4.3  --    CR 0.72 0.78   < > 0.67  --    A1C  --   --   --   --  5.5    < > = values in this interval not displayed.        IMPRESSION:   Reason for surgery/procedure: ptosis of eyelids    The proposed surgical procedure is considered LOW risk.    REVISED CARDIAC RISK INDEX  The patient has the following serious cardiovascular risks for perioperative complications such as (MI, PE, VFib and 3  AV Block):  No serious cardiac risks  INTERPRETATION: 0 risks: Class I (very low risk - 0.4% complication rate)    The patient has the following additional risks for perioperative complications:  No identified additional risks      ICD-10-CM    1. Preop general physical exam Z01.818    2. Essential hypertension with goal blood pressure less than 140/90 I10 CBC with platelets     Comprehensive metabolic panel   3. ASHLEY  (obstructive sleep apnea) G47.33    4. Hyperlipidemia LDL goal <130 E78.5    5. Ptosis of both eyelids H02.403 CBC with platelets     Comprehensive metabolic panel   6. Elevated blood sugar R73.9 Hemoglobin A1c   7. Pleomorphic adenoma of parotid gland D11.0        RECOMMENDATIONS:         --Patient is to take all scheduled medications on the day of surgery EXCEPT for modifications listed below.  Hold the hydrochlorothiazide day of surgery if in am      APPROVAL GIVEN to proceed with proposed procedure, without further diagnostic evaluation       Signed Electronically by: Addis Del Rio MD    Copy of this evaluation report is provided to requesting physician.    [place] Preop Guidelines    Revised Cardiac Risk Index

## 2019-11-26 NOTE — NURSING NOTE
"Chief Complaint   Patient presents with     Pre-Op Exam       Initial /78 (BP Location: Right arm)   Pulse 75   Temp 98.3  F (36.8  C) (Tympanic)   Resp 20   Ht 1.6 m (5' 3\")   Wt 86.2 kg (190 lb)   SpO2 97%   BMI 33.66 kg/m   Estimated body mass index is 33.66 kg/m  as calculated from the following:    Height as of this encounter: 1.6 m (5' 3\").    Weight as of this encounter: 86.2 kg (190 lb).    Patient presents to the clinic using No DME    Health Maintenance that is potentially due pending provider review:  NONE    n/a    Is there anyone who you would like to be able to receive your results? No  If yes have patient fill out SU    "

## 2019-11-26 NOTE — PATIENT INSTRUCTIONS
Try increasing the omeprazole to twice a day    Lab Results   Component Value Date    A1C 5.8 11/26/2019    A1C 5.5 08/24/2017     Hold the hydrochlorothiazide day of surgery    Before Your Surgery      Call your surgeon if there is any change in your health. This includes signs of a cold or flu (such as a sore throat, runny nose, cough, rash or fever).    Do not smoke, drink alcohol or take over the counter medicine (unless your surgeon or primary care doctor tells you to) for the 24 hours before and after surgery.    If you take prescribed drugs: Follow your doctor s orders about which medicines to take and which to stop until after surgery.    Eating and drinking prior to surgery: follow the instructions from your surgeon    Take a shower or bath the night before surgery. Use the soap your surgeon gave you to gently clean your skin. If you do not have soap from your surgeon, use your regular soap. Do not shave or scrub the surgery site.  Wear clean pajamas and have clean sheets on your bed.

## 2019-12-09 ENCOUNTER — PRE VISIT (OUTPATIENT)
Dept: NEUROLOGY | Facility: CLINIC | Age: 69
End: 2019-12-09

## 2019-12-09 NOTE — TELEPHONE ENCOUNTER
"FUTURE VISIT INFORMATION        FUTURE VISIT INFORMATION:    Date: 12/10/19    Time:  1045    Location: [facility]   REFERRAL INFORMATION:    Referring provider:  Atif Salazar MD    Referring providers clinic: [facility]     Reason for visit/diagnosis:  Parotid Mass, Pressure in Head, Eye Symptoms, Concentration Deficiency, Hx of Migraine        NOTES (FOR ALL VISITS) STATUS DETAILS   OFFICE NOTE from referring provider Printed 10/4/19, 11/20/19   OFFICE NOTE from other specialist Printed  Dr. Alcocer (FP) 4/23/19  Dr. Busby (ENT) 6/3/19  Dr. Mae (ENT) 7/19/19  ELLA Deutsch (Cardiology) 7/23/19   DISCHARGE SUMMARY from hospital      DISCHARGE REPORT from the ER     MEDICATION LIST In Epic     IMAGING  (FOR ALL VISITS)       EMG      EEG      MRI (HEAD, NECK, SPINE)  Reports printed   Images in PACs   MRI brain 5/13/19   CARDIAC STUDIES      FORMAL COGNITIVE TESTING   Mini-Cog 8/24/17: \"Cognitive deficits less likely\"  Mini-Cog 4/23/19: \"Probably Cognitive Impairment\"   OTHER                   "

## 2019-12-10 ENCOUNTER — OFFICE VISIT (OUTPATIENT)
Dept: NEUROLOGY | Facility: CLINIC | Age: 69
End: 2019-12-10
Payer: COMMERCIAL

## 2019-12-10 VITALS
SYSTOLIC BLOOD PRESSURE: 137 MMHG | DIASTOLIC BLOOD PRESSURE: 76 MMHG | TEMPERATURE: 97.6 F | RESPIRATION RATE: 20 BRPM | HEART RATE: 53 BPM

## 2019-12-10 DIAGNOSIS — R51.9 PRESSURE IN HEAD: Primary | ICD-10-CM

## 2019-12-10 DIAGNOSIS — G44.219 EPISODIC TENSION-TYPE HEADACHE, NOT INTRACTABLE: ICD-10-CM

## 2019-12-10 DIAGNOSIS — M54.2 NECK PAIN: ICD-10-CM

## 2019-12-10 PROCEDURE — 99205 OFFICE O/P NEW HI 60 MIN: CPT | Performed by: PSYCHIATRY & NEUROLOGY

## 2019-12-10 RX ORDER — TIZANIDINE 2 MG/1
2 TABLET ORAL 3 TIMES DAILY PRN
Qty: 20 TABLET | Refills: 1 | Status: SHIPPED | OUTPATIENT
Start: 2019-12-10 | End: 2021-06-10

## 2019-12-10 ASSESSMENT — PAIN SCALES - GENERAL: PAINLEVEL: MILD PAIN (3)

## 2019-12-10 NOTE — PATIENT INSTRUCTIONS
Plan:    CTA Head and Neck to look at the blood vessels. We will notify you of the results.   Let's try a muscle relaxant for the neck pain tension. Tizanidine 2mg to use up to three times daily, as needed. *Information provided.   You should have brain imaging every 1-2 years to monitor the meningioma. This can be done through your primary care provider.   Return to Neurology as needed, if symptoms continue or change.

## 2019-12-10 NOTE — PROGRESS NOTES
INITIAL NEUROLOGY CONSULTATION    DATE OF VISIT: 12/10/2019  MRN: [id number]  PATIENT NAME: Rosita King  YOB: 1950    REFERRING PROVIDER: Atif Salazar    Chief Complaint   Patient presents with     New Patient     Parotid Mass, Pressure in Head, Eye Symptoms, Concentration Deficiency, History of Migraine       SUBJECTIVE:                                                      HPI:   Rosita King is a 69 year old female whom I have been asked by Dr. Salazar to see in consultation for head pressure, history of migraine and difficulties with concentration. The patient has notable history of Left parotidectomy (adenoma) on 10.31.19. Upon follow-up with Dr. Salazar on 11.20 she endorsed some numbness in the region of her surgery. When he met with the patient prior to her surgery for consultation, she complained of confusion and mental fogginess accompanied by neck and posterior head pain, eye heaviness, blurred vision and hearing loss, fullness and numbness in the ears. MRI of the brain in 5.2019 showed diffuse atrophy and SVID and a 1cm meningioma on the Right.     The patient has additional history of ASHLEY, CTS, GERD, HTN, HLD and Generalized anxiety.      The patient tells me that she had the head pressure and associated symptoms (she describes the above, and additionally some trouble with breathing) have been occurring for about a year. The symptoms were more frequent and more intense last May when the MRI was done.She says her symptoms seemed to increase more when she was active. She has noticed that since starting the additional blood pressure medications: hydrochlorothiazide and lisinopril, the head pressure and visual/hearing changes seem to have improved.     The patient also tells me that her head pounds when she lays on her belly. She feels the blood rushing up into head with this. She does have ASHLEY and endorses using her CPAP regularly. Besides the rushing sensation when facing down, in  general resting in bed feels better.    She had bilateral eyelid surgery within this past week.     She says the jaw pain she had previously has improved since having the parotid surgery.   Her head feels heavy. She says her arms feel heavy at times. Her neck always feels sore. She has trouble swallowing (no choking, but has to concentrate). She endorses problems with bladder and bowel  - she says she has some urinary leakage, she has urgency. She also has problems with constipation. Her stomach always hurts. She feels like there is something wrong with her.     She says she holds her emotions inside. She admits could be anxious. She is not currently on any medications for mood. She says that in between her symptoms her thinking is clear, focused.     Past Medical History:   Diagnosis Date     Essential hypertension with goal blood pressure less than 140/90 8/21/2016     Gastroesophageal reflux disease without esophagitis 8/22/2016     Hyperlipidemia LDL goal <130 10/12/2015     ASHLEY (obstructive sleep apnea) 10/12/2015    Severe ASHLEY  - PSG at 7/27/2006 with undocumented weight, AHI 84.7, RDI 88.9, SpO2 remained above 90%, CPAP 7 optimal.     Paroxysmal supraventricular tachycardia (H) 2/23/2017     Past Surgical History:   Procedure Laterality Date     CARPAL TUNNEL RELEASE RT/LT Bilateral 2006, 2009     COLONOSCOPY N/A 11/25/2015    Procedure: COLONOSCOPY;  Surgeon: Mina Roy MD;  Location: [facility]     ESOPHAGOSCOPY, GASTROSCOPY, DUODENOSCOPY (EGD), COMBINED N/A 11/25/2015    Procedure: COMBINED ESOPHAGOSCOPY, GASTROSCOPY, DUODENOSCOPY (EGD), BIOPSY SINGLE OR MULTIPLE;  Surgeon: Mina Roy MD;  Location: [facility]     PAROTIDECTOMY Left 10/31/2019    Procedure: LEFT SUPERFICIAL PAROTIDECTOMY WITH FACIAL NERVE MONITORING;  Surgeon: Atif Salazar MD;  Location: [facility]     TONSILLECTOMY & ADENOIDECTOMY  age 13       hydrochlorothiazide (HYDRODIURIL) 25 MG tablet, Take 1 tablet (25 mg) by mouth  daily (Patient taking differently: Take 25 mg by mouth every morning )  lisinopril (PRINIVIL/ZESTRIL) 10 MG tablet, Take 1 tablet (10 mg) by mouth daily (Patient taking differently: Take 10 mg by mouth every morning )  metoprolol succinate ER (TOPROL-XL) 50 MG 24 hr tablet, Take 0.5 tablets (25 mg) by mouth 2 times daily (Patient taking differently: Take 50 mg by mouth every morning )  omeprazole (PRILOSEC) 20 MG DR capsule, TAKE ONE CAPSULE BY MOUTH ONCE DAILY (Patient taking differently: Take 20 mg by mouth every morning (before breakfast) 20-30 minutes prior to breakfast)  VITAMIN D, CHOLECALCIFEROL, PO, Take 1,000 Units by mouth daily   Multiple Vitamins-Minerals (CENTRUM SILVER) per tablet, Take 1 tablet by mouth daily    No current facility-administered medications on file prior to visit.     Allergies   Allergen Reactions     Penicillins      Pravastatin      Other reaction(s): Myalgia     Simvastatin      Other reaction(s): Myalgia     Sulfa Drugs         Problem (# of Occurrences) Relation (Name,Age of Onset)    Cancer (1) Sister    Depression/Anxiety (1) Father    Diabetes (2) Father, Sister    Liver Disease (2) Mother, Sister        Social History     Tobacco Use     Smoking status: Former Smoker     Smokeless tobacco: Never Used   Substance Use Topics     Alcohol use: Yes     Comment: ocassional     Drug use: No       REVIEW OF SYSTEMS:                                                      10-point review of systems is negative except as mentioned above in HPI.     EXAM:                                                      Physical Exam:   Vitals: /76 (BP Location: Right arm, Patient Position: Sitting, Cuff Size: Adult Large)   Pulse 53   Temp 97.6  F (36.4  C) (Tympanic)   Resp 20   BMI= There is no height or weight on file to calculate BMI.  GENERAL: NAD.   HEENT: Bruising around both eyes. No TTP of neck or scalp.   CV: RRR. S1, S2.   NECK: No bruits.  Neurologic:  MENTAL STATUS: Alert,  attentive. Speech is fluent. Normal comprehension. Mini-co/5. Normal concentration. Fair fund of knowledge.   CRANIAL NERVES: Discs flat. Visual fields intact to confrontation. Pupils equally, round and reactive to light. Facial sensation and movement normal. EOM full. Hearing intact to conversation. Trapezius strength intact. Palate moves symmetrically. Tongue midline.  MOTOR: 5/5 in proximal and distal muscle groups of upper and lower extremities. Tone and bulk normal.   DTRs: Intact and symmetric. Babinski down-going bilaterally.   SENSATION: Normal light touch and pinprick. Intact proprioception. Vibration: Normal at both ankles.   COORDINATION: Normal finger nose finger. Finger tapping normal. Knee heel shin normal.  STATION AND GAIT: Romberg negative. Casual gait and tandem normal.    Relevant Data:   MRI Brain (5..19):  IMPRESSION:    1. No evidence of acute infarct, hemorrhage, or herniation.  2. Mild diffuse parenchymal volume loss and white matter changes  likely due to chronic microvascular ischemic disease.   3. Partially visualized 2.2 cm mass in the left parotid gland. This  may represent a benign or malignant parotid lesion. Recommend  otolaryngology consultation.   4. Extra-axial 1 cm enhancing mass along the right cerebral convexity  most likely representing a meningioma.    Imaging reviewed by me. Agree with Radiology read.     ASSESSMENT and PLAN:                                                      Assessment and Plan:     ICD-10-CM    1. Pressure in head R51 CTA Head Neck with Contrast   2. Neck pain M54.2 CTA Head Neck with Contrast     tiZANidine (ZANAFLEX) 2 MG tablet   3. Episodic tension-type headache, not intractable G44.219         Ms. King is a pleasant 68 yo woman with history of ASHLEY, CTS, GERD, HTN, HLD, BJ and parotidectomy here for several concerns. I am unable to fit the symptoms under a unifying neurologic diagnosis. The symptoms are diffuse and non-localizing, and there  is no clear timeline. She may be having some HTN-related pressure in the head. She also describes symptoms consistent with neck tension. The blurred vision, hearing changes and problems with focus associated with the neck pressure could be due to a migrainous phenomenon, but there is too much ambiguity to say this for sure and I cannot get a handle on how often the patient has the various symptoms. She endorses anxiety. With her bodily complaints as well (stomach, legs), I wonder about somatization. This could be looked into from a mental health standpoint deemed appropriate by her primary care provider. For now, we will check head and neck vessel imaging and try a muscle relaxant.     We will plan to follow-up as needed. Her meningioma will also have to be monitored. I recommend follow-up imaging at the one year point (5.2019). She will plan to do this through her primary care provider. If there is an increase in the size of the meningioma, Neurosurgery should be involved. I do not think the meningioma is causing her current symptoms, however, and I explained this to Rosita. She understands and agrees with the plan.     Patient Instructions:  CTA Head and Neck to look at the blood vessels. We will notify you of the results.   Let's try a muscle relaxant for the neck pain tension. Tizanidine 2mg to use up to three times daily, as needed. *Information provided.   You should have brain imaging every 1-2 years to monitor the meningioma. This can be done through your primary care provider.   Return to Neurology as needed, if symptoms continue or change.     Total Time: 60 minutes were spent with the patient and in chart review/documentation. More than 50% of the time spent on counseling (as described above in Assessment and Plan/Instructions) /coordinating the care.    Marie Bowen MD  Neurology    CC: Atif Salazar MD and Addis Alcocer MD

## 2019-12-10 NOTE — LETTER
12/10/2019         RE: Rosita King  [address]  [facility] [address]        Dear Colleague,    Thank you for referring your patient, Rosita King, to the [facility]. Please see a copy of my visit note below.    INITIAL NEUROLOGY CONSULTATION    DATE OF VISIT: 12/10/2019  MRN: [id number]  PATIENT NAME: Rosita King  YOB: 1950    REFERRING PROVIDER: Atif Salazar    Chief Complaint   Patient presents with     New Patient     Parotid Mass, Pressure in Head, Eye Symptoms, Concentration Deficiency, History of Migraine       SUBJECTIVE:                                                      HPI:   Rosita King is a 69 year old female whom I have been asked by Dr. Salazar to see in consultation for head pressure, history of migraine and difficulties with concentration. The patient has notable history of Left parotidectomy (adenoma) on 10.31.19. Upon follow-up with Dr. Salazar on 11.20 she endorsed some numbness in the region of her surgery. When he met with the patient prior to her surgery for consultation, she complained of confusion and mental fogginess accompanied by neck and posterior head pain, eye heaviness, blurred vision and hearing loss, fullness and numbness in the ears. MRI of the brain in 5.2019 showed diffuse atrophy and SVID and a 1cm meningioma on the Right.     The patient has additional history of ASHLEY, CTS, GERD, HTN, HLD and Generalized anxiety.      The patient tells me that she had the head pressure and associated symptoms (she describes the above, and additionally some trouble with breathing) have been occurring for about a year. The symptoms were more frequent and more intense last May when the MRI was done.She says her symptoms seemed to increase more when she was active. She has noticed that since starting the additional blood pressure medications: hydrochlorothiazide and lisinopril, the head pressure and visual/hearing changes seem to  have improved.     The patient also tells me that her head pounds when she lays on her belly. She feels the blood rushing up into head with this. She does have ASHLEY and endorses using her CPAP regularly. Besides the rushing sensation when facing down, in general resting in bed feels better.    She had bilateral eyelid surgery within this past week.     She says the jaw pain she had previously has improved since having the parotid surgery.   Her head feels heavy. She says her arms feel heavy at times. Her neck always feels sore. She has trouble swallowing (no choking, but has to concentrate). She endorses problems with bladder and bowel  - she says she has some urinary leakage, she has urgency. She also has problems with constipation. Her stomach always hurts. She feels like there is something wrong with her.     She says she holds her emotions inside. She admits could be anxious. She is not currently on any medications for mood. She says that in between her symptoms her thinking is clear, focused.     Past Medical History:   Diagnosis Date     Essential hypertension with goal blood pressure less than 140/90 8/21/2016     Gastroesophageal reflux disease without esophagitis 8/22/2016     Hyperlipidemia LDL goal <130 10/12/2015     ASHLEY (obstructive sleep apnea) 10/12/2015    Severe ASHLEY  - PSG at 7/27/2006 with undocumented weight, AHI 84.7, RDI 88.9, SpO2 remained above 90%, CPAP 7 optimal.     Paroxysmal supraventricular tachycardia (H) 2/23/2017     Past Surgical History:   Procedure Laterality Date     CARPAL TUNNEL RELEASE RT/LT Bilateral 2006, 2009     COLONOSCOPY N/A 11/25/2015    Procedure: COLONOSCOPY;  Surgeon: Mina Roy MD;  Location: [facility]     ESOPHAGOSCOPY, GASTROSCOPY, DUODENOSCOPY (EGD), COMBINED N/A 11/25/2015    Procedure: COMBINED ESOPHAGOSCOPY, GASTROSCOPY, DUODENOSCOPY (EGD), BIOPSY SINGLE OR MULTIPLE;  Surgeon: Mian Roy MD;  Location: [facility]     PAROTIDECTOMY Left  10/31/2019    Procedure: LEFT SUPERFICIAL PAROTIDECTOMY WITH FACIAL NERVE MONITORING;  Surgeon: Atif Salazar MD;  Location: [facility]     TONSILLECTOMY & ADENOIDECTOMY  age 13       hydrochlorothiazide (HYDRODIURIL) 25 MG tablet, Take 1 tablet (25 mg) by mouth daily (Patient taking differently: Take 25 mg by mouth every morning )  lisinopril (PRINIVIL/ZESTRIL) 10 MG tablet, Take 1 tablet (10 mg) by mouth daily (Patient taking differently: Take 10 mg by mouth every morning )  metoprolol succinate ER (TOPROL-XL) 50 MG 24 hr tablet, Take 0.5 tablets (25 mg) by mouth 2 times daily (Patient taking differently: Take 50 mg by mouth every morning )  omeprazole (PRILOSEC) 20 MG DR capsule, TAKE ONE CAPSULE BY MOUTH ONCE DAILY (Patient taking differently: Take 20 mg by mouth every morning (before breakfast) 20-30 minutes prior to breakfast)  VITAMIN D, CHOLECALCIFEROL, PO, Take 1,000 Units by mouth daily   Multiple Vitamins-Minerals (CENTRUM SILVER) per tablet, Take 1 tablet by mouth daily    No current facility-administered medications on file prior to visit.     Allergies   Allergen Reactions     Penicillins      Pravastatin      Other reaction(s): Myalgia     Simvastatin      Other reaction(s): Myalgia     Sulfa Drugs         Problem (# of Occurrences) Relation (Name,Age of Onset)    Cancer (1) Sister    Depression/Anxiety (1) Father    Diabetes (2) Father, Sister    Liver Disease (2) Mother, Sister        Social History     Tobacco Use     Smoking status: Former Smoker     Smokeless tobacco: Never Used   Substance Use Topics     Alcohol use: Yes     Comment: ocassional     Drug use: No       REVIEW OF SYSTEMS:                                                      10-point review of systems is negative except as mentioned above in HPI.     EXAM:                                                      Physical Exam:   Vitals: /76 (BP Location: Right arm, Patient Position: Sitting, Cuff Size: Adult Large)   Pulse 53   Temp  97.6  F (36.4  C) (Tympanic)   Resp 20   BMI= There is no height or weight on file to calculate BMI.  GENERAL: NAD.   HEENT: Bruising around both eyes. No TTP of neck or scalp.   CV: RRR. S1, S2.   NECK: No bruits.  Neurologic:  MENTAL STATUS: Alert, attentive. Speech is fluent. Normal comprehension. Mini-co/5. Normal concentration. Fair fund of knowledge.   CRANIAL NERVES: Discs flat. Visual fields intact to confrontation. Pupils equally, round and reactive to light. Facial sensation and movement normal. EOM full. Hearing intact to conversation. Trapezius strength intact. Palate moves symmetrically. Tongue midline.  MOTOR: 5/5 in proximal and distal muscle groups of upper and lower extremities. Tone and bulk normal.   DTRs: Intact and symmetric. Babinski down-going bilaterally.   SENSATION: Normal light touch and pinprick. Intact proprioception. Vibration: Normal at both ankles.   COORDINATION: Normal finger nose finger. Finger tapping normal. Knee heel shin normal.  STATION AND GAIT: Romberg negative. Casual gait and tandem normal.    Relevant Data:   MRI Brain (5.13.19):  IMPRESSION:    1. No evidence of acute infarct, hemorrhage, or herniation.  2. Mild diffuse parenchymal volume loss and white matter changes  likely due to chronic microvascular ischemic disease.   3. Partially visualized 2.2 cm mass in the left parotid gland. This  may represent a benign or malignant parotid lesion. Recommend  otolaryngology consultation.   4. Extra-axial 1 cm enhancing mass along the right cerebral convexity  most likely representing a meningioma.    Imaging reviewed by me. Agree with Radiology read.     ASSESSMENT and PLAN:                                                      Assessment and Plan:     ICD-10-CM    1. Pressure in head R51 CTA Head Neck with Contrast   2. Neck pain M54.2 CTA Head Neck with Contrast     tiZANidine (ZANAFLEX) 2 MG tablet   3. Episodic tension-type headache, not intractable G44.219         Ms.  Christine is a pleasant 70 yo woman with history of ASHLEY, CTS, GERD, HTN, HLD, BJ and parotidectomy here for several concerns. I am unable to fit the symptoms under a unifying neurologic diagnosis. The symptoms are diffuse and non-localizing, and there is no clear timeline. She may be having some HTN-related pressure in the head. She also describes symptoms consistent with neck tension. The blurred vision, hearing changes and problems with focus associated with the neck pressure could be due to a migrainous phenomenon, but there is too much ambiguity to say this for sure and I cannot get a handle on how often the patient has the various symptoms. She endorses anxiety. With her bodily complaints as well (stomach, legs), I wonder about somatization. This could be looked into from a mental health standpoint deemed appropriate by her primary care provider. For now, we will check head and neck vessel imaging and try a muscle relaxant.     We will plan to follow-up as needed. Her meningioma will also have to be monitored. I recommend follow-up imaging at the one year point (5.2019). She will plan to do this through her primary care provider. If there is an increase in the size of the meningioma, Neurosurgery should be involved. I do not think the meningioma is causing her current symptoms, however, and I explained this to Rosita. She understands and agrees with the plan.     Patient Instructions:  CTA Head and Neck to look at the blood vessels. We will notify you of the results.   Let's try a muscle relaxant for the neck pain tension. Tizanidine 2mg to use up to three times daily, as needed. *Information provided.   You should have brain imaging every 1-2 years to monitor the meningioma. This can be done through your primary care provider.   Return to Neurology as needed, if symptoms continue or change.     Total Time: 60 minutes were spent with the patient and in chart review/documentation. More than 50% of the time spent  on counseling (as described above in Assessment and Plan/Instructions) /coordinating the care.    Marie Bowne MD  Neurology    CC: Atif Salazar MD and Addis Alcocer MD    Again, thank you for allowing me to participate in the care of your patient.        Sincerely,        Marie Bowen MD

## 2019-12-13 ENCOUNTER — HOSPITAL ENCOUNTER (OUTPATIENT)
Dept: CT IMAGING | Facility: CLINIC | Age: 69
Discharge: HOME | End: 2019-12-13
Attending: PSYCHIATRY & NEUROLOGY | Admitting: PSYCHIATRY & NEUROLOGY
Payer: MEDICARE

## 2019-12-13 DIAGNOSIS — R51.9 PRESSURE IN HEAD: ICD-10-CM

## 2019-12-13 DIAGNOSIS — M54.2 NECK PAIN: ICD-10-CM

## 2019-12-13 PROCEDURE — 25000125 ZZHC RX 250: Performed by: RADIOLOGY

## 2019-12-13 PROCEDURE — 70498 CT ANGIOGRAPHY NECK: CPT

## 2019-12-13 PROCEDURE — 25000128 H RX IP 250 OP 636: Performed by: RADIOLOGY

## 2019-12-13 RX ORDER — IOPAMIDOL 755 MG/ML
70 INJECTION, SOLUTION INTRAVASCULAR ONCE
Status: COMPLETED | OUTPATIENT
Start: 2019-12-13 | End: 2019-12-13

## 2019-12-13 RX ADMIN — SODIUM CHLORIDE 100 ML: 9 INJECTION, SOLUTION INTRAVENOUS at 12:36

## 2019-12-13 RX ADMIN — IOPAMIDOL 70 ML: 755 INJECTION, SOLUTION INTRAVENOUS at 12:35

## 2019-12-13 NOTE — LETTER
December 16, 2019      Rosita King  [address] [facility] [address]        Dear ,    We are writing to inform you of your test results.    Your CTA (vessel imaging) is normal.     Resulted Orders   CTA Head Neck with Contrast    Narrative    CTA  HEAD NECK WITH CONTRAST  12/13/2019 5:27 PM     HISTORY: Neuro deficit(s), subacute; pulsing sensation through the  neck and into the head.; Pressure in head; Neck pain    TECHNIQUE:  Precontrast localizing scans were followed by CT  angiography with an injection of 70 mL Isovue 370 IV contrast with  scans through the head and neck.  Images were transferred to a  separate 3-D workstation where multiplanar reformations and 3-D images  were created.  Estimates of carotid stenoses are made relative to the  distal internal carotid artery diameters except as noted. Radiation  dose for this scan was reduced using automated exposure control,  adjustment of the mA and/or kV according to patient size, or iterative  reconstruction technique.    COMPARISON: None.    FINDINGS: Estimates of stenosis at the carotid bifurcations are  relative to the distal internal carotids.    ARCH: Calcified atherosclerotic plaque is present in the arch of the  aorta.    NECK CTA:  Right Carotid:  The right common carotid artery is normal.  The right  carotid bifurcation appears normal.  The right internal carotid artery  is mildly tortuous..     Left Carotid:  The left common carotid artery is unremarkable.  A  small amount of atherosclerotic plaque is seen at the left carotid  bifurcation. No stenosis..  The left internal carotid is negative.      Vertebrals:  The vertebral arteries are normal.    HEAD CTA:  Anterior Circulation: No occluded vessels are seen. .    Posterior Circulation:  The basilar artery and its branches appear  normal. Fetal origins of both posterior cerebral arteries. This is a  normal variant.    MISC:: None.      Impression    IMPRESSION:   1. No  stenosis is seen at either carotid bifurcation. No evidence for  fibromuscular dysplasia.  2. No arterial dissection is identified.  3. No high-grade intracranial vascular stenosis is identified. No  occluded vessels.  4. Venous sinuses appear patent  5. No aneurysm or arterial venous malformation is identified.        SIDDHARTH CASTANO MD       If you have any questions or concerns, please call the clinic at the number listed above.       Sincerely,    Marie Bowen MD

## 2019-12-16 NOTE — RESULT ENCOUNTER NOTE
Please advise Rosita King,  1950, that her CTA (vessel imaging) is normal.  [phone] (home) NONE (work)  Marie Bowen MD

## 2020-01-24 DIAGNOSIS — L29.9 ITCHY SCALP: ICD-10-CM

## 2020-01-24 DIAGNOSIS — I10 ESSENTIAL HYPERTENSION: ICD-10-CM

## 2020-01-24 RX ORDER — HYDROCHLOROTHIAZIDE 25 MG/1
25 TABLET ORAL EVERY MORNING
Qty: 90 TABLET | Refills: 1 | Status: SHIPPED | OUTPATIENT
Start: 2020-01-24 | End: 2020-07-22

## 2020-01-24 RX ORDER — FLUOCINONIDE TOPICAL SOLUTION USP, 0.05% 0.5 MG/ML
SOLUTION TOPICAL
Qty: 60 ML | Refills: 3 | Status: SHIPPED | OUTPATIENT
Start: 2020-01-24 | End: 2023-09-26

## 2020-01-24 NOTE — TELEPHONE ENCOUNTER
"[facility] pharmacy in [place] says patient has transferred her Rx's to them but needs a new Rx for her Fluocinodnide  This was not on her current med list.      Requested Prescriptions   Pending Prescriptions Disp Refills     fluocinonide (LIDEX) 0.05 % external solution 60 mL 3     Sig: Apply sparingly to affected area twice daily as needed.  Do not apply to face.       Topical Steroids and Nonsteroidals Protocol Failed - 1/24/2020  9:19 AM        Failed - High potency steroid not ordered        Failed - Medication is active on med list        Passed - Patient is age 6 or older        Passed - Authorizing prescriber's most recent note related to this medication read.     If refill request is for ophthalmic use, please forward request to provider for approval.          Passed - Recent (12 mo) or future (30 days) visit within the authorizing provider's specialty     Patient has had an office visit with the authorizing provider or a provider within the authorizing providers department within the previous 12 mos or has a future within next 30 days. See \"Patient Info\" tab in inbasket, or \"Choose Columns\" in Meds & Orders section of the refill encounter.                Last Written Prescription Date:  1/8/18  Last Fill Quantity: 60 ml,  # refills: 3   Last office visit: 11/26/2019 with prescribing provider:  Shreyas Starr   Future Office Visit:      "

## 2020-04-30 DIAGNOSIS — I47.10 PAROXYSMAL SUPRAVENTRICULAR TACHYCARDIA (H): ICD-10-CM

## 2020-04-30 DIAGNOSIS — I10 ESSENTIAL HYPERTENSION WITH GOAL BLOOD PRESSURE LESS THAN 140/90: ICD-10-CM

## 2020-04-30 DIAGNOSIS — K21.9 GASTROESOPHAGEAL REFLUX DISEASE WITHOUT ESOPHAGITIS: ICD-10-CM

## 2020-04-30 RX ORDER — METOPROLOL SUCCINATE 50 MG/1
TABLET, EXTENDED RELEASE ORAL
Qty: 90 TABLET | Refills: 0 | Status: SHIPPED | OUTPATIENT
Start: 2020-04-30 | End: 2020-08-14

## 2020-05-20 ENCOUNTER — VIRTUAL VISIT (OUTPATIENT)
Dept: OTOLARYNGOLOGY | Facility: CLINIC | Age: 70
End: 2020-05-20
Payer: COMMERCIAL

## 2020-05-20 VITALS — BODY MASS INDEX: 35.08 KG/M2 | WEIGHT: 198 LBS | HEIGHT: 63 IN

## 2020-05-20 DIAGNOSIS — Z98.890 HISTORY OF SUPERFICIAL PAROTIDECTOMY: ICD-10-CM

## 2020-05-20 DIAGNOSIS — K11.8 PAROTID MASS: ICD-10-CM

## 2020-05-20 DIAGNOSIS — H93.8X3 EAR FULLNESS, BILATERAL: Primary | ICD-10-CM

## 2020-05-20 PROCEDURE — 99213 OFFICE O/P EST LOW 20 MIN: CPT | Mod: TEL | Performed by: OTOLARYNGOLOGY

## 2020-05-20 ASSESSMENT — MIFFLIN-ST. JEOR: SCORE: 1392.25

## 2020-05-20 NOTE — PROGRESS NOTES
"Rosita King is a 69 year old female who is being evaluated via a billable telephone visit.      The patient has been notified of following:     \"This telephone visit will be conducted via a call between you and your physician/provider. We have found that certain health care needs can be provided without the need for a physical exam.  This service lets us provide the care you need with a short phone conversation.  If a prescription is necessary we can send it directly to your pharmacy.  If lab work is needed we can place an order for that and you can then stop by our lab to have the test done at a later time.    Telephone visits are billed at different rates depending on your insurance coverage. During this emergency period, for some insurers they may be billed the same as an in-person visit.  Please reach out to your insurance provider with any questions.    If during the course of the call the physician/provider feels a telephone visit is not appropriate, you will not be charged for this service.\"    Patient has given verbal consent for Telephone visit?  Yes    What phone number would you like to be contacted at? [phone]    How would you like to obtain your AVS? Trace DREW spoke with the patient on the phone for follow-up.  The patient underwent superficial parotidectomy October 2019.  The patient's not having any incisional issues.  There is a small fullness at the ear portion of the incision which is not bothersome.  She is still having some numbness around the incision of the ear.  She is describing some ear fullness has been present for about 1 month.  No facial nerve weakness.  No recurrent mass.  No first bite syndrome or Aleksandr's syndrome.    Placed order for repeat audiogram given the patient's new hearing hearing concerns.  This is been present for over a month and so I think that she can get a hearing test at her convenience.  We have an audiogram from June 2019 that did show some symmetric " high-frequency sensorineural hearing loss.    From parotid standpoint, pathology was benign.  She did not require further follow-up.  If her incision bothers her in the future, we could revise her scar as needed.    Phone call duration: 6 minutes    Atif Salazar MD  Department of Otolarygology-Head and Neck Surgery  [facility]

## 2020-05-21 ENCOUNTER — VIRTUAL VISIT (OUTPATIENT)
Dept: FAMILY MEDICINE | Facility: CLINIC | Age: 70
End: 2020-05-21
Payer: COMMERCIAL

## 2020-05-21 ENCOUNTER — E-VISIT (OUTPATIENT)
Dept: FAMILY MEDICINE | Facility: CLINIC | Age: 70
End: 2020-05-21

## 2020-05-21 DIAGNOSIS — Z53.9 ERRONEOUS ENCOUNTER--DISREGARD: Primary | ICD-10-CM
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
## 2020-05-21 DIAGNOSIS — L08.9 INFECTED TICK BITE OF ABDOMINAL WALL, INITIAL ENCOUNTER: Primary | ICD-10-CM

## 2020-05-21 DIAGNOSIS — S30.861A INFECTED TICK BITE OF ABDOMINAL WALL, INITIAL ENCOUNTER: Primary | ICD-10-CM

## 2020-05-21 PROCEDURE — 99213 OFFICE O/P EST LOW 20 MIN: CPT | Mod: GT | Performed by: PHYSICIAN ASSISTANT

## 2020-05-21 RX ORDER — DOXYCYCLINE 100 MG/1
100 CAPSULE ORAL 2 TIMES DAILY
Qty: 14 CAPSULE | Refills: 0 | Status: SHIPPED | OUTPATIENT
Start: 2020-05-21 | End: 2020-06-03

## 2020-05-21 NOTE — PATIENT INSTRUCTIONS
I think you have a skin infection related to the tick bite, but not lyme disease given the rash you showed me today. Regardless due to your allergies to antibiotics, we can treat you with Doxycycline, which is the medication we use for tick bites.     If for some reason your symptoms get worse while on the antibiotics, including worsening pain, redness, fevers, or chills make sure you are seen in clinic.     Transmission of Lyme disease is unlikely if the tick is not clearly a larval form or a clearly defined deer tick, if the tick was not engorged or implanted less than 24 hours.  In studies where the tick was on for less than 24 hours the transmission was 0%.   If the tick was likely on more than 24 hrs, and it appears to be a deer tick, in areas of high prevalence of the Lyme bacteria, a single 200mg dose of doxycycline may be indicated to reduce the risk of Lyme disease, if given within 72 hrs of the tick being taken off.

## 2020-05-21 NOTE — PROGRESS NOTES
"Rosita King is a 69 year old female who is being evaluated via a billable video visit.      The patient has been notified of following:     \"This video visit will be conducted via a call between you and your physician/provider. We have found that certain health care needs can be provided without the need for an in-person physical exam.  This service lets us provide the care you need with a video conversation.  If a prescription is necessary we can send it directly to your pharmacy.  If lab work is needed we can place an order for that and you can then stop by our lab to have the test done at a later time.    Video visits are billed at different rates depending on your insurance coverage.  Please reach out to your insurance provider with any questions.    If during the course of the call the physician/provider feels a video visit is not appropriate, you will not be charged for this service.\"    Patient has given verbal consent for Video visit? Yes    How would you like to obtain your AVS? Mail a copy    Patient would like the video invitation sent by: Text to cell phone: [phone]    Will anyone else be joining your video visit? No      Subjective     Rosita King is a 69 year old female who presents today via video visit for the following health issues:    HPI    Rash    Duration: Found Monday    Description  Location: abdomen  Itching: severe    Intensity:  moderate    Accompanying signs and symptoms: dime size dot, now has rash around black spot, splotches of redness around the area    History (similar episodes/previous evaluation): Doesn't think it was a deer tick    Precipitating or alleviating factors:  New exposures:  Tick bite  Recent travel: no      Therapies tried and outcome: wiped with alcohol     Video Start Time: 1:19 PM    Patient Active Problem List   Diagnosis     CARDIOVASCULAR SCREENING; LDL GOAL LESS THAN 130     Vitamin D deficiency     ASHLEY (obstructive sleep apnea)     Generalized " anxiety disorder     Hyperlipidemia LDL goal <130     Diverticulosis of large intestine without hemorrhage     H. pylori infection     Essential hypertension with goal blood pressure less than 140/90     Gastroesophageal reflux disease without esophagitis     Paroxysmal supraventricular tachycardia (H)     Lichen sclerosus     Parotid mass     Urge incontinence     Other psoriasis     Intertrigo     Hiatal hernia     Carpal tunnel syndrome     Pleomorphic adenoma of parotid gland     Past Surgical History:   Procedure Laterality Date     CARPAL TUNNEL RELEASE RT/LT Bilateral 2006, 2009     COLONOSCOPY N/A 11/25/2015    Procedure: COLONOSCOPY;  Surgeon: Mina Roy MD;  Location: [facility]     ESOPHAGOSCOPY, GASTROSCOPY, DUODENOSCOPY (EGD), COMBINED N/A 11/25/2015    Procedure: COMBINED ESOPHAGOSCOPY, GASTROSCOPY, DUODENOSCOPY (EGD), BIOPSY SINGLE OR MULTIPLE;  Surgeon: Mina Roy MD;  Location: [facility]     PAROTIDECTOMY Left 10/31/2019    Procedure: LEFT SUPERFICIAL PAROTIDECTOMY WITH FACIAL NERVE MONITORING;  Surgeon: Atif Salazar MD;  Location: [facility]     TONSILLECTOMY & ADENOIDECTOMY  age 13       Social History     Tobacco Use     Smoking status: Former Smoker     Smokeless tobacco: Never Used   Substance Use Topics     Alcohol use: Yes     Comment: ocassional     Family History   Problem Relation Age of Onset     Liver Disease Mother      Depression/Anxiety Father      Diabetes Father      Diabetes Sister      Liver Disease Sister      Cancer Sister          Current Outpatient Medications   Medication Sig Dispense Refill     doxycycline hyclate (VIBRAMYCIN) 100 MG capsule Take 1 capsule (100 mg) by mouth 2 times daily for 7 days 14 capsule 0     fluocinonide (LIDEX) 0.05 % external solution Apply sparingly to affected area twice daily as needed.  Do not apply to face. 60 mL 3     hydrochlorothiazide (HYDRODIURIL) 25 MG tablet Take 1 tablet (25 mg) by mouth every morning 90 tablet 1      "lisinopril (PRINIVIL/ZESTRIL) 10 MG tablet Take 1 tablet (10 mg) by mouth daily (Patient taking differently: Take 10 mg by mouth every morning ) 90 tablet 3     metoprolol succinate ER (TOPROL-XL) 50 MG 24 hr tablet TAKE 1 TABLET BY MOUTH ONCE DAILY 90 tablet 0     Multiple Vitamins-Minerals (CENTRUM SILVER) per tablet Take 1 tablet by mouth daily       omeprazole (PRILOSEC) 20 MG DR capsule TAKE 1 CAPSULE BY MOUTH ONCE DAILY 90 capsule 3     tiZANidine (ZANAFLEX) 2 MG tablet Take 1 tablet (2 mg) by mouth 3 times daily as needed for muscle spasms or other (neck tension) 20 tablet 1     VITAMIN D, CHOLECALCIFEROL, PO Take 1,000 Units by mouth daily        Allergies   Allergen Reactions     Penicillins      Pravastatin      Other reaction(s): Myalgia     Simvastatin      Other reaction(s): Myalgia     Sulfa Drugs        Reviewed and updated as needed this visit by Provider         Review of Systems   Constitutional, HEENT, cardiovascular, pulmonary, gi and gu systems are negative, except as otherwise noted.      Objective    There were no vitals taken for this visit.  Estimated body mass index is 35.07 kg/m  as calculated from the following:    Height as of 5/20/20: 1.6 m (5' 3\").    Weight as of 5/20/20: 89.8 kg (198 lb).  Physical Exam   GENERAL: Healthy, alert and no distress  EYES: Eyes grossly normal to inspection.  No discharge or erythema, or obvious scleral/conjunctival abnormalities.  RESP: No audible wheeze, cough, or visible cyanosis.  No visible retractions or increased work of breathing.    SKIN: There is a dime sized necrotic appearing appear on the LLQ of the abdomen with surrounding erythema. No central clearing. Erythema is approximately 3 in in diameter based on video quality.   NEURO: Cranial nerves grossly intact.  Mentation and speech appropriate for age.  PSYCH: Mentation appears normal, affect normal/bright, judgement and insight intact, normal speech and appearance well-groomed.      Diagnostic " Test Results:  Labs reviewed in Epic      Assessment & Plan   (S30.861A,  L08.9,  W57.XXXA) Infected tick bite of abdominal wall, initial encounter  (primary encounter diagnosis)  Comment: Hx of tick bite on Monday. Now worsening pain, redness, and itching in the area. Exam today over the phone shows a central necrotic region with surrounding erythema about 3 inches in diameter. Does not appear like erythema migrans at this time, but more like cellulitis from the bite. Will treat with Doxycycline (due to Pcn/Sulfa allergies), so even if it is erythema migrans, this should cover both. Return precautions discussed.   Plan: doxycycline hyclate (VIBRAMYCIN) 100 MG capsule    Return in about 1 week (around 5/28/2020), or if symptoms worsen or fail to improve, for In-Clinic Visit.    Brendan Walsh PA-C  [facility]      Video-Visit Details    Type of service:  Video Visit    Video End Time:1:29 PM    Originating Location (pt. Location): Home    Distant Location (provider location):  [facility]     Platform used for Video Visit: Doximity    Return in about 1 week (around 5/28/2020), or if symptoms worsen or fail to improve, for In-Clinic Visit.     Brendan Walsh PA-C

## 2020-06-03 ENCOUNTER — OFFICE VISIT (OUTPATIENT)
Dept: FAMILY MEDICINE | Facility: CLINIC | Age: 70
End: 2020-06-03
Payer: COMMERCIAL

## 2020-06-03 VITALS
TEMPERATURE: 97.7 F | BODY MASS INDEX: 34.93 KG/M2 | RESPIRATION RATE: 17 BRPM | WEIGHT: 197.2 LBS | DIASTOLIC BLOOD PRESSURE: 60 MMHG | SYSTOLIC BLOOD PRESSURE: 110 MMHG | HEART RATE: 72 BPM

## 2020-06-03 DIAGNOSIS — H91.91 HEARING TROUBLE, RIGHT: ICD-10-CM

## 2020-06-03 DIAGNOSIS — H61.21 IMPACTED CERUMEN OF RIGHT EAR: Primary | ICD-10-CM

## 2020-06-03 PROCEDURE — 99213 OFFICE O/P EST LOW 20 MIN: CPT | Performed by: FAMILY MEDICINE

## 2020-06-03 ASSESSMENT — PAIN SCALES - GENERAL: PAINLEVEL: MODERATE PAIN (5)

## 2020-06-03 NOTE — PROGRESS NOTES
Subjective     Rosita King is a 69 year old female who presents to clinic today for the following health issues:    HPI   ENT Symptoms             Symptoms: cc Present Absent Comment   Fever/Chills   x    Fatigue  x     Muscle Aches  x  Has this sx all the time   Eye Irritation   x    Sneezing   x    Nasal Daniel/Drg   x    Sinus Pressure/Pain   x    Loss of smell   x    Dental pain  x  R jaw   Sore Throat   x    Swollen Glands  x  Tender not sure if swollen   Ear Pain/Fullness  x  Right, crackling and ringing   Cough   x    Wheeze   x    Chest Pain   x    Shortness of breath  x  Gets winded easy but not a new issue   Rash  x  Bra line   Other  x  Deer tick - treated with Doxy finished 7 day course yesterday     Symptom duration:  2 days   Symptom severity:  moderate   Treatments tried:  finished Doxy for possible deer tick, tried peroxide in ear x1 (bottle 10-year-old)   Contacts:         Patient Active Problem List   Diagnosis     CARDIOVASCULAR SCREENING; LDL GOAL LESS THAN 130     Vitamin D deficiency     ASHLEY (obstructive sleep apnea)     Generalized anxiety disorder     Hyperlipidemia LDL goal <130     Diverticulosis of large intestine without hemorrhage     H. pylori infection     Essential hypertension with goal blood pressure less than 140/90     Gastroesophageal reflux disease without esophagitis     Paroxysmal supraventricular tachycardia (H)     Lichen sclerosus     Parotid mass     Urge incontinence     Other psoriasis     Intertrigo     Hiatal hernia     Carpal tunnel syndrome     Pleomorphic adenoma of parotid gland     Past Surgical History:   Procedure Laterality Date     CARPAL TUNNEL RELEASE RT/LT Bilateral 2006, 2009     COLONOSCOPY N/A 11/25/2015    Procedure: COLONOSCOPY;  Surgeon: Mina Roy MD;  Location: [facility]     ESOPHAGOSCOPY, GASTROSCOPY, DUODENOSCOPY (EGD), COMBINED N/A 11/25/2015    Procedure: COMBINED ESOPHAGOSCOPY, GASTROSCOPY, DUODENOSCOPY (EGD), BIOPSY SINGLE OR  MULTIPLE;  Surgeon: Mina Roy MD;  Location: [facility]     PAROTIDECTOMY Left 10/31/2019    Procedure: LEFT SUPERFICIAL PAROTIDECTOMY WITH FACIAL NERVE MONITORING;  Surgeon: Atif Salazar MD;  Location: [facility]     TONSILLECTOMY & ADENOIDECTOMY  age 13       Social History     Tobacco Use     Smoking status: Former Smoker     Smokeless tobacco: Never Used   Substance Use Topics     Alcohol use: Yes     Comment: ocassional     Family History   Problem Relation Age of Onset     Liver Disease Mother      Depression/Anxiety Father      Diabetes Father      Diabetes Sister      Liver Disease Sister      Cancer Sister          Current Outpatient Medications   Medication Sig Dispense Refill     hydrochlorothiazide (HYDRODIURIL) 25 MG tablet Take 1 tablet (25 mg) by mouth every morning 90 tablet 1     lisinopril (PRINIVIL/ZESTRIL) 10 MG tablet Take 1 tablet (10 mg) by mouth daily (Patient taking differently: Take 10 mg by mouth every morning ) 90 tablet 3     metoprolol succinate ER (TOPROL-XL) 50 MG 24 hr tablet TAKE 1 TABLET BY MOUTH ONCE DAILY 90 tablet 0     omeprazole (PRILOSEC) 20 MG DR capsule TAKE 1 CAPSULE BY MOUTH ONCE DAILY 90 capsule 3     VITAMIN D, CHOLECALCIFEROL, PO Take 1,000 Units by mouth daily        fluocinonide (LIDEX) 0.05 % external solution Apply sparingly to affected area twice daily as needed.  Do not apply to face. (Patient not taking: Reported on 6/3/2020) 60 mL 3     Multiple Vitamins-Minerals (CENTRUM SILVER) per tablet Take 1 tablet by mouth daily       tiZANidine (ZANAFLEX) 2 MG tablet Take 1 tablet (2 mg) by mouth 3 times daily as needed for muscle spasms or other (neck tension) (Patient not taking: Reported on 6/3/2020) 20 tablet 1     Allergies   Allergen Reactions     Penicillins      Pravastatin      Other reaction(s): Myalgia     Simvastatin      Other reaction(s): Myalgia     Sulfa Drugs      Recent Labs   Lab Test 11/26/19  1209 10/31/19  1309  04/23/19  0942 08/24/17  1051   10/13/15  0945   A1C 5.8*  --   --   --  5.5  --   --    LDL  --   --   --  182* 192*  --  143*   HDL  --   --   --  58 57  --  58   TRIG  --   --   --  155* 117  --  84   ALT 35  --   --  36  --   --  35   CR 0.75 0.72   < > 0.67  --    < > 0.67   GFRESTIMATED 81 85   < > >90  --    < > 89   GFRESTBLACK >90 >90   < > >90  --    < > >90   GFR Calc     POTASSIUM 3.9 4.2   < > 4.3  --    < > 4.2   TSH  --   --   --  1.72  --   --  1.28    < > = values in this interval not displayed.      BP Readings from Last 3 Encounters:   06/03/20 110/60   12/10/19 137/76   11/26/19 124/78    Wt Readings from Last 3 Encounters:   06/03/20 89.4 kg (197 lb 3.2 oz)   05/20/20 89.8 kg (198 lb)   11/26/19 86.2 kg (190 lb)                  Reviewed and updated as needed this visit by Provider         Review of Systems   Constitutional, HEENT, cardiovascular, pulmonary, gi and gu systems are negative, except as otherwise noted.      Objective    /60 (BP Location: Right arm, Patient Position: Chair, Cuff Size: Adult Large)   Pulse 72   Temp 97.7  F (36.5  C) (Tympanic)   Resp 17   Wt 89.4 kg (197 lb 3.2 oz)   Breastfeeding No   BMI 34.93 kg/m    Body mass index is 34.93 kg/m .  Physical Exam   GENERAL: alert and no distress  EYES: Eyes grossly normal to inspection, PERRL and conjunctivae and sclerae normal  HENT: normal cephalic/atraumatic, right ear: occluded with wax, left ear: normal: no effusions, no erythema, normal landmarks, nose and mouth without ulcers or lesions, oropharynx clear and oral mucous membranes moist  NECK: no adenopathy, no asymmetry, masses, or scars and thyroid normal to palpation  RESP: lungs clear to auscultation - no rales, rhonchi or wheezes  CV: regular rate and rhythm, normal S1 S2, no S3 or S4  MS: no gross musculoskeletal defects noted, no edema      Assessment & Plan       ICD-10-CM    1. Impacted cerumen of right ear  H61.21    2. Hearing trouble, right  H91.91        (H61.21)  Impacted cerumen of right ear  (primary encounter diagnosis)  Comment: Physical examination remarkable for impacted right ear with cerumen, tried to clean with saline irrigation by MA, procedure aborted due to pain.  Suggested to use carbamide peroxide twice daily and follow-up on Friday.  Instructed to avoid using Q-tips.  Patient understood and in agreement with above plan.  All question answered.        Patient Instructions     Patient Education       Earwax, Home Treatment    Everyone produces earwax from the lining of the ear canal. It serves to lubricate and protect the ear. The wax that forms in the canal naturally moves toward the outside of the ear and falls out. Sometimes the ear canal may contain too much wax. This can cause a blockage and loss of hearing. Directions are given below for home treatment.  Home care  If your doctor has advised you to remove a wax blockage yourself, follow these directions:    Unless a medicine was prescribed, you may use an over-the-counter product made for clearing earwax. These contain carbamide peroxide. Lie down with the blocked ear facing upward. Apply one dropper full of medicine and wait a few minutes. Grasp the outer ear and wiggle it to help the solution enter the canal.    Lean over a sink or basin with the blocked ear facing downward. Use a bulb syringe filled with warm (not hot or cold) water to rinse the ear several times. Use gentle pressure only.    If you are having trouble draining the water out of your ear canal, put a few drops of rubbing alcohol (isopropyl alcohol) into the ear canal. This will help remove the remaining water.    Repeat this procedure once a day for up to three days, or until your hearing is back to normal. Do not use this treatment for more than three days in a row.  Don ts    Don t use cold water to rinse the ear. This will make you dizzy.    Don t perform this procedure if you have an ear infection.    Don t perform this procedure if  you have a ruptured eardrum.    Don t use cotton swabs, matches, hairpins, keys, or other objects to  clean  the ear canal. This can cause infection of the ear canal or rupture the eardrum. Because of their size and shape, cotton swabs can push earwax deeper into the ear canal instead of removing it.  Follow-up care  Follow up with your health care provider if you are not improving after three cleaning attempts, or as advised.  When to seek medical advice  Call your health care provider right away if any of these occur:    Worsening ear pain    Fever of 101 F (38.3 C) or higher, or as directed by your health care provider    Hearing does not return to normal after three days of treatment    Fluid drainage or bleeding from the ear canal    Swelling, redness, or tenderness of the outer ear    Headache, neck pain, or stiff neck    [id number] [employer]. [address] [place] [id number]. All rights reserved. This information is not intended as a substitute for professional medical care. Always follow your healthcare professional's instructions.               Kiran Marx MD  [facility]

## 2020-06-03 NOTE — NURSING NOTE
"Chief Complaint   Patient presents with     Ear Problem       Initial /60 (BP Location: Right arm, Patient Position: Chair, Cuff Size: Adult Large)   Pulse 72   Temp 97.7  F (36.5  C) (Tympanic)   Resp 17   Wt 89.4 kg (197 lb 3.2 oz)   Breastfeeding No   BMI 34.93 kg/m   Estimated body mass index is 34.93 kg/m  as calculated from the following:    Height as of 5/20/20: 1.6 m (5' 3\").    Weight as of this encounter: 89.4 kg (197 lb 3.2 oz).    Janet Jones CMA      "

## 2020-06-03 NOTE — PATIENT INSTRUCTIONS
Patient Education       Earwax, Home Treatment    Everyone produces earwax from the lining of the ear canal. It serves to lubricate and protect the ear. The wax that forms in the canal naturally moves toward the outside of the ear and falls out. Sometimes the ear canal may contain too much wax. This can cause a blockage and loss of hearing. Directions are given below for home treatment.  Home care  If your doctor has advised you to remove a wax blockage yourself, follow these directions:    Unless a medicine was prescribed, you may use an over-the-counter product made for clearing earwax. These contain carbamide peroxide. Lie down with the blocked ear facing upward. Apply one dropper full of medicine and wait a few minutes. Grasp the outer ear and wiggle it to help the solution enter the canal.    Lean over a sink or basin with the blocked ear facing downward. Use a bulb syringe filled with warm (not hot or cold) water to rinse the ear several times. Use gentle pressure only.    If you are having trouble draining the water out of your ear canal, put a few drops of rubbing alcohol (isopropyl alcohol) into the ear canal. This will help remove the remaining water.    Repeat this procedure once a day for up to three days, or until your hearing is back to normal. Do not use this treatment for more than three days in a row.  Don ts    Don t use cold water to rinse the ear. This will make you dizzy.    Don t perform this procedure if you have an ear infection.    Don t perform this procedure if you have a ruptured eardrum.    Don t use cotton swabs, matches, hairpins, keys, or other objects to  clean  the ear canal. This can cause infection of the ear canal or rupture the eardrum. Because of their size and shape, cotton swabs can push earwax deeper into the ear canal instead of removing it.  Follow-up care  Follow up with your health care provider if you are not improving after three cleaning attempts, or as  advised.  When to seek medical advice  Call your health care provider right away if any of these occur:    Worsening ear pain    Fever of 101 F (38.3 C) or higher, or as directed by your health care provider    Hearing does not return to normal after three days of treatment    Fluid drainage or bleeding from the ear canal    Swelling, redness, or tenderness of the outer ear    Headache, neck pain, or stiff neck    [id number] [employer]. [address] [place] [address], [place] [id number]. All rights reserved. This information is not intended as a substitute for professional medical care. Always follow your healthcare professional's instructions.

## 2020-06-05 ENCOUNTER — OFFICE VISIT (OUTPATIENT)
Dept: FAMILY MEDICINE | Facility: CLINIC | Age: 70
End: 2020-06-05
Payer: COMMERCIAL

## 2020-06-05 VITALS
HEART RATE: 64 BPM | HEIGHT: 63 IN | DIASTOLIC BLOOD PRESSURE: 72 MMHG | WEIGHT: 197 LBS | SYSTOLIC BLOOD PRESSURE: 132 MMHG | BODY MASS INDEX: 34.91 KG/M2 | TEMPERATURE: 99.4 F | RESPIRATION RATE: 18 BRPM

## 2020-06-05 DIAGNOSIS — H66.001 ACUTE SUPPURATIVE OTITIS MEDIA OF RIGHT EAR WITHOUT SPONTANEOUS RUPTURE OF TYMPANIC MEMBRANE, RECURRENCE NOT SPECIFIED: Primary | ICD-10-CM

## 2020-06-05 DIAGNOSIS — H61.21 IMPACTED CERUMEN OF RIGHT EAR: ICD-10-CM

## 2020-06-05 PROCEDURE — 69209 REMOVE IMPACTED EAR WAX UNI: CPT | Mod: RT | Performed by: FAMILY MEDICINE

## 2020-06-05 PROCEDURE — 99213 OFFICE O/P EST LOW 20 MIN: CPT | Mod: 25 | Performed by: FAMILY MEDICINE

## 2020-06-05 RX ORDER — CEFDINIR 300 MG/1
300 CAPSULE ORAL 2 TIMES DAILY
Qty: 20 CAPSULE | Refills: 0 | Status: SHIPPED | OUTPATIENT
Start: 2020-06-05 | End: 2020-06-22

## 2020-06-05 ASSESSMENT — MIFFLIN-ST. JEOR: SCORE: 1387.72

## 2020-06-05 NOTE — PATIENT INSTRUCTIONS
Patient Education       Earwax, Home Treatment    Everyone produces earwax from the lining of the ear canal. It serves to lubricate and protect the ear. The wax that forms in the canal naturally moves toward the outside of the ear and falls out. Sometimes the ear canal may contain too much wax. This can cause a blockage and loss of hearing. Directions are given below for home treatment.  Home care  If your doctor has advised you to remove a wax blockage yourself, follow these directions:    Unless a medicine was prescribed, you may use an over-the-counter product made for clearing earwax. These contain carbamide peroxide. Lie down with the blocked ear facing upward. Apply one dropper full of medicine and wait a few minutes. Grasp the outer ear and wiggle it to help the solution enter the canal.    Lean over a sink or basin with the blocked ear facing downward. Use a bulb syringe filled with warm (not hot or cold) water to rinse the ear several times. Use gentle pressure only.    If you are having trouble draining the water out of your ear canal, put a few drops of rubbing alcohol (isopropyl alcohol) into the ear canal. This will help remove the remaining water.    Repeat this procedure once a day for up to three days, or until your hearing is back to normal. Do not use this treatment for more than three days in a row.  Don ts    Don t use cold water to rinse the ear. This will make you dizzy.    Don t perform this procedure if you have an ear infection.    Don t perform this procedure if you have a ruptured eardrum.    Don t use cotton swabs, matches, hairpins, keys, or other objects to  clean  the ear canal. This can cause infection of the ear canal or rupture the eardrum. Because of their size and shape, cotton swabs can push earwax deeper into the ear canal instead of removing it.  Follow-up care  Follow up with your health care provider if you are not improving after three cleaning attempts, or as  advised.  When to seek medical advice  Call your health care provider right away if any of these occur:    Worsening ear pain    Fever of 101 F (38.3 C) or higher, or as directed by your health care provider    Hearing does not return to normal after three days of treatment    Fluid drainage or bleeding from the ear canal    Swelling, redness, or tenderness of the outer ear    Headache, neck pain, or stiff neck    1558-6876 [employer]. [address] [place] [address], [place] [address]. All rights reserved. This information is not intended as a substitute for professional medical care. Always follow your healthcare professional's instructions.           Patient Education     Otitis Media (Middle-Ear Infection) in Adults  Otitis media is another name for a middle-ear infection. It means an infection behind your eardrum. This kind of ear infection can happen after any condition that keeps fluid from draining from the middle ear. These conditions include allergies, a cold, a sore throat, or a respiratory infection.  Middle-ear infections are common in children, but they can also happen in adults. An ear infection in an adult may mean a more serious problem than in a child. So you may need additional tests. If you have an ear infection, you should see your health care provider for treatment.  What are the types of middle-ear infections?  Infections can affect the middle ear in several ways. They are:    Acute otitis media. This middle-ear infection occurs suddenly. It causes swelling and redness. Fluid and mucus become trapped inside the ear. You can have a fever and ear pain.    Otitis media with effusion. Fluid (effusion) and mucus build up in the middle ear after the infection goes away. You may feel like your middle ear is full. This can continue for months and may affect your hearing.    Chronic otitis media with effusion. Fluid (effusion) remains in the middle ear for a long time. Or it builds up again and  again, even though there is no infection. This type of middle-ear infection may be hard to treat. It may also affect your hearing.  Who is more likely to get a middle-ear infection?  You are more likely to get an ear infection if you:    Smoke or are around someone who smokes    Have seasonal or year-round allergy symptoms    Have a cold or other upper respiratory infection  What causes a middle-ear infection?  The middle ear connects to the throat by a canal called the eustachian tube. This tube helps even out the pressure between the outer ear and the inner ear. A cold or allergy can irritate the tube or cause the area around it to swell. This can keep fluid from draining from the middle ear. The fluid builds up behind the eardrum. Bacteria and viruses can grow in this fluid. The bacteria and viruses cause the middle-ear infection.  What are the symptoms of a middle-ear infection?  Common symptoms of a middle-ear infection in adults are:    Pain in 1 or both ears    Drainage from the ear    Muffled hearing    Sore throat   You may also have a fever. Rarely, your balance can be affected.  These symptoms may be the same as for other conditions. It s important to talk with your health care provider if you think you have a middle-ear infection. If you have a high fever, severe pain behind your ear, or paralysis in your face, see your provider as soon as you can.  How is a middle-ear infection diagnosed?  Your health care provider will take a medical history and do a physical exam. He or she will look at the outer ear and eardrum with an otoscope. The otoscope is a lighted tool that lets your provider see inside the ear. A pneumatic otoscope blows a puff of air into the ear to check how well your eardrum moves. If you eardrum doesn t move well, it may mean you have fluid behind it.  Your provider may also do a test called tympanometry. This test tells how well the middle ear is working. It can find any changes in  pressure in the middle ear. Your provider may test your hearing with a tuning fork.  How is a middle-ear infection treated?  A middle-ear infection may be treated with:    Antibiotics, taken by mouth or as ear drops    Medication for pain    Decongestants, antihistamines, or nasal steroids  Your health care provider may also have you try autoinsufflation. This helps adjust the air pressure in your ear. For this, you pinch your nose and gently exhale. This forces air back through the eustachian tube.  The exact treatment for your ear infection will depend on the type of infection you have. In general, if your symptoms don t get better in 48 to 72 hours, contact your health care provider.  Middle-ear infections can cause long-term problems if not treated. They can lead to:    Infection in other parts of the head    Permanent hearing loss    Paralysis of a nerve in your face  If you have a middle-ear infection that doesn t get better, you may need to see an ear, nose, and throat specialist (otolaryngologist). You may need a CT scan or MRI to check for head and neck cancer.  Ear tubes  Sometimes fluid stays in the middle ear even after you take antibiotics and the infection goes away. In this case, your health care provider may suggest that a small tube be placed in your ear. The tube is put at the opening of the eardrum. The tube keeps fluid from building up and relieves pressure in the middle ear. It can also help you hear better. This surgery is called myringotomy. It is not often done in adults.  The tubes usually fall out on their own after 6 months to a year.    [id number] [employer]. [address] [place] [address], [place] [address]. All rights reserved. This information is not intended as a substitute for professional medical care. Always follow your healthcare professional's instructions.

## 2020-06-05 NOTE — PROGRESS NOTES
SUBJECTIVE   Rosita King is a 69 year old female who presents with     Ear recheck       Duration: Recheck from 6/3/2020    Description (location/character/radiation): Right ear     Intensity:  moderate    Accompanying signs and symptoms: says it feels better.  Still can't hear out of it very well.     History (similar episodes/previous evaluation): None    Precipitating or alleviating factors: None    Therapies tried and outcome: wax softener          PCP   Addis Del Rio [phone]    Health Maintenance        Health Maintenance Due   Topic Date Due     DEXA  1950     ADVANCE CARE PLANNING  1950     ZOSTER IMMUNIZATION (2 of 3) 07/14/2011     DTAP/TDAP/TD IMMUNIZATION (2 - Td) 06/09/2016     PHQ-2  01/01/2020     MEDICARE ANNUAL WELLNESS VISIT  04/23/2020     FALL RISK ASSESSMENT  04/23/2020       HPI        Patient Active Problem List   Diagnosis     CARDIOVASCULAR SCREENING; LDL GOAL LESS THAN 130     Vitamin D deficiency     ASHLEY (obstructive sleep apnea)     Generalized anxiety disorder     Hyperlipidemia LDL goal <130     Diverticulosis of large intestine without hemorrhage     H. pylori infection     Essential hypertension with goal blood pressure less than 140/90     Gastroesophageal reflux disease without esophagitis     Paroxysmal supraventricular tachycardia (H)     Lichen sclerosus     Parotid mass     Urge incontinence     Other psoriasis     Intertrigo     Hiatal hernia     Carpal tunnel syndrome     Pleomorphic adenoma of parotid gland     Current Outpatient Medications   Medication     hydrochlorothiazide (HYDRODIURIL) 25 MG tablet     lisinopril (PRINIVIL/ZESTRIL) 10 MG tablet     metoprolol succinate ER (TOPROL-XL) 50 MG 24 hr tablet     Multiple Vitamins-Minerals (CENTRUM SILVER) per tablet     omeprazole (PRILOSEC) 20 MG DR capsule     tiZANidine (ZANAFLEX) 2 MG tablet     VITAMIN D, CHOLECALCIFEROL, PO     fluocinonide (LIDEX) 0.05 % external solution     No current  facility-administered medications for this visit.        Patient Active Problem List   Diagnosis     CARDIOVASCULAR SCREENING; LDL GOAL LESS THAN 130     Vitamin D deficiency     ASHLEY (obstructive sleep apnea)     Generalized anxiety disorder     Hyperlipidemia LDL goal <130     Diverticulosis of large intestine without hemorrhage     H. pylori infection     Essential hypertension with goal blood pressure less than 140/90     Gastroesophageal reflux disease without esophagitis     Paroxysmal supraventricular tachycardia (H)     Lichen sclerosus     Parotid mass     Urge incontinence     Other psoriasis     Intertrigo     Hiatal hernia     Carpal tunnel syndrome     Pleomorphic adenoma of parotid gland     Past Surgical History:   Procedure Laterality Date     CARPAL TUNNEL RELEASE RT/LT Bilateral 2006, 2009     COLONOSCOPY N/A 11/25/2015    Procedure: COLONOSCOPY;  Surgeon: Mina Roy MD;  Location: [facility]     ESOPHAGOSCOPY, GASTROSCOPY, DUODENOSCOPY (EGD), COMBINED N/A 11/25/2015    Procedure: COMBINED ESOPHAGOSCOPY, GASTROSCOPY, DUODENOSCOPY (EGD), BIOPSY SINGLE OR MULTIPLE;  Surgeon: Mina Roy MD;  Location: [facility]     PAROTIDECTOMY Left 10/31/2019    Procedure: LEFT SUPERFICIAL PAROTIDECTOMY WITH FACIAL NERVE MONITORING;  Surgeon: Atif Salazar MD;  Location: [facility]     TONSILLECTOMY & ADENOIDECTOMY  age 13       Social History     Tobacco Use     Smoking status: Former Smoker     Smokeless tobacco: Never Used   Substance Use Topics     Alcohol use: Yes     Comment: ocassional     Family History   Problem Relation Age of Onset     Liver Disease Mother      Depression/Anxiety Father      Diabetes Father      Diabetes Sister      Liver Disease Sister      Cancer Sister          Current Outpatient Medications   Medication Sig Dispense Refill     cefdinir (OMNICEF) 300 MG capsule Take 1 capsule (300 mg) by mouth 2 times daily for 10 days 20 capsule 0     hydrochlorothiazide (HYDRODIURIL) 25  MG tablet Take 1 tablet (25 mg) by mouth every morning 90 tablet 1     lisinopril (PRINIVIL/ZESTRIL) 10 MG tablet Take 1 tablet (10 mg) by mouth daily (Patient taking differently: Take 10 mg by mouth every morning ) 90 tablet 3     metoprolol succinate ER (TOPROL-XL) 50 MG 24 hr tablet TAKE 1 TABLET BY MOUTH ONCE DAILY 90 tablet 0     Multiple Vitamins-Minerals (CENTRUM SILVER) per tablet Take 1 tablet by mouth daily       omeprazole (PRILOSEC) 20 MG DR capsule TAKE 1 CAPSULE BY MOUTH ONCE DAILY 90 capsule 3     tiZANidine (ZANAFLEX) 2 MG tablet Take 1 tablet (2 mg) by mouth 3 times daily as needed for muscle spasms or other (neck tension) 20 tablet 1     VITAMIN D, CHOLECALCIFEROL, PO Take 1,000 Units by mouth daily        fluocinonide (LIDEX) 0.05 % external solution Apply sparingly to affected area twice daily as needed.  Do not apply to face. (Patient not taking: Reported on 6/3/2020) 60 mL 3     Allergies   Allergen Reactions     Penicillins      Pravastatin      Other reaction(s): Myalgia     Simvastatin      Other reaction(s): Myalgia     Sulfa Drugs      Recent Labs   Lab Test 11/26/19  1209 10/31/19  1309  04/23/19  0942 08/24/17  1051  10/13/15  0945   A1C 5.8*  --   --   --  5.5  --   --    LDL  --   --   --  182* 192*  --  143*   HDL  --   --   --  58 57  --  58   TRIG  --   --   --  155* 117  --  84   ALT 35  --   --  36  --   --  35   CR 0.75 0.72   < > 0.67  --    < > 0.67   GFRESTIMATED 81 85   < > >90  --    < > 89   GFRESTBLACK >90 >90   < > >90  --    < > >90   GFR Calc     POTASSIUM 3.9 4.2   < > 4.3  --    < > 4.2   TSH  --   --   --  1.72  --   --  1.28    < > = values in this interval not displayed.      BP Readings from Last 3 Encounters:   06/05/20 132/72   06/03/20 110/60   12/10/19 137/76    Wt Readings from Last 3 Encounters:   06/05/20 89.4 kg (197 lb)   06/03/20 89.4 kg (197 lb 3.2 oz)   05/20/20 89.8 kg (198 lb)                    Reviewed and updated:  Tobacco   "Allergies  Meds  Med Hx  Surg Hx  Fam Hx  Soc Hx     ROS:  Constitutional, HEENT, cardiovascular, pulmonary, gi and gu systems are negative, except as otherwise noted.    PHYSICAL EXAM   /72 (Cuff Size: Adult Regular)   Pulse 64   Temp 99.4  F (37.4  C) (Tympanic)   Resp 18   Ht 1.6 m (5' 3\")   Wt 89.4 kg (197 lb)   Breastfeeding No   BMI 34.90 kg/m    Body mass index is 34.9 kg/m .  GENERAL: alert and no distress  EYES: Eyes grossly normal to inspection, PERRL and conjunctivae and sclerae normal  HENT: normal cephalic/atraumatic, right ear: erythematous, bulging membrane, mucopurulent effusion and occluded with wax, left ear: normal: no effusions, no erythema, normal landmarks, nose and mouth without ulcers or lesions, oropharynx clear and oral mucous membranes moist  NECK: no adenopathy, no asymmetry, masses, or scars and thyroid normal to palpation  RESP: lungs clear to auscultation - no rales, rhonchi or wheezes  CV: regular rates and rhythm, normal S1 S2, no S3 or S4 and no murmur, click or rub  MS: no gross musculoskeletal defects noted, no edema    Assessment & Plan     (H66.001) Acute suppurative otitis media of right ear without spontaneous rupture of tympanic membrane, recurrence not specified  (primary encounter diagnosis)  Comment: Impacted cerumen cleaned with saline irrigation by MA.  Tympanic membrane erythematous, bulging with effusion.  Omnicef prescribed for suspected otitis media, common side effects discussed.  Suggested to avoid using Q-tips.  Follow-up in 1 week or earlier if needed.       (H61.21) Impacted cerumen of right ear  Comment:   Plan: REMOVE IMPACTED CERUMEN, cefdinir (OMNICEF) 300        MG capsule                 Patient Instructions     Patient Education       Earwax, Home Treatment    Everyone produces earwax from the lining of the ear canal. It serves to lubricate and protect the ear. The wax that forms in the canal naturally moves toward the outside of the ear " and falls out. Sometimes the ear canal may contain too much wax. This can cause a blockage and loss of hearing. Directions are given below for home treatment.  Home care  If your doctor has advised you to remove a wax blockage yourself, follow these directions:    Unless a medicine was prescribed, you may use an over-the-counter product made for clearing earwax. These contain carbamide peroxide. Lie down with the blocked ear facing upward. Apply one dropper full of medicine and wait a few minutes. Grasp the outer ear and wiggle it to help the solution enter the canal.    Lean over a sink or basin with the blocked ear facing downward. Use a bulb syringe filled with warm (not hot or cold) water to rinse the ear several times. Use gentle pressure only.    If you are having trouble draining the water out of your ear canal, put a few drops of rubbing alcohol (isopropyl alcohol) into the ear canal. This will help remove the remaining water.    Repeat this procedure once a day for up to three days, or until your hearing is back to normal. Do not use this treatment for more than three days in a row.  Don ts    Don t use cold water to rinse the ear. This will make you dizzy.    Don t perform this procedure if you have an ear infection.    Don t perform this procedure if you have a ruptured eardrum.    Don t use cotton swabs, matches, hairpins, keys, or other objects to  clean  the ear canal. This can cause infection of the ear canal or rupture the eardrum. Because of their size and shape, cotton swabs can push earwax deeper into the ear canal instead of removing it.  Follow-up care  Follow up with your health care provider if you are not improving after three cleaning attempts, or as advised.  When to seek medical advice  Call your health care provider right away if any of these occur:    Worsening ear pain    Fever of 101 F (38.3 C) or higher, or as directed by your health care provider    Hearing does not return to normal  after three days of treatment    Fluid drainage or bleeding from the ear canal    Swelling, redness, or tenderness of the outer ear    Headache, neck pain, or stiff neck    [id number] [employer]. [address] [place] [address], [place] [address]. All rights reserved. This information is not intended as a substitute for professional medical care. Always follow your healthcare professional's instructions.           Patient Education     Otitis Media (Middle-Ear Infection) in Adults  Otitis media is another name for a middle-ear infection. It means an infection behind your eardrum. This kind of ear infection can happen after any condition that keeps fluid from draining from the middle ear. These conditions include allergies, a cold, a sore throat, or a respiratory infection.  Middle-ear infections are common in children, but they can also happen in adults. An ear infection in an adult may mean a more serious problem than in a child. So you may need additional tests. If you have an ear infection, you should see your health care provider for treatment.  What are the types of middle-ear infections?  Infections can affect the middle ear in several ways. They are:    Acute otitis media. This middle-ear infection occurs suddenly. It causes swelling and redness. Fluid and mucus become trapped inside the ear. You can have a fever and ear pain.    Otitis media with effusion. Fluid (effusion) and mucus build up in the middle ear after the infection goes away. You may feel like your middle ear is full. This can continue for months and may affect your hearing.    Chronic otitis media with effusion. Fluid (effusion) remains in the middle ear for a long time. Or it builds up again and again, even though there is no infection. This type of middle-ear infection may be hard to treat. It may also affect your hearing.  Who is more likely to get a middle-ear infection?  You are more likely to get an ear infection if you:    Smoke or  are around someone who smokes    Have seasonal or year-round allergy symptoms    Have a cold or other upper respiratory infection  What causes a middle-ear infection?  The middle ear connects to the throat by a canal called the eustachian tube. This tube helps even out the pressure between the outer ear and the inner ear. A cold or allergy can irritate the tube or cause the area around it to swell. This can keep fluid from draining from the middle ear. The fluid builds up behind the eardrum. Bacteria and viruses can grow in this fluid. The bacteria and viruses cause the middle-ear infection.  What are the symptoms of a middle-ear infection?  Common symptoms of a middle-ear infection in adults are:    Pain in 1 or both ears    Drainage from the ear    Muffled hearing    Sore throat   You may also have a fever. Rarely, your balance can be affected.  These symptoms may be the same as for other conditions. It s important to talk with your health care provider if you think you have a middle-ear infection. If you have a high fever, severe pain behind your ear, or paralysis in your face, see your provider as soon as you can.  How is a middle-ear infection diagnosed?  Your health care provider will take a medical history and do a physical exam. He or she will look at the outer ear and eardrum with an otoscope. The otoscope is a lighted tool that lets your provider see inside the ear. A pneumatic otoscope blows a puff of air into the ear to check how well your eardrum moves. If you eardrum doesn t move well, it may mean you have fluid behind it.  Your provider may also do a test called tympanometry. This test tells how well the middle ear is working. It can find any changes in pressure in the middle ear. Your provider may test your hearing with a tuning fork.  How is a middle-ear infection treated?  A middle-ear infection may be treated with:    Antibiotics, taken by mouth or as ear drops    Medication for  pain    Decongestants, antihistamines, or nasal steroids  Your health care provider may also have you try autoinsufflation. This helps adjust the air pressure in your ear. For this, you pinch your nose and gently exhale. This forces air back through the eustachian tube.  The exact treatment for your ear infection will depend on the type of infection you have. In general, if your symptoms don t get better in 48 to 72 hours, contact your health care provider.  Middle-ear infections can cause long-term problems if not treated. They can lead to:    Infection in other parts of the head    Permanent hearing loss    Paralysis of a nerve in your face  If you have a middle-ear infection that doesn t get better, you may need to see an ear, nose, and throat specialist (otolaryngologist). You may need a CT scan or MRI to check for head and neck cancer.  Ear tubes  Sometimes fluid stays in the middle ear even after you take antibiotics and the infection goes away. In this case, your health care provider may suggest that a small tube be placed in your ear. The tube is put at the opening of the eardrum. The tube keeps fluid from building up and relieves pressure in the middle ear. It can also help you hear better. This surgery is called myringotomy. It is not often done in adults.  The tubes usually fall out on their own after 6 months to a year.    [id number] [employer]. [address] [place] [address]. All rights reserved. This information is not intended as a substitute for professional medical care. Always follow your healthcare professional's instructions.                 Kiran Marx MD  [facility]

## 2020-06-05 NOTE — NURSING NOTE
"Chief Complaint   Patient presents with     Ear Problem     /72 (Cuff Size: Adult Regular)   Pulse 64   Temp 99.4  F (37.4  C) (Tympanic)   Resp 18   Ht 1.6 m (5' 3\")   Wt 89.4 kg (197 lb)   Breastfeeding No   BMI 34.90 kg/m   Estimated body mass index is 34.9 kg/m  as calculated from the following:    Height as of this encounter: 1.6 m (5' 3\").    Weight as of this encounter: 89.4 kg (197 lb).  Patient presents to the clinic using No DME      Health Maintenance that is potentially due pending provider review:    Health Maintenance Due   Topic Date Due     DEXA  1950     ADVANCE CARE PLANNING  1950     ZOSTER IMMUNIZATION (2 of 3) 07/14/2011     DTAP/TDAP/TD IMMUNIZATION (2 - Td) 06/09/2016     PHQ-2  01/01/2020     MEDICARE ANNUAL WELLNESS VISIT  04/23/2020     FALL RISK ASSESSMENT  04/23/2020                "

## 2020-06-22 ENCOUNTER — OFFICE VISIT (OUTPATIENT)
Dept: FAMILY MEDICINE | Facility: CLINIC | Age: 70
End: 2020-06-22
Payer: COMMERCIAL

## 2020-06-22 VITALS
TEMPERATURE: 97.8 F | SYSTOLIC BLOOD PRESSURE: 132 MMHG | HEIGHT: 63 IN | WEIGHT: 197 LBS | DIASTOLIC BLOOD PRESSURE: 78 MMHG | HEART RATE: 64 BPM | BODY MASS INDEX: 34.91 KG/M2 | RESPIRATION RATE: 18 BRPM

## 2020-06-22 DIAGNOSIS — H66.001 ACUTE SUPPURATIVE OTITIS MEDIA OF RIGHT EAR WITHOUT SPONTANEOUS RUPTURE OF TYMPANIC MEMBRANE, RECURRENCE NOT SPECIFIED: Primary | ICD-10-CM

## 2020-06-22 PROCEDURE — 99212 OFFICE O/P EST SF 10 MIN: CPT | Performed by: FAMILY MEDICINE

## 2020-06-22 ASSESSMENT — MIFFLIN-ST. JEOR: SCORE: 1387.72

## 2020-06-22 NOTE — NURSING NOTE
"Chief Complaint   Patient presents with     Ear Problem     /78 (Cuff Size: Adult Regular)   Pulse 64   Temp 97.8  F (36.6  C) (Tympanic)   Resp 18   Ht 1.6 m (5' 3\")   Wt 89.4 kg (197 lb)   Breastfeeding No   BMI 34.90 kg/m   Estimated body mass index is 34.9 kg/m  as calculated from the following:    Height as of this encounter: 1.6 m (5' 3\").    Weight as of this encounter: 89.4 kg (197 lb).  Patient presents to the clinic using No DME      Health Maintenance that is potentially due pending provider review:    Health Maintenance Due   Topic Date Due     DEXA  1950     ADVANCE CARE PLANNING  1950     ZOSTER IMMUNIZATION (2 of 3) 07/14/2011     DTAP/TDAP/TD IMMUNIZATION (2 - Td) 06/09/2016     MEDICARE ANNUAL WELLNESS VISIT  04/23/2020     FALL RISK ASSESSMENT  04/23/2020                "

## 2020-06-22 NOTE — PROGRESS NOTES
SUBJECTIVE   Rosita King is a 69 year old female who presents with     Ear Recheck       Duration: recheck from 6/3 & 6/5    Description (location/character/radiation): Right ear - ear infection     Intensity:  mild    Accompanying signs and symptoms: Feels a lot better. Its not echoing. Can hear better now     History (similar episodes/previous evaluation): None    Precipitating or alleviating factors: None    Therapies tried and outcome: Omnicef          PCP   Addis Del Rio [phone]    Health Maintenance        Health Maintenance Due   Topic Date Due     DEXA  1950     ADVANCE CARE PLANNING  1950     ZOSTER IMMUNIZATION (2 of 3) 07/14/2011     DTAP/TDAP/TD IMMUNIZATION (2 - Td) 06/09/2016     MEDICARE ANNUAL WELLNESS VISIT  04/23/2020     FALL RISK ASSESSMENT  04/23/2020       HPI        Patient Active Problem List   Diagnosis     CARDIOVASCULAR SCREENING; LDL GOAL LESS THAN 130     Vitamin D deficiency     ASHLEY (obstructive sleep apnea)     Generalized anxiety disorder     Hyperlipidemia LDL goal <130     Diverticulosis of large intestine without hemorrhage     H. pylori infection     Essential hypertension with goal blood pressure less than 140/90     Gastroesophageal reflux disease without esophagitis     Paroxysmal supraventricular tachycardia (H)     Lichen sclerosus     Parotid mass     Urge incontinence     Other psoriasis     Intertrigo     Hiatal hernia     Carpal tunnel syndrome     Pleomorphic adenoma of parotid gland     Current Outpatient Medications   Medication     hydrochlorothiazide (HYDRODIURIL) 25 MG tablet     lisinopril (PRINIVIL/ZESTRIL) 10 MG tablet     metoprolol succinate ER (TOPROL-XL) 50 MG 24 hr tablet     Multiple Vitamins-Minerals (CENTRUM SILVER) per tablet     omeprazole (PRILOSEC) 20 MG DR capsule     tiZANidine (ZANAFLEX) 2 MG tablet     VITAMIN D, CHOLECALCIFEROL, PO     fluocinonide (LIDEX) 0.05 % external solution     No current  facility-administered medications for this visit.        Patient Active Problem List   Diagnosis     CARDIOVASCULAR SCREENING; LDL GOAL LESS THAN 130     Vitamin D deficiency     ASHLEY (obstructive sleep apnea)     Generalized anxiety disorder     Hyperlipidemia LDL goal <130     Diverticulosis of large intestine without hemorrhage     H. pylori infection     Essential hypertension with goal blood pressure less than 140/90     Gastroesophageal reflux disease without esophagitis     Paroxysmal supraventricular tachycardia (H)     Lichen sclerosus     Parotid mass     Urge incontinence     Other psoriasis     Intertrigo     Hiatal hernia     Carpal tunnel syndrome     Pleomorphic adenoma of parotid gland     Past Surgical History:   Procedure Laterality Date     CARPAL TUNNEL RELEASE RT/LT Bilateral 2006, 2009     COLONOSCOPY N/A 11/25/2015    Procedure: COLONOSCOPY;  Surgeon: Mina Roy MD;  Location: [facility]     ESOPHAGOSCOPY, GASTROSCOPY, DUODENOSCOPY (EGD), COMBINED N/A 11/25/2015    Procedure: COMBINED ESOPHAGOSCOPY, GASTROSCOPY, DUODENOSCOPY (EGD), BIOPSY SINGLE OR MULTIPLE;  Surgeon: Mina Roy MD;  Location: [facility]     PAROTIDECTOMY Left 10/31/2019    Procedure: LEFT SUPERFICIAL PAROTIDECTOMY WITH FACIAL NERVE MONITORING;  Surgeon: Atif Salazar MD;  Location: [facility]     TONSILLECTOMY & ADENOIDECTOMY  age 13       Social History     Tobacco Use     Smoking status: Former Smoker     Smokeless tobacco: Never Used   Substance Use Topics     Alcohol use: Yes     Comment: ocassional     Family History   Problem Relation Age of Onset     Liver Disease Mother      Depression/Anxiety Father      Diabetes Father      Diabetes Sister      Liver Disease Sister      Cancer Sister          Current Outpatient Medications   Medication Sig Dispense Refill     hydrochlorothiazide (HYDRODIURIL) 25 MG tablet Take 1 tablet (25 mg) by mouth every morning 90 tablet 1     lisinopril (PRINIVIL/ZESTRIL) 10 MG  tablet Take 1 tablet (10 mg) by mouth daily (Patient taking differently: Take 10 mg by mouth every morning ) 90 tablet 3     metoprolol succinate ER (TOPROL-XL) 50 MG 24 hr tablet TAKE 1 TABLET BY MOUTH ONCE DAILY 90 tablet 0     Multiple Vitamins-Minerals (CENTRUM SILVER) per tablet Take 1 tablet by mouth daily       omeprazole (PRILOSEC) 20 MG DR capsule TAKE 1 CAPSULE BY MOUTH ONCE DAILY 90 capsule 3     tiZANidine (ZANAFLEX) 2 MG tablet Take 1 tablet (2 mg) by mouth 3 times daily as needed for muscle spasms or other (neck tension) 20 tablet 1     VITAMIN D, CHOLECALCIFEROL, PO Take 1,000 Units by mouth daily        fluocinonide (LIDEX) 0.05 % external solution Apply sparingly to affected area twice daily as needed.  Do not apply to face. (Patient not taking: Reported on 6/3/2020) 60 mL 3     Allergies   Allergen Reactions     Penicillins      Pravastatin      Other reaction(s): Myalgia     Simvastatin      Other reaction(s): Myalgia     Sulfa Drugs      Recent Labs   Lab Test 11/26/19  1209 10/31/19  1309  04/23/19  0942 08/24/17  1051  10/13/15  0945   A1C 5.8*  --   --   --  5.5  --   --    LDL  --   --   --  182* 192*  --  143*   HDL  --   --   --  58 57  --  58   TRIG  --   --   --  155* 117  --  84   ALT 35  --   --  36  --   --  35   CR 0.75 0.72   < > 0.67  --    < > 0.67   GFRESTIMATED 81 85   < > >90  --    < > 89   GFRESTBLACK >90 >90   < > >90  --    < > >90   GFR Calc     POTASSIUM 3.9 4.2   < > 4.3  --    < > 4.2   TSH  --   --   --  1.72  --   --  1.28    < > = values in this interval not displayed.      BP Readings from Last 3 Encounters:   06/22/20 132/78   06/05/20 132/72   06/03/20 110/60    Wt Readings from Last 3 Encounters:   06/22/20 89.4 kg (197 lb)   06/05/20 89.4 kg (197 lb)   06/03/20 89.4 kg (197 lb 3.2 oz)                    Reviewed and updated:  Tobacco  Allergies  Meds  Med Hx  Surg Hx  Fam Hx  Soc Hx     ROS:  Constitutional, HEENT, cardiovascular, pulmonary,  "gi and gu systems are negative, except as otherwise noted.    PHYSICAL EXAM   /78 (Cuff Size: Adult Regular)   Pulse 64   Temp 97.8  F (36.6  C) (Tympanic)   Resp 18   Ht 1.6 m (5' 3\")   Wt 89.4 kg (197 lb)   Breastfeeding No   BMI 34.90 kg/m    Body mass index is 34.9 kg/m .  GENERAL: alert and no distress  EYES: Eyes grossly normal to inspection, PERRL and conjunctivae and sclerae normal  HENT: normal cephalic/atraumatic, ear canals and TM's normal, nose and mouth without ulcers or lesions, oropharynx clear and oral mucous membranes moist  NECK: no adenopathy, no asymmetry, masses, or scars and thyroid normal to palpation    Assessment & Plan     (H66.001) Acute suppurative otitis media of right ear without spontaneous rupture of tympanic membrane, recurrence not specified  (primary encounter diagnosis)  Comment: Patient was treated for impacted cerumen and suppurative otitis media involving the right ear recently.  Patient has completed antibiotic course, feeling much better now.  Other medications reviewed and no changes made.  Home care discussed and all question answered.        Kiran Marx MD  [facility]          "

## 2020-07-22 ENCOUNTER — TELEPHONE (OUTPATIENT)
Dept: CARDIOLOGY | Facility: CLINIC | Age: 70
End: 2020-07-22

## 2020-07-22 DIAGNOSIS — I10 ESSENTIAL HYPERTENSION: Primary | ICD-10-CM

## 2020-07-22 RX ORDER — HYDROCHLOROTHIAZIDE 25 MG/1
25 TABLET ORAL EVERY MORNING
Qty: 90 TABLET | Refills: 3 | Status: SHIPPED | OUTPATIENT
Start: 2020-07-22 | End: 2020-08-14

## 2020-07-22 NOTE — TELEPHONE ENCOUNTER
Refill request received.  Noted pt due for annual follow up with lab.  Scheduled visit with Arelis and pt will call [facility] for lab appt.  JAMES COTTON RN on 7/22/2020 at 9:52 AM

## 2020-07-27 ENCOUNTER — ANCILLARY PROCEDURE (OUTPATIENT)
Dept: MAMMOGRAPHY | Facility: CLINIC | Age: 70
End: 2020-07-27
Attending: FAMILY MEDICINE
Payer: COMMERCIAL

## 2020-07-27 DIAGNOSIS — Z12.31 VISIT FOR SCREENING MAMMOGRAM: ICD-10-CM

## 2020-07-27 PROCEDURE — 77063 BREAST TOMOSYNTHESIS BI: CPT

## 2020-07-27 PROCEDURE — 77067 SCR MAMMO BI INCL CAD: CPT | Mod: TC

## 2020-08-06 ENCOUNTER — HOSPITAL ENCOUNTER (OUTPATIENT)
Dept: CARDIOLOGY | Facility: CLINIC | Age: 70
Discharge: HOME | End: 2020-08-06
Attending: PHYSICIAN ASSISTANT | Admitting: PHYSICIAN ASSISTANT
Payer: MEDICARE

## 2020-08-06 ENCOUNTER — OFFICE VISIT (OUTPATIENT)
Dept: FAMILY MEDICINE | Facility: CLINIC | Age: 70
End: 2020-08-06
Payer: COMMERCIAL

## 2020-08-06 ENCOUNTER — ANCILLARY PROCEDURE (OUTPATIENT)
Dept: GENERAL RADIOLOGY | Facility: CLINIC | Age: 70
End: 2020-08-06
Attending: FAMILY MEDICINE
Payer: COMMERCIAL

## 2020-08-06 VITALS
WEIGHT: 195 LBS | OXYGEN SATURATION: 96 % | RESPIRATION RATE: 16 BRPM | HEIGHT: 63 IN | BODY MASS INDEX: 34.55 KG/M2 | HEART RATE: 75 BPM | TEMPERATURE: 99.2 F | SYSTOLIC BLOOD PRESSURE: 122 MMHG | DIASTOLIC BLOOD PRESSURE: 80 MMHG

## 2020-08-06 DIAGNOSIS — I47.10 PAROXYSMAL SUPRAVENTRICULAR TACHYCARDIA (H): ICD-10-CM

## 2020-08-06 DIAGNOSIS — M17.0 PRIMARY OSTEOARTHRITIS OF BOTH KNEES: ICD-10-CM

## 2020-08-06 DIAGNOSIS — M25.562 BILATERAL CHRONIC KNEE PAIN: ICD-10-CM

## 2020-08-06 DIAGNOSIS — G89.29 BILATERAL CHRONIC KNEE PAIN: ICD-10-CM

## 2020-08-06 DIAGNOSIS — M25.561 BILATERAL CHRONIC KNEE PAIN: ICD-10-CM

## 2020-08-06 DIAGNOSIS — E55.9 VITAMIN D DEFICIENCY: ICD-10-CM

## 2020-08-06 DIAGNOSIS — R10.84 ABDOMINAL PAIN, GENERALIZED: Primary | ICD-10-CM

## 2020-08-06 DIAGNOSIS — R73.9 ELEVATED BLOOD SUGAR: ICD-10-CM

## 2020-08-06 LAB
ALBUMIN SERPL-MCNC: 4.1 G/DL (ref 3.4–5)
ALBUMIN UR-MCNC: NEGATIVE MG/DL
ALP SERPL-CCNC: 64 U/L (ref 40–150)
ALT SERPL W P-5'-P-CCNC: 35 U/L (ref 0–50)
ANION GAP SERPL CALCULATED.3IONS-SCNC: 5 MMOL/L (ref 3–14)
APPEARANCE UR: CLEAR
AST SERPL W P-5'-P-CCNC: 19 U/L (ref 0–45)
BASOPHILS # BLD AUTO: 0 10E9/L (ref 0–0.2)
BASOPHILS NFR BLD AUTO: 0.6 %
BILIRUB SERPL-MCNC: 0.3 MG/DL (ref 0.2–1.3)
BILIRUB UR QL STRIP: NEGATIVE
BUN SERPL-MCNC: 16 MG/DL (ref 7–30)
CALCIUM SERPL-MCNC: 9.9 MG/DL (ref 8.5–10.1)
CHLORIDE SERPL-SCNC: 103 MMOL/L (ref 94–109)
CO2 SERPL-SCNC: 30 MMOL/L (ref 20–32)
COLOR UR AUTO: YELLOW
CREAT SERPL-MCNC: 0.79 MG/DL (ref 0.52–1.04)
CRP SERPL-MCNC: 4.2 MG/L (ref 0–8)
DIFFERENTIAL METHOD BLD: NORMAL
EOSINOPHIL # BLD AUTO: 0.2 10E9/L (ref 0–0.7)
EOSINOPHIL NFR BLD AUTO: 2.4 %
ERYTHROCYTE [DISTWIDTH] IN BLOOD BY AUTOMATED COUNT: 12.8 % (ref 10–15)
ERYTHROCYTE [SEDIMENTATION RATE] IN BLOOD BY WESTERGREN METHOD: 7 MM/H (ref 0–30)
GFR SERPL CREATININE-BSD FRML MDRD: 76 ML/MIN/{1.73_M2}
GLUCOSE SERPL-MCNC: 99 MG/DL (ref 70–99)
GLUCOSE UR STRIP-MCNC: NEGATIVE MG/DL
HBA1C MFR BLD: 6.1 % (ref 0–5.6)
HCT VFR BLD AUTO: 39 % (ref 35–47)
HGB BLD-MCNC: 13 G/DL (ref 11.7–15.7)
HGB UR QL STRIP: NEGATIVE
KETONES UR STRIP-MCNC: NEGATIVE MG/DL
LEUKOCYTE ESTERASE UR QL STRIP: ABNORMAL
LYMPHOCYTES # BLD AUTO: 2 10E9/L (ref 0.8–5.3)
LYMPHOCYTES NFR BLD AUTO: 29.9 %
MCH RBC QN AUTO: 30 PG (ref 26.5–33)
MCHC RBC AUTO-ENTMCNC: 33.3 G/DL (ref 31.5–36.5)
MCV RBC AUTO: 90 FL (ref 78–100)
MONOCYTES # BLD AUTO: 0.6 10E9/L (ref 0–1.3)
MONOCYTES NFR BLD AUTO: 8.4 %
NEUTROPHILS # BLD AUTO: 3.9 10E9/L (ref 1.6–8.3)
NEUTROPHILS NFR BLD AUTO: 58.7 %
NITRATE UR QL: NEGATIVE
NON-SQ EPI CELLS #/AREA URNS LPF: NORMAL /LPF
PH UR STRIP: 5 PH (ref 5–7)
PLATELET # BLD AUTO: 292 10E9/L (ref 150–450)
POTASSIUM SERPL-SCNC: 3.9 MMOL/L (ref 3.4–5.3)
PROT SERPL-MCNC: 7.4 G/DL (ref 6.8–8.8)
RBC # BLD AUTO: 4.34 10E12/L (ref 3.8–5.2)
RBC #/AREA URNS AUTO: NORMAL /HPF
SODIUM SERPL-SCNC: 138 MMOL/L (ref 133–144)
SOURCE: ABNORMAL
SP GR UR STRIP: 1.02 (ref 1–1.03)
UROBILINOGEN UR STRIP-ACNC: 0.2 EU/DL (ref 0.2–1)
WBC # BLD AUTO: 6.7 10E9/L (ref 4–11)
WBC #/AREA URNS AUTO: NORMAL /HPF

## 2020-08-06 PROCEDURE — 80053 COMPREHEN METABOLIC PANEL: CPT | Performed by: FAMILY MEDICINE

## 2020-08-06 PROCEDURE — 82306 VITAMIN D 25 HYDROXY: CPT | Performed by: FAMILY MEDICINE

## 2020-08-06 PROCEDURE — 83036 HEMOGLOBIN GLYCOSYLATED A1C: CPT | Performed by: FAMILY MEDICINE

## 2020-08-06 PROCEDURE — 85025 COMPLETE CBC W/AUTO DIFF WBC: CPT | Performed by: FAMILY MEDICINE

## 2020-08-06 PROCEDURE — 36415 COLL VENOUS BLD VENIPUNCTURE: CPT | Performed by: FAMILY MEDICINE

## 2020-08-06 PROCEDURE — 86140 C-REACTIVE PROTEIN: CPT | Performed by: FAMILY MEDICINE

## 2020-08-06 PROCEDURE — 99214 OFFICE O/P EST MOD 30 MIN: CPT | Performed by: FAMILY MEDICINE

## 2020-08-06 PROCEDURE — 81001 URINALYSIS AUTO W/SCOPE: CPT | Performed by: FAMILY MEDICINE

## 2020-08-06 PROCEDURE — 85652 RBC SED RATE AUTOMATED: CPT | Performed by: FAMILY MEDICINE

## 2020-08-06 PROCEDURE — 93005 ELECTROCARDIOGRAM TRACING: CPT

## 2020-08-06 PROCEDURE — 73565 X-RAY EXAM OF KNEES: CPT

## 2020-08-06 PROCEDURE — 93000 ELECTROCARDIOGRAM COMPLETE: CPT | Performed by: PHYSICIAN ASSISTANT

## 2020-08-06 ASSESSMENT — MIFFLIN-ST. JEOR: SCORE: 1378.64

## 2020-08-06 NOTE — PROGRESS NOTES
"Subjective     Rosita King is a 69 year old female who presents to clinic today for the following health issues:    HPI       Gastrointestinal symptoms      Duration: months    Description:           ABDOMINAL PAIN - epigastric area  .   Pain is described as tender and nagging and radiates to back.    Intensity:  moderate    Accompanying signs and symptoms:  bloating    History  Previous {similar problem: no   Previous evaluation:  none    Aggravating factors: meals and lying down    Alleviating factors: antacids    Other Therapies tried: eating healthier    Has a long history of heartburn  Now has 2-3 months of abdominal pain, was intermittent now constant. Mostly epigastric pain but can feel it into the lower pelvis. Feels bloating.   No dysuria, urinary frequency or urgency. Some \"pulling\".   Doesn't want to bend. No nausea or vomiting.  No constipation, diarrhea, melena or hematochezia   No weight loss. Is having more trouble with urinary incontinence. Especially when the abdomen pulls.   Wt Readings from Last 5 Encounters:   08/06/20 88.5 kg (195 lb)   06/22/20 89.4 kg (197 lb)   06/05/20 89.4 kg (197 lb)   06/03/20 89.4 kg (197 lb 3.2 oz)   05/20/20 89.8 kg (198 lb)        Leg weakness  Has had for several years. Last we discussed 4/2019.   Has leg pain the last year, progressively worsening. Will take aleve which helps. Her legs throb, the whole leg. Feels weakness in her legs going up the stairs. Sometimes feels in her arms. When standing, the legs will throb, and with laying down at night. In muscles, calves and thighs.   Felt like it started with the knees, but now is whole leg, all the muscles.     Leg weakness continues to progress. Off aleve.   Knees still pop and are sore. Don't lock up or give way  Has developed pain in her low back.   Feels that she is now walking bowlegged, knees are really a problem  No bowel problems.       BP Readings from Last 3 Encounters:   08/06/20 122/80   06/22/20 " "132/78   06/05/20 132/72    Wt Readings from Last 3 Encounters:   08/06/20 88.5 kg (195 lb)   06/22/20 89.4 kg (197 lb)   06/05/20 89.4 kg (197 lb)                    Reviewed and updated as needed this visit by Provider  Tobacco  Allergies  Meds  Problems  Med Hx  Surg Hx  Fam Hx         Review of Systems   Constitutional, HEENT, cardiovascular, pulmonary, gi and gu systems are negative, except as otherwise noted.      Objective    /80 (BP Location: Right arm)   Pulse 75   Temp 99.2  F (37.3  C) (Tympanic)   Resp 16   Ht 1.6 m (5' 3\")   Wt 88.5 kg (195 lb)   SpO2 96%   BMI 34.54 kg/m    Body mass index is 34.54 kg/m .  Physical Exam   GENERAL: healthy, alert and no distress  NECK: no adenopathy, no asymmetry, masses, or scars and thyroid normal to palpation  RESP: lungs clear to auscultation - no rales, rhonchi or wheezes  CV: regular rate and rhythm, normal S1 S2, no S3 or S4, no murmur, click or rub, no peripheral edema and peripheral pulses strong  ABDOMEN: soft, nontender, no hepatosplenomegaly, no masses and bowel sounds normal  MS: antalgic gait, she does have some bowing of the legs  Mildly reduced strength of the quads and hamstrings but symmetric, unable to obtain DTRs at knees    xrays of knees read by myself and show endstage osteoarthritis of right knee and slightly better on left         Assessment & Plan     1. Abdominal pain, generalized  Uncertain etiology  - CT Abdomen Pelvis w Contrast; Future  - *UA reflex to Microscopic and Culture ([facility] (except [place] and [place])  - Comprehensive metabolic panel  - CBC with platelets and differential  - ESR: Erythrocyte sedimentation rate  - CRP, inflammation    2. Primary osteoarthritis of both knees  endstae  Likely the etiology of her leg problems as she is walking bowlegged and putting stress on legs  - Orthopedic & Spine  Referral; Future    3. Bilateral chronic knee pain  See above  - XR Knee AP " Standing Bilateral; Future  - ESR: Erythrocyte sedimentation rate  - CRP, inflammation    4. Vitamin D deficiency  On replacement, she'd like to recheck  - Vitamin D Deficiency  - Urine Microscopic    5. Elevated blood sugar  Last checked was 140  - Hemoglobin A1c     Patient Instructions   Labs today    See ortho for those knees    CT scan for abdomen and pelvis    If normal, we'll do a colonoscopy and esophagogastroduodenoscopy again       Return in about 1 month (around 9/6/2020).    Addis Del Rio MD  [facility]

## 2020-08-06 NOTE — NURSING NOTE
"Chief Complaint   Patient presents with     Abdominal Pain     epigastric pain x months       Initial /80 (BP Location: Right arm)   Pulse 75   Temp 99.2  F (37.3  C) (Tympanic)   Resp 16   Ht 1.6 m (5' 3\")   Wt 88.5 kg (195 lb)   SpO2 96%   BMI 34.54 kg/m   Estimated body mass index is 34.54 kg/m  as calculated from the following:    Height as of this encounter: 1.6 m (5' 3\").    Weight as of this encounter: 88.5 kg (195 lb).    Patient presents to the clinic using No DME    Health Maintenance that is potentially due pending provider review:  NONE    n/a    Is there anyone who you would like to be able to receive your results? No  If yes have patient fill out SU    "

## 2020-08-06 NOTE — PATIENT INSTRUCTIONS
Labs today    See ortho for those knees    CT scan for abdomen and pelvis    If normal, we'll do a colonoscopy and esophagogastroduodenoscopy again

## 2020-08-07 LAB — DEPRECATED CALCIDIOL+CALCIFEROL SERPL-MC: 30 UG/L (ref 20–75)

## 2020-08-14 ENCOUNTER — VIRTUAL VISIT (OUTPATIENT)
Dept: CARDIOLOGY | Facility: CLINIC | Age: 70
End: 2020-08-14
Payer: COMMERCIAL

## 2020-08-14 DIAGNOSIS — I10 ESSENTIAL HYPERTENSION WITH GOAL BLOOD PRESSURE LESS THAN 140/90: ICD-10-CM

## 2020-08-14 DIAGNOSIS — I47.10 PAROXYSMAL SUPRAVENTRICULAR TACHYCARDIA (H): ICD-10-CM

## 2020-08-14 DIAGNOSIS — I10 ESSENTIAL HYPERTENSION: ICD-10-CM

## 2020-08-14 PROCEDURE — 99213 OFFICE O/P EST LOW 20 MIN: CPT | Mod: 95 | Performed by: PHYSICIAN ASSISTANT

## 2020-08-14 RX ORDER — LISINOPRIL 10 MG/1
10 TABLET ORAL EVERY MORNING
Qty: 90 TABLET | Refills: 3 | Status: SHIPPED | OUTPATIENT
Start: 2020-08-14 | End: 2021-08-05

## 2020-08-14 RX ORDER — METOPROLOL SUCCINATE 50 MG/1
50 TABLET, EXTENDED RELEASE ORAL DAILY
Qty: 90 TABLET | Refills: 3 | Status: SHIPPED | OUTPATIENT
Start: 2020-08-14 | End: 2021-08-05

## 2020-08-14 RX ORDER — HYDROCHLOROTHIAZIDE 25 MG/1
25 TABLET ORAL EVERY MORNING
Qty: 90 TABLET | Refills: 3 | Status: SHIPPED | OUTPATIENT
Start: 2020-08-14 | End: 2021-08-05

## 2020-08-14 NOTE — PATIENT INSTRUCTIONS
Rosita - it was nice to speak with you today!  I'm glad that everything from a heart perspective appears to be under good control    1. Reviewed that BPs at Dr. House's office have been ~130s  2. Noted weight gain/lack of exercise due to need for knee replacement  3. Reviewed that palpitations/pounding at night haven't changed dramatically in the last year.    PLAN:  1. Increase activity as much as able (even exercising while sitting due to knee pain). Reduce calories - this should also help julia you up for knee surgery!  2. Continue medications -  I sent Rx for metoprolol, hydrochlorothiazide and lisinopril x 90 days with refills for 1 year  3. Aim to wear CPAP every night of the week  4. See us back in ~1 year and get a routine 7-day monitor prior as well as an echo and non-fasting labs.  CALL if issues prior!  [facility] are [place]:  [phone]

## 2020-08-14 NOTE — PROGRESS NOTES
"Rosita King is a 69 year old female who is being evaluated via a billable telephone visit.      The patient has been notified of following:     \"This telephone visit will be conducted via a call between you and your physician/provider. We have found that certain health care needs can be provided without the need for a physical exam.  This service lets us provide the care you need with a short phone conversation.  If a prescription is necessary we can send it directly to your pharmacy.  If lab work is needed we can place an order for that and you can then stop by our lab to have the test done at a later time.    Telephone visits are billed at different rates depending on your insurance coverage. During this emergency period, for some insurers they may be billed the same as an in-person visit.  Please reach out to your insurance provider with any questions.    If during the course of the call the physician/provider feels a telephone visit is not appropriate, you will not be charged for this service.\"    Patient has given verbal consent for Telephone visit?  Yes    What phone number would you like to be contacted at [phone]    How would you like to obtain your AVS? Trace Monaco MA 8/14/2020 8:34 AM    CC:  Annual follow-up     VITALS:  NONE taken    BRIEF HPI:  Jenna is a 69 year old yo F who has seen Dr. Glynn, Dr. Saravia and Dr. Kelsey for h/o:    1. Paroxysmal SVT a/w palpitations, SOB and fatigue and first evaluated 2016. EF with normal EF. Zio showed likely AT back in 2016 that continued despite metoprolol. Subsequent ZioPatches with shorter episodes and evidence of pauses.  2. Benign Essential HTN  3. ASHLEY on CPAP \"most of the time\" usually 4x/week (falls asleep before putting it on sometimes).    Jenna and I spoke last in 7/2019 at which time she was doing well, with less \"heart pounding.\" We had reviewed that her palpitations typically correlated with SR with minimal ectopy. No changes " "were made and annual follow-up rec'd with routine blood work.    She had parotid surgery 11/2019 and was formally dx'd with pleomorphic adenoma (benign).     INTERVAL HISTORY:  Overall feels like the heart is doing OK.  Still notes occasional palpitations at night. No fainting, falling.    No c/o CP, SOB but admits can't do much b/c needs bilateral TKAs.      Doesn't check BP at home but it's been \"fine\" when checked in clinic (130s).    No edema, orthopnea, PND. Gaining \"calorie weight.\"     DIAGNOSTICS:  EKG 8/9/2020 with SR 60 bpm.   CTA Head/Neck 12/2019 without stenosis bilateral carotids. No FMD. No dissection.   ZioPatch 5/15-5/29/2019 ordered by her Primary showed predominately SR with average HR 72 bpm (range 45 bpm @ 0740 - 105 bpm @ 1718). 63 episodes SVT, with average  bpm (range  bpm). Longest episode lasted ~13 minutes.  1 episode of 2nd degree HB Type I (per Dr. Saravia, NOT Type II) and a 2.5\" pause on 5/25 @ 2337. Symptoms of palpitations typically correlated with sinus rhythm and occasional ectopy.  Rarely correlated with SVT.   ZioPatch 4/2018 showed SR with 13 runs of PSVT (<17 beats) and symptoms which correlated with SR.  Echocardiogram 6/2016 showed EF 55-60% with grade I diastolic dysfunction. Normal RV function and size. 1+ TR but no other significant valvular abnormalities.   Nuclear Treadmill Stress Test 10/2015 showed breast attenuation artifact but a small area of anterior wall ischemia could not be excluded. Reached 79% of max predicted HR (not on BB).   Component      Latest Ref Rng & Units 11/26/2019 8/6/2020   Sodium      133 - 144 mmol/L 136 138   Potassium      3.4 - 5.3 mmol/L 3.9 3.9   Chloride      94 - 109 mmol/L 102 103   Carbon Dioxide      20 - 32 mmol/L 31 30   Anion Gap      3 - 14 mmol/L 3 5   Glucose      70 - 99 mg/dL 140 (H) 99   Urea Nitrogen      7 - 30 mg/dL 17 16   Creatinine      0.52 - 1.04 mg/dL 0.75 0.79   GFR Estimate      >60 mL/min/1.73:m2 81 76 "   GFR Estimate If Black      >60 mL/min/1.73:m2 >90 88   Calcium      8.5 - 10.1 mg/dL 9.2 9.9     Component      Latest Ref Rng & Units 11/26/2019 8/6/2020   WBC      4.0 - 11.0 10e9/L 5.5 6.7   RBC Count      3.8 - 5.2 10e12/L 4.14 4.34   Hemoglobin      11.7 - 15.7 g/dL 12.5 13.0   Hematocrit      35.0 - 47.0 % 38.1 39.0   MCV      78 - 100 fl 92 90   MCH      26.5 - 33.0 pg 30.2 30.0   MCHC      31.5 - 36.5 g/dL 32.8 33.3   RDW      10.0 - 15.0 % 12.7 12.8   Platelet Count      150 - 450 10e9/L 265 292     REVIEW OF SYSTEMS:  Negative except as noted above and knee arthritis    PHYSICAL EXAM:  Deferred, telephone    ASSESSMENT/PLAN:    1. Palpitations; h/o AT    [facility] done last 2019 showed that sxs typically correlated with SR    ZP showed 2.5s pause while sleeping as well as episodes of Wenkebach, for which we've avoided increasing AVN blocking agents    Overall, she thinks things are stable and EKG as above showed SR     PLAN:    Continue to monitor for arrhythmias/palpitations    Annual follow-up with echo and 1 week ZP prior to assess for arrhythmias    2. Benign Essential HTN    On hydrochlorothiazide 25, metoprolol XL 50, lisinopril 10 and BPs has been good at MD's office appts    CMP wnl 8/2020     PLAN:    Refills sent         CURRENT MEDICATIONS:  Current Outpatient Medications   Medication Sig Dispense Refill     fluocinonide (LIDEX) 0.05 % external solution Apply sparingly to affected area twice daily as needed.  Do not apply to face. 60 mL 3     hydrochlorothiazide (HYDRODIURIL) 25 MG tablet Take 1 tablet (25 mg) by mouth every morning 90 tablet 3     lisinopril (ZESTRIL) 10 MG tablet Take 1 tablet (10 mg) by mouth every morning 90 tablet 3     metoprolol succinate ER (TOPROL-XL) 50 MG 24 hr tablet Take 1 tablet (50 mg) by mouth daily 90 tablet 3     Multiple Vitamins-Minerals (CENTRUM SILVER) per tablet Take 1 tablet by mouth daily       omeprazole (PRILOSEC) 20 MG DR capsule TAKE 1 CAPSULE BY  MOUTH ONCE DAILY 90 capsule 3     tiZANidine (ZANAFLEX) 2 MG tablet Take 1 tablet (2 mg) by mouth 3 times daily as needed for muscle spasms or other (neck tension) 20 tablet 1     VITAMIN D, CHOLECALCIFEROL, PO Take 1,000 Units by mouth daily            ORDERS PLACED:  Orders Placed This Encounter   Procedures     Basic metabolic panel     Follow-Up with Cardiac Advanced Practice Provider     Leadless EKG Monitor 3 to 14 Days     Echocardiogram Complete     Orders Placed This Encounter   Medications     hydrochlorothiazide (HYDRODIURIL) 25 MG tablet     Sig: Take 1 tablet (25 mg) by mouth every morning     Dispense:  90 tablet     Refill:  3     lisinopril (ZESTRIL) 10 MG tablet     Sig: Take 1 tablet (10 mg) by mouth every morning     Dispense:  90 tablet     Refill:  3     metoprolol succinate ER (TOPROL-XL) 50 MG 24 hr tablet     Sig: Take 1 tablet (50 mg) by mouth daily     Dispense:  90 tablet     Refill:  3     Medications Discontinued During This Encounter   Medication Reason     hydrochlorothiazide (HYDRODIURIL) 25 MG tablet Reorder     lisinopril (PRINIVIL/ZESTRIL) 10 MG tablet Reorder     metoprolol succinate ER (TOPROL-XL) 50 MG 24 hr tablet Reorder         Encounter Diagnoses   Name Primary?     Paroxysmal supraventricular tachycardia (H)      Essential hypertension      Essential hypertension with goal blood pressure less than 140/90          ALLERGIES     Allergies   Allergen Reactions     Penicillins      Pravastatin      Other reaction(s): Myalgia     Simvastatin      Other reaction(s): Myalgia     Sulfa Drugs        PAST MEDICAL HISTORY:  Past Medical History:   Diagnosis Date     Essential hypertension with goal blood pressure less than 140/90 8/21/2016     Gastroesophageal reflux disease without esophagitis 8/22/2016     Hyperlipidemia LDL goal <130 10/12/2015     ASHLEY (obstructive sleep apnea) 10/12/2015    Severe ASHLEY  - PSG at 7/27/2006 with undocumented weight, AHI 84.7, RDI 88.9, SpO2 remained  above 90%, CPAP 7 optimal.     Paroxysmal supraventricular tachycardia (H) 2/23/2017       PAST SURGICAL HISTORY:  Past Surgical History:   Procedure Laterality Date     CARPAL TUNNEL RELEASE RT/LT Bilateral 2006, 2009     COLONOSCOPY N/A 11/25/2015    Procedure: COLONOSCOPY;  Surgeon: Mina Roy MD;  Location: [facility]     ESOPHAGOSCOPY, GASTROSCOPY, DUODENOSCOPY (EGD), COMBINED N/A 11/25/2015    Procedure: COMBINED ESOPHAGOSCOPY, GASTROSCOPY, DUODENOSCOPY (EGD), BIOPSY SINGLE OR MULTIPLE;  Surgeon: Mina Roy MD;  Location: [facility]     PAROTIDECTOMY Left 10/31/2019    Procedure: LEFT SUPERFICIAL PAROTIDECTOMY WITH FACIAL NERVE MONITORING;  Surgeon: Atif Salazar MD;  Location: [facility]     TONSILLECTOMY & ADENOIDECTOMY  age 13       FAMILY HISTORY:  Family History   Problem Relation Age of Onset     Liver Disease Mother      Depression/Anxiety Father      Diabetes Father      Diabetes Sister      Liver Disease Sister      Cancer Sister        SOCIAL HISTORY:  Social History     Socioeconomic History     Marital status:      Spouse name: None     Number of children: None     Years of education: None     Highest education level: None   Occupational History     None   Social Needs     Financial resource strain: None     Food insecurity     Worry: None     Inability: None     Transportation needs     Medical: None     Non-medical: None   Tobacco Use     Smoking status: Former Smoker     Smokeless tobacco: Never Used   Substance and Sexual Activity     Alcohol use: Yes     Comment: ocassional     Drug use: No     Sexual activity: Not Currently     Partners: Female   Lifestyle     Physical activity     Days per week: None     Minutes per session: None     Stress: None   Relationships     Social connections     Talks on phone: None     Gets together: None     Attends Pentecostal service: None     Active member of club or organization: None     Attends meetings of clubs or organizations: None      Relationship status: None     Intimate partner violence     Fear of current or ex partner: None     Emotionally abused: None     Physically abused: None     Forced sexual activity: None   Other Topics Concern     Parent/sibling w/ CABG, MI or angioplasty before 65F 55M? Yes     Comment: Father with MI at 55   Social History Narrative     None       I have reviewed the note as documented above.  This accurately captures the substance of my conversation with the patient.     Phone call contact time  Call Started at 0919  Call Ended at 0930  Duration 11 minutes    NARDA NiñoAS

## 2020-08-14 NOTE — LETTER
"8/14/2020    Addis Del Rio MD  [address] [facility] [address]    RE: Rosita King       Dear Colleague,    I had the pleasure of seeing Rosita King in the [facility].    Rosita King is a 69 year old female who is being evaluated via a billable telephone visit.        CC:  Annual follow-up     VITALS:  NONE taken    BRIEF HPI:  Jenna is a 69 year old yo F who has seen Dr. Glynn, Dr. Saravia and Dr. Kelsey for h/o:    1. Paroxysmal SVT a/w palpitations, SOB and fatigue and first evaluated 2016. EF with normal EF. Zio showed likely AT back in 2016 that continued despite metoprolol. Subsequent ZioPatches with shorter episodes and evidence of pauses.  2. Benign Essential HTN  3. ASHLEY on CPAP \"most of the time\" usually 4x/week (falls asleep before putting it on sometimes).    Jenna and I spoke last in 7/2019 at which time she was doing well, with less \"heart pounding.\" We had reviewed that her palpitations typically correlated with SR with minimal ectopy. No changes were made and annual follow-up rec'd with routine blood work.    She had parotid surgery 11/2019 and was formally dx'd with pleomorphic adenoma (benign).     INTERVAL HISTORY:  Overall feels like the heart is doing OK.  Still notes occasional palpitations at night. No fainting, falling.    No c/o CP, SOB but admits can't do much b/c needs bilateral TKAs.      Doesn't check BP at home but it's been \"fine\" when checked in clinic (130s).    No edema, orthopnea, PND. Gaining \"calorie weight.\"     DIAGNOSTICS:  EKG 8/9/2020 with SR 60 bpm.   CTA Head/Neck 12/2019 without stenosis bilateral carotids. No FMD. No dissection.   ZioPatch 5/15-5/29/2019 ordered by her Primary showed predominately SR with average HR 72 bpm (range 45 bpm @ 0740 - 105 bpm @ 1718). 63 episodes SVT, with average  bpm (range  bpm). Longest episode lasted ~13 minutes.  1 episode of 2nd degree HB Type I (per Dr. Saravia, NOT Type II) and a " "2.5\" pause on 5/25 @ 2337. Symptoms of palpitations typically correlated with sinus rhythm and occasional ectopy.  Rarely correlated with SVT.   ZioPatch 4/2018 showed SR with 13 runs of PSVT (<17 beats) and symptoms which correlated with SR.  Echocardiogram 6/2016 showed EF 55-60% with grade I diastolic dysfunction. Normal RV function and size. 1+ TR but no other significant valvular abnormalities.   Nuclear Treadmill Stress Test 10/2015 showed breast attenuation artifact but a small area of anterior wall ischemia could not be excluded. Reached 79% of max predicted HR (not on BB).   Component      Latest Ref Rng & Units 11/26/2019 8/6/2020   Sodium      133 - 144 mmol/L 136 138   Potassium      3.4 - 5.3 mmol/L 3.9 3.9   Chloride      94 - 109 mmol/L 102 103   Carbon Dioxide      20 - 32 mmol/L 31 30   Anion Gap      3 - 14 mmol/L 3 5   Glucose      70 - 99 mg/dL 140 (H) 99   Urea Nitrogen      7 - 30 mg/dL 17 16   Creatinine      0.52 - 1.04 mg/dL 0.75 0.79   GFR Estimate      >60 mL/min/1.73:m2 81 76   GFR Estimate If Black      >60 mL/min/1.73:m2 >90 88   Calcium      8.5 - 10.1 mg/dL 9.2 9.9     Component      Latest Ref Rng & Units 11/26/2019 8/6/2020   WBC      4.0 - 11.0 10e9/L 5.5 6.7   RBC Count      3.8 - 5.2 10e12/L 4.14 4.34   Hemoglobin      11.7 - 15.7 g/dL 12.5 13.0   Hematocrit      35.0 - 47.0 % 38.1 39.0   MCV      78 - 100 fl 92 90   MCH      26.5 - 33.0 pg 30.2 30.0   MCHC      31.5 - 36.5 g/dL 32.8 33.3   RDW      10.0 - 15.0 % 12.7 12.8   Platelet Count      150 - 450 10e9/L 265 292     REVIEW OF SYSTEMS:  Negative except as noted above and knee arthritis    PHYSICAL EXAM:  Deferred, telephone    ASSESSMENT/PLAN:    1. Palpitations; h/o AT    ZioPatch done last 2019 showed that sxs typically correlated with SR    ZP showed 2.5s pause while sleeping as well as episodes of Wenkebach, for which we've avoided increasing AVN blocking agents    Overall, she thinks things are stable and EKG as above " showed SR     PLAN:    Continue to monitor for arrhythmias/palpitations    Annual follow-up with echo and 1 week ZP prior to assess for arrhythmias    2. Benign Essential HTN    On hydrochlorothiazide 25, metoprolol XL 50, lisinopril 10 and BPs has been good at MD's office appts    TOVA wnl 8/2020     PLAN:    Refills sent         CURRENT MEDICATIONS:  Current Outpatient Medications   Medication Sig Dispense Refill     fluocinonide (LIDEX) 0.05 % external solution Apply sparingly to affected area twice daily as needed.  Do not apply to face. 60 mL 3     hydrochlorothiazide (HYDRODIURIL) 25 MG tablet Take 1 tablet (25 mg) by mouth every morning 90 tablet 3     lisinopril (ZESTRIL) 10 MG tablet Take 1 tablet (10 mg) by mouth every morning 90 tablet 3     metoprolol succinate ER (TOPROL-XL) 50 MG 24 hr tablet Take 1 tablet (50 mg) by mouth daily 90 tablet 3     Multiple Vitamins-Minerals (CENTRUM SILVER) per tablet Take 1 tablet by mouth daily       omeprazole (PRILOSEC) 20 MG DR capsule TAKE 1 CAPSULE BY MOUTH ONCE DAILY 90 capsule 3     tiZANidine (ZANAFLEX) 2 MG tablet Take 1 tablet (2 mg) by mouth 3 times daily as needed for muscle spasms or other (neck tension) 20 tablet 1     VITAMIN D, CHOLECALCIFEROL, PO Take 1,000 Units by mouth daily            ORDERS PLACED:  Orders Placed This Encounter   Procedures     Basic metabolic panel     Follow-Up with Cardiac Advanced Practice Provider     Leadless EKG Monitor 3 to 14 Days     Echocardiogram Complete     Orders Placed This Encounter   Medications     hydrochlorothiazide (HYDRODIURIL) 25 MG tablet     Sig: Take 1 tablet (25 mg) by mouth every morning     Dispense:  90 tablet     Refill:  3     lisinopril (ZESTRIL) 10 MG tablet     Sig: Take 1 tablet (10 mg) by mouth every morning     Dispense:  90 tablet     Refill:  3     metoprolol succinate ER (TOPROL-XL) 50 MG 24 hr tablet     Sig: Take 1 tablet (50 mg) by mouth daily     Dispense:  90 tablet     Refill:  3      Medications Discontinued During This Encounter   Medication Reason     hydrochlorothiazide (HYDRODIURIL) 25 MG tablet Reorder     lisinopril (PRINIVIL/ZESTRIL) 10 MG tablet Reorder     metoprolol succinate ER (TOPROL-XL) 50 MG 24 hr tablet Reorder         Encounter Diagnoses   Name Primary?     Paroxysmal supraventricular tachycardia (H)      Essential hypertension      Essential hypertension with goal blood pressure less than 140/90          ALLERGIES     Allergies   Allergen Reactions     Penicillins      Pravastatin      Other reaction(s): Myalgia     Simvastatin      Other reaction(s): Myalgia     Sulfa Drugs        PAST MEDICAL HISTORY:  Past Medical History:   Diagnosis Date     Essential hypertension with goal blood pressure less than 140/90 8/21/2016     Gastroesophageal reflux disease without esophagitis 8/22/2016     Hyperlipidemia LDL goal <130 10/12/2015     ASHLEY (obstructive sleep apnea) 10/12/2015    Severe ASHLEY  - PSG at 7/27/2006 with undocumented weight, AHI 84.7, RDI 88.9, SpO2 remained above 90%, CPAP 7 optimal.     Paroxysmal supraventricular tachycardia (H) 2/23/2017       PAST SURGICAL HISTORY:  Past Surgical History:   Procedure Laterality Date     CARPAL TUNNEL RELEASE RT/LT Bilateral 2006, 2009     COLONOSCOPY N/A 11/25/2015    Procedure: COLONOSCOPY;  Surgeon: Mina Roy MD;  Location: [facility]     ESOPHAGOSCOPY, GASTROSCOPY, DUODENOSCOPY (EGD), COMBINED N/A 11/25/2015    Procedure: COMBINED ESOPHAGOSCOPY, GASTROSCOPY, DUODENOSCOPY (EGD), BIOPSY SINGLE OR MULTIPLE;  Surgeon: Mina Roy MD;  Location: [facility]     PAROTIDECTOMY Left 10/31/2019    Procedure: LEFT SUPERFICIAL PAROTIDECTOMY WITH FACIAL NERVE MONITORING;  Surgeon: Atif Salazar MD;  Location: [facility]     TONSILLECTOMY & ADENOIDECTOMY  age 13       FAMILY HISTORY:  Family History   Problem Relation Age of Onset     Liver Disease Mother      Depression/Anxiety Father      Diabetes Father      Diabetes Sister       Liver Disease Sister      Cancer Sister        SOCIAL HISTORY:  Social History     Socioeconomic History     Marital status:      Spouse name: None     Number of children: None     Years of education: None     Highest education level: None   Occupational History     None   Social Needs     Financial resource strain: None     Food insecurity     Worry: None     Inability: None     Transportation needs     Medical: None     Non-medical: None   Tobacco Use     Smoking status: Former Smoker     Smokeless tobacco: Never Used   Substance and Sexual Activity     Alcohol use: Yes     Comment: ocassional     Drug use: No     Sexual activity: Not Currently     Partners: Female   Lifestyle     Physical activity     Days per week: None     Minutes per session: None     Stress: None   Relationships     Social connections     Talks on phone: None     Gets together: None     Attends Rastafarian service: None     Active member of club or organization: None     Attends meetings of clubs or organizations: None     Relationship status: None     Intimate partner violence     Fear of current or ex partner: None     Emotionally abused: None     Physically abused: None     Forced sexual activity: None   Other Topics Concern     Parent/sibling w/ CABG, MI or angioplasty before 65F 55M? Yes     Comment: Father with MI at 55   Social History Narrative     None       I have reviewed the note as documented above.  This accurately captures the substance of my conversation with the patient.      Thank you for allowing me to participate in the care of your patient.    Sincerely,     Cassie Deutsch PA-C     [facility]

## 2020-08-17 ENCOUNTER — TRANSFERRED RECORDS (OUTPATIENT)
Dept: HEALTH INFORMATION MANAGEMENT | Facility: CLINIC | Age: 70
End: 2020-08-17

## 2020-08-17 ENCOUNTER — HOSPITAL ENCOUNTER (OUTPATIENT)
Dept: CT IMAGING | Facility: CLINIC | Age: 70
Discharge: HOME | End: 2020-08-17
Attending: FAMILY MEDICINE | Admitting: FAMILY MEDICINE
Payer: MEDICARE

## 2020-08-17 DIAGNOSIS — R10.84 ABDOMINAL PAIN, GENERALIZED: ICD-10-CM

## 2020-08-17 PROCEDURE — 25000128 H RX IP 250 OP 636: Performed by: RADIOLOGY

## 2020-08-17 PROCEDURE — 25000125 ZZHC RX 250: Performed by: RADIOLOGY

## 2020-08-17 PROCEDURE — 74177 CT ABD & PELVIS W/CONTRAST: CPT

## 2020-08-17 RX ORDER — IOPAMIDOL 755 MG/ML
95 INJECTION, SOLUTION INTRAVASCULAR ONCE
Status: COMPLETED | OUTPATIENT
Start: 2020-08-17 | End: 2020-08-17

## 2020-08-17 RX ADMIN — IOPAMIDOL 95 ML: 755 INJECTION, SOLUTION INTRAVENOUS at 15:23

## 2020-08-17 RX ADMIN — SODIUM CHLORIDE 64 ML: 9 INJECTION, SOLUTION INTRAVENOUS at 15:23

## 2020-09-23 ENCOUNTER — ALLIED HEALTH/NURSE VISIT (OUTPATIENT)
Dept: FAMILY MEDICINE | Facility: CLINIC | Age: 70
End: 2020-09-23
Payer: COMMERCIAL

## 2020-09-23 DIAGNOSIS — Z23 NEED FOR PROPHYLACTIC VACCINATION AND INOCULATION AGAINST INFLUENZA: Primary | ICD-10-CM

## 2020-09-23 PROCEDURE — 90662 IIV NO PRSV INCREASED AG IM: CPT

## 2020-09-23 PROCEDURE — G0008 ADMIN INFLUENZA VIRUS VAC: HCPCS

## 2020-09-23 PROCEDURE — 99207 ZZC NO CHARGE NURSE ONLY: CPT

## 2020-09-29 ENCOUNTER — DOCUMENTATION ONLY (OUTPATIENT)
Dept: SLEEP MEDICINE | Facility: CLINIC | Age: 70
End: 2020-09-29

## 2020-09-29 NOTE — PROGRESS NOTES
Reviewed patients CPAP machine as she has a Resmed S8 Compact and we are not able to retrieve downloads with an SD card. Machine info does not provide an AHI level, only hours used. Pt hours used are 2087/5178. Motor hours are 12,083. CPAP pressure is at 8 cm H2O. Pt states they have an extra CPAP machine at home that was previously her spouses as he has now a new machine. I did explain that I could set the pressure on that machine so she could use it. She states understanding and will call me later on to schedule a time to complete.    Lynette Avilez, DME Coordinator

## 2020-10-02 NOTE — PROGRESS NOTES
"Rosita King is a 69 year old female who is being evaluated via a billable telephone visit.      The patient has been notified of following:     \"This telephone visit will be conducted via a call between you and your physician/provider. We have found that certain health care needs can be provided without the need for a physical exam.  This service lets us provide the care you need with a short phone conversation.  If a prescription is necessary we can send it directly to your pharmacy.  If lab work is needed we can place an order for that and you can then stop by our lab to have the test done at a later time.    Telephone visits are billed at different rates depending on your insurance coverage. During this emergency period, for some insurers they may be billed the same as an in-person visit.  Please reach out to your insurance provider with any questions.    If during the course of the call the physician/provider feels a telephone visit is not appropriate, you will not be charged for this service.\"    Patient has given verbal consent for Telephone visit?  Yes    What phone number would you like to be contacted at? [phone]    How would you like to obtain your AVS? [employer]    Virtual visit for annual follow-up of severe ASHLEY managed with CPAP.    Assessment:  - Severe ASHLEY    Plan:  - Per patient, adequately controlled with CPAP auto-titrate 5-8 cm H2O.  Unable to get efficacy download.  - Discussed consideration of using updated CPAP to get compliance / efficacy data.  Discussed recommendations for nightly usage and for 7 or more hours per night.    68 yo F with PMHx of vitamin D deficiency, HTN, paroxysmal SVT, anxiety.    LOV on 6/11/2018 to re-establish care and goal to restart treatment of severe ASHLEY.  Plan to provide new supplies, continued CPAP auto-titrate 5-8 cm H2O.    Today, she feels she is doing \"fine\" with her CPAP.  Uses it \"most nights\" and will wear it for 5-6 hours on those nights.  Per her " , some infrequent light snoring but no observed apnea.    Her CPAP does not have efficacy data download capability.    Prior Sleep Testin2006 - PSG with undocumented weight, AHI 84.7, RDI 88.9, SpO2 remained above 90%, CPAP 7 optimal.    Phone call duration: 25 minutes    Danis Rodríguez MD, MD

## 2020-10-05 ENCOUNTER — VIRTUAL VISIT (OUTPATIENT)
Dept: SLEEP MEDICINE | Facility: CLINIC | Age: 70
End: 2020-10-05
Payer: COMMERCIAL

## 2020-10-05 DIAGNOSIS — G47.33 OSA (OBSTRUCTIVE SLEEP APNEA): Primary | ICD-10-CM

## 2020-10-05 PROCEDURE — 99214 OFFICE O/P EST MOD 30 MIN: CPT | Mod: 95 | Performed by: FAMILY MEDICINE

## 2020-11-17 ENCOUNTER — TRANSFERRED RECORDS (OUTPATIENT)
Dept: HEALTH INFORMATION MANAGEMENT | Facility: CLINIC | Age: 70
End: 2020-11-17

## 2020-12-20 ENCOUNTER — HEALTH MAINTENANCE LETTER (OUTPATIENT)
Age: 70
End: 2020-12-20

## 2021-03-02 ENCOUNTER — IMMUNIZATION (OUTPATIENT)
Dept: FAMILY MEDICINE | Facility: CLINIC | Age: 71
End: 2021-03-02
Payer: COMMERCIAL

## 2021-03-02 PROCEDURE — 91300 PR COVID VAC PFIZER DIL RECON 30 MCG/0.3 ML IM: CPT

## 2021-03-02 PROCEDURE — 0001A PR COVID VAC PFIZER DIL RECON 30 MCG/0.3 ML IM: CPT

## 2021-03-23 ENCOUNTER — IMMUNIZATION (OUTPATIENT)
Dept: FAMILY MEDICINE | Facility: CLINIC | Age: 71
End: 2021-03-23
Attending: FAMILY MEDICINE
Payer: COMMERCIAL

## 2021-03-23 PROCEDURE — 0002A PR COVID VAC PFIZER DIL RECON 30 MCG/0.3 ML IM: CPT

## 2021-03-23 PROCEDURE — 91300 PR COVID VAC PFIZER DIL RECON 30 MCG/0.3 ML IM: CPT

## 2021-04-15 ENCOUNTER — TRANSFERRED RECORDS (OUTPATIENT)
Dept: HEALTH INFORMATION MANAGEMENT | Facility: CLINIC | Age: 71
End: 2021-04-15

## 2021-04-29 ENCOUNTER — OFFICE VISIT (OUTPATIENT)
Dept: FAMILY MEDICINE | Facility: CLINIC | Age: 71
End: 2021-04-29
Payer: COMMERCIAL

## 2021-04-29 VITALS
OXYGEN SATURATION: 99 % | SYSTOLIC BLOOD PRESSURE: 139 MMHG | RESPIRATION RATE: 24 BRPM | BODY MASS INDEX: 34.55 KG/M2 | WEIGHT: 195 LBS | HEART RATE: 89 BPM | DIASTOLIC BLOOD PRESSURE: 80 MMHG | TEMPERATURE: 98.7 F | HEIGHT: 63 IN

## 2021-04-29 DIAGNOSIS — R07.9 CHEST PAIN, UNSPECIFIED TYPE: ICD-10-CM

## 2021-04-29 DIAGNOSIS — K21.00 GASTROESOPHAGEAL REFLUX DISEASE WITH ESOPHAGITIS WITHOUT HEMORRHAGE: ICD-10-CM

## 2021-04-29 DIAGNOSIS — R06.09 DOE (DYSPNEA ON EXERTION): Primary | ICD-10-CM

## 2021-04-29 DIAGNOSIS — E78.5 HYPERLIPIDEMIA LDL GOAL <130: ICD-10-CM

## 2021-04-29 DIAGNOSIS — I10 ESSENTIAL HYPERTENSION WITH GOAL BLOOD PRESSURE LESS THAN 140/90: ICD-10-CM

## 2021-04-29 DIAGNOSIS — R91.8 PULMONARY NODULES: ICD-10-CM

## 2021-04-29 LAB
ALBUMIN SERPL-MCNC: 3.5 G/DL (ref 3.4–5)
ALP SERPL-CCNC: 63 U/L (ref 40–150)
ALT SERPL W P-5'-P-CCNC: 36 U/L (ref 0–50)
ANION GAP SERPL CALCULATED.3IONS-SCNC: 3 MMOL/L (ref 3–14)
AST SERPL W P-5'-P-CCNC: 15 U/L (ref 0–45)
BASOPHILS # BLD AUTO: 0 10E9/L (ref 0–0.2)
BASOPHILS NFR BLD AUTO: 0.3 %
BILIRUB SERPL-MCNC: 0.3 MG/DL (ref 0.2–1.3)
BUN SERPL-MCNC: 12 MG/DL (ref 7–30)
CALCIUM SERPL-MCNC: 9.3 MG/DL (ref 8.5–10.1)
CHLORIDE SERPL-SCNC: 101 MMOL/L (ref 94–109)
CHOLEST SERPL-MCNC: 277 MG/DL
CO2 SERPL-SCNC: 30 MMOL/L (ref 20–32)
CREAT SERPL-MCNC: 0.76 MG/DL (ref 0.52–1.04)
DIFFERENTIAL METHOD BLD: NORMAL
EOSINOPHIL # BLD AUTO: 0.1 10E9/L (ref 0–0.7)
EOSINOPHIL NFR BLD AUTO: 0.7 %
ERYTHROCYTE [DISTWIDTH] IN BLOOD BY AUTOMATED COUNT: 13.4 % (ref 10–15)
GFR SERPL CREATININE-BSD FRML MDRD: 79 ML/MIN/{1.73_M2}
GLUCOSE SERPL-MCNC: 116 MG/DL (ref 70–99)
HCT VFR BLD AUTO: 39.4 % (ref 35–47)
HDLC SERPL-MCNC: 64 MG/DL
HGB BLD-MCNC: 13.5 G/DL (ref 11.7–15.7)
LDLC SERPL CALC-MCNC: 186 MG/DL
LYMPHOCYTES # BLD AUTO: 2.2 10E9/L (ref 0.8–5.3)
LYMPHOCYTES NFR BLD AUTO: 24.1 %
MCH RBC QN AUTO: 29.7 PG (ref 26.5–33)
MCHC RBC AUTO-ENTMCNC: 34.3 G/DL (ref 31.5–36.5)
MCV RBC AUTO: 87 FL (ref 78–100)
MONOCYTES # BLD AUTO: 0.7 10E9/L (ref 0–1.3)
MONOCYTES NFR BLD AUTO: 8 %
NEUTROPHILS # BLD AUTO: 6.1 10E9/L (ref 1.6–8.3)
NEUTROPHILS NFR BLD AUTO: 66.9 %
NONHDLC SERPL-MCNC: 213 MG/DL
PLATELET # BLD AUTO: 264 10E9/L (ref 150–450)
POTASSIUM SERPL-SCNC: 3.6 MMOL/L (ref 3.4–5.3)
PROT SERPL-MCNC: 7.1 G/DL (ref 6.8–8.8)
RBC # BLD AUTO: 4.54 10E12/L (ref 3.8–5.2)
SODIUM SERPL-SCNC: 134 MMOL/L (ref 133–144)
TRIGL SERPL-MCNC: 136 MG/DL
TSH SERPL DL<=0.005 MIU/L-ACNC: 0.87 MU/L (ref 0.4–4)
VIT B12 SERPL-MCNC: 572 PG/ML (ref 193–986)
WBC # BLD AUTO: 9.1 10E9/L (ref 4–11)

## 2021-04-29 PROCEDURE — 36415 COLL VENOUS BLD VENIPUNCTURE: CPT | Performed by: FAMILY MEDICINE

## 2021-04-29 PROCEDURE — 99215 OFFICE O/P EST HI 40 MIN: CPT | Performed by: FAMILY MEDICINE

## 2021-04-29 PROCEDURE — 84443 ASSAY THYROID STIM HORMONE: CPT | Performed by: FAMILY MEDICINE

## 2021-04-29 PROCEDURE — 85025 COMPLETE CBC W/AUTO DIFF WBC: CPT | Performed by: FAMILY MEDICINE

## 2021-04-29 PROCEDURE — 80061 LIPID PANEL: CPT | Performed by: FAMILY MEDICINE

## 2021-04-29 PROCEDURE — 82607 VITAMIN B-12: CPT | Performed by: FAMILY MEDICINE

## 2021-04-29 PROCEDURE — 80053 COMPREHEN METABOLIC PANEL: CPT | Performed by: FAMILY MEDICINE

## 2021-04-29 PROCEDURE — 93000 ELECTROCARDIOGRAM COMPLETE: CPT | Performed by: FAMILY MEDICINE

## 2021-04-29 RX ORDER — ASPIRIN 81 MG/1
81 TABLET ORAL DAILY
Status: ON HOLD | COMMUNITY
End: 2021-06-18

## 2021-04-29 ASSESSMENT — MIFFLIN-ST. JEOR: SCORE: 1373.64

## 2021-04-29 NOTE — PROGRESS NOTES
"    Assessment & Plan     HARDWICK (dyspnea on exertion)  Uncertain etiology, need to rule out cardiac  - EKG 12-lead complete w/read - Clinics  - NM MPI Treadmill; Future  - CBC with platelets and differential  - Comprehensive metabolic panel  - Vitamin B12  - TSH with free T4 reflex    Chest pain, unspecified type  Uncertain etiology  Offered nitroglycerin, patient refulsed  - Lipid panel reflex to direct LDL Non-fasting    Pulmonary nodules  Found on CT scan  Repeat CT scan to follow  - CT Chest w/o Contrast; Future    Hyperlipidemia LDL goal <130  On statin  - Lipid panel reflex to direct LDL Non-fasting    Gastroesophageal reflux disease with esophagitis without hemorrhage  Fair control   cponti    Essential hypertension with goal blood pressure less than 140/90  Well controlled   Continue hydrochlorothiazide, metoprolol, lisinop    Patient Instructions   Get a stress done     Get a  CT of the chest    Labs today    We will go from there       I spent a total of 61 minutes on the day of the visit.   Time spent doing chart review, history and exam, documentation and further activities per the note       BMI:   Estimated body mass index is 34.54 kg/m  as calculated from the following:    Height as of this encounter: 1.6 m (5' 3\").    Weight as of this encounter: 88.5 kg (195 lb).         Return in about 1 month (around 5/29/2021).  Addis House MD  [facility]    Prosper West is a 70 year old who presents for the following health issues     HPI     GERD/Heartburn  Onset/Duration: 2 months  Description: gas pressure, burping  Intensity: moderate  Progression of Symptoms: waxing and waning  Accompanying Signs & Symptoms:  Does it feel like food gets stuck or trouble swallowing: neck is sore  Nausea: no  Vomiting (bloody?): no  Abdominal Pain: no  Black-Tarry stools: no  Bloody stools: no  History:  Previous similar episodes: no  Previous ulcers: YES- Hernia  Precipitating factors: " "  Caffeine use: YES- 4 cups  Alcohol use: YES- 1 a night  NSAID/Aspirin use: YES- 81 mg  Tobacco use: no  Worse with no particular food or drink.  Trouble breathing and feels pressure all over  Therapies tried and outcome:             Lifestyle changes: None            Medications: Omeprazole (Prilosec)    Long history of heartburn.   Had a CT scan   IMPRESSION:    1. No mass, adenopathy, or a focal inflammatory process are seen.  2. There is an incidental left pulmonary nodule. If the patient has no  smoking history or other risk factors no further workup is necessary.  If the patient has a smoking history or other risk factors a follow-up  chest CT in 12 months is suggested.     SERA MATTHEWS MD    Omeprazole has helped    shortness of breath   Has had for a year but has gotten worse over the last few months  Can hardly work at all. Is huffing and puffing and needs to sit and rest.   Happens with sweeping, up and down stairs, no nausea, diaphoresis. But feels a pressure on her chest. Tightens up with excitement as well. Is always trying to catch her breath  Knees limit her exercise, shots have helped.   Has tightness, that radiates into neck and left ear, did have the parotid mass removed on that side.  She smoked years ago, quit 15 years ago. 1/2 ppd. About 27 years smoked tota.   The 10-year ASCVD risk score (Lynda MACHO Jr., et al., 2013) is: 15.7%    Values used to calculate the score:      Age: 70 years      Sex: Female      Is Non- : No      Diabetic: No      Tobacco smoker: No      Systolic Blood Pressure: 139 mmHg      Is BP treated: Yes      HDL Cholesterol: 64 mg/dL      Total Cholesterol: 277 mg/dL   Recent Labs   Lab Test 04/23/19  0942 08/24/17  1051 10/13/15  0945   CHOL 271* 272* 218*   HDL 58 57 58   * 192* 143*   TRIG 155* 117 84   CHOLHDLRATIO  --   --  3.8        Feels light headed a lot, uncomfortable from ear to ear.   Scalp is sore, head feels \"full\".   Saw the " "neurologist, but didn't feel it was helpful  Had an MRI 5/13/19                                                                  IMPRESSION:    1. No evidence of acute infarct, hemorrhage, or herniation.  2. Mild diffuse parenchymal volume loss and white matter changes  likely due to chronic microvascular ischemic disease.   3. Partially visualized 2.2 cm mass in the left parotid gland. This  may represent a benign or malignant parotid lesion. Recommend  otolaryngology consultation.   4. Extra-axial 1 cm enhancing mass along the right cerebral convexity  most likely representing a meningioma.    hypertension   On hydrochlorothiazide, lisinopril and metoprolol  BP Readings from Last 6 Encounters:   04/29/21 139/80   08/06/20 122/80   06/22/20 132/78   06/05/20 132/72   06/03/20 110/60   12/10/19 137/76        Review of Systems   Multiple positives as above.       Objective    /80 (BP Location: Right arm)   Pulse 89   Temp 98.7  F (37.1  C) (Tympanic)   Resp 24   Ht 1.6 m (5' 3\")   Wt 88.5 kg (195 lb)   SpO2 99%   BMI 34.54 kg/m    Body mass index is 34.54 kg/m .  Physical Exam   GENERAL: healthy, alert and no distress  NECK: no adenopathy, no asymmetry, masses, or scars and thyroid normal to palpation  RESP: lungs clear to auscultation - no rales, rhonchi or wheezes  CV: regular rates and rhythm, normal S1 S2, no S3 or S4, no murmur, click or rub, peripheral pulses strong and no peripheral edema  ABDOMEN: soft, nontender, no hepatosplenomegaly, no masses and bowel sounds normal  MS: no gross musculoskeletal defects noted, no edema          "

## 2021-04-29 NOTE — NURSING NOTE
"Chief Complaint   Patient presents with     Gastrointestinal Problem     gas pressure, burping and chest pressure x 2 months       Initial /80 (BP Location: Right arm)   Pulse 89   Temp 98.7  F (37.1  C) (Tympanic)   Resp 24   Ht 1.6 m (5' 3\")   Wt 88.5 kg (195 lb)   SpO2 99%   BMI 34.54 kg/m   Estimated body mass index is 34.54 kg/m  as calculated from the following:    Height as of this encounter: 1.6 m (5' 3\").    Weight as of this encounter: 88.5 kg (195 lb).    Patient presents to the clinic using No DME    Health Maintenance that is potentially due pending provider review:  NONE    n/a    Is there anyone who you would like to be able to receive your results? No  If yes have patient fill out SU    "

## 2021-05-03 ENCOUNTER — HOSPITAL ENCOUNTER (OUTPATIENT)
Dept: CT IMAGING | Facility: CLINIC | Age: 71
Discharge: HOME | End: 2021-05-03
Attending: FAMILY MEDICINE | Admitting: FAMILY MEDICINE
Payer: MEDICARE

## 2021-05-03 DIAGNOSIS — R91.8 PULMONARY NODULES: ICD-10-CM

## 2021-05-03 PROCEDURE — 250N000011 HC RX IP 250 OP 636: Performed by: RADIOLOGY

## 2021-05-03 PROCEDURE — 71260 CT THORAX DX C+: CPT

## 2021-05-03 PROCEDURE — 250N000009 HC RX 250: Performed by: RADIOLOGY

## 2021-05-03 RX ORDER — IOPAMIDOL 755 MG/ML
80 INJECTION, SOLUTION INTRAVASCULAR ONCE
Status: COMPLETED | OUTPATIENT
Start: 2021-05-03 | End: 2021-05-03

## 2021-05-03 RX ADMIN — SODIUM CHLORIDE 80 ML: 9 INJECTION, SOLUTION INTRAVENOUS at 08:57

## 2021-05-03 RX ADMIN — IOPAMIDOL 80 ML: 755 INJECTION, SOLUTION INTRAVENOUS at 08:57

## 2021-05-04 DIAGNOSIS — K21.9 GASTROESOPHAGEAL REFLUX DISEASE WITHOUT ESOPHAGITIS: ICD-10-CM

## 2021-05-12 ENCOUNTER — HOSPITAL ENCOUNTER (OUTPATIENT)
Dept: NUCLEAR MEDICINE | Facility: CLINIC | Age: 71
Setting detail: NUCLEAR MEDICINE
End: 2021-05-12
Attending: FAMILY MEDICINE
Payer: MEDICARE

## 2021-05-12 ENCOUNTER — HOSPITAL ENCOUNTER (OUTPATIENT)
Dept: CARDIOLOGY | Facility: CLINIC | Age: 71
Discharge: HOME | End: 2021-05-12
Attending: FAMILY MEDICINE | Admitting: FAMILY MEDICINE
Payer: MEDICARE

## 2021-05-12 VITALS — HEIGHT: 63 IN | WEIGHT: 195 LBS | BODY MASS INDEX: 34.55 KG/M2

## 2021-05-12 DIAGNOSIS — R06.09 DOE (DYSPNEA ON EXERTION): ICD-10-CM

## 2021-05-12 LAB
CV STRESS MAX HR HE: 82
RATE PRESSURE PRODUCT: NORMAL
STRESS ECHO BASELINE DIASTOLIC HE: 80
STRESS ECHO BASELINE HR: 72
STRESS ECHO BASELINE SYSTOLIC BP: 122
STRESS ECHO CALCULATED PERCENT HR: 55 %
STRESS ECHO LAST STRESS DIASTOLIC BP: 76
STRESS ECHO LAST STRESS SYSTOLIC BP: 128
STRESS ECHO TARGET HR: 150

## 2021-05-12 PROCEDURE — 250N000011 HC RX IP 250 OP 636: Performed by: FAMILY MEDICINE

## 2021-05-12 PROCEDURE — 93018 CV STRESS TEST I&R ONLY: CPT | Performed by: INTERNAL MEDICINE

## 2021-05-12 PROCEDURE — A9502 TC99M TETROFOSMIN: HCPCS | Performed by: FAMILY MEDICINE

## 2021-05-12 PROCEDURE — 78452 HT MUSCLE IMAGE SPECT MULT: CPT

## 2021-05-12 PROCEDURE — 93016 CV STRESS TEST SUPVJ ONLY: CPT | Performed by: INTERNAL MEDICINE

## 2021-05-12 PROCEDURE — 78452 HT MUSCLE IMAGE SPECT MULT: CPT | Mod: 26 | Performed by: INTERNAL MEDICINE

## 2021-05-12 PROCEDURE — 343N000001 HC RX 343: Performed by: FAMILY MEDICINE

## 2021-05-12 PROCEDURE — 93017 CV STRESS TEST TRACING ONLY: CPT

## 2021-05-12 RX ORDER — REGADENOSON 0.08 MG/ML
0.4 INJECTION, SOLUTION INTRAVENOUS ONCE
Status: COMPLETED | OUTPATIENT
Start: 2021-05-12 | End: 2021-05-12

## 2021-05-12 RX ADMIN — REGADENOSON 0.4 MG: 0.08 INJECTION, SOLUTION INTRAVENOUS at 14:18

## 2021-05-12 RX ADMIN — TETROFOSMIN 32.4 MCI.: 1.38 INJECTION, POWDER, LYOPHILIZED, FOR SOLUTION INTRAVENOUS at 14:30

## 2021-05-12 RX ADMIN — TETROFOSMIN 11 MCI.: 1.38 INJECTION, POWDER, LYOPHILIZED, FOR SOLUTION INTRAVENOUS at 12:45

## 2021-05-12 ASSESSMENT — MIFFLIN-ST. JEOR: SCORE: 1373.64

## 2021-05-13 DIAGNOSIS — R93.1 ABNORMAL FINDINGS ON DIAGNOSTIC IMAGING OF HEART/CORONARY CIRCULATION: Primary | ICD-10-CM

## 2021-05-25 DIAGNOSIS — Z11.59 ENCOUNTER FOR SCREENING FOR OTHER VIRAL DISEASES: ICD-10-CM

## 2021-06-01 ENCOUNTER — TELEPHONE (OUTPATIENT)
Dept: FAMILY MEDICINE | Facility: CLINIC | Age: 71
End: 2021-06-01

## 2021-06-01 NOTE — TELEPHONE ENCOUNTER
Reason for Call:  Preop    Detailed comments: pt is calling for a preop with Dr. Alcocer and will need prior to her surgery on 06/17/2021.    Please call her, she was on hold, and stated they were going to try to find out if they could get her in.    Phone Number Patient can be reached at: Cell number on file:    Telephone Information:   Mobile [phone]       Best Time: any    Can we leave a detailed message on this number? YES    Call taken on 6/1/2021 at 1:40 PM by Stephanie Hanna

## 2021-06-10 ENCOUNTER — OFFICE VISIT (OUTPATIENT)
Dept: FAMILY MEDICINE | Facility: CLINIC | Age: 71
End: 2021-06-10
Payer: COMMERCIAL

## 2021-06-10 VITALS
HEART RATE: 58 BPM | OXYGEN SATURATION: 99 % | DIASTOLIC BLOOD PRESSURE: 68 MMHG | BODY MASS INDEX: 34.65 KG/M2 | TEMPERATURE: 97.2 F | WEIGHT: 195.6 LBS | RESPIRATION RATE: 18 BRPM | SYSTOLIC BLOOD PRESSURE: 130 MMHG

## 2021-06-10 DIAGNOSIS — G89.29 CHRONIC PAIN OF RIGHT KNEE: ICD-10-CM

## 2021-06-10 DIAGNOSIS — R93.1 ABNORMAL FINDINGS ON DIAGNOSTIC IMAGING OF HEART/CORONARY CIRCULATION: ICD-10-CM

## 2021-06-10 DIAGNOSIS — D32.9 MENINGIOMA (H): ICD-10-CM

## 2021-06-10 DIAGNOSIS — M25.561 CHRONIC PAIN OF RIGHT KNEE: ICD-10-CM

## 2021-06-10 DIAGNOSIS — M17.0 PRIMARY OSTEOARTHRITIS OF BOTH KNEES: ICD-10-CM

## 2021-06-10 DIAGNOSIS — R73.03 PREDIABETES: ICD-10-CM

## 2021-06-10 DIAGNOSIS — I10 ESSENTIAL HYPERTENSION WITH GOAL BLOOD PRESSURE LESS THAN 140/90: ICD-10-CM

## 2021-06-10 DIAGNOSIS — R06.02 CHRONIC SHORTNESS OF BREATH: ICD-10-CM

## 2021-06-10 DIAGNOSIS — Z01.818 PREOP GENERAL PHYSICAL EXAM: Primary | ICD-10-CM

## 2021-06-10 DIAGNOSIS — R91.8 PULMONARY NODULES: ICD-10-CM

## 2021-06-10 DIAGNOSIS — I47.10 PAROXYSMAL SUPRAVENTRICULAR TACHYCARDIA (H): ICD-10-CM

## 2021-06-10 DIAGNOSIS — G47.33 OSA (OBSTRUCTIVE SLEEP APNEA): ICD-10-CM

## 2021-06-10 PROBLEM — J44.9 COPD (CHRONIC OBSTRUCTIVE PULMONARY DISEASE) (H): Status: ACTIVE | Noted: 2021-06-10

## 2021-06-10 LAB
ALBUMIN SERPL-MCNC: 3.8 G/DL (ref 3.4–5)
ALP SERPL-CCNC: 59 U/L (ref 40–150)
ALT SERPL W P-5'-P-CCNC: 37 U/L (ref 0–50)
ANION GAP SERPL CALCULATED.3IONS-SCNC: 3 MMOL/L (ref 3–14)
AST SERPL W P-5'-P-CCNC: 18 U/L (ref 0–45)
BASOPHILS # BLD AUTO: 0 10E9/L (ref 0–0.2)
BASOPHILS NFR BLD AUTO: 0.6 %
BILIRUB SERPL-MCNC: 0.4 MG/DL (ref 0.2–1.3)
BUN SERPL-MCNC: 19 MG/DL (ref 7–30)
CALCIUM SERPL-MCNC: 9.2 MG/DL (ref 8.5–10.1)
CHLORIDE SERPL-SCNC: 105 MMOL/L (ref 94–109)
CO2 SERPL-SCNC: 29 MMOL/L (ref 20–32)
CREAT SERPL-MCNC: 0.86 MG/DL (ref 0.52–1.04)
DIFFERENTIAL METHOD BLD: NORMAL
EOSINOPHIL # BLD AUTO: 0.1 10E9/L (ref 0–0.7)
EOSINOPHIL NFR BLD AUTO: 1.6 %
ERYTHROCYTE [DISTWIDTH] IN BLOOD BY AUTOMATED COUNT: 14.2 % (ref 10–15)
GFR SERPL CREATININE-BSD FRML MDRD: 68 ML/MIN/{1.73_M2}
GLUCOSE SERPL-MCNC: 94 MG/DL (ref 70–99)
HBA1C MFR BLD: 6.2 % (ref 0–5.6)
HCT VFR BLD AUTO: 38.9 % (ref 35–47)
HGB BLD-MCNC: 13.2 G/DL (ref 11.7–15.7)
LYMPHOCYTES # BLD AUTO: 1.7 10E9/L (ref 0.8–5.3)
LYMPHOCYTES NFR BLD AUTO: 26.3 %
MCH RBC QN AUTO: 31 PG (ref 26.5–33)
MCHC RBC AUTO-ENTMCNC: 33.9 G/DL (ref 31.5–36.5)
MCV RBC AUTO: 91 FL (ref 78–100)
MONOCYTES # BLD AUTO: 0.6 10E9/L (ref 0–1.3)
MONOCYTES NFR BLD AUTO: 9 %
NEUTROPHILS # BLD AUTO: 4 10E9/L (ref 1.6–8.3)
NEUTROPHILS NFR BLD AUTO: 62.5 %
PLATELET # BLD AUTO: 293 10E9/L (ref 150–450)
POTASSIUM SERPL-SCNC: 3.8 MMOL/L (ref 3.4–5.3)
PROT SERPL-MCNC: 7.1 G/DL (ref 6.8–8.8)
RBC # BLD AUTO: 4.26 10E12/L (ref 3.8–5.2)
SODIUM SERPL-SCNC: 137 MMOL/L (ref 133–144)
WBC # BLD AUTO: 6.4 10E9/L (ref 4–11)

## 2021-06-10 PROCEDURE — 85025 COMPLETE CBC W/AUTO DIFF WBC: CPT | Performed by: FAMILY MEDICINE

## 2021-06-10 PROCEDURE — 99215 OFFICE O/P EST HI 40 MIN: CPT | Performed by: FAMILY MEDICINE

## 2021-06-10 PROCEDURE — 36415 COLL VENOUS BLD VENIPUNCTURE: CPT | Performed by: FAMILY MEDICINE

## 2021-06-10 PROCEDURE — 83036 HEMOGLOBIN GLYCOSYLATED A1C: CPT | Performed by: FAMILY MEDICINE

## 2021-06-10 PROCEDURE — 80053 COMPREHEN METABOLIC PANEL: CPT | Performed by: FAMILY MEDICINE

## 2021-06-10 NOTE — PROGRESS NOTES
[facility]  [address] [place] [id number]  Phone: [phone]  Fax: [phone]  Primary Provider: Erika Alcocer  Pre-op Performing Provider: ERIKA ALCOCER      PREOPERATIVE EVALUATION:  Today's date: 6/10/2021    Rosita King is a 70 year old female who presents for a preoperative evaluation.    Surgical Information:  Surgery/Procedure: Total Knee Arthroplasty-Right   Surgery Location: [facility]   Surgeon: Dr. Nils Edwards   Surgery Date: 06/17/2021  Time of Surgery: 12:30   Where patient plans to recover: Other: unsure   Fax number for surgical facility: Note does not need to be faxed, will be available electronically in Epic.    Type of Anesthesia Anticipated: Spinal    Assessment & Plan     The proposed surgical procedure is considered INTERMEDIATE risk.    Preop general physical exam  Primary osteoarthritis of both knees  Chronic pain of right knee  Recommend TKA  Ok for surgery    Chronic shortness of breath  Uncertain etiology  - General PFT Lab (Please always keep checked); Future  - Pulmonary Function Test; Future  - Echocardiogram Complete; Future  Will need to see cardiology as some point.     Meningioma (H)  One cm  Needs recheck, can do this after surgery  - MR Brain w/o & w Contrast; Future    Essential hypertension with goal blood pressure less than 140/90  Well controlled  - Comprehensive metabolic panel  - CBC with platelets and differential  Continue metoprolol, lisinopril and hydrochlorothiazide     ASHLEY (obstructive sleep apnea)  Severe  On CPAP    Pulmonary nodule  On CT scan  Recheck CT in 12 months    Paroxysmal supraventricular tachycardia (H)  Well controlled   Continue metoprolol    Prediabetes  Diet and exercise discussed  - Hemoglobin A1c    Abnormal findings on diagnostic imaging of heart/coronary circulation  With no inducible ischemia on stress test, and normal EF, her cardiac risk is low.   At some point she  should see cardiology and have an echocardiogram, MRI heart. She wants to get these tests done after her TKA and her risk is overall low for an event with this surgery. Approved for anesthesia.          Risks and Recommendations:  The patient has the following additional risks and recommendations for perioperative complications:  Obstructive Sleep Apnea:   Needs to bring in her CPAP     Medication Instructions:  Patient is to take all scheduled medications on the day of surgery EXCEPT for modifications listed below:    Patient Instructions   Hold aspirin from now on  Hold the lisinopril and hydrochlorothiazide day of surgery  Take metoprolol day of surgery     RECOMMENDATION:  APPROVAL GIVEN to proceed with proposed procedure, without further diagnostic evaluation.    I spent a total of 68 minutes on the day of the visit.   Time spent doing chart review, history and exam, documentation and further activities per the note        Subjective     HPI related to upcoming procedure: right knee pain x 3-4 years, has had injections, physical therapy     Preop Questions 6/10/2021   1. Have you ever had a heart attack or stroke? No   2. Have you ever had surgery on your heart or blood vessels, such as a stent placement, a coronary artery bypass, or surgery on an artery in your head, neck, heart, or legs? No   3. Do you have chest pain with activity? No   4. Do you have a history of  heart failure? No   5. Do you currently have a cold, bronchitis or symptoms of other infection? No   6. Do you have a cough, shortness of breath, or wheezing? UNKNOWN - chronic shortness of breath   7. Do you or anyone in your family have previous history of blood clots? No   8. Do you or does anyone in your family have a serious bleeding problem such as prolonged bleeding following surgeries or cuts? No   9. Have you ever had problems with anemia or been told to take iron pills? No   10. Have you had any abnormal blood loss such as black, tarry  or bloody stools, or abnormal vaginal bleeding? No   11. Have you ever had a blood transfusion? No   12. Are you willing to have a blood transfusion if it is medically needed before, during, or after your surgery? Yes   13. Have you or any of your relatives ever had problems with anesthesia? No   14. Do you have sleep apnea, excessive snoring or daytime drowsiness? YES    14a. Do you have a CPAP machine? Yes   15. Do you have any artifical heart valves or other implanted medical devices like a pacemaker, defibrillator, or continuous glucose monitor? No   16. Do you have artificial joints? No   17. Are you allergic to latex? No   18. Is there any chance that you may be pregnant? -       Health Care Directive:  Patient does not have a Health Care Directive or Living Will: Patient states has Advance Directive and will bring in a copy to clinic.    Preoperative Review of :   reviewed - no record of controlled substances prescribed.    Chronic dyspnea on exertion   She has a nuclear stress test that was negative for inducible ischemia on 5/12/21. However, thickened myocardium was noted:     The nuclear stress test is negative for inducible myocardial ischemia or infarction.     Small left ventricular cavity size is noted. LVH appears to be present.     Left ventricular function is normal.     The left ventricular ejection fraction at stress is greater than 70%.     A prior study was conducted on 10/19/2015.  This study has no change when compared with the prior study.     Low voltage on ECG and the appearance of thickened myocardium raises the possibility of infiltrative cardiomyopathy. Consider referral to Dr. Varma at [facility] for evaluation.  Patient has not followed up with cardiology yet.   CT scan of chest was ok except for small pulmonary nodule.     hypertension   On lisinopril and hydrochlorothiazide and metoprolol.   BP Readings from Last 6 Encounters:   06/10/21 130/68   04/29/21 139/80   08/06/20 122/80    06/22/20 132/78   06/05/20 132/72   06/03/20 110/60     Meningioma  Has a one cm meningioma noted on brain MRI  May 2019. Will need to have it rechecked.. did not repeat last year due to COVID.     Paroxysmal supraventricular tachycardia  Well controlled on metoprolol    Prediabetes  Lab Results   Component Value Date    A1C 6.2 06/10/2021    A1C 6.1 08/06/2020    A1C 5.8 11/26/2019    A1C 5.5 08/24/2017         Review of Systems  Constitutional, neuro, ENT, endocrine, pulmonary, cardiac, gastrointestinal, genitourinary, musculoskeletal, integument and psychiatric systems are negative, except as otherwise noted.    Patient Active Problem List    Diagnosis Date Noted     Pulmonary nodules 04/29/2021     Priority: Medium     Pleomorphic adenoma of parotid gland 11/26/2019     Priority: Medium     Parotid mass 10/09/2019     Priority: Medium     Added automatically from request for surgery [id number]       Lichen sclerosus 08/24/2017     Priority: Medium     Paroxysmal supraventricular tachycardia (H) 02/23/2017     Priority: Medium     Gastroesophageal reflux disease without esophagitis 08/22/2016     Priority: Medium     Essential hypertension with goal blood pressure less than 140/90 08/21/2016     Priority: Medium     Diverticulosis of large intestine without hemorrhage 12/24/2015     Priority: Medium     H. pylori infection 12/24/2015     Priority: Medium     Vitamin D deficiency 10/12/2015     Priority: Medium     ASHLEY (obstructive sleep apnea) 10/12/2015     Priority: Medium     Severe ASHLEY   - PSG at 7/27/2006 with undocumented weight, AHI 84.7, RDI 88.9, SpO2 remained above 90%, CPAP 7 optimal.       Generalized anxiety disorder 10/12/2015     Priority: Medium     Diagnosis updated by automated process. Provider to review and confirm.       Hyperlipidemia LDL goal <130 10/12/2015     Priority: Medium     CARDIOVASCULAR SCREENING; LDL GOAL LESS THAN 130 02/03/2015     Priority: Medium     Intertrigo 05/19/2011      Priority: Medium     Urge incontinence 05/03/2010     Priority: Medium     Hiatal hernia 01/12/2009     Priority: Medium     Overview:   small sliding hiatal hernia noted on stress echo 2009       Carpal tunnel syndrome 11/27/2006     Priority: Medium     Other psoriasis 06/06/2006     Priority: Medium      Past Medical History:   Diagnosis Date     Essential hypertension with goal blood pressure less than 140/90 8/21/2016     Gastroesophageal reflux disease without esophagitis 8/22/2016     Hyperlipidemia LDL goal <130 10/12/2015     ASHLEY (obstructive sleep apnea) 10/12/2015    Severe ASHLEY  - PSG at 7/27/2006 with undocumented weight, AHI 84.7, RDI 88.9, SpO2 remained above 90%, CPAP 7 optimal.     Paroxysmal supraventricular tachycardia (H) 2/23/2017     Past Surgical History:   Procedure Laterality Date     CARPAL TUNNEL RELEASE RT/LT Bilateral 2006, 2009     COLONOSCOPY N/A 11/25/2015    Procedure: COLONOSCOPY;  Surgeon: Mina Roy MD;  Location: [facility]     ESOPHAGOSCOPY, GASTROSCOPY, DUODENOSCOPY (EGD), COMBINED N/A 11/25/2015    Procedure: COMBINED ESOPHAGOSCOPY, GASTROSCOPY, DUODENOSCOPY (EGD), BIOPSY SINGLE OR MULTIPLE;  Surgeon: Mina Roy MD;  Location: [facility]     PAROTIDECTOMY Left 10/31/2019    Procedure: LEFT SUPERFICIAL PAROTIDECTOMY WITH FACIAL NERVE MONITORING;  Surgeon: Atif Salazar MD;  Location: [facility]     TONSILLECTOMY & ADENOIDECTOMY  age 13     Current Outpatient Medications   Medication Sig Dispense Refill     aspirin 81 MG EC tablet Take 81 mg by mouth daily       fluocinonide (LIDEX) 0.05 % external solution Apply sparingly to affected area twice daily as needed.  Do not apply to face. 60 mL 3     hydrochlorothiazide (HYDRODIURIL) 25 MG tablet Take 1 tablet (25 mg) by mouth every morning 90 tablet 3     lisinopril (ZESTRIL) 10 MG tablet Take 1 tablet (10 mg) by mouth every morning 90 tablet 3     metoprolol succinate ER (TOPROL-XL) 50 MG 24 hr tablet Take 1 tablet (50  mg) by mouth daily 90 tablet 3     omeprazole (PRILOSEC) 20 MG DR capsule TAKE 1 CAPSULE BY MOUTH ONCE DAILY 90 capsule 1     VITAMIN D, CHOLECALCIFEROL, PO Take 1,000 Units by mouth daily        Multiple Vitamins-Minerals (CENTRUM SILVER) per tablet Take 1 tablet by mouth daily       tiZANidine (ZANAFLEX) 2 MG tablet Take 1 tablet (2 mg) by mouth 3 times daily as needed for muscle spasms or other (neck tension) (Patient not taking: Reported on 6/10/2021) 20 tablet 1       Allergies   Allergen Reactions     Penicillins      Pravastatin      Other reaction(s): Myalgia     Simvastatin      Other reaction(s): Myalgia     Sulfa Drugs         Social History     Tobacco Use     Smoking status: Former Smoker     Smokeless tobacco: Never Used   Substance Use Topics     Alcohol use: Yes     Comment: ocassional     Family History   Problem Relation Age of Onset     Liver Disease Mother      Depression/Anxiety Father      Diabetes Father      Diabetes Sister      Liver Disease Sister      Breast Cancer Sister      Cancer Sister      Kidney Cancer Nephew      History   Drug Use No         Objective     /68 (BP Location: Right arm, Patient Position: Sitting, Cuff Size: Adult Large)   Pulse 58   Temp 97.2  F (36.2  C) (Tympanic)   Resp 18   Wt 88.7 kg (195 lb 9.6 oz)   SpO2 99%   BMI 34.65 kg/m      Physical Exam    GENERAL APPEARANCE: healthy, alert and no distress     EYES: EOMI, PERRL     NECK: no adenopathy, no asymmetry, masses, or scars and thyroid normal to palpation     RESP: lungs clear to auscultation - no rales, rhonchi or wheezes     CV: regular rates and rhythm, normal S1 S2, no S3 or S4 and no murmur, click or rub     ABDOMEN:  soft, nontender, no HSM or masses and bowel sounds normal     MS: abnormal right knee gait     SKIN: no suspicious lesions or rashes     NEURO: Normal strength and tone, sensory exam grossly normal, mentation intact and speech normal     PSYCH: mentation appears normal. and affect  normal/bright     LYMPHATICS: No cervical adenopathy    Recent Labs   Lab Test 04/29/21  1046 08/06/20  1714 11/26/19  1209   HGB 13.5 13.0 12.5    292 265    138 136   POTASSIUM 3.6 3.9 3.9   CR 0.76 0.79 0.75   A1C  --  6.1* 5.8*        Diagnostics:  Labs pending at this time.  Results will be reviewed when available.  Low voltage   EKG: appears normal, NSR, normal axis, normal intervals, no acute ST/T changes c/w ischemia, unchanged from previous tracings, low voltage and poor R wave progression    Revised Cardiac Risk Index (RCRI):  The patient has the following serious cardiovascular risks for perioperative complications:  Unable to determine cardiac risk at this time  I recommend MRI heart and follow up by cardiology as requested before.        Signed Electronically by: Addis House MD  Copy of this evaluation report is provided to requesting physician.

## 2021-06-10 NOTE — H&P (VIEW-ONLY)
[facility]  [address] [place] [id number]  Phone: [phone]  Fax: [phone]  Primary Provider: Erika Alcocer  Pre-op Performing Provider: ERIKA ALCOCER      PREOPERATIVE EVALUATION:  Today's date: 6/10/2021    Rosita King is a 70 year old female who presents for a preoperative evaluation.    Surgical Information:  Surgery/Procedure: Total Knee Arthroplasty-Right   Surgery Location: [facility]   Surgeon: Dr. Nils Edwards   Surgery Date: 06/17/2021  Time of Surgery: 12:30   Where patient plans to recover: Other: unsure   Fax number for surgical facility: Note does not need to be faxed, will be available electronically in Epic.    Type of Anesthesia Anticipated: Spinal    Assessment & Plan     The proposed surgical procedure is considered INTERMEDIATE risk.    Preop general physical exam  Primary osteoarthritis of both knees  Chronic pain of right knee  Recommend TKA  Ok for surgery    Chronic shortness of breath  Uncertain etiology  - General PFT Lab (Please always keep checked); Future  - Pulmonary Function Test; Future  - Echocardiogram Complete; Future  Will need to see cardiology as some point.     Meningioma (H)  One cm  Needs recheck, can do this after surgery  - MR Brain w/o & w Contrast; Future    Essential hypertension with goal blood pressure less than 140/90  Well controlled  - Comprehensive metabolic panel  - CBC with platelets and differential  Continue metoprolol, lisinopril and hydrochlorothiazide     ASHLEY (obstructive sleep apnea)  Severe  On CPAP    Pulmonary nodule  On CT scan  Recheck CT in 12 months    Paroxysmal supraventricular tachycardia (H)  Well controlled   Continue metoprolol    Prediabetes  Diet and exercise discussed  - Hemoglobin A1c    Abnormal findings on diagnostic imaging of heart/coronary circulation  With no inducible ischemia on stress test, and normal EF, her cardiac risk is low.   At some point she  should see cardiology and have an echocardiogram, MRI heart. She wants to get these tests done after her TKA and her risk is overall low for an event with this surgery. Approved for anesthesia.          Risks and Recommendations:  The patient has the following additional risks and recommendations for perioperative complications:  Obstructive Sleep Apnea:   Needs to bring in her CPAP     Medication Instructions:  Patient is to take all scheduled medications on the day of surgery EXCEPT for modifications listed below:    Patient Instructions   Hold aspirin from now on  Hold the lisinopril and hydrochlorothiazide day of surgery  Take metoprolol day of surgery     RECOMMENDATION:  APPROVAL GIVEN to proceed with proposed procedure, without further diagnostic evaluation.    I spent a total of 68 minutes on the day of the visit.   Time spent doing chart review, history and exam, documentation and further activities per the note        Subjective     HPI related to upcoming procedure: right knee pain x 3-4 years, has had injections, physical therapy     Preop Questions 6/10/2021   1. Have you ever had a heart attack or stroke? No   2. Have you ever had surgery on your heart or blood vessels, such as a stent placement, a coronary artery bypass, or surgery on an artery in your head, neck, heart, or legs? No   3. Do you have chest pain with activity? No   4. Do you have a history of  heart failure? No   5. Do you currently have a cold, bronchitis or symptoms of other infection? No   6. Do you have a cough, shortness of breath, or wheezing? UNKNOWN - chronic shortness of breath   7. Do you or anyone in your family have previous history of blood clots? No   8. Do you or does anyone in your family have a serious bleeding problem such as prolonged bleeding following surgeries or cuts? No   9. Have you ever had problems with anemia or been told to take iron pills? No   10. Have you had any abnormal blood loss such as black, tarry  or bloody stools, or abnormal vaginal bleeding? No   11. Have you ever had a blood transfusion? No   12. Are you willing to have a blood transfusion if it is medically needed before, during, or after your surgery? Yes   13. Have you or any of your relatives ever had problems with anesthesia? No   14. Do you have sleep apnea, excessive snoring or daytime drowsiness? YES    14a. Do you have a CPAP machine? Yes   15. Do you have any artifical heart valves or other implanted medical devices like a pacemaker, defibrillator, or continuous glucose monitor? No   16. Do you have artificial joints? No   17. Are you allergic to latex? No   18. Is there any chance that you may be pregnant? -       Health Care Directive:  Patient does not have a Health Care Directive or Living Will: Patient states has Advance Directive and will bring in a copy to clinic.    Preoperative Review of :   reviewed - no record of controlled substances prescribed.    Chronic dyspnea on exertion   She has a nuclear stress test that was negative for inducible ischemia on 5/12/21. However, thickened myocardium was noted:     The nuclear stress test is negative for inducible myocardial ischemia or infarction.     Small left ventricular cavity size is noted. LVH appears to be present.     Left ventricular function is normal.     The left ventricular ejection fraction at stress is greater than 70%.     A prior study was conducted on 10/19/2015.  This study has no change when compared with the prior study.     Low voltage on ECG and the appearance of thickened myocardium raises the possibility of infiltrative cardiomyopathy. Consider referral to Dr. Varma at [facility] for evaluation.  Patient has not followed up with cardiology yet.   CT scan of chest was ok except for small pulmonary nodule.     hypertension   On lisinopril and hydrochlorothiazide and metoprolol.   BP Readings from Last 6 Encounters:   06/10/21 130/68   04/29/21 139/80   08/06/20 122/80    06/22/20 132/78   06/05/20 132/72   06/03/20 110/60     Meningioma  Has a one cm meningioma noted on brain MRI  May 2019. Will need to have it rechecked.. did not repeat last year due to COVID.     Paroxysmal supraventricular tachycardia  Well controlled on metoprolol    Prediabetes  Lab Results   Component Value Date    A1C 6.2 06/10/2021    A1C 6.1 08/06/2020    A1C 5.8 11/26/2019    A1C 5.5 08/24/2017         Review of Systems  Constitutional, neuro, ENT, endocrine, pulmonary, cardiac, gastrointestinal, genitourinary, musculoskeletal, integument and psychiatric systems are negative, except as otherwise noted.    Patient Active Problem List    Diagnosis Date Noted     Pulmonary nodules 04/29/2021     Priority: Medium     Pleomorphic adenoma of parotid gland 11/26/2019     Priority: Medium     Parotid mass 10/09/2019     Priority: Medium     Added automatically from request for surgery [id number]       Lichen sclerosus 08/24/2017     Priority: Medium     Paroxysmal supraventricular tachycardia (H) 02/23/2017     Priority: Medium     Gastroesophageal reflux disease without esophagitis 08/22/2016     Priority: Medium     Essential hypertension with goal blood pressure less than 140/90 08/21/2016     Priority: Medium     Diverticulosis of large intestine without hemorrhage 12/24/2015     Priority: Medium     H. pylori infection 12/24/2015     Priority: Medium     Vitamin D deficiency 10/12/2015     Priority: Medium     ASHLEY (obstructive sleep apnea) 10/12/2015     Priority: Medium     Severe ASHLEY   - PSG at 7/27/2006 with undocumented weight, AHI 84.7, RDI 88.9, SpO2 remained above 90%, CPAP 7 optimal.       Generalized anxiety disorder 10/12/2015     Priority: Medium     Diagnosis updated by automated process. Provider to review and confirm.       Hyperlipidemia LDL goal <130 10/12/2015     Priority: Medium     CARDIOVASCULAR SCREENING; LDL GOAL LESS THAN 130 02/03/2015     Priority: Medium     Intertrigo 05/19/2011      Priority: Medium     Urge incontinence 05/03/2010     Priority: Medium     Hiatal hernia 01/12/2009     Priority: Medium     Overview:   small sliding hiatal hernia noted on stress echo 2009       Carpal tunnel syndrome 11/27/2006     Priority: Medium     Other psoriasis 06/06/2006     Priority: Medium      Past Medical History:   Diagnosis Date     Essential hypertension with goal blood pressure less than 140/90 8/21/2016     Gastroesophageal reflux disease without esophagitis 8/22/2016     Hyperlipidemia LDL goal <130 10/12/2015     ASHLEY (obstructive sleep apnea) 10/12/2015    Severe ASHLEY  - PSG at 7/27/2006 with undocumented weight, AHI 84.7, RDI 88.9, SpO2 remained above 90%, CPAP 7 optimal.     Paroxysmal supraventricular tachycardia (H) 2/23/2017     Past Surgical History:   Procedure Laterality Date     CARPAL TUNNEL RELEASE RT/LT Bilateral 2006, 2009     COLONOSCOPY N/A 11/25/2015    Procedure: COLONOSCOPY;  Surgeon: Mina Roy MD;  Location: [facility]     ESOPHAGOSCOPY, GASTROSCOPY, DUODENOSCOPY (EGD), COMBINED N/A 11/25/2015    Procedure: COMBINED ESOPHAGOSCOPY, GASTROSCOPY, DUODENOSCOPY (EGD), BIOPSY SINGLE OR MULTIPLE;  Surgeon: Mina Roy MD;  Location: [facility]     PAROTIDECTOMY Left 10/31/2019    Procedure: LEFT SUPERFICIAL PAROTIDECTOMY WITH FACIAL NERVE MONITORING;  Surgeon: Atif Salazar MD;  Location: [facility]     TONSILLECTOMY & ADENOIDECTOMY  age 13     Current Outpatient Medications   Medication Sig Dispense Refill     aspirin 81 MG EC tablet Take 81 mg by mouth daily       fluocinonide (LIDEX) 0.05 % external solution Apply sparingly to affected area twice daily as needed.  Do not apply to face. 60 mL 3     hydrochlorothiazide (HYDRODIURIL) 25 MG tablet Take 1 tablet (25 mg) by mouth every morning 90 tablet 3     lisinopril (ZESTRIL) 10 MG tablet Take 1 tablet (10 mg) by mouth every morning 90 tablet 3     metoprolol succinate ER (TOPROL-XL) 50 MG 24 hr tablet Take 1 tablet (50  mg) by mouth daily 90 tablet 3     omeprazole (PRILOSEC) 20 MG DR capsule TAKE 1 CAPSULE BY MOUTH ONCE DAILY 90 capsule 1     VITAMIN D, CHOLECALCIFEROL, PO Take 1,000 Units by mouth daily        Multiple Vitamins-Minerals (CENTRUM SILVER) per tablet Take 1 tablet by mouth daily       tiZANidine (ZANAFLEX) 2 MG tablet Take 1 tablet (2 mg) by mouth 3 times daily as needed for muscle spasms or other (neck tension) (Patient not taking: Reported on 6/10/2021) 20 tablet 1       Allergies   Allergen Reactions     Penicillins      Pravastatin      Other reaction(s): Myalgia     Simvastatin      Other reaction(s): Myalgia     Sulfa Drugs         Social History     Tobacco Use     Smoking status: Former Smoker     Smokeless tobacco: Never Used   Substance Use Topics     Alcohol use: Yes     Comment: ocassional     Family History   Problem Relation Age of Onset     Liver Disease Mother      Depression/Anxiety Father      Diabetes Father      Diabetes Sister      Liver Disease Sister      Breast Cancer Sister      Cancer Sister      Kidney Cancer Nephew      History   Drug Use No         Objective     /68 (BP Location: Right arm, Patient Position: Sitting, Cuff Size: Adult Large)   Pulse 58   Temp 97.2  F (36.2  C) (Tympanic)   Resp 18   Wt 88.7 kg (195 lb 9.6 oz)   SpO2 99%   BMI 34.65 kg/m      Physical Exam    GENERAL APPEARANCE: healthy, alert and no distress     EYES: EOMI, PERRL     NECK: no adenopathy, no asymmetry, masses, or scars and thyroid normal to palpation     RESP: lungs clear to auscultation - no rales, rhonchi or wheezes     CV: regular rates and rhythm, normal S1 S2, no S3 or S4 and no murmur, click or rub     ABDOMEN:  soft, nontender, no HSM or masses and bowel sounds normal     MS: abnormal right knee gait     SKIN: no suspicious lesions or rashes     NEURO: Normal strength and tone, sensory exam grossly normal, mentation intact and speech normal     PSYCH: mentation appears normal. and affect  normal/bright     LYMPHATICS: No cervical adenopathy    Recent Labs   Lab Test 04/29/21  1046 08/06/20  1714 11/26/19  1209   HGB 13.5 13.0 12.5    292 265    138 136   POTASSIUM 3.6 3.9 3.9   CR 0.76 0.79 0.75   A1C  --  6.1* 5.8*        Diagnostics:  Labs pending at this time.  Results will be reviewed when available.  Low voltage   EKG: appears normal, NSR, normal axis, normal intervals, no acute ST/T changes c/w ischemia, unchanged from previous tracings, low voltage and poor R wave progression    Revised Cardiac Risk Index (RCRI):  The patient has the following serious cardiovascular risks for perioperative complications:  Unable to determine cardiac risk at this time  I recommend MRI heart and follow up by cardiology as requested before.        Signed Electronically by: Addis House MD  Copy of this evaluation report is provided to requesting physician.

## 2021-06-10 NOTE — PATIENT INSTRUCTIONS
Hold aspirin from now on  Hold the lisinopril and hydrochlorothiazide day of surgery  Take metoprolol day of surgery    Get echocardiogram and see the cardiologist, I am going to recommend that you do this prior to your surgery so we may have to delay surgery.

## 2021-06-11 PROBLEM — R73.03 PREDIABETES: Status: ACTIVE | Noted: 2021-06-11

## 2021-06-11 PROBLEM — D32.9 MENINGIOMA (H): Status: ACTIVE | Noted: 2021-06-11

## 2021-06-11 PROBLEM — R93.1 ABNORMAL FINDINGS ON DIAGNOSTIC IMAGING OF HEART/CORONARY CIRCULATION: Status: ACTIVE | Noted: 2021-06-11

## 2021-06-13 ENCOUNTER — ANESTHESIA EVENT (OUTPATIENT)
Dept: SURGERY | Facility: CLINIC | Age: 71
End: 2021-06-13
Payer: MEDICARE

## 2021-06-14 DIAGNOSIS — Z11.59 ENCOUNTER FOR SCREENING FOR OTHER VIRAL DISEASES: ICD-10-CM

## 2021-06-14 LAB
SARS-COV-2 RNA RESP QL NAA+PROBE: NORMAL
SPECIMEN SOURCE: NORMAL

## 2021-06-14 PROCEDURE — U0003 INFECTIOUS AGENT DETECTION BY NUCLEIC ACID (DNA OR RNA); SEVERE ACUTE RESPIRATORY SYNDROME CORONAVIRUS 2 (SARS-COV-2) (CORONAVIRUS DISEASE [COVID-19]), AMPLIFIED PROBE TECHNIQUE, MAKING USE OF HIGH THROUGHPUT TECHNOLOGIES AS DESCRIBED BY CMS-2020-01-R: HCPCS | Performed by: ORTHOPAEDIC SURGERY

## 2021-06-14 PROCEDURE — U0005 INFEC AGEN DETEC AMPLI PROBE: HCPCS | Performed by: ORTHOPAEDIC SURGERY

## 2021-06-15 LAB
LABORATORY COMMENT REPORT: NORMAL
SARS-COV-2 RNA RESP QL NAA+PROBE: NEGATIVE
SPECIMEN SOURCE: NORMAL

## 2021-06-17 ENCOUNTER — APPOINTMENT (OUTPATIENT)
Dept: GENERAL RADIOLOGY | Facility: CLINIC | Age: 71
End: 2021-06-17
Attending: ORTHOPAEDIC SURGERY
Payer: MEDICARE

## 2021-06-17 ENCOUNTER — HOSPITAL ENCOUNTER (OUTPATIENT)
Facility: CLINIC | Age: 71
Discharge: HOME | End: 2021-06-18
Attending: ORTHOPAEDIC SURGERY | Admitting: ORTHOPAEDIC SURGERY
Payer: MEDICARE

## 2021-06-17 ENCOUNTER — ANESTHESIA (OUTPATIENT)
Dept: SURGERY | Facility: CLINIC | Age: 71
End: 2021-06-17
Payer: MEDICARE

## 2021-06-17 DIAGNOSIS — Z96.651 S/P TOTAL KNEE ARTHROPLASTY, RIGHT: Primary | ICD-10-CM

## 2021-06-17 PROBLEM — Z96.659 STATUS POST TOTAL KNEE REPLACEMENT: Status: ACTIVE | Noted: 2021-06-17

## 2021-06-17 PROBLEM — M17.11 RIGHT KNEE DJD: Status: ACTIVE | Noted: 2021-06-17

## 2021-06-17 PROBLEM — J44.9 COPD (CHRONIC OBSTRUCTIVE PULMONARY DISEASE) (H): Chronic | Status: ACTIVE | Noted: 2021-06-10

## 2021-06-17 PROCEDURE — C1776 JOINT DEVICE (IMPLANTABLE): HCPCS | Performed by: ORTHOPAEDIC SURGERY

## 2021-06-17 PROCEDURE — 710N000010 HC RECOVERY PHASE 1, LEVEL 2, PER MIN: Performed by: ORTHOPAEDIC SURGERY

## 2021-06-17 PROCEDURE — 272N000001 HC OR GENERAL SUPPLY STERILE: Performed by: ORTHOPAEDIC SURGERY

## 2021-06-17 PROCEDURE — 250N000011 HC RX IP 250 OP 636: Performed by: PHYSICIAN ASSISTANT

## 2021-06-17 PROCEDURE — 999N000141 HC STATISTIC PRE-PROCEDURE NURSING ASSESSMENT: Performed by: ORTHOPAEDIC SURGERY

## 2021-06-17 PROCEDURE — 250N000011 HC RX IP 250 OP 636: Performed by: ORTHOPAEDIC SURGERY

## 2021-06-17 PROCEDURE — 250N000009 HC RX 250: Performed by: NURSE ANESTHETIST, CERTIFIED REGISTERED

## 2021-06-17 PROCEDURE — 258N000003 HC RX IP 258 OP 636: Performed by: ORTHOPAEDIC SURGERY

## 2021-06-17 PROCEDURE — 370N000017 HC ANESTHESIA TECHNICAL FEE, PER MIN: Performed by: ORTHOPAEDIC SURGERY

## 2021-06-17 PROCEDURE — 250N000013 HC RX MED GY IP 250 OP 250 PS 637: Performed by: PHYSICIAN ASSISTANT

## 2021-06-17 PROCEDURE — 93005 ELECTROCARDIOGRAM TRACING: CPT

## 2021-06-17 PROCEDURE — 999N000065 XR KNEE PORT RIGHT 1/2 VIEWS: Mod: RT

## 2021-06-17 PROCEDURE — 278N000051 HC OR IMPLANT GENERAL: Performed by: ORTHOPAEDIC SURGERY

## 2021-06-17 PROCEDURE — 258N000003 HC RX IP 258 OP 636: Performed by: NURSE ANESTHETIST, CERTIFIED REGISTERED

## 2021-06-17 PROCEDURE — 250N000009 HC RX 250: Performed by: ORTHOPAEDIC SURGERY

## 2021-06-17 PROCEDURE — 250N000013 HC RX MED GY IP 250 OP 250 PS 637: Performed by: NURSE ANESTHETIST, CERTIFIED REGISTERED

## 2021-06-17 PROCEDURE — 82962 GLUCOSE BLOOD TEST: CPT

## 2021-06-17 PROCEDURE — 250N000013 HC RX MED GY IP 250 OP 250 PS 637: Performed by: ORTHOPAEDIC SURGERY

## 2021-06-17 PROCEDURE — 250N000011 HC RX IP 250 OP 636: Performed by: NURSE ANESTHETIST, CERTIFIED REGISTERED

## 2021-06-17 PROCEDURE — 360N000077 HC SURGERY LEVEL 4, PER MIN: Performed by: ORTHOPAEDIC SURGERY

## 2021-06-17 DEVICE — IMP BASEPLATE TIBIAL GENESIS II SZ 3 RT TI 71420182: Type: IMPLANTABLE DEVICE | Site: KNEE | Status: FUNCTIONAL

## 2021-06-17 DEVICE — IMP COMP PATELLA SNR GENESIS II 9X32MM 71420576: Type: IMPLANTABLE DEVICE | Site: KNEE | Status: FUNCTIONAL

## 2021-06-17 DEVICE — IMP COMP FEMORAL S&N LEGION CR NP NARROW 5 RT 71933646: Type: IMPLANTABLE DEVICE | Site: KNEE | Status: FUNCTIONAL

## 2021-06-17 DEVICE — BONE CEMENT RADIOPAQUE SIMPLEX HV FULL DOSE 6194-1-001: Type: IMPLANTABLE DEVICE | Site: KNEE | Status: FUNCTIONAL

## 2021-06-17 DEVICE — IMP INSERT ARTICULAR S&N LGN CR XLPE SZ3-4 11MM 71453112: Type: IMPLANTABLE DEVICE | Site: KNEE | Status: FUNCTIONAL

## 2021-06-17 RX ORDER — FENTANYL CITRATE 50 UG/ML
INJECTION, SOLUTION INTRAMUSCULAR; INTRAVENOUS PRN
Status: DISCONTINUED | OUTPATIENT
Start: 2021-06-17 | End: 2021-06-17

## 2021-06-17 RX ORDER — BUPIVACAINE HYDROCHLORIDE 7.5 MG/ML
INJECTION, SOLUTION INTRASPINAL PRN
Status: DISCONTINUED | OUTPATIENT
Start: 2021-06-17 | End: 2021-06-17

## 2021-06-17 RX ORDER — LISINOPRIL 10 MG/1
10 TABLET ORAL EVERY MORNING
Status: DISCONTINUED | OUTPATIENT
Start: 2021-06-18 | End: 2021-06-18 | Stop reason: HOSPADM

## 2021-06-17 RX ORDER — TRANEXAMIC ACID 650 MG/1
1950 TABLET ORAL ONCE
Status: COMPLETED | OUTPATIENT
Start: 2021-06-17 | End: 2021-06-17

## 2021-06-17 RX ORDER — HYDROMORPHONE HYDROCHLORIDE 1 MG/ML
.3-.5 INJECTION, SOLUTION INTRAMUSCULAR; INTRAVENOUS; SUBCUTANEOUS EVERY 5 MIN PRN
Status: DISCONTINUED | OUTPATIENT
Start: 2021-06-17 | End: 2021-06-17 | Stop reason: HOSPADM

## 2021-06-17 RX ORDER — ALBUTEROL SULFATE 0.83 MG/ML
2.5 SOLUTION RESPIRATORY (INHALATION) EVERY 4 HOURS PRN
Status: DISCONTINUED | OUTPATIENT
Start: 2021-06-17 | End: 2021-06-17 | Stop reason: HOSPADM

## 2021-06-17 RX ORDER — NALOXONE HYDROCHLORIDE 0.4 MG/ML
0.4 INJECTION, SOLUTION INTRAMUSCULAR; INTRAVENOUS; SUBCUTANEOUS
Status: DISCONTINUED | OUTPATIENT
Start: 2021-06-17 | End: 2021-06-18 | Stop reason: HOSPADM

## 2021-06-17 RX ORDER — MEPERIDINE HYDROCHLORIDE 25 MG/ML
12.5 INJECTION INTRAMUSCULAR; INTRAVENOUS; SUBCUTANEOUS EVERY 5 MIN PRN
Status: DISCONTINUED | OUTPATIENT
Start: 2021-06-17 | End: 2021-06-17 | Stop reason: HOSPADM

## 2021-06-17 RX ORDER — NALOXONE HYDROCHLORIDE 0.4 MG/ML
0.4 INJECTION, SOLUTION INTRAMUSCULAR; INTRAVENOUS; SUBCUTANEOUS
Status: DISCONTINUED | OUTPATIENT
Start: 2021-06-17 | End: 2021-06-17

## 2021-06-17 RX ORDER — ACETAMINOPHEN 325 MG/1
975 TABLET ORAL EVERY 8 HOURS
Status: DISCONTINUED | OUTPATIENT
Start: 2021-06-17 | End: 2021-06-18 | Stop reason: HOSPADM

## 2021-06-17 RX ORDER — SODIUM CHLORIDE, SODIUM LACTATE, POTASSIUM CHLORIDE, CALCIUM CHLORIDE 600; 310; 30; 20 MG/100ML; MG/100ML; MG/100ML; MG/100ML
INJECTION, SOLUTION INTRAVENOUS CONTINUOUS
Status: DISCONTINUED | OUTPATIENT
Start: 2021-06-17 | End: 2021-06-17 | Stop reason: HOSPADM

## 2021-06-17 RX ORDER — AMOXICILLIN 250 MG
1 CAPSULE ORAL 2 TIMES DAILY
Status: DISCONTINUED | OUTPATIENT
Start: 2021-06-17 | End: 2021-06-18 | Stop reason: HOSPADM

## 2021-06-17 RX ORDER — SODIUM CHLORIDE, SODIUM LACTATE, POTASSIUM CHLORIDE, CALCIUM CHLORIDE 600; 310; 30; 20 MG/100ML; MG/100ML; MG/100ML; MG/100ML
INJECTION, SOLUTION INTRAVENOUS CONTINUOUS
Status: DISCONTINUED | OUTPATIENT
Start: 2021-06-17 | End: 2021-06-18 | Stop reason: HOSPADM

## 2021-06-17 RX ORDER — GLYCOPYRROLATE 0.2 MG/ML
INJECTION, SOLUTION INTRAMUSCULAR; INTRAVENOUS PRN
Status: DISCONTINUED | OUTPATIENT
Start: 2021-06-17 | End: 2021-06-17

## 2021-06-17 RX ORDER — ACETAMINOPHEN 325 MG/1
650 TABLET ORAL EVERY 4 HOURS PRN
Qty: 100 TABLET | Refills: 0 | Status: ON HOLD
Start: 2021-06-17 | End: 2022-09-21

## 2021-06-17 RX ORDER — PROPOFOL 10 MG/ML
INJECTION, EMULSION INTRAVENOUS CONTINUOUS PRN
Status: DISCONTINUED | OUTPATIENT
Start: 2021-06-17 | End: 2021-06-17

## 2021-06-17 RX ORDER — NALOXONE HYDROCHLORIDE 0.4 MG/ML
0.2 INJECTION, SOLUTION INTRAMUSCULAR; INTRAVENOUS; SUBCUTANEOUS
Status: DISCONTINUED | OUTPATIENT
Start: 2021-06-17 | End: 2021-06-18 | Stop reason: HOSPADM

## 2021-06-17 RX ORDER — HYDROXYZINE HYDROCHLORIDE 25 MG/1
25-50 TABLET, FILM COATED ORAL EVERY 6 HOURS PRN
Qty: 40 TABLET | Refills: 0 | Status: SHIPPED | OUTPATIENT
Start: 2021-06-17 | End: 2022-04-19

## 2021-06-17 RX ORDER — AMOXICILLIN 250 MG
1-2 CAPSULE ORAL DAILY PRN
Qty: 15 TABLET | Refills: 0 | Status: SHIPPED | OUTPATIENT
Start: 2021-06-17 | End: 2022-04-19

## 2021-06-17 RX ORDER — OXYCODONE HYDROCHLORIDE 5 MG/1
5-10 TABLET ORAL
Qty: 40 TABLET | Refills: 0 | Status: SHIPPED | OUTPATIENT
Start: 2021-06-17 | End: 2022-04-19

## 2021-06-17 RX ORDER — METOPROLOL TARTRATE 1 MG/ML
1-2 INJECTION, SOLUTION INTRAVENOUS EVERY 5 MIN PRN
Status: DISCONTINUED | OUTPATIENT
Start: 2021-06-17 | End: 2021-06-17 | Stop reason: HOSPADM

## 2021-06-17 RX ORDER — PROCHLORPERAZINE MALEATE 5 MG
5 TABLET ORAL EVERY 6 HOURS PRN
Status: DISCONTINUED | OUTPATIENT
Start: 2021-06-17 | End: 2021-06-18 | Stop reason: HOSPADM

## 2021-06-17 RX ORDER — HYDROCHLOROTHIAZIDE 25 MG/1
25 TABLET ORAL EVERY MORNING
Status: DISCONTINUED | OUTPATIENT
Start: 2021-06-18 | End: 2021-06-18 | Stop reason: HOSPADM

## 2021-06-17 RX ORDER — MAGNESIUM SULFATE HEPTAHYDRATE 40 MG/ML
2 INJECTION, SOLUTION INTRAVENOUS ONCE
Status: COMPLETED | OUTPATIENT
Start: 2021-06-17 | End: 2021-06-17

## 2021-06-17 RX ORDER — CEFAZOLIN SODIUM 2 G/100ML
2 INJECTION, SOLUTION INTRAVENOUS
Status: COMPLETED | OUTPATIENT
Start: 2021-06-17 | End: 2021-06-17

## 2021-06-17 RX ORDER — EPINEPHRINE 1 MG/ML
INJECTION, SOLUTION, CONCENTRATE INTRAVENOUS PRN
Status: DISCONTINUED | OUTPATIENT
Start: 2021-06-17 | End: 2021-06-17

## 2021-06-17 RX ORDER — KETOROLAC TROMETHAMINE 15 MG/ML
15 INJECTION, SOLUTION INTRAMUSCULAR; INTRAVENOUS EVERY 6 HOURS
Status: DISPENSED | OUTPATIENT
Start: 2021-06-17 | End: 2021-06-18

## 2021-06-17 RX ORDER — NALOXONE HYDROCHLORIDE 0.4 MG/ML
0.2 INJECTION, SOLUTION INTRAMUSCULAR; INTRAVENOUS; SUBCUTANEOUS
Status: DISCONTINUED | OUTPATIENT
Start: 2021-06-17 | End: 2021-06-17

## 2021-06-17 RX ORDER — LIDOCAINE 40 MG/G
CREAM TOPICAL
Status: DISCONTINUED | OUTPATIENT
Start: 2021-06-17 | End: 2021-06-17 | Stop reason: HOSPADM

## 2021-06-17 RX ORDER — HYDROMORPHONE HCL IN WATER/PF 6 MG/30 ML
0.4 PATIENT CONTROLLED ANALGESIA SYRINGE INTRAVENOUS
Status: DISCONTINUED | OUTPATIENT
Start: 2021-06-17 | End: 2021-06-18 | Stop reason: HOSPADM

## 2021-06-17 RX ORDER — KETAMINE HYDROCHLORIDE 10 MG/ML
INJECTION, SOLUTION INTRAMUSCULAR; INTRAVENOUS PRN
Status: DISCONTINUED | OUTPATIENT
Start: 2021-06-17 | End: 2021-06-17

## 2021-06-17 RX ORDER — OXYCODONE HYDROCHLORIDE 5 MG/1
10 TABLET ORAL EVERY 4 HOURS PRN
Status: DISCONTINUED | OUTPATIENT
Start: 2021-06-17 | End: 2021-06-18 | Stop reason: HOSPADM

## 2021-06-17 RX ORDER — KETOROLAC TROMETHAMINE 15 MG/ML
15 INJECTION, SOLUTION INTRAMUSCULAR; INTRAVENOUS
Status: DISCONTINUED | OUTPATIENT
Start: 2021-06-17 | End: 2021-06-17 | Stop reason: HOSPADM

## 2021-06-17 RX ORDER — ACETAMINOPHEN 325 MG/1
975 TABLET ORAL ONCE
Status: COMPLETED | OUTPATIENT
Start: 2021-06-17 | End: 2021-06-17

## 2021-06-17 RX ORDER — ONDANSETRON 4 MG/1
4 TABLET, ORALLY DISINTEGRATING ORAL EVERY 6 HOURS PRN
Status: DISCONTINUED | OUTPATIENT
Start: 2021-06-17 | End: 2021-06-18 | Stop reason: HOSPADM

## 2021-06-17 RX ORDER — METOPROLOL SUCCINATE 50 MG/1
50 TABLET, EXTENDED RELEASE ORAL DAILY
Status: DISCONTINUED | OUTPATIENT
Start: 2021-06-16 | End: 2021-06-18 | Stop reason: HOSPADM

## 2021-06-17 RX ORDER — DEXAMETHASONE SODIUM PHOSPHATE 10 MG/ML
INJECTION, SOLUTION INTRAMUSCULAR; INTRAVENOUS PRN
Status: DISCONTINUED | OUTPATIENT
Start: 2021-06-17 | End: 2021-06-17

## 2021-06-17 RX ORDER — LIDOCAINE 40 MG/G
CREAM TOPICAL
Status: DISCONTINUED | OUTPATIENT
Start: 2021-06-17 | End: 2021-06-18 | Stop reason: HOSPADM

## 2021-06-17 RX ORDER — BISACODYL 10 MG
10 SUPPOSITORY, RECTAL RECTAL DAILY PRN
Status: DISCONTINUED | OUTPATIENT
Start: 2021-06-17 | End: 2021-06-18 | Stop reason: HOSPADM

## 2021-06-17 RX ORDER — IBUPROFEN 600 MG/1
600 TABLET, FILM COATED ORAL EVERY 6 HOURS PRN
Qty: 30 TABLET | Refills: 0
Start: 2021-06-17 | End: 2022-04-19

## 2021-06-17 RX ORDER — HYDROMORPHONE HCL IN WATER/PF 6 MG/30 ML
0.2 PATIENT CONTROLLED ANALGESIA SYRINGE INTRAVENOUS
Status: DISCONTINUED | OUTPATIENT
Start: 2021-06-17 | End: 2021-06-18 | Stop reason: HOSPADM

## 2021-06-17 RX ORDER — ONDANSETRON 2 MG/ML
4 INJECTION INTRAMUSCULAR; INTRAVENOUS EVERY 30 MIN PRN
Status: DISCONTINUED | OUTPATIENT
Start: 2021-06-17 | End: 2021-06-17 | Stop reason: HOSPADM

## 2021-06-17 RX ORDER — ACETAMINOPHEN 325 MG/1
650 TABLET ORAL EVERY 4 HOURS PRN
Status: DISCONTINUED | OUTPATIENT
Start: 2021-06-20 | End: 2021-06-18 | Stop reason: HOSPADM

## 2021-06-17 RX ORDER — OXYCODONE HYDROCHLORIDE 5 MG/1
5 TABLET ORAL EVERY 4 HOURS PRN
Status: DISCONTINUED | OUTPATIENT
Start: 2021-06-17 | End: 2021-06-18 | Stop reason: HOSPADM

## 2021-06-17 RX ORDER — FENTANYL CITRATE 50 UG/ML
25-50 INJECTION, SOLUTION INTRAMUSCULAR; INTRAVENOUS
Status: DISCONTINUED | OUTPATIENT
Start: 2021-06-17 | End: 2021-06-17 | Stop reason: HOSPADM

## 2021-06-17 RX ORDER — CEFAZOLIN SODIUM 2 G/100ML
2 INJECTION, SOLUTION INTRAVENOUS SEE ADMIN INSTRUCTIONS
Status: DISCONTINUED | OUTPATIENT
Start: 2021-06-17 | End: 2021-06-17 | Stop reason: HOSPADM

## 2021-06-17 RX ORDER — GABAPENTIN 300 MG/1
300 CAPSULE ORAL ONCE
Status: COMPLETED | OUTPATIENT
Start: 2021-06-17 | End: 2021-06-17

## 2021-06-17 RX ORDER — ONDANSETRON 4 MG/1
4 TABLET, ORALLY DISINTEGRATING ORAL EVERY 30 MIN PRN
Status: DISCONTINUED | OUTPATIENT
Start: 2021-06-17 | End: 2021-06-17 | Stop reason: HOSPADM

## 2021-06-17 RX ORDER — LIDOCAINE HYDROCHLORIDE 10 MG/ML
INJECTION, SOLUTION INFILTRATION; PERINEURAL PRN
Status: DISCONTINUED | OUTPATIENT
Start: 2021-06-17 | End: 2021-06-17

## 2021-06-17 RX ORDER — PANTOPRAZOLE SODIUM 40 MG/1
40 TABLET, DELAYED RELEASE ORAL
Status: DISCONTINUED | OUTPATIENT
Start: 2021-06-18 | End: 2021-06-18 | Stop reason: HOSPADM

## 2021-06-17 RX ORDER — ONDANSETRON 2 MG/ML
4 INJECTION INTRAMUSCULAR; INTRAVENOUS EVERY 6 HOURS PRN
Status: DISCONTINUED | OUTPATIENT
Start: 2021-06-17 | End: 2021-06-18 | Stop reason: HOSPADM

## 2021-06-17 RX ORDER — HYDROXYZINE HYDROCHLORIDE 25 MG/1
25-50 TABLET, FILM COATED ORAL EVERY 6 HOURS PRN
Status: DISCONTINUED | OUTPATIENT
Start: 2021-06-17 | End: 2021-06-18 | Stop reason: HOSPADM

## 2021-06-17 RX ORDER — ROPIVACAINE HYDROCHLORIDE 5 MG/ML
INJECTION, SOLUTION EPIDURAL; INFILTRATION; PERINEURAL PRN
Status: DISCONTINUED | OUTPATIENT
Start: 2021-06-17 | End: 2021-06-17

## 2021-06-17 RX ORDER — DOCUSATE SODIUM 100 MG/1
100 CAPSULE, LIQUID FILLED ORAL 2 TIMES DAILY
Status: DISCONTINUED | OUTPATIENT
Start: 2021-06-17 | End: 2021-06-18 | Stop reason: HOSPADM

## 2021-06-17 RX ORDER — POLYETHYLENE GLYCOL 3350 17 G/17G
17 POWDER, FOR SOLUTION ORAL DAILY
Status: DISCONTINUED | OUTPATIENT
Start: 2021-06-18 | End: 2021-06-18 | Stop reason: HOSPADM

## 2021-06-17 RX ORDER — CEFAZOLIN SODIUM 2 G/100ML
2 INJECTION, SOLUTION INTRAVENOUS EVERY 8 HOURS
Status: COMPLETED | OUTPATIENT
Start: 2021-06-17 | End: 2021-06-18

## 2021-06-17 RX ADMIN — MAGNESIUM SULFATE HEPTAHYDRATE 2 G: 40 INJECTION, SOLUTION INTRAVENOUS at 12:34

## 2021-06-17 RX ADMIN — MIDAZOLAM HYDROCHLORIDE 2 MG: 1 INJECTION, SOLUTION INTRAMUSCULAR; INTRAVENOUS at 12:41

## 2021-06-17 RX ADMIN — KETAMINE HYDROCHLORIDE 25 MG: 10 INJECTION INTRAMUSCULAR; INTRAVENOUS at 14:02

## 2021-06-17 RX ADMIN — CEFAZOLIN SODIUM 2 G: 2 INJECTION, SOLUTION INTRAVENOUS at 12:34

## 2021-06-17 RX ADMIN — ROPIVACAINE HYDROCHLORIDE 20 ML: 5 INJECTION, SOLUTION EPIDURAL; INFILTRATION; PERINEURAL at 14:31

## 2021-06-17 RX ADMIN — OXYCODONE HYDROCHLORIDE 5 MG: 5 TABLET ORAL at 19:25

## 2021-06-17 RX ADMIN — GLYCOPYRROLATE 0.3 MG: 0.2 INJECTION, SOLUTION INTRAMUSCULAR; INTRAVENOUS at 12:45

## 2021-06-17 RX ADMIN — LIDOCAINE HYDROCHLORIDE 50 MG: 10 INJECTION, SOLUTION INFILTRATION; PERINEURAL at 12:37

## 2021-06-17 RX ADMIN — FENTANYL CITRATE 50 MCG: 50 INJECTION, SOLUTION INTRAMUSCULAR; INTRAVENOUS at 12:48

## 2021-06-17 RX ADMIN — DOCUSATE SODIUM AND SENNOSIDES 1 TABLET: 8.6; 5 TABLET ORAL at 21:37

## 2021-06-17 RX ADMIN — PROPOFOL 50 MCG/KG/MIN: 10 INJECTION, EMULSION INTRAVENOUS at 12:47

## 2021-06-17 RX ADMIN — KETOROLAC TROMETHAMINE 15 MG: 15 INJECTION, SOLUTION INTRAMUSCULAR; INTRAVENOUS at 19:01

## 2021-06-17 RX ADMIN — ROPIVACAINE HYDROCHLORIDE 10 ML: 5 INJECTION, SOLUTION EPIDURAL; INFILTRATION; PERINEURAL at 14:32

## 2021-06-17 RX ADMIN — KETAMINE HYDROCHLORIDE 25 MG: 10 INJECTION INTRAMUSCULAR; INTRAVENOUS at 13:18

## 2021-06-17 RX ADMIN — CEFAZOLIN SODIUM 2 G: 2 INJECTION, SOLUTION INTRAVENOUS at 21:54

## 2021-06-17 RX ADMIN — FENTANYL CITRATE 50 MCG: 50 INJECTION, SOLUTION INTRAMUSCULAR; INTRAVENOUS at 12:52

## 2021-06-17 RX ADMIN — SODIUM CHLORIDE, POTASSIUM CHLORIDE, SODIUM LACTATE AND CALCIUM CHLORIDE 10 ML: 600; 310; 30; 20 INJECTION, SOLUTION INTRAVENOUS at 11:23

## 2021-06-17 RX ADMIN — EPINEPHRINE 0.2 MG: 1 INJECTION, SOLUTION INTRAMUSCULAR; SUBCUTANEOUS at 12:46

## 2021-06-17 RX ADMIN — SODIUM CHLORIDE, POTASSIUM CHLORIDE, SODIUM LACTATE AND CALCIUM CHLORIDE: 600; 310; 30; 20 INJECTION, SOLUTION INTRAVENOUS at 18:01

## 2021-06-17 RX ADMIN — DOCUSATE SODIUM 100 MG: 100 CAPSULE, LIQUID FILLED ORAL at 21:37

## 2021-06-17 RX ADMIN — SODIUM CHLORIDE, POTASSIUM CHLORIDE, SODIUM LACTATE AND CALCIUM CHLORIDE: 600; 310; 30; 20 INJECTION, SOLUTION INTRAVENOUS at 12:34

## 2021-06-17 RX ADMIN — HYDROMORPHONE HYDROCHLORIDE 0.2 MG: 0.2 INJECTION, SOLUTION INTRAMUSCULAR; INTRAVENOUS; SUBCUTANEOUS at 21:36

## 2021-06-17 RX ADMIN — BUPIVACAINE HYDROCHLORIDE IN DEXTROSE 1.6 ML: 7.5 INJECTION, SOLUTION SUBARACHNOID at 12:46

## 2021-06-17 RX ADMIN — MIDAZOLAM HYDROCHLORIDE 2 MG: 1 INJECTION, SOLUTION INTRAMUSCULAR; INTRAVENOUS at 12:34

## 2021-06-17 RX ADMIN — GABAPENTIN 300 MG: 300 CAPSULE ORAL at 10:43

## 2021-06-17 RX ADMIN — ACETAMINOPHEN 975 MG: 325 TABLET, FILM COATED ORAL at 10:43

## 2021-06-17 RX ADMIN — ACETAMINOPHEN 975 MG: 325 TABLET, FILM COATED ORAL at 19:02

## 2021-06-17 RX ADMIN — TRANEXAMIC ACID 1950 MG: 650 TABLET ORAL at 10:42

## 2021-06-17 RX ADMIN — LIDOCAINE HYDROCHLORIDE 1 ML: 10 INJECTION, SOLUTION EPIDURAL; INFILTRATION; INTRACAUDAL; PERINEURAL at 11:25

## 2021-06-17 RX ADMIN — DEXAMETHASONE SODIUM PHOSPHATE 1 MG: 10 INJECTION, SOLUTION INTRAMUSCULAR; INTRAVENOUS at 14:32

## 2021-06-17 RX ADMIN — MIDAZOLAM HYDROCHLORIDE 1 MG: 1 INJECTION, SOLUTION INTRAMUSCULAR; INTRAVENOUS at 12:45

## 2021-06-17 RX ADMIN — DEXAMETHASONE SODIUM PHOSPHATE 3 MG: 10 INJECTION, SOLUTION INTRAMUSCULAR; INTRAVENOUS at 14:31

## 2021-06-17 ASSESSMENT — MIFFLIN-ST. JEOR
SCORE: 1459
SCORE: 1389.51

## 2021-06-17 ASSESSMENT — COPD QUESTIONNAIRES: COPD: 1

## 2021-06-17 ASSESSMENT — ENCOUNTER SYMPTOMS: DYSRHYTHMIAS: 1

## 2021-06-17 ASSESSMENT — LIFESTYLE VARIABLES: TOBACCO_USE: 1

## 2021-06-17 NOTE — PROCEDURES
06/17/21    PREOPERATIVE DIAGNOSES: Right knee arthritis  POSTOPERATIVE DIAGNOSIS: Right knee arthritis  PROCEDURE: Right total knee arthroplasty.   SURGEON: Nils Edwards MD   ASSISTANT: Renetta Villafuerte PA-C The presence of the PA was necessary for safe progression of the case.   ANESTHESIA: Spinal with MAC.   ESTIMATED BLOOD LOSS: 50 mL.   TOURNIQUET TIME: 37 minutes at 250 mmHg.   COMPLICATIONS: None apparent.   DISPOSITION: Stable to PACU.    IMPLANTS USED: [employer]  Legion size 5N CoCr femoral component with a size 3 tibial component, size 3 by 11mm CR high fex polyethylene and 32 mm patella.     INDICATIONS: Jenna is a 70 year old female with a history of progressive right knee pain due to end stage arthritis. Unfortunately, she failed conservative management including therapy and injections. After discussing risks, benefits and surgery, she elected to proceed with a right total knee replacement understanding the risks of infection, damage to vessels and nerves, blood clots, stiffness, ongoing pain, need for revision surgery, need for transfusion.     PROCEDURE: The patient was brought to the preoperative holding area where the right knee was marked. Consent was reviewed. She then was transferred to the operating theater. After induction of successful spinal anesthesia, she was placed supine with a bump under the right hip.  A timeout was performed, verifying correct patient, surgery, location. She received preoperative antibiotics as well as transexemic acid. The right lower extremity was then prepped and draped in standard sterile fashion.     We exsanguinated the leg and inflated the tourniquet. We then made a longitudinal incision over the anterior aspect of the knee. Dissection was carried down through skin and subcutaneous tissue. A medial parapatellar arthrotomy was made, exposing medial and patellofemoral compartment arthritis.  Synovial tissue from the suprapatellar pouch excised. The anterior  horn of the medial meniscus was released and a medial release was performed. Then, the remainder of the fat pad was excised. We turned our attention to the patella. Using a free hand technique, this was resected down to 12 mm and sized to a 32mm, then punched and a metal protector plate was placed. We then turned our attention to the femur. Preoperatively, there was a 3 degree flexion contracture.  Therefore, using an intramedullary alignment guide, 10 mm of distal femur was resected in 5 degrees of valgus.     We then turned our attention to the tibia.  An extramedullary alignment cut was used to resect 2mm off the low medial side, perpendicular to the mechanical axis.  We then turned our attention back to the distal femur and sized it to a size 5.  The epicondylar axis was established and we placed our 4-in-1 cutting block perpendicular to it, in 1 degrees of external rotation. With this in place, our anterior, posterior and chamfer distal femur cuts were made.  We then used a laminar  to open the joint in order to remove medial and lateral meniscus remnants as well as posterior osteophytes.  The PCL was intact.  A variety of polyethylene were trialed, and we felt a 11mm was best, with range of motion from full extension to 130 of flexion, stability to varus and valgus stress, normal POLO test, and the patella tracked well.  After this, sized our tibial component to a 3.  We then drilled and punched our tibial component, lining it with the medial 1/3 of the tibial tubercle. The knee was thoroughly irrigated using pulse lavage. The final components were then cemented in place. All excess cement was removed and the knee was placed into full extension while the cement was curing. Also, the wound was instilled with dilute Betadine solution. Once the cement had cured, we retrialed our components with a 11mm poly with findings as above. We then placed the final poly.  The tourniquet was deflated with hemostasis  achieved.  The capsular layer was closed with #1 Stratafix, at which point there was 125 degrees of flexion with gravity.  Subcutaneous tissues with 2-0 Vicryl, skin with a 3-0 Monocryl. A sterile dressing was applied and the patient was awoken from anesthesia and transferred to PACU in stable condition.     POSTOPERATIVE PLAN:   1. Weightbearing as tolerated right lower extremity with PT for mobilization.   2. Deep venous thrombosis prophylaxis: Aspirin 325mg daily x 30 days  3. Perioperative antibiotics.   4. Follow up in 2 weeks for wound check.     CARLITOS ROSARIO MD

## 2021-06-17 NOTE — ANESTHESIA CARE TRANSFER NOTE
Patient: Rosita King    Procedure(s):  Right Total Knee Arthroplasty    Diagnosis: Arthritis of right knee [M17.11]  Diagnosis Additional Information: No value filed.    Anesthesia Type:   Spinal     Note:    Oropharynx: oropharynx clear of all foreign objects and spontaneously breathing  Level of Consciousness: awake and drowsy  Oxygen Supplementation: face mask  Level of Supplemental Oxygen (L/min / FiO2): 10  Independent Airway: airway patency satisfactory and stable  Dentition: dentition unchanged  Vital Signs Stable: post-procedure vital signs reviewed and stable  Report to RN Given: handoff report given  Patient transferred to: Phase II    Handoff Report: Identifed the Patient, Identified the Reponsible Provider, Reviewed the pertinent medical history, Discussed the surgical course, Reviewed Intra-OP anesthesia mangement and issues during anesthesia, Set expectations for post-procedure period and Allowed opportunity for questions and acknowledgement of understanding      Vitals: (Last set prior to Anesthesia Care Transfer)  CRNA VITALS  6/17/2021 1352 - 6/17/2021 1434      6/17/2021             Pulse:  58    SpO2:  98 %        Electronically Signed By: Patricia Bautista CRNA, APRN CRNA  June 17, 2021  2:34 PM

## 2021-06-17 NOTE — ANESTHESIA PROCEDURE NOTES
Adductor canal Procedure Note  Pre-Procedure   Staff -        CRNA: Patricia Bautista APRN CRNA       Performed By: CRNA       Location: post-op       Procedure Start/Stop Times: 6/17/2021 2:23 PM and 6/17/2021 2:33 PM       Pre-Anesthestic Checklist: patient identified, IV checked, site marked, risks and benefits discussed, informed consent, monitors and equipment checked, pre-op evaluation, at physician/surgeon's request and post-op pain management  Timeout:       Correct Patient: Yes        Correct Procedure: Yes        Correct Site: Yes        Correct Position: Yes        Correct Laterality: Yes        Site Marked: Yes  Procedure Documentation  Procedure: Adductor canal       Laterality: right       Patient Position: supine       Patient Prep/Sterile Barriers: sterile gloves, mask, patient draped       Skin prep: Chloraprep       Needle Type: insulated and short bevel       Needle Gauge: 20.        Needle Length (Inches): 4        Ultrasound guided       1. Ultrasound was used to identify targeted nerve, plexus, vascular marker, or fascial plane and place a needle adjacent to it in real-time.       2. Ultrasound was used to visualize the spread of anesthetic in close proximity to the above referenced structure.       3. A permanent image is entered into the patient's record.       4. The visualized anatomic structures appeared normal.       5. There were no apparent abnormal pathologic findings.    Assessment/Narrative         The placement was negative for: blood aspirated, painful injection and site bleeding       Paresthesias: No.       Test dose of 5 mL lidocaine 2% w/ 1:200,000 epinephrine at 14:30.         Test dose negative, 3 minutes after injection, for signs of intravascular, subdural, or intrathecal injection.      Bolus given via needle. No blood aspirated via catheter.        Secured via.        Insertion/Infusion Method: Single Shot       Complications: none       Injection made  incrementally with aspirations every 5 mL.

## 2021-06-17 NOTE — ANESTHESIA PREPROCEDURE EVALUATION
Anesthesia Pre-Procedure Evaluation    Patient: Rosita King   MRN: [id number] : 1950        Preoperative Diagnosis: Arthritis of right knee [M17.11]   Procedure : Procedure(s):  Total Knee Arthroplasty     Past Medical History:   Diagnosis Date     Essential hypertension with goal blood pressure less than 140/90 2016     Gastroesophageal reflux disease without esophagitis 2016     Hyperlipidemia LDL goal <130 10/12/2015     ASHLEY (obstructive sleep apnea) 10/12/2015    Severe ASHLEY  - PSG at 2006 with undocumented weight, AHI 84.7, RDI 88.9, SpO2 remained above 90%, CPAP 7 optimal.     Paroxysmal supraventricular tachycardia (H) 2017      Past Surgical History:   Procedure Laterality Date     CARPAL TUNNEL RELEASE RT/LT Bilateral ,      COLONOSCOPY N/A 2015    Procedure: COLONOSCOPY;  Surgeon: Mina Roy MD;  Location: [facility]     ESOPHAGOSCOPY, GASTROSCOPY, DUODENOSCOPY (EGD), COMBINED N/A 2015    Procedure: COMBINED ESOPHAGOSCOPY, GASTROSCOPY, DUODENOSCOPY (EGD), BIOPSY SINGLE OR MULTIPLE;  Surgeon: Mina Roy MD;  Location: [facility]     PAROTIDECTOMY Left 10/31/2019    Procedure: LEFT SUPERFICIAL PAROTIDECTOMY WITH FACIAL NERVE MONITORING;  Surgeon: Atif Salazar MD;  Location: [facility]     TONSILLECTOMY & ADENOIDECTOMY  age 13      Allergies   Allergen Reactions     Penicillins      Pravastatin      Other reaction(s): Myalgia     Simvastatin      Other reaction(s): Myalgia     Sulfa Drugs       Social History     Tobacco Use     Smoking status: Former Smoker     Smokeless tobacco: Never Used   Substance Use Topics     Alcohol use: Yes     Comment: ocassional      Wt Readings from Last 1 Encounters:   21 88.5 kg (195 lb)        Anesthesia Evaluation   Pt has had prior anesthetic.         ROS/MED HX  ENT/Pulmonary:     (+) sleep apnea, ASHLEY risk factors, tobacco use, Past use, COPD,     Neurologic:       Cardiovascular:     (+) Dyslipidemia  hypertension-----dysrhythmias, Other, Irregular Heartbeat/Palpitations,     METS/Exercise Tolerance:     Hematologic:       Musculoskeletal:   (+) arthritis,     GI/Hepatic:     (+) GERD, hiatal hernia,     Renal/Genitourinary:       Endo:     (+) type II DM, Obesity,     Psychiatric/Substance Use:     (+) psychiatric history anxiety     Infectious Disease:       Malignancy:       Other:            Physical Exam    Airway        Mallampati: II   TM distance: > 3 FB   Neck ROM: full   Mouth opening: > 3 cm    Respiratory Devices and Support         Dental  no notable dental history         Cardiovascular   cardiovascular exam normal          Pulmonary   pulmonary exam normal                OUTSIDE LABS:  CBC:   Lab Results   Component Value Date    WBC 6.4 06/10/2021    WBC 9.1 04/29/2021    HGB 13.2 06/10/2021    HGB 13.5 04/29/2021    HCT 38.9 06/10/2021    HCT 39.4 04/29/2021     06/10/2021     04/29/2021     BMP:   Lab Results   Component Value Date     06/10/2021     04/29/2021    POTASSIUM 3.8 06/10/2021    POTASSIUM 3.6 04/29/2021    CHLORIDE 105 06/10/2021    CHLORIDE 101 04/29/2021    CO2 29 06/10/2021    CO2 30 04/29/2021    BUN 19 06/10/2021    BUN 12 04/29/2021    CR 0.86 06/10/2021    CR 0.76 04/29/2021    GLC 94 06/10/2021     (H) 04/29/2021     COAGS: No results found for: PTT, INR, FIBR  POC: No results found for: BGM, HCG, HCGS  HEPATIC:   Lab Results   Component Value Date    ALBUMIN 3.8 06/10/2021    PROTTOTAL 7.1 06/10/2021    ALT 37 06/10/2021    AST 18 06/10/2021    ALKPHOS 59 06/10/2021    BILITOTAL 0.4 06/10/2021     OTHER:   Lab Results   Component Value Date    A1C 6.2 (H) 06/10/2021    CORINA 9.2 06/10/2021    MAG 2.3 04/23/2019    TSH 0.87 04/29/2021    CRP 4.2 08/06/2020    SED 7 08/06/2020       Anesthesia Plan    ASA Status:  3   NPO Status:  NPO Appropriate    Anesthesia Type: Spinal.      Maintenance: Balanced.        Consents    Anesthesia Plan(s) and  associated risks, benefits, and realistic alternatives discussed. Questions answered and patient/representative(s) expressed understanding.     - Discussed with:  Patient         Postoperative Care       PONV prophylaxis: Ondansetron (or other 5HT-3), Dexamethasone or Solumedrol     Comments:                JESUS Morrison CRNA

## 2021-06-17 NOTE — PROGRESS NOTES
"[facility] Medicine   Cross Cover Note  Date of Service: 6/17/2021     I was called due to patient with post-operative bradycardia, HR 40s-50s. Patient is sleepy from surgery but otherwise, no real complaints. Denies lightheadedness, dizziness, chest pain, shortness of breath, palpitations, abdominal pain, nausea, emesis or other concerning symptoms. She does not like the sensation of feeling \"trapped\" by limitations in movements from the surgical block.    Physical exam  BP (!) 145/72 (BP Location: Right arm)   Pulse 54   Temp 97.5  F (36.4  C) (Axillary)   Resp 14   Ht 1.626 m (5' 4\")   Wt 95.4 kg (210 lb 5.1 oz)   SpO2 97%   BMI 36.10 kg/m    Gen: sleepy, dozing off occasionally though easily wakes, calm, cooperative, appropriate thought process/content, short term memory intact  CV: slow rate, regular rhythm. Observed pulse in room on capnography ranged from 42-55, HR would dip down to 40s when drifting off to sleep and then increase to the 50s when awake and talking.  Lungs clear.    A&P:  #Bradycardia, post-operative  HR 50s when awake, 40s when sleeping. Blood pressure stable. No concerning symptoms associated with this. Patient sleepy though suspect that's related to anesthesia. She did take her metoprolol this morning. HR baseline at last several encounters on review of chart variable, 53-89. Appears to be stable bradycardia post-operatively, dropping lower when sleeping. Suspect may be related to surgery/anesthesia/medications.   - ECG ordered  - telemetry ordered  - Monitor bladder to distention, straight cath as needed    - Notify provider if symptoms arise, hypotension or other concerning symptoms    Pulse Readings from Last 15 Encounters:   06/17/21 (!) 44   06/10/21 58   04/29/21 89   08/06/20 75   06/22/20 64   06/05/20 64   06/03/20 72   12/10/19 53   11/26/19 75   11/20/19 57   10/31/19 74   10/25/19 54   10/04/19 69   07/23/19 56   06/17/19 53 "     Kelly Galo PA-C    I have discussed patient with Dr. Andrzej Wetzel.    ADDENDUM 6:40 PM:  ECG shows sinus bradycardia with HR of ~59, poor r wave progression, no acute ST or T wave changes, similar to comparison from 4/2021 though r wave progression slightly more pronounced today.   - ECG reassuring, continue to monitor as above

## 2021-06-17 NOTE — ANESTHESIA PROCEDURE NOTES
Intrathecal injection Procedure Note  Pre-Procedure   Staff -        CRNA: Patricia Bautista APRN CRNA       Performed By: CRNA       Location: OR       Procedure Start/Stop Times: 6/17/2021 12:34 PM and 6/17/2021 12:46 PM       Pre-Anesthestic Checklist: patient identified, risks and benefits discussed, informed consent, monitors and equipment checked, pre-op evaluation and at physician/surgeon's request  Timeout:       Correct Patient: Yes        Correct Procedure: Yes        Correct Site: Yes        Correct Position: Yes   Procedure Documentation  Procedure: intrathecal injection       Patient Position: sitting       Patient Prep/Sterile Barriers: sterile gloves, mask, patient draped       Skin prep: Betadine       Insertion Site: L4-5. (midline approach).       Needle Gauge: 22.        Needle Length (Inches): 3.5        Spinal Needle Type: Shan tipNo introducer used       # of attempts: 3 and  # of redirects:  2    Assessment/Narrative         Paresthesias: No.       CSF fluid: clear.      Opening pressure was cmH2O while  Sitting.

## 2021-06-17 NOTE — ANESTHESIA POSTPROCEDURE EVALUATION
Patient: Rosita King    Procedure(s):  Right Total Knee Arthroplasty    Diagnosis:Arthritis of right knee [M17.11]  Diagnosis Additional Information: No value filed.    Anesthesia Type:  Spinal    Note:  Disposition: Outpatient   Postop Pain Control: Uneventful            Sign Out: Well controlled pain   PONV: No   Neuro/Psych: Uneventful            Sign Out: Acceptable/Baseline neuro status   Airway/Respiratory: Uneventful            Sign Out: Acceptable/Baseline resp. status   CV/Hemodynamics: Uneventful            Sign Out: Acceptable CV status; No obvious hypovolemia; No obvious fluid overload   Other NRE: NONE   DID A NON-ROUTINE EVENT OCCUR? No           Last vitals:  Vitals:    06/17/21 1430 06/17/21 1436 06/17/21 1445   BP: 97/56 97/56 113/63   Pulse: 56 64 60   Resp: 12 16 23   Temp:      SpO2: 98% 99% 99%       Last vitals prior to Anesthesia Care Transfer:  CRNA VITALS  6/17/2021 1352 - 6/17/2021 1451      6/17/2021             Pulse:  58    SpO2:  98 %          Electronically Signed By: Patricia Bautista CRNA, JESUS BROWNLEE  June 17, 2021  2:51 PM

## 2021-06-18 ENCOUNTER — TELEPHONE (OUTPATIENT)
Dept: FAMILY MEDICINE | Facility: CLINIC | Age: 71
End: 2021-06-18

## 2021-06-18 ENCOUNTER — APPOINTMENT (OUTPATIENT)
Dept: PHYSICAL THERAPY | Facility: CLINIC | Age: 71
End: 2021-06-18
Attending: ORTHOPAEDIC SURGERY
Payer: MEDICARE

## 2021-06-18 ENCOUNTER — APPOINTMENT (OUTPATIENT)
Dept: OCCUPATIONAL THERAPY | Facility: CLINIC | Age: 71
End: 2021-06-18
Attending: ORTHOPAEDIC SURGERY
Payer: MEDICARE

## 2021-06-18 VITALS
RESPIRATION RATE: 20 BRPM | TEMPERATURE: 97.6 F | HEIGHT: 64 IN | OXYGEN SATURATION: 95 % | DIASTOLIC BLOOD PRESSURE: 64 MMHG | HEART RATE: 60 BPM | BODY MASS INDEX: 35.91 KG/M2 | WEIGHT: 210.32 LBS | SYSTOLIC BLOOD PRESSURE: 129 MMHG

## 2021-06-18 PROBLEM — I97.89 POSTOPERATIVE BRADYCARDIA: Status: ACTIVE | Noted: 2021-06-18

## 2021-06-18 PROBLEM — L90.0 LICHEN SCLEROSUS: Status: ACTIVE | Noted: 2017-08-24

## 2021-06-18 PROBLEM — K11.8 PAROTID MASS: Status: ACTIVE | Noted: 2019-10-09

## 2021-06-18 LAB — HGB BLD-MCNC: 12.1 G/DL (ref 11.7–15.7)

## 2021-06-18 PROCEDURE — 97161 PT EVAL LOW COMPLEX 20 MIN: CPT | Mod: GP

## 2021-06-18 PROCEDURE — 250N000013 HC RX MED GY IP 250 OP 250 PS 637: Performed by: PHYSICIAN ASSISTANT

## 2021-06-18 PROCEDURE — 85018 HEMOGLOBIN: CPT | Performed by: ORTHOPAEDIC SURGERY

## 2021-06-18 PROCEDURE — 36415 COLL VENOUS BLD VENIPUNCTURE: CPT | Performed by: ORTHOPAEDIC SURGERY

## 2021-06-18 PROCEDURE — 99214 OFFICE O/P EST MOD 30 MIN: CPT | Performed by: PHYSICIAN ASSISTANT

## 2021-06-18 PROCEDURE — 97165 OT EVAL LOW COMPLEX 30 MIN: CPT | Mod: GO

## 2021-06-18 PROCEDURE — 97110 THERAPEUTIC EXERCISES: CPT | Mod: GP

## 2021-06-18 PROCEDURE — 97535 SELF CARE MNGMENT TRAINING: CPT | Mod: GO

## 2021-06-18 PROCEDURE — 250N000011 HC RX IP 250 OP 636: Performed by: ORTHOPAEDIC SURGERY

## 2021-06-18 PROCEDURE — 97116 GAIT TRAINING THERAPY: CPT | Mod: GP

## 2021-06-18 PROCEDURE — 250N000013 HC RX MED GY IP 250 OP 250 PS 637: Performed by: ORTHOPAEDIC SURGERY

## 2021-06-18 RX ORDER — ASPIRIN 81 MG/1
81 TABLET ORAL DAILY
COMMUNITY
Start: 2021-06-18 | End: 2022-04-19

## 2021-06-18 RX ORDER — ALBUTEROL SULFATE 0.83 MG/ML
2.5 SOLUTION RESPIRATORY (INHALATION) EVERY 6 HOURS PRN
Status: DISCONTINUED | OUTPATIENT
Start: 2021-06-18 | End: 2021-06-18 | Stop reason: HOSPADM

## 2021-06-18 RX ADMIN — POLYETHYLENE GLYCOL 3350 17 G: 17 POWDER, FOR SOLUTION ORAL at 08:05

## 2021-06-18 RX ADMIN — KETOROLAC TROMETHAMINE 15 MG: 15 INJECTION, SOLUTION INTRAMUSCULAR; INTRAVENOUS at 02:05

## 2021-06-18 RX ADMIN — METOPROLOL SUCCINATE 50 MG: 50 TABLET, EXTENDED RELEASE ORAL at 08:05

## 2021-06-18 RX ADMIN — ACETAMINOPHEN 975 MG: 325 TABLET, FILM COATED ORAL at 10:53

## 2021-06-18 RX ADMIN — KETOROLAC TROMETHAMINE 15 MG: 15 INJECTION, SOLUTION INTRAMUSCULAR; INTRAVENOUS at 08:04

## 2021-06-18 RX ADMIN — DOCUSATE SODIUM AND SENNOSIDES 1 TABLET: 8.6; 5 TABLET ORAL at 08:06

## 2021-06-18 RX ADMIN — PANTOPRAZOLE SODIUM 40 MG: 40 TABLET, DELAYED RELEASE ORAL at 06:12

## 2021-06-18 RX ADMIN — HYDROCHLOROTHIAZIDE 25 MG: 25 TABLET ORAL at 08:05

## 2021-06-18 RX ADMIN — LISINOPRIL 10 MG: 10 TABLET ORAL at 08:06

## 2021-06-18 RX ADMIN — ACETAMINOPHEN 975 MG: 325 TABLET, FILM COATED ORAL at 04:28

## 2021-06-18 RX ADMIN — DOCUSATE SODIUM 100 MG: 100 CAPSULE, LIQUID FILLED ORAL at 08:06

## 2021-06-18 RX ADMIN — CEFAZOLIN SODIUM 2 G: 2 INJECTION, SOLUTION INTRAVENOUS at 04:31

## 2021-06-18 RX ADMIN — OXYCODONE HYDROCHLORIDE 10 MG: 5 TABLET ORAL at 08:05

## 2021-06-18 RX ADMIN — ASPIRIN 325 MG: 325 TABLET, COATED ORAL at 08:05

## 2021-06-18 RX ADMIN — OXYCODONE HYDROCHLORIDE 10 MG: 5 TABLET ORAL at 00:03

## 2021-06-18 ASSESSMENT — ACTIVITIES OF DAILY LIVING (ADL): DEPENDENT_IADLS:: INDEPENDENT

## 2021-06-18 NOTE — TELEPHONE ENCOUNTER
Discharged with homecare order for PT.  Requesting order for SN assess to monitor HR, BP due to bradycardia while hosp.  Verbal order for SN given.  POLLY Avilez RN

## 2021-06-18 NOTE — PROGRESS NOTES
06/18/21 1200   Quick Adds   Type of Visit Initial Occupational Therapy Evaluation   Living Environment   People in home spouse   Current Living Arrangements house   Number of Stairs, Within Home, Primary 4   Stair Railings, Within Home, Primary railings safe and in good condition   Transportation Anticipated family or friend will provide   Living Environment Comments tub/shower combo with extended tub bench.    Self-Care   Usual Activity Tolerance good   Current Activity Tolerance moderate   Regular Exercise No   Equipment Currently Used at Home grab bar, tub/shower;commode chair;walker, standard   Disability/Function   Hearing Difficulty or Deaf no   Wear Glasses or Blind yes   Vision Management wears glasses    Concentrating, Remembering or Making Decisions Difficulty no   Difficulty Communicating no   Difficulty Eating/Swallowing no   Walking or Climbing Stairs Difficulty no   Dressing/Bathing Difficulty no   Toileting issues no   Doing Errands Independently Difficulty (such as shopping) no   Fall history within last six months no   Change in Functional Status Since Onset of Current Illness/Injury no   General Information   Onset of Illness/Injury or Date of Surgery 06/17/21   Referring Physician Nils Edwards MD   Patient/Family Therapy Goal Statement (OT) to return home.    Cognitive Status Examination   Orientation Status orientation to person, place and time   Pain Assessment   Patient Currently in Pain No   Transfers   Transfer Comments independent using FWW   Lower Body Dressing Assessment/Training   Will Level (Lower Body Dressing) independent   Toileting   Will Level (Toileting) independent   Instrumental Activities of Daily Living (IADL)   IADL Comments spouse can assist as needed.    Clinical Impression   Criteria for Skilled Therapeutic Interventions Met (OT) yes;skilled treatment is necessary   OT Diagnosis 1x treat for education on tub/shower transfer techniques.    OT  Problem List-Impairments impacting ADL post-surgical precautions   Assessment of Occupational Performance 1-3 Performance Deficits   Identified Performance Deficits tub/shower transfer    Planned Therapy Interventions (OT) ADL retraining   Clinical Decision Making Complexity (OT) low complexity   Therapy Frequency (OT) 1x eval and treat   Risk & Benefits of therapy have been explained evaluation/treatment results reviewed;care plan/treatment goals reviewed;risks/benefits reviewed;current/potential barriers reviewed;participants voiced agreement with care plan;participants included;patient   OT Discharge Planning    OT Discharge Recommendation (DC Rec) Home with assist   OT Rationale for DC Rec all IP OT goals met    Total Evaluation Time (Minutes)   Total Evaluation Time (Minutes) 5

## 2021-06-18 NOTE — PROVIDER NOTIFICATION
AMELIA Sousa due to excoriation under bilateral breast, asked for a cream or powder. Awaiting orders.

## 2021-06-18 NOTE — PLAN OF CARE
Patient alert and oriented x4, pleasant and cooperative with staff. Pain rating 5/10 with patient stating that she was able to sleep. Declines any further medication intervention at this time. Ambulated to bedroom window and back to bed with minimal difficulty and A2 Gb/walker. Tolerated well. Voiding without difficulty.   Temp: 97.2  F (36.2  C) Temp src: Axillary BP: 120/63 Pulse: 66   Resp: 20 SpO2: 96 % O2 Device: Nasal cannula Oxygen Delivery: 1 LPM

## 2021-06-18 NOTE — PLAN OF CARE
[facility] DISCHARGE NOTE    Patient discharged to home at 1:40 PM via wheel chair. Accompanied by spouse and staff. Discharge instructions reviewed with patient and spouse, opportunity offered to ask questions. Prescriptions filled and sent with patient upon discharge. All belongings sent with patient.    Samaria Mendoza RN

## 2021-06-18 NOTE — DISCHARGE SUMMARY
[facility] Orthopedics Discharge Summary                                  [facility]     MICHAEL MUNOZ [id number]   Age: 70 year old  PCP: Addis Alcocer, [phone] 1950     Date of Admission:  6/17/2021  Date of Discharge::  6/18/2021  Discharge Physician:  Mio Kaiser PA-C    Code status:  Full Code    Admission Information:  Admission Diagnosis:  Arthritis of right knee [M17.11]  Status post total knee replacement [Z96.659]    Post-Operative Day: 1 Day Post-Op     Reason for admission:  The patient was admitted for the following:Procedure(s) (LRB):  Right Total Knee Arthroplasty (Right)    Principal Problem:    S/P total knee arthroplasty, right  Active Problems:    ASHLEY (obstructive sleep apnea)    Essential hypertension with goal blood pressure less than 140/90    Gastroesophageal reflux disease without esophagitis    COPD (chronic obstructive pulmonary disease) (H)    Right knee DJD    Status post total knee replacement      Allergies:  Penicillins, Pravastatin, Simvastatin, and Sulfa drugs    Following the procedure noted above the patient was transferred to the post-op floor and started on:    Therapy:  physical therapy  Anticoagulation Plan:  mg daily  for 30 days  Pain Management: oxycodone  Weight bearing status: Weight bearing as tolerated     The patient was followed and co-managed by the hospitalist service during the inpatient treatment course  Complications:  None  Consultations:  None     Pertinent Labs   Lab Results: personally reviewed.     Recent Labs   Lab Test 06/18/21  0451 06/10/21  1123 04/29/21  1046 08/06/20  1714 04/23/19  0942 04/23/19  0942   HGB 12.1 13.2 13.5 13.0   < > 13.1   HCT  --  38.9 39.4 39.0   < > 38.9   MCV  --  91 87 90   < > 91   PLT  --  293 264 292   < > 248   NA  --  137 134 138   < > 137   CRP  --   --   --  4.2  --  <2.9    < > = values in this interval not displayed.          Discharge Information:  Condition at discharge:  Stable  Discharge destination:  Admited to home care:   Agency: [facility]     Medications at discharge:  Current Discharge Medication List      START taking these medications    Details   acetaminophen (TYLENOL) 325 MG tablet Take 2 tablets (650 mg) by mouth every 4 hours as needed for other (mild pain)  Qty: 100 tablet, Refills: 0    Associated Diagnoses: S/P total knee arthroplasty, right      hydrOXYzine (ATARAX) 25 MG tablet Take 1-2 tablets (25-50 mg) by mouth every 6 hours as needed for itching or anxiety (with pain, moderate pain)  Qty: 40 tablet, Refills: 0    Associated Diagnoses: S/P total knee arthroplasty, right      ibuprofen (ADVIL/MOTRIN) 600 MG tablet Take 1 tablet (600 mg) by mouth every 6 hours as needed for pain (mild)  Qty: 30 tablet, Refills: 0    Associated Diagnoses: S/P total knee arthroplasty, right      oxyCODONE (ROXICODONE) 5 MG tablet Take 1-2 tablets (5-10 mg) by mouth every 3 hours as needed for pain (Moderate to Severe)  Qty: 40 tablet, Refills: 0    Associated Diagnoses: S/P total knee arthroplasty, right      senna-docusate (SENOKOT-S/PERICOLACE) 8.6-50 MG tablet Take 1-2 tablets by mouth daily as needed for constipation Take while on oral narcotics to prevent or treat constipation.  Qty: 15 tablet, Refills: 0    Comments: While taking narcotics  Associated Diagnoses: S/P total knee arthroplasty, right         CONTINUE these medications which have CHANGED    Details   !! aspirin (ASA) 325 MG EC tablet Take 1 tablet (325 mg) by mouth daily  Qty: 30 tablet, Refills: 0    Associated Diagnoses: S/P total knee arthroplasty, right      !! aspirin 81 MG EC tablet Take 1 tablet (81 mg) by mouth daily Stop taking this while on aspirin 325mg daily x 4 weeks post-operatively.  Qty:         !! - Potential duplicate medications found. Please discuss with provider.      CONTINUE these medications which have NOT CHANGED    Details   hydrochlorothiazide (HYDRODIURIL) 25 MG  tablet Take 1 tablet (25 mg) by mouth every morning  Qty: 90 tablet, Refills: 3    Associated Diagnoses: Essential hypertension      lisinopril (ZESTRIL) 10 MG tablet Take 1 tablet (10 mg) by mouth every morning  Qty: 90 tablet, Refills: 3    Associated Diagnoses: Essential hypertension      metoprolol succinate ER (TOPROL-XL) 50 MG 24 hr tablet Take 1 tablet (50 mg) by mouth daily  Qty: 90 tablet, Refills: 3    Associated Diagnoses: Paroxysmal supraventricular tachycardia (H); Essential hypertension with goal blood pressure less than 140/90      omeprazole (PRILOSEC) 20 MG DR capsule TAKE 1 CAPSULE BY MOUTH ONCE DAILY  Qty: 90 capsule, Refills: 1    Comments: This prescription was filled on 2/10/2021. Any refills authorized will be placed on file.  Associated Diagnoses: Gastroesophageal reflux disease without esophagitis      fluocinonide (LIDEX) 0.05 % external solution Apply sparingly to affected area twice daily as needed.  Do not apply to face.  Qty: 60 mL, Refills: 3    Associated Diagnoses: Itchy scalp      Multiple Vitamins-Minerals (CENTRUM SILVER) per tablet Take 1 tablet by mouth daily      VITAMIN D, CHOLECALCIFEROL, PO Take 1,000 Units by mouth daily                         Follow-Up Care:  Patient should be seen in the office in 14 days by the Orthopedic Surgeon/Physician Assistant.  Call [phone] for appointment or questions.    Mio Kaiser PA-C

## 2021-06-18 NOTE — PLAN OF CARE
"A& O x 4, patient has had 2 large incontinent voids this evening. Pain to right knee, gave Ketorolac, Oxycodone and Dilaudid per MAR this shift with some relief. Patient encouraged to get up to commode this shift, patient felt her right leg was not fully \"awake\" yet and declined, encouraged x 2; will attempt for NOC shift. Bradycardia, on Telemetry, capnography in place and WNL.    /77 (BP Location: Right arm)   Pulse 59   Temp 97.1  F (36.2  C) (Axillary)   Resp 19   Ht 1.626 m (5' 4\")   Wt 95.4 kg (210 lb 5.1 oz)   SpO2 95%   BMI 36.10 kg/m      "

## 2021-06-18 NOTE — TELEPHONE ENCOUNTER
Reason for Call:  Other     Detailed comments: Sandi PONCE from [facility] calling requesting a Nurse Evaluation order for patient. Patient had surgery and was bradycardic while in the hospital. Patient is going to be discharge today.     Phone Number Patient can be reached at: Other phone number: [phone]    Best Time: any    Can we leave a detailed message on this number? YES    Call taken on 6/18/2021 at 12:52 PM by Rika Pérez

## 2021-06-18 NOTE — PLAN OF CARE
Occupational Therapy Discharge Summary    Reason for therapy discharge:    All goals and outcomes met, no further needs identified.    Progress towards therapy goal(s). See goals on Care Plan in [facility] electronic health record for goal details.  Goals met    Therapy recommendation(s):    No further therapy is recommended.

## 2021-06-18 NOTE — PLAN OF CARE
Physical Therapy Discharge Summary    Reason for therapy discharge:    Discharged to home with home therapy.    Progress towards therapy goal(s). See goals on Care Plan in [facility] electronic health record for goal details.  Goals met    Therapy recommendation(s):    Continued therapy is recommended.  Rationale/Recommendations:  Home PT to maximize strength and mobility.

## 2021-06-18 NOTE — CONSULTS
Care Management Initial Consult    General Information  Assessment completed with: Jenna Judge  Type of CM/SW Visit: Initial Assessment    Primary Care Provider verified and updated as needed: Yes   Readmission within the last 30 days: no previous admission in last 30 days      Reason for Consult: discharge planning  Advance Care Planning: Advance Care Planning Reviewed: no concerns identified          Communication Assessment  Patient's communication style: spoken language (English or Bilingual)    Hearing Difficulty or Deaf: no   Wear Glasses or Blind: yes    Cognitive  Cognitive/Neuro/Behavioral: WDL  Level of Consciousness: alert  Arousal Level: arouses to voice                Living Environment:   People in home: spouse  Derrick  Current living Arrangements: house      Able to return to prior arrangements: yes       Family/Social Support:  Care provided by: self  Provides care for: no one  Marital Status:   , Children  Derrick       Description of Support System: Supportive, Involved    Support Assessment: Adequate family and caregiver support    Current Resources:   Patient receiving home care services: No     Community Resources: None  Equipment currently used at home: grab bar, tub/shower, commode chair, walker, standard  Supplies currently used at home: None    Employment/Financial:  Employment Status: retired        Financial Concerns: No concerns identified           Lifestyle & Psychosocial Needs:        Socioeconomic History     Marital status:      Spouse name: Not on file     Number of children: Not on file     Years of education: Not on file     Highest education level: Not on file     Tobacco Use     Smoking status: Former Smoker     Smokeless tobacco: Never Used   Substance and Sexual Activity     Alcohol use: Yes     Comment: ocassional     Drug use: No     Sexual activity: Not Currently     Partners: Female       Functional Status:  Prior to admission patient needed assistance:    Dependent ADLs:: Independent  Dependent IADLs:: Independent  Assesssment of Functional Status: Not at  functional baseline, Not at baseline with mobility, Not at baseline with ADL Functioning    Mental Health Status:  Mental Health Status: No Current Concerns       Chemical Dependency Status:  Chemical Dependency Status: No Current Concerns             Values/Beliefs:  Spiritual, Cultural Beliefs, [employer]beterian Practices, Values that affect care: no             Care Management Discharge Note    Discharge Date: 21       Discharge Disposition: Home, Home Care    Discharge Services: None    Discharge DME: None    Discharge Transportation: family or friend will provide    Private pay costs discussed: Not applicable  Patient/family educated on Medicare website which has current facility and service quality ratings: yes    Education Provided on the Discharge Plan:  yes  Persons Notified of Discharge Plans: yes  Patient/Family in Agreement with the Plan: yes    Handoff Referral Completed: Yes    Additional Information:  Met with patient at bedside, discussed discharge planning.  Patient currently lives with her , Derrick in a home in [place].  She is very independent with ADLs at baseline.  He remains active in the community.  She has been supporting family with health issues.      Discussed [facility] pre-planned home care PT services from [facility] (Phone: [phone]).  Discussed medicare regulations related to home care.  Patient is in agreement.  Order/referral placed for PT.      Patient's , Derrick will plan to transport upon discharge.      Laurita Gonzales MSN, RN  Inpatient Care Coordinator  [facility] [phone]  [facility] [phone]      HOME CARE HAND OFF  Patient Name: Rosita King    MRN: [id number]    : 1950    Patient Zip Code: [address]    Admit Diagnosis: Arthritis of right knee [M17.11]  Status post total knee  replacement [Z96.659]    Pre-planned [facility] patient    Services Pt Needs at Home: PT    Discharge Support: Family/Friend Support    Living Arrangements: With spouse     or Address Other Than Pt: No    Wound Care: Yes: TKA    Anticipate DC Date: 6/18/2021

## 2021-06-18 NOTE — PROGRESS NOTES
"[facility] Orthopaedics Progress Note      Post-operative Day: 1 Day Post-Op    Procedure(s):  Right Total Knee Arthroplasty      Subjective:    Pt reports that she is doing well; her knee has minimal pain at rest but increases with activity. She feels soreness in her posterior knee. She denies any lightheadedness or dizziness. Her plan is to discharge to home with support and she has outpatient therapy set up to start on Monday.     Pain: minimal  Chest pain, SOB:  No      Objective:  Blood pressure (!) 153/81, pulse 82, temperature 97.1  F (36.2  C), temperature source Axillary, resp. rate 20, height 1.626 m (5' 4\"), weight 95.4 kg (210 lb 5.1 oz), SpO2 94 %, not currently breastfeeding.    Patient Vitals for the past 24 hrs:   BP Temp Temp src Pulse Resp SpO2 Height Weight   06/18/21 0646 (!) 153/81 97.1  F (36.2  C) Axillary 82 20 94 % -- --   06/18/21 0233 120/63 97.2  F (36.2  C) Axillary 66 20 96 % -- --   06/18/21 0000 -- -- -- 63 -- -- -- --   06/17/21 2239 131/77 97.1  F (36.2  C) Axillary 59 19 95 % -- --   06/17/21 2135 -- -- -- -- -- 95 % -- --   06/17/21 1928 -- -- -- 58 -- 98 % -- --   06/17/21 1841 (!) 148/85 -- -- 51 15 100 % -- --   06/17/21 1802 (!) 140/72 -- -- -- -- -- -- --   06/17/21 1740 (!) 145/72 96.3  F (35.7  C) Axillary 54 14 97 % -- --   06/17/21 1710 139/75 97.5  F (36.4  C) Axillary (!) 44 14 100 % -- --   06/17/21 1700 -- -- -- -- -- 100 % -- --   06/17/21 1641 139/67 -- -- -- 14 -- -- --   06/17/21 1623 (!) 151/77 95.9  F (35.5  C) Axillary -- -- 100 % -- 95.4 kg (210 lb 5.1 oz)   06/17/21 1617 (!) 143/72 -- Oral 52 16 100 % 1.626 m (5' 4\") --   06/17/21 1530 115/64 -- -- 51 15 99 % -- --   06/17/21 1515 113/63 97.4  F (36.3  C) Axillary 55 12 99 % -- --   06/17/21 1500 122/69 -- -- 59 25 97 % -- --   06/17/21 1445 113/63 -- -- 60 23 99 % -- --   06/17/21 1436 97/56 -- -- 64 16 99 % -- --   06/17/21 1430 97/56 -- -- 56 12 98 % -- --   06/17/21 1424 (!) 88/54 97  F (36.1  C) " "Axillary 56 14 98 % -- --   06/17/21 1127 -- -- -- -- -- -- -- 88.5 kg (195 lb)   06/17/21 1051 134/65 98.9  F (37.2  C) Oral -- 16 96 % 1.626 m (5' 4\") --       Wt Readings from Last 4 Encounters:   06/17/21 95.4 kg (210 lb 5.1 oz)   06/10/21 88.7 kg (195 lb 9.6 oz)   05/12/21 88.5 kg (195 lb)   04/29/21 88.5 kg (195 lb)         Motor function, sensation, and circulation intact   Yes  Wound status: incisions are clean dry and intact. Aquacel in place with minimal drainage. Mild swelling in the right knee, no erythema or ecchymosis.   Calf tenderness: Bilateral  No    Pertinent Labs   Lab Results: personally reviewed.     Recent Labs   Lab Test 06/18/21  0451 06/10/21  1123 04/29/21  1046 08/06/20  1714 04/23/19  0942 04/23/19  0942   HGB 12.1 13.2 13.5 13.0   < > 13.1   HCT  --  38.9 39.4 39.0   < > 38.9   MCV  --  91 87 90   < > 91   PLT  --  293 264 292   < > 248   NA  --  137 134 138   < > 137   CRP  --   --   --  4.2  --  <2.9    < > = values in this interval not displayed.       Plan: Anticoagulation protocol:  mg daily  x 28  days            Pain medications:  oxycodone            Weight bearing status:  WBAT  Appreciate medical management of bradycardia.   Disposition:  Plan for discharge to home with outpatient therapy when medically stable and pain is controlled, cleared by PT. Discharge planned for later today.        Report completed by:  Mio Kaiser PA-C  Date: 6/18/2021  Time: 8:46 AM    "

## 2021-06-18 NOTE — PROGRESS NOTES
Initial IP Physical Therapy Evaluation       06/18/21 1103   Quick Adds   Type of Visit Initial PT Evaluation   Living Environment   People in home spouse   Current Living Arrangements house   Number of Stairs, Within Home, Primary 4   Stair Railings, Within Home, Primary railings safe and in good condition   Transportation Anticipated family or friend will provide   Self-Care   Usual Activity Tolerance good   Current Activity Tolerance moderate   Regular Exercise No   Equipment Currently Used at Home grab bar, tub/shower;commode chair;walker, standard   Activity/Exercise/Self-Care Comment Patient typically    Disability/Function   Hearing Difficulty or Deaf no   Wear Glasses or Blind yes   Vision Management glasses   Concentrating, Remembering or Making Decisions Difficulty no   Difficulty Communicating no   Difficulty Eating/Swallowing no   Walking or Climbing Stairs Difficulty no   Dressing/Bathing Difficulty no   Toileting issues no   Doing Errands Independently Difficulty (such as shopping) no   Fall history within last six months no   Change in Functional Status Since Onset of Current Illness/Injury no   General Information   Onset of Illness/Injury or Date of Surgery 06/17/21   Referring Physician Dr. Nils Edwards   Patient/Family Therapy Goals Statement (PT) Go home   Pertinent History of Current Problem (include personal factors and/or comorbidities that impact the POC) Patient is 69 y/o s/p R TKA on 6/17/21   Existing Precautions/Restrictions fall   Cognition   Orientation Status (Cognition) oriented x 4   Affect/Mental Status (Cognition) WNL   Follows Commands (Cognition) WNL   Pain Assessment   Patient Currently in Pain Yes, see Vital Sign flowsheet  (2/10 pain)   Integumentary/Edema   Integumentary/Edema no deficits were identifed   Posture    Posture Not impaired   Range of Motion (ROM)   ROM Comment RLE knee flexion to 86 degrees   Strength   Strength Comments BLE WFL, R knee extension quad  contraction normal   Bed Mobility   Comment (Bed Mobility) Supine<>sit SBA   Transfers   Transfer Safety Comments Sit<>stand SBA with FWW   Gait/Stairs (Locomotion)   [place] Level (Gait) supervision   Assistive Device (Gait) walker, front-wheeled   Distance in Feet (Required for LE Total Joints) 150, 150   Pattern (Gait) step-through   Negotiation (Stairs) stairs independence;handrail location;number of steps   [place] Level (Stairs) supervision   Handrail Location (Stairs) left side (ascending)   Number of Steps (Stairs) 10 total   Comment (Gait/Stairs) Patient able to ambulate 150'x2 in hallway, safe and efficient with functional weight shifting and posture. Pain increased towards end of session but remained steady and tolerated well. Flexed posture and wide base of support but improved with practice. Kacey increased during session as patient stated she feels increasingly confident. Stairs: step to step pattern, slow but steady with one rail. Fatigued upon completion but tolerated well. Initial demo and mod vc throughout for technique   Balance   Balance no deficits were identified   Sensory Examination   Sensory Perception WFL   Coordination   Coordination no deficits were identified   Muscle Tone   Muscle Tone no deficits were identified   Clinical Impression   Criteria for Skilled Therapeutic Intervention yes, treatment indicated   PT Diagnosis (PT) S/p R TKA   Influenced by the following impairments Pain, weakness, decreased activity tolerance   Functional limitations due to impairments Mobility, transfers   Clinical Presentation Stable/Uncomplicated   Clinical Presentation Rationale Clinical judgment   Clinical Decision Making (Complexity) low complexity   Therapy Frequency (PT) One time eval and treatment only   Predicted Duration of Therapy Intervention (days/wks) 1 day   Planned Therapy Interventions (PT) gait training;home exercise program;strengthening;transfer training   Anticipated  Equipment Needs at Discharge (PT) walker, rolling   Risk & Benefits of therapy have been explained evaluation/treatment results reviewed;care plan/treatment goals reviewed;risks/benefits reviewed;current/potential barriers reviewed;participants voiced agreement with care plan;participants included;patient   PT Discharge Planning    PT Discharge Recommendation (DC Rec) home with home care physical therapy   PT Rationale for DC Rec Patient demonstrates safe ambulation technique and can safely complete stairs to discharge home. Home PT recommended to maximize strength and mobility.   PT Brief overview of current status  SBA transfers and mobility, easily fatigued   Total Evaluation Time   Total Evaluation Time (Minutes) 10     Vinayak Shelton, PT, DPT

## 2021-06-18 NOTE — PROGRESS NOTES
[facility] Medicine Progress Note  Date of Service: 06/18/2021    Assessment & Plan   Rosita King is a 70 year old female who presented on 6/17/2021 for scheduled Procedure(s):  Right Total Knee Arthroplasty by Nils Edwards MD and is being followed by the hospital medicine service for co-management of acute and/or chronic perioperative medical problems.    Right knee DJD  S/p Procedure(s):  Right Total Knee Arthroplasty on 6/17/21  1 Day Post-Op    - pain control, wound cares, physical therapy, occupational therapy and DVT prophylaxis per orthopedic surgery service    Postoperative bradycardia  Overnight POD#0->1, bradycardic in 40-50's, worse when asleep. Has been monitored on telemetry, pulse this morning ranging between 55-82. Blood pressure stable. Patient slightly dizzy / lightheaded, although she wonders if this is due to the oxycodone. Is on metoprolol XL 50 mg q am - heart rate dropped around 55 this morning after taking her medication.   - Continue telemetry  - Continue metoprolol, holding parameters in place  - Patient may discharge if heart rate remains above 55, blood pressure stable, and patient asymptomatic  - Patient advised to check blood pressure and heart rate at home; if persistently low, would warrant visit with PCP and dose adjustment for beta blocker    Essential hypertension with goal blood pressure less than 140/90  Blood pressure stable despite intermittent bradycardia. Managed prior to admission with metoprolol XL 50 mg daily, hydrochlorothiazide 25 mg daily, and lisinopril 10 mg daily.   - Continue medications with holding parameters    COPD (chronic obstructive pulmonary disease)  Noted per problem list, does not appear to be using inhalers prior to admission.   - Prn albuterol available    Gastroesophageal reflux disease without esophagitis  Managed prior to admission with omeprazole 20 mg daily, continue (changed to Protonix while  "in the hospital due to formulary)    ASHLEY (obstructive sleep apnea)  Uses CPAP, may continue to use with home settings.       DVT Prophylaxis: as per orthopedic surgery service - aspirin 325 mg daily x 30 days  Code Status: Full Code    Lines: Peripheral   Boyer catheter: No    Discussion: Some intermittent post-op bradycardia overnight but improving. Patient mostly asymptomatic other than lightheadedness, although patient attributes this to oxycodone.    Disposition: Anticipate discharge likely today to home. If heart rate remains stable, there are no anticipated barriers to discharge from internal medicine standpoint.    Attestation:  I have reviewed today's vital signs, notes, medications, labs and imaging.  Discussed with Dr. Andrzej Wetzel.    Irish Siddiqui PA-C  [facility]         Interval History   Patient feeling fairly well after surgery; initially had a \"trapped\" and claustrophobic feeling especially present when her spinal block was active, now improved. Pain rated 5/10 currently, but was 8/10 overnight. Denies numbness or tingling.     Tolerating oral intake, denies abdominal pain, nausea, vomiting. No bowel movement since surgery, passing flatus. Urinating without difficulty, no feelings of incomplete bladder emptying or dysuria.    She has chronic shortness of breath / dyspnea on exertion present at baseline that is in process of work-up with outpatient pulmonology.     She currently feels lightheaded and dizzy, even when sitting down; she attributes this to the oxycodone.     Denies chest pain, palpitations, cough, wheeze, headache, vision changes, or other complaints.    Physical Exam   Temp:  [95.9  F (35.5  C)-97.5  F (36.4  C)] 97.1  F (36.2  C)  Pulse:  [44-82] 60  Resp:  [12-25] 20  BP: ()/(54-85) 134/72  SpO2:  [94 %-100 %] 94 %    Weights:   Vitals:    06/17/21 1127 06/17/21 1623   Weight: 88.5 kg (195 lb) 95.4 kg (210 lb 5.1 oz)    Body mass index is 36.1 " kg/m .    General appearance: Awake, alert, and in no apparent distress. Pleasant and conversational, speaking in full sentences.  CV: Regular rhythm & rate, no murmurs. No edema. Peripheral pulses intact.  Respiratory: Moving air well bilaterally, no wheezing, crackles, or rhonchi.  GI: Non-distended, soft, nontender to palpation. No rebound or guarding. Normoactive bowel sounds.  Skin: Warm, dry, no rashes or ecchymoses. No mottling of skin. Aquacel dressing present on right knee, clean and dry.  Musculoskeletal / extremities: Moves all extremities equally, no obvious abnormalities. Distal CMS intact.  Neurologic: No focal deficits.    Data   Recent Labs   Lab 06/18/21  0451   HGB 12.1       No results for input(s): GLC, BGM in the last 168 hours.     Unresulted Labs Ordered in the Past 30 Days of this Admission     No orders found for last 31 day(s).           Imaging  Recent Results (from the past 24 hour(s))   XR Knee Port Right 1/2 Views    Narrative    XR KNEE PORT RIGHT 1/2 VIEWS 6/17/2021 2:59 PM     HISTORY: Post-Op Total Knee       Impression    IMPRESSION:  Total knee arthroplasty placement .    DIANE KEY MD        I reviewed all new labs and imaging results over the last 24 hours. I personally reviewed no images or EKG's today.    Medications     lactated ringers Stopped (06/18/21 0920)       acetaminophen  975 mg Oral Q8H     aspirin  325 mg Oral Daily     docusate sodium  100 mg Oral BID     hydrochlorothiazide  25 mg Oral QAM     ketorolac  15 mg Intravenous Q6H     lisinopril  10 mg Oral QAM     metoprolol succinate ER  50 mg Oral Daily     miconazole   Topical BID     pantoprazole  40 mg Oral QAM AC     polyethylene glycol  17 g Oral Daily     senna-docusate  1 tablet Oral BID     sodium chloride (PF)  3 mL Intracatheter Q8H       Irish Siddiqui PA-C  [facility]

## 2021-06-18 NOTE — PROGRESS NOTES
"[facility] ADMISSION NOTE    Patient admitted to room 2309 at approximately 1620 via cart from surgery. Patient was accompanied by spouse and nurse.     Verbal SBAR report received from ROSARIO Crenshaw.  prior to patient arrival.     Patient trasferred to bed via air anu. Patient alert and oriented X 3. The patient is not having any pain.  . Admission vital signs: Blood pressure 131/77, pulse 59, temperature 97.1  F (36.2  C), temperature source Axillary, resp. rate 19, height 1.626 m (5' 4\"), weight 95.4 kg (210 lb 5.1 oz), SpO2 95 %, not currently breastfeeding. Patient and spouse were oriented to plan of care, call light, bed controls, tv, telephone, bathroom and visiting hours.     Risk Assessment    The following safety risks were identified during admission: fall. Yellow risk band applied: YES.     Skin Initial Assessment    This writer admitted this patient and completed a full skin assessment and Kyle score in the Adult PCS flowsheet. Appropriate interventions initiated as needed.     Secondary skin check completed by Melisa Miller RN.    Kyle Risk Assessment  Sensory Perception: 4-->no impairment  Moisture: 4-->rarely moist  Activity: 4-->walks frequently  Mobility: 4-->no limitation  Nutrition: 4-->excellent  Friction and Shear: 3-->no apparent problem  Kyle Score: 23    Education    Patient has a [place] to Observation order: No  Observation education completed and documented: N/A      Brii Chavis RN    "

## 2021-06-18 NOTE — DISCHARGE INSTRUCTIONS
Stop taking the 81mg EC aspirin while you are taking the 325mg EC aspirin for 4 weeks.     For the bradycardia (slow heart rate):  - Continue your metoprolol XL 50 mg for now.   - Check your pulse / heart rate and blood pressure twice daily and record; bring your readings to your next clinic appointments, especially the upcoming one with cardiology.  - If you are feeling lightheaded / dizzy then you should check  your heart rate and blood pressure. If pulse is persistently less than 55, and/or your blood pressure is less than 100/70, you should avoid taking your metoprolol and call your clinic or go to urgent care / emergency department.

## 2021-06-21 ENCOUNTER — PATIENT OUTREACH (OUTPATIENT)
Dept: NURSING | Facility: CLINIC | Age: 71
End: 2021-06-21
Payer: COMMERCIAL

## 2021-06-21 NOTE — LETTER
[facility] CARE COORDINATION  [facility]  [address] [place]  [address]    June 21, 2021    Rosita King  [address] [facility] [address]      Dear Rosita,    I am a clinic community health worker who works with Addis House MD at  [facility]. I wanted to thank you for spending the time to talk with me and wanted to provide you with my contact information to call me with any future questions or concerns.  Below is a description of clinic care coordination and how we can further assist you.      The clinic care coordination team is made up of a registered nurse,  and community health worker who understand the health care system. The goal of clinic care coordination is to help you manage your health and improve access to the health care system in the most efficient manner. The team can assist you in meeting your health care goals by providing education, coordinating services, strengthening the communication among your providers and supporting you with any resource needs.    Please feel free to contact me with any questions or concerns. We are focused on providing you with the highest-quality healthcare experience possible and that all starts with you.     Sincerely,       Teodora TRIPP  Community Health Worker  [facility]  Clinic Care Coordination  [facility]  [email or web address]   Office: [phone]

## 2021-06-21 NOTE — PROGRESS NOTES
Clinic Care Coordination Contact  Community Health Worker Initial Outreach    CHW Additional Questions  [place] active?: Yes    Patient accepts CC: No, Enrolled in home care. Patient will be sent Care Coordination introduction letter for future reference.     The Clinic Community Health Worker spoke with the patient today at the request of the PCP to discuss possible Clinic Care Coordination enrollment. The service was described to the patient and immediate needs were discussed. The patient declined enrollment at this time. The PCP is encouraged to refer in the future if the patient's needs change.    Teodora TRIPP  Community Health Worker  [facility]  Clinic Care Coordination  [facility]  [email or web address]   Office: [phone]

## 2021-06-28 LAB — GLUCOSE BLDC GLUCOMTR-MCNC: 130 MG/DL (ref 70–99)

## 2021-07-01 ENCOUNTER — HOSPITAL ENCOUNTER (OUTPATIENT)
Dept: ULTRASOUND IMAGING | Facility: CLINIC | Age: 71
Discharge: HOME | End: 2021-07-01
Attending: ORTHOPAEDIC SURGERY | Admitting: ORTHOPAEDIC SURGERY
Payer: MEDICARE

## 2021-07-01 ENCOUNTER — TRANSFERRED RECORDS (OUTPATIENT)
Dept: HEALTH INFORMATION MANAGEMENT | Facility: CLINIC | Age: 71
End: 2021-07-01

## 2021-07-01 DIAGNOSIS — M79.604 PAIN AND SWELLING OF RIGHT LOWER EXTREMITY: ICD-10-CM

## 2021-07-01 DIAGNOSIS — M79.89 PAIN AND SWELLING OF RIGHT LOWER EXTREMITY: ICD-10-CM

## 2021-07-01 DIAGNOSIS — Z96.659 STATUS POST TOTAL KNEE REPLACEMENT: ICD-10-CM

## 2021-07-01 PROCEDURE — 93971 EXTREMITY STUDY: CPT | Mod: RT

## 2021-07-29 ENCOUNTER — TRANSFERRED RECORDS (OUTPATIENT)
Dept: HEALTH INFORMATION MANAGEMENT | Facility: CLINIC | Age: 71
End: 2021-07-29

## 2021-08-05 DIAGNOSIS — I47.10 PAROXYSMAL SUPRAVENTRICULAR TACHYCARDIA (H): ICD-10-CM

## 2021-08-05 DIAGNOSIS — I10 ESSENTIAL HYPERTENSION: ICD-10-CM

## 2021-08-05 DIAGNOSIS — I10 ESSENTIAL HYPERTENSION WITH GOAL BLOOD PRESSURE LESS THAN 140/90: ICD-10-CM

## 2021-08-05 RX ORDER — METOPROLOL SUCCINATE 50 MG/1
50 TABLET, EXTENDED RELEASE ORAL DAILY
Qty: 90 TABLET | Refills: 0 | Status: SHIPPED | OUTPATIENT
Start: 2021-08-05 | End: 2021-10-27

## 2021-08-05 RX ORDER — HYDROCHLOROTHIAZIDE 25 MG/1
25 TABLET ORAL EVERY MORNING
Qty: 90 TABLET | Refills: 0 | Status: SHIPPED | OUTPATIENT
Start: 2021-08-05 | End: 2021-10-27

## 2021-08-05 RX ORDER — LISINOPRIL 10 MG/1
10 TABLET ORAL EVERY MORNING
Qty: 90 TABLET | Refills: 0 | Status: SHIPPED | OUTPATIENT
Start: 2021-08-05 | End: 2021-10-27

## 2021-08-31 ENCOUNTER — HOSPITAL ENCOUNTER (OUTPATIENT)
Dept: RESPIRATORY THERAPY | Facility: CLINIC | Age: 71
Discharge: HOME | End: 2021-08-31
Attending: INTERNAL MEDICINE | Admitting: INTERNAL MEDICINE
Payer: MEDICARE

## 2021-08-31 DIAGNOSIS — R06.02 CHRONIC SHORTNESS OF BREATH: ICD-10-CM

## 2021-08-31 PROCEDURE — 94729 DIFFUSING CAPACITY: CPT

## 2021-08-31 PROCEDURE — 94060 EVALUATION OF WHEEZING: CPT | Mod: 26 | Performed by: INTERNAL MEDICINE

## 2021-08-31 PROCEDURE — 94726 PLETHYSMOGRAPHY LUNG VOLUMES: CPT

## 2021-08-31 PROCEDURE — 94060 EVALUATION OF WHEEZING: CPT

## 2021-08-31 PROCEDURE — 94726 PLETHYSMOGRAPHY LUNG VOLUMES: CPT | Mod: 26 | Performed by: INTERNAL MEDICINE

## 2021-08-31 PROCEDURE — 250N000009 HC RX 250: Performed by: FAMILY MEDICINE

## 2021-08-31 PROCEDURE — 94729 DIFFUSING CAPACITY: CPT | Mod: 26 | Performed by: INTERNAL MEDICINE

## 2021-08-31 RX ORDER — ALBUTEROL SULFATE 0.83 MG/ML
2.5 SOLUTION RESPIRATORY (INHALATION) ONCE
Status: COMPLETED | OUTPATIENT
Start: 2021-08-31 | End: 2021-08-31

## 2021-08-31 RX ADMIN — ALBUTEROL SULFATE 2.5 MG: 2.5 SOLUTION RESPIRATORY (INHALATION) at 08:15

## 2021-09-01 LAB
DLCOUNC-%PRED-PRE: 87 %
DLCOUNC-PRE: 16.98 ML/MIN/MMHG
DLCOUNC-PRED: 19.46 ML/MIN/MMHG
ERV-%PRED-PRE: 67 %
ERV-PRE: 0.27 L
ERV-PRED: 0.4 L
EXPTIME-PRE: 8.05 SEC
FEF2575-%PRED-POST: 85 %
FEF2575-%PRED-PRE: 82 %
FEF2575-POST: 1.6 L/SEC
FEF2575-PRE: 1.54 L/SEC
FEF2575-PRED: 1.87 L/SEC
FEFMAX-%PRED-PRE: 87 %
FEFMAX-PRE: 4.94 L/SEC
FEFMAX-PRED: 5.65 L/SEC
FEV1-%PRED-PRE: 86 %
FEV1-PRE: 1.9 L
FEV1FEV6-PRE: 77 %
FEV1FEV6-PRED: 79 %
FEV1FVC-PRE: 78 %
FEV1FVC-PRED: 78 %
FEV1SVC-PRE: 92 %
FEV1SVC-PRED: 72 %
FIFMAX-PRE: 2.85 L/SEC
FRCPLETH-%PRED-PRE: 98 %
FRCPLETH-PRE: 2.66 L
FRCPLETH-PRED: 2.71 L
FVC-%PRED-PRE: 85 %
FVC-PRE: 2.44 L
FVC-PRED: 2.85 L
GAW-%PRED-PRE: 35 %
GAW-PRE: 0.36 L/S/CMH2O
GAW-PRED: 1.03 L/S/CMH2O
IC-%PRED-PRE: 62 %
IC-PRE: 1.67 L
IC-PRED: 2.66 L
RVPLETH-%PRED-PRE: 109 %
RVPLETH-PRE: 2.26 L
RVPLETH-PRED: 2.06 L
SGAW-%PRED-PRE: 131 %
SGAW-PRE: 0.13 1/CMH2O*S
SGAW-PRED: 0.1 1/CMH2O*S
SRAW-%PRED-PRE: 158 %
SRAW-PRE: 7.54 CMH2O*S
SRAW-PRED: 4.76 CMH2O*S
TLCPLETH-%PRED-PRE: 87 %
TLCPLETH-PRE: 4.33 L
TLCPLETH-PRED: 4.94 L
VA-%PRED-PRE: 87 %
VA-PRE: 4.09 L
VC-%PRED-PRE: 67 %
VC-PRE: 2.07 L
VC-PRED: 3.06 L

## 2021-09-23 ENCOUNTER — LAB (OUTPATIENT)
Dept: LAB | Facility: CLINIC | Age: 71
End: 2021-09-23
Payer: COMMERCIAL

## 2021-09-23 ENCOUNTER — HOSPITAL ENCOUNTER (OUTPATIENT)
Dept: CARDIOLOGY | Facility: CLINIC | Age: 71
End: 2021-09-23
Attending: PHYSICIAN ASSISTANT
Payer: MEDICARE

## 2021-09-23 DIAGNOSIS — I10 ESSENTIAL HYPERTENSION: ICD-10-CM

## 2021-09-23 DIAGNOSIS — I47.10 PAROXYSMAL SUPRAVENTRICULAR TACHYCARDIA (H): ICD-10-CM

## 2021-09-23 LAB
ANION GAP SERPL CALCULATED.3IONS-SCNC: 3 MMOL/L (ref 3–14)
BUN SERPL-MCNC: 17 MG/DL (ref 7–30)
CALCIUM SERPL-MCNC: 8.7 MG/DL (ref 8.5–10.1)
CHLORIDE BLD-SCNC: 105 MMOL/L (ref 94–109)
CO2 SERPL-SCNC: 30 MMOL/L (ref 20–32)
CREAT SERPL-MCNC: 0.85 MG/DL (ref 0.52–1.04)
GFR SERPL CREATININE-BSD FRML MDRD: 70 ML/MIN/1.73M2
GLUCOSE BLD-MCNC: 162 MG/DL (ref 70–99)
LVEF ECHO: NORMAL
POTASSIUM BLD-SCNC: 3.9 MMOL/L (ref 3.4–5.3)
SODIUM SERPL-SCNC: 138 MMOL/L (ref 133–144)

## 2021-09-23 PROCEDURE — 93242 EXT ECG>48HR<7D RECORDING: CPT

## 2021-09-23 PROCEDURE — 80048 BASIC METABOLIC PNL TOTAL CA: CPT

## 2021-09-23 PROCEDURE — 36415 COLL VENOUS BLD VENIPUNCTURE: CPT

## 2021-09-23 PROCEDURE — 93306 TTE W/DOPPLER COMPLETE: CPT | Mod: 26 | Performed by: INTERNAL MEDICINE

## 2021-09-23 PROCEDURE — 93306 TTE W/DOPPLER COMPLETE: CPT

## 2021-09-23 PROCEDURE — 93244 EXT ECG>48HR<7D REV&INTERPJ: CPT | Performed by: INTERNAL MEDICINE

## 2021-10-03 ENCOUNTER — HEALTH MAINTENANCE LETTER (OUTPATIENT)
Age: 71
End: 2021-10-03

## 2021-10-19 ENCOUNTER — ANCILLARY PROCEDURE (OUTPATIENT)
Dept: MAMMOGRAPHY | Facility: CLINIC | Age: 71
End: 2021-10-19
Attending: FAMILY MEDICINE
Payer: COMMERCIAL

## 2021-10-19 DIAGNOSIS — Z12.31 VISIT FOR SCREENING MAMMOGRAM: ICD-10-CM

## 2021-10-19 PROCEDURE — 77063 BREAST TOMOSYNTHESIS BI: CPT | Mod: TC | Performed by: RADIOLOGY

## 2021-10-19 PROCEDURE — 77067 SCR MAMMO BI INCL CAD: CPT | Mod: TC | Performed by: RADIOLOGY

## 2021-10-27 ENCOUNTER — OFFICE VISIT (OUTPATIENT)
Dept: CARDIOLOGY | Facility: CLINIC | Age: 71
End: 2021-10-27
Attending: FAMILY MEDICINE
Payer: COMMERCIAL

## 2021-10-27 VITALS
BODY MASS INDEX: 33.81 KG/M2 | WEIGHT: 197 LBS | HEART RATE: 70 BPM | SYSTOLIC BLOOD PRESSURE: 124 MMHG | DIASTOLIC BLOOD PRESSURE: 65 MMHG

## 2021-10-27 DIAGNOSIS — I10 ESSENTIAL HYPERTENSION: ICD-10-CM

## 2021-10-27 DIAGNOSIS — I10 ESSENTIAL HYPERTENSION WITH GOAL BLOOD PRESSURE LESS THAN 140/90: ICD-10-CM

## 2021-10-27 DIAGNOSIS — R93.1 ABNORMAL FINDINGS ON DIAGNOSTIC IMAGING OF HEART/CORONARY CIRCULATION: ICD-10-CM

## 2021-10-27 DIAGNOSIS — I47.10 PAROXYSMAL SUPRAVENTRICULAR TACHYCARDIA (H): ICD-10-CM

## 2021-10-27 PROCEDURE — 99214 OFFICE O/P EST MOD 30 MIN: CPT | Performed by: INTERNAL MEDICINE

## 2021-10-27 RX ORDER — LISINOPRIL 10 MG/1
10 TABLET ORAL EVERY MORNING
Qty: 90 TABLET | Refills: 0 | Status: SHIPPED | OUTPATIENT
Start: 2021-10-27 | End: 2022-04-19

## 2021-10-27 RX ORDER — HYDROCHLOROTHIAZIDE 25 MG/1
25 TABLET ORAL EVERY MORNING
Qty: 90 TABLET | Refills: 0 | Status: SHIPPED | OUTPATIENT
Start: 2021-10-27 | End: 2022-04-19

## 2021-10-27 RX ORDER — METOPROLOL SUCCINATE 50 MG/1
50 TABLET, EXTENDED RELEASE ORAL DAILY
Qty: 90 TABLET | Refills: 0 | Status: SHIPPED | OUTPATIENT
Start: 2021-10-27 | End: 2022-04-19

## 2021-10-27 NOTE — PROGRESS NOTES
HPI and Plan:   See dictation [id number]    No orders of the defined types were placed in this encounter.    Orders Placed This Encounter   Medications     lisinopril (ZESTRIL) 10 MG tablet     Sig: Take 1 tablet (10 mg) by mouth every morning No further refills until seen by cardiology--call [phone] to schedule     Dispense:  90 tablet     Refill:  0     metoprolol succinate ER (TOPROL-XL) 50 MG 24 hr tablet     Sig: Take 1 tablet (50 mg) by mouth daily No further refills until seen by cardiology--call [phone] to schedule     Dispense:  90 tablet     Refill:  0     hydrochlorothiazide (HYDRODIURIL) 25 MG tablet     Sig: Take 1 tablet (25 mg) by mouth every morning No further refills until seen by cardiology--call [phone] to schedule     Dispense:  90 tablet     Refill:  0     Medications Discontinued During This Encounter   Medication Reason     lisinopril (ZESTRIL) 10 MG tablet Reorder     hydrochlorothiazide (HYDRODIURIL) 25 MG tablet Reorder     metoprolol succinate ER (TOPROL-XL) 50 MG 24 hr tablet Reorder         Encounter Diagnoses   Name Primary?     Essential hypertension      Paroxysmal supraventricular tachycardia (H)      Essential hypertension with goal blood pressure less than 140/90      Abnormal findings on diagnostic imaging of heart/coronary circulation        CURRENT MEDICATIONS:  Current Outpatient Medications   Medication Sig Dispense Refill     acetaminophen (TYLENOL) 325 MG tablet Take 2 tablets (650 mg) by mouth every 4 hours as needed for other (mild pain) 100 tablet 0     aspirin (ASA) 325 MG EC tablet Take 1 tablet (325 mg) by mouth daily 30 tablet 0     aspirin 81 MG EC tablet Take 1 tablet (81 mg) by mouth daily Stop taking this while on aspirin 325mg daily x 4 weeks post-operatively.       fluocinonide (LIDEX) 0.05 % external solution Apply sparingly to affected area twice daily as needed.  Do not apply to face. 60 mL 3     hydrochlorothiazide (HYDRODIURIL) 25 MG tablet  Take 1 tablet (25 mg) by mouth every morning No further refills until seen by cardiology--call [phone] to schedule 90 tablet 0     hydrOXYzine (ATARAX) 25 MG tablet Take 1-2 tablets (25-50 mg) by mouth every 6 hours as needed for itching or anxiety (with pain, moderate pain) 40 tablet 0     ibuprofen (ADVIL/MOTRIN) 600 MG tablet Take 1 tablet (600 mg) by mouth every 6 hours as needed for pain (mild) 30 tablet 0     lisinopril (ZESTRIL) 10 MG tablet Take 1 tablet (10 mg) by mouth every morning No further refills until seen by cardiology--call [phone] to schedule 90 tablet 0     metoprolol succinate ER (TOPROL-XL) 50 MG 24 hr tablet Take 1 tablet (50 mg) by mouth daily No further refills until seen by cardiology--call [phone] to schedule 90 tablet 0     Multiple Vitamins-Minerals (CENTRUM SILVER) per tablet Take 1 tablet by mouth daily       omeprazole (PRILOSEC) 20 MG DR capsule TAKE 1 CAPSULE BY MOUTH ONCE DAILY 90 capsule 1     oxyCODONE (ROXICODONE) 5 MG tablet Take 1-2 tablets (5-10 mg) by mouth every 3 hours as needed for pain (Moderate to Severe) 40 tablet 0     senna-docusate (SENOKOT-S/PERICOLACE) 8.6-50 MG tablet Take 1-2 tablets by mouth daily as needed for constipation Take while on oral narcotics to prevent or treat constipation. 15 tablet 0     VITAMIN D, CHOLECALCIFEROL, PO Take 1,000 Units by mouth daily          ALLERGIES     Allergies   Allergen Reactions     Penicillins      Pravastatin      Other reaction(s): Myalgia     Simvastatin      Other reaction(s): Myalgia     Sulfa Drugs        PAST MEDICAL HISTORY:  Past Medical History:   Diagnosis Date     Essential hypertension with goal blood pressure less than 140/90 8/21/2016     Gastroesophageal reflux disease without esophagitis 8/22/2016     H. pylori infection 12/24/2015     Hyperlipidemia LDL goal <130 10/12/2015     ASHLEY (obstructive sleep apnea) 10/12/2015    Severe ASHLEY  - PSG at 7/27/2006 with undocumented weight, AHI 84.7, RDI  88.9, SpO2 remained above 90%, CPAP 7 optimal.     Paroxysmal supraventricular tachycardia (H) 2/23/2017       PAST SURGICAL HISTORY:  Past Surgical History:   Procedure Laterality Date     ARTHROPLASTY KNEE Right 6/17/2021    Procedure: Right Total Knee Arthroplasty;  Surgeon: Nils Edwards MD;  Location: [facility]     CARPAL TUNNEL RELEASE RT/LT Bilateral 2006, 2009     COLONOSCOPY N/A 11/25/2015    Procedure: COLONOSCOPY;  Surgeon: Mina Roy MD;  Location: [facility]     ESOPHAGOSCOPY, GASTROSCOPY, DUODENOSCOPY (EGD), COMBINED N/A 11/25/2015    Procedure: COMBINED ESOPHAGOSCOPY, GASTROSCOPY, DUODENOSCOPY (EGD), BIOPSY SINGLE OR MULTIPLE;  Surgeon: Mina Roy MD;  Location: [facility]     PAROTIDECTOMY Left 10/31/2019    Procedure: LEFT SUPERFICIAL PAROTIDECTOMY WITH FACIAL NERVE MONITORING;  Surgeon: Atif Salazar MD;  Location: [facility]     TONSILLECTOMY & ADENOIDECTOMY  age 13       FAMILY HISTORY:  Family History   Problem Relation Age of Onset     Liver Disease Mother      Depression/Anxiety Father      Diabetes Father      Diabetes Sister      Liver Disease Sister      Breast Cancer Sister      Cancer Sister      Kidney Cancer Nephew        SOCIAL HISTORY:  Social History     Socioeconomic History     Marital status:      Spouse name: None     Number of children: None     Years of education: None     Highest education level: None   Occupational History     None   Tobacco Use     Smoking status: Former Smoker     Smokeless tobacco: Never Used   Substance and Sexual Activity     Alcohol use: Yes     Comment: ocassional     Drug use: No     Sexual activity: Not Currently     Partners: Female   Other Topics Concern     Parent/sibling w/ CABG, MI or angioplasty before 65F 55M? Yes     Comment: Father with MI at 55   Social History Narrative     None     Social Determinants of Health     Financial Resource Strain:      Difficulty of Paying Living Expenses:    Food Insecurity:       Worried About Running Out of Food in the Last Year:      Ran Out of Food in the Last Year:    Transportation Needs:      Lack of Transportation (Medical):      Lack of Transportation (Non-Medical):    Physical Activity:      Days of Exercise per Week:      Minutes of Exercise per Session:    Stress:      Feeling of Stress :    Social Connections:      Frequency of Communication with Friends and Family:      Frequency of Social Gatherings with Friends and Family:      Attends Jehovah's witness Services:      Active Member of Clubs or Organizations:      Attends Club or Organization Meetings:      Marital Status:    Intimate Partner Violence:      Fear of Current or Ex-Partner:      Emotionally Abused:      Physically Abused:      Sexually Abused:        Review of Systems:  Skin:  Negative itching;rash   Eyes:  Positive for glasses  ENT:  Negative    Respiratory:  Positive for dyspnea on exertion;sleep apnea;CPAP  Cardiovascular:    Positive for;lightheadedness;chest pain;palpitations;fatigue;dizziness  Gastroenterology: Negative    Genitourinary:  Positive for urinary frequency;urgency  Musculoskeletal:  Negative    Neurologic:  Negative memory problems  Psychiatric:  Positive for anxiety  Heme/Lymph/Imm:  Positive for night sweats  Endocrine:  Negative      Physical Exam:  Vitals: /65   Pulse 70   Wt 89.4 kg (197 lb)   BMI 33.81 kg/m      Recent Lab Results:  LIPID RESULTS:  Lab Results   Component Value Date    CHOL 277 (H) 04/29/2021    HDL 64 04/29/2021     (H) 04/29/2021    TRIG 136 04/29/2021    CHOLHDLRATIO 3.8 10/13/2015       LIVER ENZYME RESULTS:  Lab Results   Component Value Date    AST 18 06/10/2021    ALT 37 06/10/2021       CBC RESULTS:  Lab Results   Component Value Date    WBC 6.4 06/10/2021    RBC 4.26 06/10/2021    HGB 12.1 06/18/2021    HCT 38.9 06/10/2021    MCV 91 06/10/2021    MCH 31.0 06/10/2021    MCHC 33.9 06/10/2021    RDW 14.2 06/10/2021     06/10/2021       BMP  RESULTS:  Lab Results   Component Value Date     09/23/2021     06/10/2021    POTASSIUM 3.9 09/23/2021    POTASSIUM 3.8 06/10/2021    CHLORIDE 105 09/23/2021    CHLORIDE 105 06/10/2021    CO2 30 09/23/2021    CO2 29 06/10/2021    ANIONGAP 3 09/23/2021    ANIONGAP 3 06/10/2021     (H) 09/23/2021    GLC 94 06/10/2021    BUN 17 09/23/2021    BUN 19 06/10/2021    CR 0.85 09/23/2021    CR 0.86 06/10/2021    GFRESTIMATED 70 09/23/2021    GFRESTIMATED 68 06/10/2021    GFRESTBLACK 79 06/10/2021    CORINA 8.7 09/23/2021    CORINA 9.2 06/10/2021        A1C RESULTS:  Lab Results   Component Value Date    A1C 6.2 (H) 06/10/2021       INR RESULTS:  No results found for: INR        CC  Addis Alcocer MD  [address] [place] [address]

## 2021-10-27 NOTE — LETTER
10/27/2021    Addis House MD  [address] [facility] [address]    RE: Rosita King       Dear Colleague,    I had the pleasure of seeing Rosita King in the [facility].    HPI and Plan:   See dictation [id number]    No orders of the defined types were placed in this encounter.    Orders Placed This Encounter   Medications     lisinopril (ZESTRIL) 10 MG tablet     Sig: Take 1 tablet (10 mg) by mouth every morning No further refills until seen by cardiology--call [phone] to schedule     Dispense:  90 tablet     Refill:  0     metoprolol succinate ER (TOPROL-XL) 50 MG 24 hr tablet     Sig: Take 1 tablet (50 mg) by mouth daily No further refills until seen by cardiology--call [phone] to schedule     Dispense:  90 tablet     Refill:  0     hydrochlorothiazide (HYDRODIURIL) 25 MG tablet     Sig: Take 1 tablet (25 mg) by mouth every morning No further refills until seen by cardiology--call [phone] to schedule     Dispense:  90 tablet     Refill:  0     Medications Discontinued During This Encounter   Medication Reason     lisinopril (ZESTRIL) 10 MG tablet Reorder     hydrochlorothiazide (HYDRODIURIL) 25 MG tablet Reorder     metoprolol succinate ER (TOPROL-XL) 50 MG 24 hr tablet Reorder         Encounter Diagnoses   Name Primary?     Essential hypertension      Paroxysmal supraventricular tachycardia (H)      Essential hypertension with goal blood pressure less than 140/90      Abnormal findings on diagnostic imaging of heart/coronary circulation        CURRENT MEDICATIONS:  Current Outpatient Medications   Medication Sig Dispense Refill     acetaminophen (TYLENOL) 325 MG tablet Take 2 tablets (650 mg) by mouth every 4 hours as needed for other (mild pain) 100 tablet 0     aspirin (ASA) 325 MG EC tablet Take 1 tablet (325 mg) by mouth daily 30 tablet 0     aspirin 81 MG EC tablet Take 1 tablet (81 mg) by mouth daily Stop taking this while  on aspirin 325mg daily x 4 weeks post-operatively.       fluocinonide (LIDEX) 0.05 % external solution Apply sparingly to affected area twice daily as needed.  Do not apply to face. 60 mL 3     hydrochlorothiazide (HYDRODIURIL) 25 MG tablet Take 1 tablet (25 mg) by mouth every morning No further refills until seen by cardiology--call [phone] to schedule 90 tablet 0     hydrOXYzine (ATARAX) 25 MG tablet Take 1-2 tablets (25-50 mg) by mouth every 6 hours as needed for itching or anxiety (with pain, moderate pain) 40 tablet 0     ibuprofen (ADVIL/MOTRIN) 600 MG tablet Take 1 tablet (600 mg) by mouth every 6 hours as needed for pain (mild) 30 tablet 0     lisinopril (ZESTRIL) 10 MG tablet Take 1 tablet (10 mg) by mouth every morning No further refills until seen by cardiology--call [phone] to schedule 90 tablet 0     metoprolol succinate ER (TOPROL-XL) 50 MG 24 hr tablet Take 1 tablet (50 mg) by mouth daily No further refills until seen by cardiology--call [phone] to schedule 90 tablet 0     Multiple Vitamins-Minerals (CENTRUM SILVER) per tablet Take 1 tablet by mouth daily       omeprazole (PRILOSEC) 20 MG DR capsule TAKE 1 CAPSULE BY MOUTH ONCE DAILY 90 capsule 1     oxyCODONE (ROXICODONE) 5 MG tablet Take 1-2 tablets (5-10 mg) by mouth every 3 hours as needed for pain (Moderate to Severe) 40 tablet 0     senna-docusate (SENOKOT-S/PERICOLACE) 8.6-50 MG tablet Take 1-2 tablets by mouth daily as needed for constipation Take while on oral narcotics to prevent or treat constipation. 15 tablet 0     VITAMIN D, CHOLECALCIFEROL, PO Take 1,000 Units by mouth daily          ALLERGIES     Allergies   Allergen Reactions     Penicillins      Pravastatin      Other reaction(s): Myalgia     Simvastatin      Other reaction(s): Myalgia     Sulfa Drugs        PAST MEDICAL HISTORY:  Past Medical History:   Diagnosis Date     Essential hypertension with goal blood pressure less than 140/90 8/21/2016     Gastroesophageal  reflux disease without esophagitis 8/22/2016     H. pylori infection 12/24/2015     Hyperlipidemia LDL goal <130 10/12/2015     ASHELY (obstructive sleep apnea) 10/12/2015    Severe ASHLEY  - PSG at 7/27/2006 with undocumented weight, AHI 84.7, RDI 88.9, SpO2 remained above 90%, CPAP 7 optimal.     Paroxysmal supraventricular tachycardia (H) 2/23/2017       PAST SURGICAL HISTORY:  Past Surgical History:   Procedure Laterality Date     ARTHROPLASTY KNEE Right 6/17/2021    Procedure: Right Total Knee Arthroplasty;  Surgeon: Nils Edwards MD;  Location: [facility]     CARPAL TUNNEL RELEASE RT/LT Bilateral 2006, 2009     COLONOSCOPY N/A 11/25/2015    Procedure: COLONOSCOPY;  Surgeon: Mina Roy MD;  Location: [facility]     ESOPHAGOSCOPY, GASTROSCOPY, DUODENOSCOPY (EGD), COMBINED N/A 11/25/2015    Procedure: COMBINED ESOPHAGOSCOPY, GASTROSCOPY, DUODENOSCOPY (EGD), BIOPSY SINGLE OR MULTIPLE;  Surgeon: Mina Roy MD;  Location: [facility]     PAROTIDECTOMY Left 10/31/2019    Procedure: LEFT SUPERFICIAL PAROTIDECTOMY WITH FACIAL NERVE MONITORING;  Surgeon: Atif Salazar MD;  Location: [facility]     TONSILLECTOMY & ADENOIDECTOMY  age 13       FAMILY HISTORY:  Family History   Problem Relation Age of Onset     Liver Disease Mother      Depression/Anxiety Father      Diabetes Father      Diabetes Sister      Liver Disease Sister      Breast Cancer Sister      Cancer Sister      Kidney Cancer Nephew        SOCIAL HISTORY:  Social History     Socioeconomic History     Marital status:      Spouse name: None     Number of children: None     Years of education: None     Highest education level: None   Occupational History     None   Tobacco Use     Smoking status: Former Smoker     Smokeless tobacco: Never Used   Substance and Sexual Activity     Alcohol use: Yes     Comment: ocassional     Drug use: No     Sexual activity: Not Currently     Partners: Female   Other Topics Concern     Parent/sibling w/ CABG, MI  or angioplasty before 65F 55M? Yes     Comment: Father with MI at 55   Social History Narrative     None     Social Determinants of Health     Financial Resource Strain:      Difficulty of Paying Living Expenses:    Food Insecurity:      Worried About Running Out of Food in the Last Year:      Ran Out of Food in the Last Year:    Transportation Needs:      Lack of Transportation (Medical):      Lack of Transportation (Non-Medical):    Physical Activity:      Days of Exercise per Week:      Minutes of Exercise per Session:    Stress:      Feeling of Stress :    Social Connections:      Frequency of Communication with Friends and Family:      Frequency of Social Gatherings with Friends and Family:      Attends Taoist Services:      Active Member of Clubs or Organizations:      Attends Club or Organization Meetings:      Marital Status:    Intimate Partner Violence:      Fear of Current or Ex-Partner:      Emotionally Abused:      Physically Abused:      Sexually Abused:        Review of Systems:  Skin:  Negative itching;rash   Eyes:  Positive for glasses  ENT:  Negative    Respiratory:  Positive for dyspnea on exertion;sleep apnea;CPAP  Cardiovascular:    Positive for;lightheadedness;chest pain;palpitations;fatigue;dizziness  Gastroenterology: Negative    Genitourinary:  Positive for urinary frequency;urgency  Musculoskeletal:  Negative    Neurologic:  Negative memory problems  Psychiatric:  Positive for anxiety  Heme/Lymph/Imm:  Positive for night sweats  Endocrine:  Negative      Physical Exam:  Vitals: /65   Pulse 70   Wt 89.4 kg (197 lb)   BMI 33.81 kg/m      Recent Lab Results:  LIPID RESULTS:  Lab Results   Component Value Date    CHOL 277 (H) 04/29/2021    HDL 64 04/29/2021     (H) 04/29/2021    TRIG 136 04/29/2021    CHOLHDLRATIO 3.8 10/13/2015       LIVER ENZYME RESULTS:  Lab Results   Component Value Date    AST 18 06/10/2021    ALT 37 06/10/2021       CBC RESULTS:  Lab Results   Component  Value Date    WBC 6.4 06/10/2021    RBC 4.26 06/10/2021    HGB 12.1 06/18/2021    HCT 38.9 06/10/2021    MCV 91 06/10/2021    MCH 31.0 06/10/2021    MCHC 33.9 06/10/2021    RDW 14.2 06/10/2021     06/10/2021       BMP RESULTS:  Lab Results   Component Value Date     09/23/2021     06/10/2021    POTASSIUM 3.9 09/23/2021    POTASSIUM 3.8 06/10/2021    CHLORIDE 105 09/23/2021    CHLORIDE 105 06/10/2021    CO2 30 09/23/2021    CO2 29 06/10/2021    ANIONGAP 3 09/23/2021    ANIONGAP 3 06/10/2021     (H) 09/23/2021    GLC 94 06/10/2021    BUN 17 09/23/2021    BUN 19 06/10/2021    CR 0.85 09/23/2021    CR 0.86 06/10/2021    GFRESTIMATED 70 09/23/2021    GFRESTIMATED 68 06/10/2021    GFRESTBLACK 79 06/10/2021    CORINA 8.7 09/23/2021    CORINA 9.2 06/10/2021        A1C RESULTS:  Lab Results   Component Value Date    A1C 6.2 (H) 06/10/2021       INR RESULTS:  No results found for: INR        CC  Addis Alcocer MD  [address] [place] [id number]                      Thank you for allowing me to participate in the care of your patient.      Sincerely,     Audra Varma MD     [facility]  cc:   Addis Alcocer MD  [address] [place] [id number]

## 2021-10-27 NOTE — PROGRESS NOTES
Service Date: 10/27/2021    REASON FOR VISIT:  Abnormal nuclear stress test findings concerning for infiltrative cardiomyopathies.    HISTORY OF PRESENT ILLNESS:  Jenna is a very pleasant 71-year-old female who was seen in the past by our EP colleagues for paroxysmal supraventricular tachycardia associated with palpitations.  Her EF has been normal in the past and Zio Patch in the past has shown atrial tachycardia that has been treated with beta blockers.  She has a history of hypertension and obstructive sleep apnea and uses CPAP.  She was more recently seen by Arelis Deutsch in 08/2020.    The reason Jenna has been referred to me is because she has been having persistent shortness of breath for the last few years that has not significantly changed.  She had a nuclear stress test done in 05/2021 that was negative for inducible ischemia or infarction.  LV cavity was noted to be small and LVH was reported on the nuclear study with hyperdynamic EF with stress.  However, the nuclear reader also mentioned that the voltage was quite low on the ECG and the appearance of thickened myocardium on the nuclear study raised some possibilities of potential infiltrative cardiomyopathy and requested evaluation with me at [facility] in [place].    However, subsequently in September, she had an echocardiogram that showed normal LV size and function and LV wall thickness was actually normal on that study, which is more reliable than her nuclear study.  In any case, her ECG low voltage could have been perhaps from  breast tissue attenuation.    Today, Jenna tells me that she continues to have intermittent palpitations consistent with her atrial tachycardia; however, her shortness of breath has been persistent and has not gotten better and she does not have any clear cut answers for that.  She denies any typical symptoms of angina, but occasionally has some resting chest discomfort that is not related to exertion.  These feel like twinges  and go away spontaneously.    PHYSICAL EXAMINATION:    VITAL SIGNS:  Blood pressure is 124/65 with a pulse of 70.  GENERAL:  Alert, oriented x3, in no acute distress.  NECK:  Supple.  JVP normal.  LUNGS:  Clear.  HEART:  Cardiac sounds are regular.  There is a soft systolic murmur.  No rubs or gallops  EXTREMITIES:  Warm.  There is no edema.    NECK:  JVP is hard to see, but likely normal.    ASSESSMENT AND PLAN:  Jenna is 71 with an abnormal reported nuclear stress test.  There is no evidence of ischemia or infarction; however, they did mention some concerns for potential infiltrative cardiomyopathy.  However, echocardiography subsequently has shown normal LV wall thickness and size and dimensions.  I personally reviewed the images.  However, the image quality is technically difficult and challenging.  I personally do not think the patient has cardiac amyloidosis and her lowish voltage on the ECG may be a reflection of breast tissue attenuation.  However, given the nuclear test findings, I did offer her a possibility of doing a cardiac MRI for full diagnostic certainty and evaluation, but I think the yield of that is going to be low.  At this time, she wants to hold off on that and repeat ultrasound imaging of her heart with an echocardiography in 6 months from now with contrast.  If there are any signs of worsening LVH on that, we will revisit this issue.    In regards to her shortness of breath, this seems to be chronic.  Her symptoms are not typical for angina.  Her nuclear stress test was negative for ischemia.  She does have significant hyperlipidemia, but her prior chest CT did not show significant amount of coronary calcifications.  She has had myalgias on statin and is not on that.  Given her persistent shortness of breath, I did offer her further investigations that could include invasive assessment; however, again at this time, she wants to try conservative management and see how things are in the next 6  months.  I have told her if her symptoms get worse, if she gets exertional angina or if her breathing gets worse, she needs to contact our clinic and in that case, we will proceed with further investigations that includes MRI plus/minus heart catheterization much sooner.  Otherwise, I will have her follow up with Arelis in 6 months from now and I would be happy to see her as needed depending on findings of the echocardiography if getting worse.      Audra Varma MD    cc:  Arelis Deutsch PA-C.  [facility] at [address], [address]  [place]  [address]    Audra Varma MD        D: 10/27/2021   T: 10/27/2021   MT: MAYCOL    Name:     MICHAEL MUNOZ  MRN:      [id number]        Account:      [id number]   :      1950           Service Date: 10/27/2021       Document: [id number]

## 2021-12-07 DIAGNOSIS — G47.33 OBSTRUCTIVE SLEEP APNEA: Primary | ICD-10-CM

## 2021-12-16 ENCOUNTER — TRANSFERRED RECORDS (OUTPATIENT)
Dept: HEALTH INFORMATION MANAGEMENT | Facility: CLINIC | Age: 71
End: 2021-12-16
Payer: COMMERCIAL

## 2021-12-21 ENCOUNTER — HOSPITAL ENCOUNTER (OUTPATIENT)
Dept: MRI IMAGING | Facility: CLINIC | Age: 71
Discharge: HOME | End: 2021-12-21
Attending: FAMILY MEDICINE | Admitting: FAMILY MEDICINE
Payer: MEDICARE

## 2021-12-21 DIAGNOSIS — D32.9 MENINGIOMA (H): ICD-10-CM

## 2021-12-21 PROCEDURE — 255N000002 HC RX 255 OP 636: Performed by: FAMILY MEDICINE

## 2021-12-21 PROCEDURE — 70553 MRI BRAIN STEM W/O & W/DYE: CPT

## 2021-12-21 PROCEDURE — A9585 GADOBUTROL INJECTION: HCPCS | Performed by: FAMILY MEDICINE

## 2021-12-21 RX ORDER — GADOBUTROL 604.72 MG/ML
9 INJECTION INTRAVENOUS ONCE
Status: COMPLETED | OUTPATIENT
Start: 2021-12-21 | End: 2021-12-21

## 2021-12-21 RX ADMIN — GADOBUTROL 9 ML: 604.72 INJECTION INTRAVENOUS at 11:03

## 2021-12-29 DIAGNOSIS — K21.9 GASTROESOPHAGEAL REFLUX DISEASE WITHOUT ESOPHAGITIS: ICD-10-CM

## 2022-01-23 ENCOUNTER — HEALTH MAINTENANCE LETTER (OUTPATIENT)
Age: 72
End: 2022-01-23

## 2022-03-22 DIAGNOSIS — K21.9 GASTROESOPHAGEAL REFLUX DISEASE WITHOUT ESOPHAGITIS: ICD-10-CM

## 2022-03-30 ENCOUNTER — HOSPITAL ENCOUNTER (OUTPATIENT)
Dept: CARDIOLOGY | Facility: CLINIC | Age: 72
Discharge: HOME | End: 2022-03-30
Attending: INTERNAL MEDICINE | Admitting: INTERNAL MEDICINE
Payer: MEDICARE

## 2022-03-30 DIAGNOSIS — I47.10 PAROXYSMAL SUPRAVENTRICULAR TACHYCARDIA (H): ICD-10-CM

## 2022-03-30 DIAGNOSIS — I10 ESSENTIAL HYPERTENSION WITH GOAL BLOOD PRESSURE LESS THAN 140/90: ICD-10-CM

## 2022-03-30 DIAGNOSIS — R93.1 ABNORMAL FINDINGS ON DIAGNOSTIC IMAGING OF HEART/CORONARY CIRCULATION: ICD-10-CM

## 2022-03-30 DIAGNOSIS — I10 ESSENTIAL HYPERTENSION: ICD-10-CM

## 2022-03-30 LAB — LVEF ECHO: NORMAL

## 2022-03-30 PROCEDURE — 255N000002 HC RX 255 OP 636: Performed by: INTERNAL MEDICINE

## 2022-03-30 PROCEDURE — 93306 TTE W/DOPPLER COMPLETE: CPT | Mod: 26 | Performed by: INTERNAL MEDICINE

## 2022-03-30 RX ADMIN — HUMAN ALBUMIN MICROSPHERES AND PERFLUTREN 2 ML: 10; .22 INJECTION, SOLUTION INTRAVENOUS at 11:29

## 2022-04-16 NOTE — PROGRESS NOTES
"[facility]    I had the pleasure of seeing Rosita when she came for follow up of SVT and HTN.  This 71 year old sees Dr. Varma and has seen Dr. Glynn and Dr. Saravia for her history of:    1. Paroxysmal SVT a/w palpitations, SOB and fatigue and first evaluated 2016. EF with normal EF. Zio showed likely AT back in 2016 that continued despite metoprolol. Subsequent ZioPatches with shorter episodes and evidence of pauses.  2. Benign Essential HTN  3. ASHLEY on CPAP  4. Prediabetes - A1c 6.2% 6/2021      I had a virtual visit with Jenna 8/2020 at which time she had just a few occasional palpitations at night. She was d/t undergo bilateral TKAs and felt arthritis was really affecting her mobility. She remained in SR and routine follow-up rec'd with echo and ZioPatch.    She met with Dr. Varma 10/2021 d/t concerns re: increasing SOB. A stress test was done 5/2021, which was negative for ischemia but concerning for possible infiltrative dz given low voltage with thickened myocardium.  Follow-up echo looked OK. He thought cMRI could be done for further w/u but given nl echo, the yield would be quite low. 6 m follow-up rec'd with repeat echo for consideration of angiogram and/or cMRI.    PFTs 8/2021 wnl    S/p R TKA 6/2021. Is waiting to do the L knee d/t 's health issues.      Interval History:  She continues to feel poorly at times, noting \"shaking inside\" episodes, continued HARDWICK despite minimal activity and even noted while sitting. O2 levels are fine. No c/o CP.    BPs are really good at home, even when she has these \"spells\" she has also checked her blood sugar at these times, which has not been too low.    She has a hard time describing all of her concerns-notes that \"it all might be due to old age.\"  She feels her breathing \"is not normal\" and she has to concentrate on this.  Her O2 sats about has been good despite this.  Denies edema, orthopnea, PND.    She has gained weight due to \"stress " "eating.\"  Her  is being worked up for aortic valve replacement but has significant kidney issues, all of which was found in the setting of needing a shoulder replacement.  He is still in significant discomfort because his shoulder has not been operated on.    Denies any problems with bleeding.    VITALS:  Vitals: /67   Pulse 52   Temp 98.2  F (36.8  C) (Tympanic)   Wt 91.4 kg (201 lb 9.6 oz)   SpO2 98%   BMI 34.60 kg/m      Diagnostic Testing:  Echo 3/30/2022  LVEF 60-65%. No RWMA. Nl RV. No sig valve abnls. Mild aortic sclerosis.  Nuc Stress Test 5/2021 no ischemia/infarction. Small LV cavity. Low volgage on EKG; thickened myocardium on study with consideration for infiltrative CM  CTA Head/Neck 12/2019 without stenosis bilateral carotids. No FMD. No dissection.   ZioPatch 5/15-5/29/2019 ordered by her Primary showed predominately SR with average HR 72 bpm (range 45 bpm @ 0740 - 105 bpm @ 1718). 63 episodes SVT, with average  bpm (range  bpm). Longest episode lasted ~13 minutes.  1 episode of 2nd degree HB Type I (per Dr. Saravia, NOT Type II) and a 2.5\" pause on 5/25 @ 2337. Symptoms of palpitations typically correlated with sinus rhythm and occasional ectopy.  Rarely correlated with SVT.   ZioPatch 4/2018 showed SR with 13 runs of PSVT (<17 beats) and symptoms which correlated with SR.  Echocardiogram 6/2016 showed EF 55-60% with grade I diastolic dysfunction. Normal RV function and size. 1+ TR but no other significant valvular abnormalities.   Nuclear Treadmill Stress Test 10/2015 showed breast attenuation artifact but a small area of anterior wall ischemia could not be excluded. Reached 79% of max predicted HR (not on BB).       Plan:  1. 2 weeks ZioPatch  2. Follow-up with me      Assessment/Plan:    1. HARDWICK    Nuc stress test 5/2021 wnl    Echo 3/2022 with nl EF and no sig valve abnls, with evidence of some concentric remodeling    Hgb 6/2021 wnl 12.1 g/dL    PFTs were normal " "8/2021      She feels that she has trouble breathing \"all the time.\"  She wonders if this is due to her posterior.    PLAN:    2-week Zio Patch to ensure no arrhythmias are contributing to this    I am hopeful that she could qualify for Pulmonary Rehabilitation    I will have Dr. Varma review echocardiogram to see if he would like any other testing.    2. H/o AT    Last ZioPatch 2019 showed sxs of palpitations c/w SR    She notes she \"shakes inside\" but does not correlate these with palpitations per se    Remains on metoprolol    PLAN:    We will plan repeat 2-week ZioPatch    Follow-up with me to review    3. HTN    Remains on hydrochlorothiazide 25, metoprolol XL 50, lisinopirl 10    BP looks great    PLAN:    Continue current meds-all refilled        Arelis Deutsch PA-C, MSPAS      Orders Placed This Encounter   Procedures     Pulmonary Rehab Referral     Follow-Up with Cardiology JENNIFER     Leadless EKG Monitor 8 to 14 Days     Orders Placed This Encounter   Medications     hydrochlorothiazide (HYDRODIURIL) 25 MG tablet     Sig: Take 1 tablet (25 mg) by mouth every morning     Dispense:  90 tablet     Refill:  3     lisinopril (ZESTRIL) 10 MG tablet     Sig: Take 1 tablet (10 mg) by mouth every morning     Dispense:  90 tablet     Refill:  3     metoprolol succinate ER (TOPROL-XL) 50 MG 24 hr tablet     Sig: Take 1 tablet (50 mg) by mouth daily     Dispense:  90 tablet     Refill:  3     Medications Discontinued During This Encounter   Medication Reason     aspirin 81 MG EC tablet Stopped by Patient     aspirin (ASA) 325 MG EC tablet Stopped by Patient     ibuprofen (ADVIL/MOTRIN) 600 MG tablet Therapy completed     hydrOXYzine (ATARAX) 25 MG tablet Therapy completed     oxyCODONE (ROXICODONE) 5 MG tablet Therapy completed     LORazepam (ATIVAN) 0.5 MG tablet Therapy completed     senna-docusate (SENOKOT-S/PERICOLACE) 8.6-50 MG tablet Therapy completed     lisinopril (ZESTRIL) 10 MG tablet Reorder     metoprolol " succinate ER (TOPROL-XL) 50 MG 24 hr tablet Reorder     hydrochlorothiazide (HYDRODIURIL) 25 MG tablet Reorder         Encounter Diagnoses   Name Primary?     Essential hypertension      Paroxysmal supraventricular tachycardia (H)      Essential hypertension with goal blood pressure less than 140/90      Abnormal findings on diagnostic imaging of heart/coronary circulation      HARDWICK (dyspnea on exertion) Yes       CURRENT MEDICATIONS:  Current Outpatient Medications   Medication Sig Dispense Refill     acetaminophen (TYLENOL) 325 MG tablet Take 2 tablets (650 mg) by mouth every 4 hours as needed for other (mild pain) 100 tablet 0     fluocinonide (LIDEX) 0.05 % external solution Apply sparingly to affected area twice daily as needed.  Do not apply to face. 60 mL 3     hydrochlorothiazide (HYDRODIURIL) 25 MG tablet Take 1 tablet (25 mg) by mouth every morning 90 tablet 3     lisinopril (ZESTRIL) 10 MG tablet Take 1 tablet (10 mg) by mouth every morning 90 tablet 3     metoprolol succinate ER (TOPROL-XL) 50 MG 24 hr tablet Take 1 tablet (50 mg) by mouth daily 90 tablet 3     Multiple Vitamins-Minerals (CENTRUM SILVER) per tablet Take 1 tablet by mouth daily       omeprazole (PRILOSEC) 20 MG DR capsule TAKE 1 CAPSULE BY MOUTH ONCE DAILY 90 capsule 2     VITAMIN D, CHOLECALCIFEROL, PO Take 1,000 Units by mouth daily          ALLERGIES     Allergies   Allergen Reactions     Penicillins      Pravastatin      Other reaction(s): Myalgia     Simvastatin      Other reaction(s): Myalgia     Sulfa Drugs          Review of Systems:  Skin:  Positive for itching   Eyes:  Positive for visual blurring  ENT:  Negative    Respiratory:  Positive for dyspnea on exertion;shortness of breath  Cardiovascular:    Positive for;lightheadedness;dizziness;fatigue  Gastroenterology: Negative    Genitourinary:  Positive for urinary frequency;urgency  Musculoskeletal:  Positive for joint pain;joint swelling;joint stiffness;nocturnal  cramping  Neurologic:  Positive for tremors  Psychiatric:  Positive for anxiety  Heme/Lymph/Imm:  Negative    Endocrine:  Negative      Physical Exam:  Vitals: /67   Pulse 52   Temp 98.2  F (36.8  C) (Tympanic)   Wt 91.4 kg (201 lb 9.6 oz)   SpO2 98%   BMI 34.60 kg/m      Constitutional:  cooperative, alert and oriented, well developed, well nourished, in no acute distress        Skin:  warm and dry to the touch, no apparent skin lesions or masses noted        Head:  normocephalic, no masses or lesions        Eyes:      watery eyes    ENT:  not assessed this visit        Neck:  JVP normal        Chest:  normal breath sounds, clear to auscultation, normal A-P diameter, normal symmetry, normal respiratory excursion, no use of accessory muscles        Cardiac: regular rhythm, normal S1/S2, no S3 or S4, apical impulse not displaced, no murmurs, gallops or rubs   S4 no presence of murmur            Abdomen:  abdomen soft obese      Vascular:   pulses below the femoral arteries are diminished                                    Extremities and Back:  no deformities, clubbing, cyanosis, erythema observed;no edema        Neurological:  no gross motor deficits            PAST MEDICAL HISTORY:  Past Medical History:   Diagnosis Date     Essential hypertension with goal blood pressure less than 140/90 8/21/2016     Gastroesophageal reflux disease without esophagitis 8/22/2016     H. pylori infection 12/24/2015     Hyperlipidemia LDL goal <130 10/12/2015     ASHLEY (obstructive sleep apnea) 10/12/2015    Severe ASHLEY  - PSG at 7/27/2006 with undocumented weight, AHI 84.7, RDI 88.9, SpO2 remained above 90%, CPAP 7 optimal.     Paroxysmal supraventricular tachycardia (H) 2/23/2017       PAST SURGICAL HISTORY:  Past Surgical History:   Procedure Laterality Date     ARTHROPLASTY KNEE Right 6/17/2021    Procedure: Right Total Knee Arthroplasty;  Surgeon: Nils Edwards MD;  Location: [facility]     CARPAL TUNNEL RELEASE  RT/LT Bilateral 2006, 2009     COLONOSCOPY N/A 11/25/2015    Procedure: COLONOSCOPY;  Surgeon: Mina Roy MD;  Location: [facility]     ESOPHAGOSCOPY, GASTROSCOPY, DUODENOSCOPY (EGD), COMBINED N/A 11/25/2015    Procedure: COMBINED ESOPHAGOSCOPY, GASTROSCOPY, DUODENOSCOPY (EGD), BIOPSY SINGLE OR MULTIPLE;  Surgeon: Mina Roy MD;  Location: [facility]     PAROTIDECTOMY Left 10/31/2019    Procedure: LEFT SUPERFICIAL PAROTIDECTOMY WITH FACIAL NERVE MONITORING;  Surgeon: Atif Salazar MD;  Location: [facility]     TONSILLECTOMY & ADENOIDECTOMY  age 13       FAMILY HISTORY:  Family History   Problem Relation Age of Onset     Liver Disease Mother      Depression/Anxiety Father      Diabetes Father      Diabetes Sister      Liver Disease Sister      Breast Cancer Sister      Cancer Sister      Kidney Cancer Nephew        SOCIAL HISTORY:  Social History     Socioeconomic History     Marital status:      Spouse name: None     Number of children: None     Years of education: None     Highest education level: None   Tobacco Use     Smoking status: Former Smoker     Smokeless tobacco: Never Used   Substance and Sexual Activity     Alcohol use: Yes     Comment: ocassional     Drug use: No     Sexual activity: Not Currently     Partners: Female   Other Topics Concern     Parent/sibling w/ CABG, MI or angioplasty before 65F 55M? Yes     Comment: Father with MI at 55

## 2022-04-19 ENCOUNTER — OFFICE VISIT (OUTPATIENT)
Dept: CARDIOLOGY | Facility: CLINIC | Age: 72
End: 2022-04-19
Attending: INTERNAL MEDICINE
Payer: COMMERCIAL

## 2022-04-19 ENCOUNTER — DOCUMENTATION ONLY (OUTPATIENT)
Dept: CARDIOLOGY | Facility: CLINIC | Age: 72
End: 2022-04-19

## 2022-04-19 VITALS
TEMPERATURE: 98.2 F | DIASTOLIC BLOOD PRESSURE: 67 MMHG | HEART RATE: 52 BPM | OXYGEN SATURATION: 98 % | WEIGHT: 201.6 LBS | SYSTOLIC BLOOD PRESSURE: 110 MMHG | BODY MASS INDEX: 34.6 KG/M2

## 2022-04-19 DIAGNOSIS — I10 ESSENTIAL HYPERTENSION WITH GOAL BLOOD PRESSURE LESS THAN 140/90: ICD-10-CM

## 2022-04-19 DIAGNOSIS — R93.1 ABNORMAL FINDINGS ON DIAGNOSTIC IMAGING OF HEART/CORONARY CIRCULATION: ICD-10-CM

## 2022-04-19 DIAGNOSIS — I47.10 PAROXYSMAL SUPRAVENTRICULAR TACHYCARDIA (H): ICD-10-CM

## 2022-04-19 DIAGNOSIS — I10 ESSENTIAL HYPERTENSION: ICD-10-CM

## 2022-04-19 DIAGNOSIS — R06.09 DOE (DYSPNEA ON EXERTION): Primary | ICD-10-CM

## 2022-04-19 PROCEDURE — 99214 OFFICE O/P EST MOD 30 MIN: CPT | Performed by: PHYSICIAN ASSISTANT

## 2022-04-19 RX ORDER — HYDROCHLOROTHIAZIDE 25 MG/1
25 TABLET ORAL EVERY MORNING
Qty: 90 TABLET | Refills: 3 | Status: SHIPPED | OUTPATIENT
Start: 2022-04-19 | End: 2023-01-17

## 2022-04-19 RX ORDER — METOPROLOL SUCCINATE 50 MG/1
50 TABLET, EXTENDED RELEASE ORAL DAILY
Qty: 90 TABLET | Refills: 3 | Status: SHIPPED | OUTPATIENT
Start: 2022-04-19 | End: 2023-01-17

## 2022-04-19 RX ORDER — LISINOPRIL 10 MG/1
10 TABLET ORAL EVERY MORNING
Qty: 90 TABLET | Refills: 3 | Status: SHIPPED | OUTPATIENT
Start: 2022-04-19 | End: 2023-01-17

## 2022-04-19 NOTE — PATIENT INSTRUCTIONS
"Jenna - it was nice to see you today!     At your visit today we reviewed:    Your \"internal shaking\" - feeling you can't concentrate  - unsure if related to funny heart rhythms. You've noted good BP and sugar during these episodes  Note continued shortness of breath. Echo showed good heart pumping function and valves       Medication Changes:    No changes today. I refilled the hydrochlorothiazide, lisinopril and metoprolol    Recommendations:    Pulmonary Rehab - let's see if it can help your feelings of breathing trouble (if covered by insurance  Agreed we'd try this before doing any of the heart tests (cardiac MRI, angiogram)  3. Get 2 week monitor to make sure your \"shaking\" feelings are not due to funny heart rhythms    Follow-up:    See me for cardiology follow up to review monitor but CALL Cardiology nurse Ana Cristina @ [phone] for any issues, questions or concerns in the interim.      To schedule a future appointment, we kindly ask that you call cardiology scheduling at [phone] three months prior to requested visit date.    Important Phone Numbers for [facility]):    [place] [facility] Ana Cristina: [phone]  Cardiology [phone]  [place] [facility] [phone]  [place] [facility] [phone]  [place] [facility]: [phone]      "

## 2022-04-19 NOTE — LETTER
"4/19/2022    Addis House MD  [address] [facility] [address]    RE: Rosita GUTIERREZ Tyraheidimilena       Dear Colleague,     I had the pleasure of seeing Rosita King in the [facility].  [facility] HE[facility]    I had the pleasure of seeing Rosita when she came for follow up of SVT and HTN.  This 71 year old sees Dr. Varma and has seen Dr. Glynn and Dr. Saravia for her history of:    1. Paroxysmal SVT a/w palpitations, SOB and fatigue and first evaluated 2016. EF with normal EF. Zio showed likely AT back in 2016 that continued despite metoprolol. Subsequent ZioPatches with shorter episodes and evidence of pauses.  2. Benign Essential HTN  3. ASHLEY on CPAP  4. Prediabetes - A1c 6.2% 6/2021      I had a virtual visit with Jenna 8/2020 at which time she had just a few occasional palpitations at night. She was d/t undergo bilateral TKAs and felt arthritis was really affecting her mobility. She remained in SR and routine follow-up rec'd with echo and ZioPatch.    She met with Dr. Varma 10/2021 d/t concerns re: increasing SOB. A stress test was done 5/2021, which was negative for ischemia but concerning for possible infiltrative dz given low voltage with thickened myocardium.  Follow-up echo looked OK. He thought cMRI could be done for further w/u but given nl echo, the yield would be quite low. 6 m follow-up rec'd with repeat echo for consideration of angiogram and/or cMRI.    PFTs 8/2021 wnl    S/p R TKA 6/2021. Is waiting to do the L knee d/t 's health issues.      Interval History:  She continues to feel poorly at times, noting \"shaking inside\" episodes, continued HARDWICK despite minimal activity and even noted while sitting. O2 levels are fine. No c/o CP.    BPs are really good at home, even when she has these \"spells\" she has also checked her blood sugar at these times, which has not been too low.    She has a hard time describing all of her concerns-notes that \"it all might be due to old " "age.\"  She feels her breathing \"is not normal\" and she has to concentrate on this.  Her O2 sats about has been good despite this.  Denies edema, orthopnea, PND.    She has gained weight due to \"stress eating.\"  Her  is being worked up for aortic valve replacement but has significant kidney issues, all of which was found in the setting of needing a shoulder replacement.  He is still in significant discomfort because his shoulder has not been operated on.    Denies any problems with bleeding.    VITALS:  Vitals: /67   Pulse 52   Temp 98.2  F (36.8  C) (Tympanic)   Wt 91.4 kg (201 lb 9.6 oz)   SpO2 98%   BMI 34.60 kg/m      Diagnostic Testing:  Echo 3/30/2022  LVEF 60-65%. No RWMA. Nl RV. No sig valve abnls. Mild aortic sclerosis.  Nuc Stress Test 5/2021 no ischemia/infarction. Small LV cavity. Low volgage on EKG; thickened myocardium on study with consideration for infiltrative CM  CTA Head/Neck 12/2019 without stenosis bilateral carotids. No FMD. No dissection.   ZioPatch 5/15-5/29/2019 ordered by her Primary showed predominately SR with average HR 72 bpm (range 45 bpm @ 0740 - 105 bpm @ 1718). 63 episodes SVT, with average  bpm (range  bpm). Longest episode lasted ~13 minutes.  1 episode of 2nd degree HB Type I (per Dr. Saravia, NOT Type II) and a 2.5\" pause on 5/25 @ 2337. Symptoms of palpitations typically correlated with sinus rhythm and occasional ectopy.  Rarely correlated with SVT.   ZioPatch 4/2018 showed SR with 13 runs of PSVT (<17 beats) and symptoms which correlated with SR.  Echocardiogram 6/2016 showed EF 55-60% with grade I diastolic dysfunction. Normal RV function and size. 1+ TR but no other significant valvular abnormalities.   Nuclear Treadmill Stress Test 10/2015 showed breast attenuation artifact but a small area of anterior wall ischemia could not be excluded. Reached 79% of max predicted HR (not on BB).       Plan:  1. 2 weeks ZioPatch  2. Follow-up with " "me      Assessment/Plan:    1. HARDWICK    Nuc stress test 5/2021 wnl    Echo 3/2022 with nl EF and no sig valve abnls, with evidence of some concentric remodeling    Hgb 6/2021 wnl 12.1 g/dL    PFTs were normal 8/2021      She feels that she has trouble breathing \"all the time.\"  She wonders if this is due to her posterior.    PLAN:    2-week Zio Patch to ensure no arrhythmias are contributing to this    I am hopeful that she could qualify for Pulmonary Rehabilitation    I will have Dr. Varma review echocardiogram to see if he would like any other testing.    2. H/o AT    Last ZioPatch 2019 showed sxs of palpitations c/w SR    She notes she \"shakes inside\" but does not correlate these with palpitations per se    Remains on metoprolol    PLAN:    We will plan repeat 2-week ZioPatch    Follow-up with me to review    3. HTN    Remains on hydrochlorothiazide 25, metoprolol XL 50, lisinopirl 10    BP looks great    PLAN:    Continue current meds-all refilled        Arelis Deutsch PA-C, MSPAS      Orders Placed This Encounter   Procedures     Pulmonary Rehab Referral     Follow-Up with Cardiology JENNIFER     Leadless EKG Monitor 8 to 14 Days     Orders Placed This Encounter   Medications     hydrochlorothiazide (HYDRODIURIL) 25 MG tablet     Sig: Take 1 tablet (25 mg) by mouth every morning     Dispense:  90 tablet     Refill:  3     lisinopril (ZESTRIL) 10 MG tablet     Sig: Take 1 tablet (10 mg) by mouth every morning     Dispense:  90 tablet     Refill:  3     metoprolol succinate ER (TOPROL-XL) 50 MG 24 hr tablet     Sig: Take 1 tablet (50 mg) by mouth daily     Dispense:  90 tablet     Refill:  3     Medications Discontinued During This Encounter   Medication Reason     aspirin 81 MG EC tablet Stopped by Patient     aspirin (ASA) 325 MG EC tablet Stopped by Patient     ibuprofen (ADVIL/MOTRIN) 600 MG tablet Therapy completed     hydrOXYzine (ATARAX) 25 MG tablet Therapy completed     oxyCODONE (ROXICODONE) 5 MG tablet Therapy " completed     LORazepam (ATIVAN) 0.5 MG tablet Therapy completed     senna-docusate (SENOKOT-S/PERICOLACE) 8.6-50 MG tablet Therapy completed     lisinopril (ZESTRIL) 10 MG tablet Reorder     metoprolol succinate ER (TOPROL-XL) 50 MG 24 hr tablet Reorder     hydrochlorothiazide (HYDRODIURIL) 25 MG tablet Reorder         Encounter Diagnoses   Name Primary?     Essential hypertension      Paroxysmal supraventricular tachycardia (H)      Essential hypertension with goal blood pressure less than 140/90      Abnormal findings on diagnostic imaging of heart/coronary circulation      HARDWICK (dyspnea on exertion) Yes       CURRENT MEDICATIONS:  Current Outpatient Medications   Medication Sig Dispense Refill     acetaminophen (TYLENOL) 325 MG tablet Take 2 tablets (650 mg) by mouth every 4 hours as needed for other (mild pain) 100 tablet 0     fluocinonide (LIDEX) 0.05 % external solution Apply sparingly to affected area twice daily as needed.  Do not apply to face. 60 mL 3     hydrochlorothiazide (HYDRODIURIL) 25 MG tablet Take 1 tablet (25 mg) by mouth every morning 90 tablet 3     lisinopril (ZESTRIL) 10 MG tablet Take 1 tablet (10 mg) by mouth every morning 90 tablet 3     metoprolol succinate ER (TOPROL-XL) 50 MG 24 hr tablet Take 1 tablet (50 mg) by mouth daily 90 tablet 3     Multiple Vitamins-Minerals (CENTRUM SILVER) per tablet Take 1 tablet by mouth daily       omeprazole (PRILOSEC) 20 MG DR capsule TAKE 1 CAPSULE BY MOUTH ONCE DAILY 90 capsule 2     VITAMIN D, CHOLECALCIFEROL, PO Take 1,000 Units by mouth daily          ALLERGIES     Allergies   Allergen Reactions     Penicillins      Pravastatin      Other reaction(s): Myalgia     Simvastatin      Other reaction(s): Myalgia     Sulfa Drugs          Review of Systems:  Skin:  Positive for itching   Eyes:  Positive for visual blurring  ENT:  Negative    Respiratory:  Positive for dyspnea on exertion;shortness of breath  Cardiovascular:    Positive  for;lightheadedness;dizziness;fatigue  Gastroenterology: Negative    Genitourinary:  Positive for urinary frequency;urgency  Musculoskeletal:  Positive for joint pain;joint swelling;joint stiffness;nocturnal cramping  Neurologic:  Positive for tremors  Psychiatric:  Positive for anxiety  Heme/Lymph/Imm:  Negative    Endocrine:  Negative      Physical Exam:  Vitals: /67   Pulse 52   Temp 98.2  F (36.8  C) (Tympanic)   Wt 91.4 kg (201 lb 9.6 oz)   SpO2 98%   BMI 34.60 kg/m      Constitutional:  cooperative, alert and oriented, well developed, well nourished, in no acute distress        Skin:  warm and dry to the touch, no apparent skin lesions or masses noted        Head:  normocephalic, no masses or lesions        Eyes:      watery eyes    ENT:  not assessed this visit        Neck:  JVP normal        Chest:  normal breath sounds, clear to auscultation, normal A-P diameter, normal symmetry, normal respiratory excursion, no use of accessory muscles        Cardiac: regular rhythm, normal S1/S2, no S3 or S4, apical impulse not displaced, no murmurs, gallops or rubs   S4 no presence of murmur            Abdomen:  abdomen soft obese      Vascular:   pulses below the femoral arteries are diminished                                    Extremities and Back:  no deformities, clubbing, cyanosis, erythema observed;no edema        Neurological:  no gross motor deficits            PAST MEDICAL HISTORY:  Past Medical History:   Diagnosis Date     Essential hypertension with goal blood pressure less than 140/90 8/21/2016     Gastroesophageal reflux disease without esophagitis 8/22/2016     H. pylori infection 12/24/2015     Hyperlipidemia LDL goal <130 10/12/2015     ASHLEY (obstructive sleep apnea) 10/12/2015    Severe ASHLEY  - PSG at 7/27/2006 with undocumented weight, AHI 84.7, RDI 88.9, SpO2 remained above 90%, CPAP 7 optimal.     Paroxysmal supraventricular tachycardia (H) 2/23/2017       PAST SURGICAL HISTORY:  Past  Surgical History:   Procedure Laterality Date     ARTHROPLASTY KNEE Right 6/17/2021    Procedure: Right Total Knee Arthroplasty;  Surgeon: Nils Edwards MD;  Location: [facility]     CARPAL TUNNEL RELEASE RT/LT Bilateral 2006, 2009     COLONOSCOPY N/A 11/25/2015    Procedure: COLONOSCOPY;  Surgeon: Mina Roy MD;  Location: [facility]     ESOPHAGOSCOPY, GASTROSCOPY, DUODENOSCOPY (EGD), COMBINED N/A 11/25/2015    Procedure: COMBINED ESOPHAGOSCOPY, GASTROSCOPY, DUODENOSCOPY (EGD), BIOPSY SINGLE OR MULTIPLE;  Surgeon: Mina Roy MD;  Location: [facility]     PAROTIDECTOMY Left 10/31/2019    Procedure: LEFT SUPERFICIAL PAROTIDECTOMY WITH FACIAL NERVE MONITORING;  Surgeon: Atif Salazar MD;  Location: [facility] OR     TONSILLECTOMY & ADENOIDECTOMY  age 13       FAMILY HISTORY:  Family History   Problem Relation Age of Onset     Liver Disease Mother      Depression/Anxiety Father      Diabetes Father      Diabetes Sister      Liver Disease Sister      Breast Cancer Sister      Cancer Sister      Kidney Cancer Nephew        SOCIAL HISTORY:  Social History     Socioeconomic History     Marital status:      Spouse name: None     Number of children: None     Years of education: None     Highest education level: None   Tobacco Use     Smoking status: Former Smoker     Smokeless tobacco: Never Used   Substance and Sexual Activity     Alcohol use: Yes     Comment: ocassional     Drug use: No     Sexual activity: Not Currently     Partners: Female   Other Topics Concern     Parent/sibling w/ CABG, MI or angioplasty before 65F 55M? Yes     Comment: Father with MI at 55       Thank you for allowing me to participate in the care of your patient.      Sincerely,     Cassie Deutsch PA-C     [facility]  cc:   Audra Varma MD  [address] [place] [id number]

## 2022-04-19 NOTE — PROGRESS NOTES
"Ozzy Varma!?  Any other testing?    I saw Jenna in routine follow-up.  Your note from 10/2021 is below.  She continues to complain of significant HARDWICK, along with a number of other complaints like \"internal shaking,\" fatigue, difficulty concentrating, etc.    I reviewed her echo, which looked good but did show some evidence of concentric remodeling.    I am hoping she qualifies for Pulmonary Rehab given HARDWICK.  She would like to try this before pursuing the testing you mentioned (cMRI, cath) as she knows that you think it would be low yield.    I am going to get a 2-week Zio patch and see her back to review the results given this \"internal shaking\" and her h/o AT.    Anything you think she needs in the interim?    ECHO 3/30/2022:  Left Ventricle  The left ventricle is normal in size. There is concentric remodeling present.  Left ventricular systolic function is normal. The visual ejection fraction is  60-65%. Left ventricular diastolic function is normal. No regional wall motion  abnormalities noted.     Right Ventricle  The right ventricle is normal in structure, function and size.     Atria  Normal left atrial size. Right atrial size is normal.     Mitral Valve  The mitral valve is normal in structure and function. There is trace mitral  regurgitation.     Tricuspid Valve  The tricuspid valve is not well visualized, but is grossly normal. There is  physiologic tricuspid regurgitation. Right ventricular systolic pressure could  not be approximated due to inadequate tricuspid regurgitation.     Aortic Valve  There is mild trileaflet aortic sclerosis.     Pulmonic Valve  The pulmonic valve is not well seen, but is grossly normal.     Vessels  The aortic root is normal size. Normal size ascending aorta. The inferior vena  cava was normal in size with preserved respiratory variability.     Pericardium  There is no pericardial effusion.   "   ______________________________________________________________________________  MMode/2D Measurements & Calculations  IVSd: 1.1 cm  LVIDd: 3.6 cm  LVIDs: 2.3 cm  LVPWd: 1.1 cm  FS: 36.6 %  LV mass(C)d: 121.1 grams  LV mass(C)dI: 62.3 grams/m2     Ao root diam: 3.4 cm  LA dimension: 3.5 cm  asc Aorta Diam: 3.7 cm  LA/Ao: 1.0  LA Volume Index (BP): 24.3 ml/m2  RWT: 0.61  TAPSE: 2.4 cm     Doppler Measurements & Calculations  MV E max aydee: 45.1 cm/sec  MV A max aydee: 66.4 cm/sec  MV E/A: 0.68  MV dec slope: 141.6 cm/sec2  MV dec time: 0.32 sec     PA acc time: 0.12 sec  E/E' av.8  Lateral E/e': 4.8  Medial E/e': 6.8     ______________________________________________________________________________  Report approved by: Valerie Jensen 2022 03:24 PM     Audra Varma MD    Cardiovascular Disease  Essential hypertension +3 more    Dx  RECHECK; Referred by Addis Alcocer MD    Reason for Visit     YOUR NOTE 10/27/2021:  Progress Notes  Audra Varma MD (Physician)     Cardiology  Service Date: 10/27/2021     REASON FOR VISIT:  Abnormal nuclear stress test findings concerning for infiltrative cardiomyopathies.     HISTORY OF PRESENT ILLNESS:  Jenna is a very pleasant 71-year-old female who was seen in the past by our EP colleagues for paroxysmal supraventricular tachycardia associated with palpitations.  Her EF has been normal in the past and Zio Patch in the past has shown atrial tachycardia that has been treated with beta blockers.  She has a history of hypertension and obstructive sleep apnea and uses CPAP.  She was more recently seen by Arelis Deutsch in 2020.     The reason Jenna has been referred to me is because she has been having persistent shortness of breath for the last few years that has not significantly changed.  She had a nuclear stress test done in 2021 that was negative for inducible ischemia or infarction.  LV cavity was noted to be small and LVH was reported on the nuclear study with  hyperdynamic EF with stress.  However, the nuclear reader also mentioned that the voltage was quite low on the ECG and the appearance of thickened myocardium on the nuclear study raised some possibilities of potential infiltrative cardiomyopathy and requested evaluation with me at [facility] in [place].     However, subsequently in September, she had an echocardiogram that showed normal LV size and function and LV wall thickness was actually normal on that study, which is more reliable than her nuclear study.  In any case, her ECG low voltage could have been perhaps from  breast tissue attenuation.     Today, Jenna tells me that she continues to have intermittent palpitations consistent with her atrial tachycardia; however, her shortness of breath has been persistent and has not gotten better and she does not have any clear cut answers for that.  She denies any typical symptoms of angina, but occasionally has some resting chest discomfort that is not related to exertion.  These feel like twinges and go away spontaneously.     PHYSICAL EXAMINATION:    VITAL SIGNS:  Blood pressure is 124/65 with a pulse of 70.  GENERAL:  Alert, oriented x3, in no acute distress.  NECK:  Supple.  JVP normal.  LUNGS:  Clear.  HEART:  Cardiac sounds are regular.  There is a soft systolic murmur.  No rubs or gallops  EXTREMITIES:  Warm.  There is no edema.    NECK:  JVP is hard to see, but likely normal.     ASSESSMENT AND PLAN:  Jenna is 71 with an abnormal reported nuclear stress test.  There is no evidence of ischemia or infarction; however, they did mention some concerns for potential infiltrative cardiomyopathy.  However, echocardiography subsequently has shown normal LV wall thickness and size and dimensions.  I personally reviewed the images.  However, the image quality is technically difficult and challenging.  I personally do not think the patient has cardiac amyloidosis and her lowish voltage on the ECG may be a reflection of  breast tissue attenuation.  However, given the nuclear test findings, I did offer her a possibility of doing a cardiac MRI for full diagnostic certainty and evaluation, but I think the yield of that is going to be low.  At this time, she wants to hold off on that and repeat ultrasound imaging of her heart with an echocardiography in 6 months from now with contrast.  If there are any signs of worsening LVH on that, we will revisit this issue.     In regards to her shortness of breath, this seems to be chronic.  Her symptoms are not typical for angina.  Her nuclear stress test was negative for ischemia.  She does have significant hyperlipidemia, but her prior chest CT did not show significant amount of coronary calcifications.  She has had myalgias on statin and is not on that.  Given her persistent shortness of breath, I did offer her further investigations that could include invasive assessment; however, again at this time, she wants to try conservative management and see how things are in the next 6 months.  I have told her if her symptoms get worse, if she gets exertional angina or if her breathing gets worse, she needs to contact our clinic and in that case, we will proceed with further investigations that includes MRI plus/minus heart catheterization much sooner.  Otherwise, I will have her follow up with Arelis in 6 months from now and I would be happy to see her as needed depending on findings of the echocardiography if getting worse.        Audra Varma MD

## 2022-04-22 NOTE — TELEPHONE ENCOUNTER
I cannot explain her symptoms on the basis of the echo. If she has persistent symptoms concerning for HF/ angina, angiogram with [facility] can be discussed.

## 2022-04-25 NOTE — PROGRESS NOTES
Will review Dr. Varma's recommendations at next OV 6/2021.     Audra Varma MD  You 4 days ago     KV       I cannot explain her symptoms on the basis of the echo. If she has persistent symptoms concerning for HF/ angina, angiogram with [facility] can be discussed.          Documentation

## 2022-04-26 ENCOUNTER — TRANSFERRED RECORDS (OUTPATIENT)
Dept: HEALTH INFORMATION MANAGEMENT | Facility: CLINIC | Age: 72
End: 2022-04-26

## 2022-04-26 ENCOUNTER — HOSPITAL ENCOUNTER (OUTPATIENT)
Dept: CARDIOLOGY | Facility: CLINIC | Age: 72
Discharge: HOME | End: 2022-04-26
Attending: PHYSICIAN ASSISTANT | Admitting: PHYSICIAN ASSISTANT
Payer: MEDICARE

## 2022-04-26 DIAGNOSIS — I47.10 PAROXYSMAL SUPRAVENTRICULAR TACHYCARDIA (H): ICD-10-CM

## 2022-04-26 PROCEDURE — 93248 EXT ECG>7D<15D REV&INTERPJ: CPT | Performed by: INTERNAL MEDICINE

## 2022-04-26 PROCEDURE — 93246 EXT ECG>7D<15D RECORDING: CPT

## 2022-05-25 DIAGNOSIS — R06.09 DYSPNEA ON EXERTION: Primary | ICD-10-CM

## 2022-05-26 ENCOUNTER — HOSPITAL ENCOUNTER (OUTPATIENT)
Dept: CARDIAC REHAB | Facility: CLINIC | Age: 72
Discharge: HOME | End: 2022-05-26
Attending: PHYSICIAN ASSISTANT
Payer: MEDICARE

## 2022-05-26 DIAGNOSIS — R06.09 DYSPNEA ON EXERTION: ICD-10-CM

## 2022-05-26 PROCEDURE — G0238 OTH RESP PROC, INDIV: HCPCS | Performed by: REHABILITATION PRACTITIONER

## 2022-05-31 ENCOUNTER — HOSPITAL ENCOUNTER (OUTPATIENT)
Dept: CARDIAC REHAB | Facility: CLINIC | Age: 72
Discharge: HOME | End: 2022-05-31
Attending: FAMILY MEDICINE
Payer: MEDICARE

## 2022-05-31 PROCEDURE — G0238 OTH RESP PROC, INDIV: HCPCS | Performed by: REHABILITATION PRACTITIONER

## 2022-06-02 ENCOUNTER — HOSPITAL ENCOUNTER (OUTPATIENT)
Dept: CARDIAC REHAB | Facility: CLINIC | Age: 72
Discharge: HOME | End: 2022-06-02
Attending: FAMILY MEDICINE
Payer: MEDICARE

## 2022-06-02 PROCEDURE — G0239 OTH RESP PROC, GROUP: HCPCS

## 2022-06-07 ENCOUNTER — HOSPITAL ENCOUNTER (OUTPATIENT)
Dept: CARDIAC REHAB | Facility: CLINIC | Age: 72
Discharge: HOME | End: 2022-06-07
Attending: FAMILY MEDICINE
Payer: MEDICARE

## 2022-06-07 PROBLEM — D32.9 MENINGIOMA (H): Status: ACTIVE | Noted: 2019-05-13

## 2022-06-07 PROCEDURE — G0239 OTH RESP PROC, GROUP: HCPCS

## 2022-06-14 ENCOUNTER — HOSPITAL ENCOUNTER (OUTPATIENT)
Dept: CARDIAC REHAB | Facility: CLINIC | Age: 72
Discharge: HOME | End: 2022-06-14
Attending: FAMILY MEDICINE
Payer: MEDICARE

## 2022-06-14 PROCEDURE — G0239 OTH RESP PROC, GROUP: HCPCS

## 2022-06-17 NOTE — PROGRESS NOTES
"[facility]    I had the pleasure of seeing Rosita when she came for follow up of SVT and HTN.  This 71 year old sees Dr. Varma and has seen Dr. Saravia and Dr. Glynn for her history of:    1. Paroxysmal SVT a/w palpitations, SOB and fatigue and first evaluated 2016. EF with normal EF. Zio showed likely AT back in 2016 that continued despite metoprolol. Subsequent ZioPatches with shorter episodes and evidence of pauses.  2. Benign Essential HTN  3. ASHLEY on CPAP  4. Prediabetes - A1c 6.2% 6/2021      I saw Jenna 4/2022 at which time she has \"internal shakiness\" as well as constant HARDWICK.  We reviewed her normal nuc 5/2021, echo 3/2022 with nl LVEF and no sig valve abnls, nl PFTs 8/2021 and Hgb 6/2021. I ordered Pulmonary Rehab and asked her to have a follow-up ZioPatch. Dr. Varma had previously noted cMRI/angiogram would be very low yield, but could consider R/L Heart Cath.    She's now started Pulmonary Rehab.     Interval History:  She notes that with Pulmonary Rehab she feels nothing much has changed, but notes that she is only been to 4-5 sessions of this. She's not been on the treadmill at all to test her breathing b/c she's not had her TKA done yet.    Still notes HARDWICK which hasn't changed. She avoids the heat/humidity when possible.  Denies orthopnea or PND.  She states this \"comes and goes.\"    She does think her \"internal shakiness\" has improved somewhat. BPs at home are \"OK\" in 120/60s with HRs 70-80s.  She was reassured today to know those are normal.    No falls/faints.  No edema.      VITALS:  Vitals: /76 (BP Location: Right arm, Patient Position: Right side, Cuff Size: Adult Large)   Pulse 67   Wt 92.4 kg (203 lb 12.8 oz)   SpO2 98%   BMI 34.98 kg/m      Diagnostic Testing:  ZioPatch 4/26-5/7/2022 on Metoprolol XL 50 mg daily showed SR with 11 runs of SVT. In SR, avg HR 67 bpm, range  bpm. Longest SVT episode 9 beats, with avg  bpm (range  bpm). PACs and PVCs " "noted. PVC/PAC burden <1%  Echo 3/30/2022  LVEF 60-65%. No RWMA. Nl RV. No sig valve abnls. Mild aortic sclerosis.  Nuc Stress Test 5/2021 no ischemia/infarction. Small LV cavity. Low volgage on EKG; thickened myocardium on study with consideration for infiltrative CM  CTA Head/Neck 12/2019 without stenosis bilateral carotids. No FMD. No dissection.   ZioPatch 5/15-5/29/2019 ordered by her Primary showed predominately SR with average HR 72 bpm (range 45 bpm @ 0740 - 105 bpm @ 1718). 63 episodes SVT, with average  bpm (range  bpm). Longest episode lasted ~13 minutes.  1 episode of 2nd degree HB Type I (per Dr. Saravia, NOT Type II) and a 2.5\" pause on 5/25 @ 2337. Symptoms of palpitations typically correlated with sinus rhythm and occasional ectopy.  Rarely correlated with SVT.   ZioPatch 4/2018 showed SR with 13 runs of PSVT (<17 beats) and symptoms which correlated with SR.  Echocardiogram 6/2016 showed EF 55-60% with grade I diastolic dysfunction. Normal RV function and size. 1+ TR but no other significant valvular abnormalities.   Nuclear Treadmill Stress Test 10/2015 showed breast attenuation artifact but a small area of anterior wall ischemia could not be excluded. Reached 79% of max predicted HR (not on BB).        Plan:  1. Consider increasing metoprolol slightly for ectopy  2.  6-month follow-up    Assessment/Plan:    1. MULU    Has now started Pulmonary Rehab - O2 sats remain >96%, but she notes she has not tested her breathing on the treadmill d/t need for L TKA    On the NuStep, she notes that she \"does fine.\"    Denies any evidence of fluid overload.  On exam, no overt heart failure    PLAN:    We again reviewed that Dr. Varma said that we could proceed with right heart cath with angiogram if symptoms persisted, but previously noted they may be of low yield.    Given her 's health concerns and her need for L TKA, she would like to hold off on any additional testing at this point    I will " "see her again in 6 months and see how she is doing.    2. Ectopy    She complained of \"constant internal shakiness\" which she states has improved somewhat    We reviewed her ZioPatch on metoprolol XL 50 mg daily, showing basically SR. her SVT was asymptomatic and brief (longest episode 9 beats)    We reviewed that some of her triggered episodes were related to SR/ectopy but some were also related to only SR    PLAN:    Reviewed that we do not need to treat her infrequent ectopy (less than 1%) however if she wanted to see if this could improve symptoms at all, we would increase metoprolol XL to 50 mg in the morning and 25 mg at night    At this time, she does not want to make any medication changes, but will contact us if she does    6-month follow-up with me    Arelis Deutsch PA-C, MSPAS      Orders Placed This Encounter   Procedures     Follow-Up with Cardiology JENNIFER     No orders of the defined types were placed in this encounter.    There are no discontinued medications.      Encounter Diagnosis   Name Primary?     Paroxysmal supraventricular tachycardia (H)        CURRENT MEDICATIONS:  Current Outpatient Medications   Medication Sig Dispense Refill     acetaminophen (TYLENOL) 325 MG tablet Take 2 tablets (650 mg) by mouth every 4 hours as needed for other (mild pain) 100 tablet 0     fluocinonide (LIDEX) 0.05 % external solution Apply sparingly to affected area twice daily as needed.  Do not apply to face. 60 mL 3     hydrochlorothiazide (HYDRODIURIL) 25 MG tablet Take 1 tablet (25 mg) by mouth every morning 90 tablet 3     lisinopril (ZESTRIL) 10 MG tablet Take 1 tablet (10 mg) by mouth every morning 90 tablet 3     metoprolol succinate ER (TOPROL-XL) 50 MG 24 hr tablet Take 1 tablet (50 mg) by mouth daily 90 tablet 3     omeprazole (PRILOSEC) 20 MG DR capsule TAKE 1 CAPSULE BY MOUTH ONCE DAILY 90 capsule 2     VITAMIN D, CHOLECALCIFEROL, PO Take 1,000 Units by mouth daily        Multiple Vitamins-Minerals (CENTRUM " SILVER) per tablet Take 1 tablet by mouth daily (Patient not taking: Reported on 6/21/2022)         ALLERGIES     Allergies   Allergen Reactions     Penicillins      Pravastatin      Other reaction(s): Myalgia     Simvastatin      Other reaction(s): Myalgia     Sulfa Drugs          Review of Systems:  Skin:  Negative     Eyes:  Negative    ENT:  Negative    Respiratory:  Positive for shortness of breath;dyspnea on exertion  Cardiovascular:  Negative for;chest pain;edema Positive for;fatigue;lightheadedness;palpitations;exercise intolerance  Gastroenterology: Negative for melena;hematochezia  Genitourinary:  Negative    Musculoskeletal:  Positive for joint pain  Neurologic:  Negative    Psychiatric:  Negative    Heme/Lymph/Imm:  Negative    Endocrine:  Negative      Physical Exam:  Vitals: /76 (BP Location: Right arm, Patient Position: Right side, Cuff Size: Adult Large)   Pulse 67   Wt 92.4 kg (203 lb 12.8 oz)   SpO2 98%   BMI 34.98 kg/m      Constitutional:  cooperative, alert and oriented, well developed, well nourished, in no acute distress        Skin:  warm and dry to the touch, no apparent skin lesions or masses noted        Head:  normocephalic, no masses or lesions        Eyes:      watery eyes    ENT:  not assessed this visit        Neck:  not assessed this visit        Chest:  not assessed this visit        Cardiac: regular rhythm, normal S1/S2, no S3 or S4, apical impulse not displaced, no murmurs, gallops or rubs   S4 no presence of murmur            Abdomen:    obese      Vascular: not assessed this visit                                      Extremities and Back:  not assessed this visit        Neurological:  no gross motor deficits            PAST MEDICAL HISTORY:  Past Medical History:   Diagnosis Date     Essential hypertension with goal blood pressure less than 140/90 8/21/2016     Gastroesophageal reflux disease without esophagitis 8/22/2016     H. pylori infection 12/24/2015      Hyperlipidemia LDL goal <130 10/12/2015     ASHLEY (obstructive sleep apnea) 10/12/2015    Severe ASHLEY  - PSG at 7/27/2006 with undocumented weight, AHI 84.7, RDI 88.9, SpO2 remained above 90%, CPAP 7 optimal.     Paroxysmal supraventricular tachycardia (H) 2/23/2017       PAST SURGICAL HISTORY:  Past Surgical History:   Procedure Laterality Date     ARTHROPLASTY KNEE Right 6/17/2021    Procedure: Right Total Knee Arthroplasty;  Surgeon: Nils Edwards MD;  Location: [facility]     CARPAL TUNNEL RELEASE RT/LT Bilateral 2006, 2009     COLONOSCOPY N/A 11/25/2015    Procedure: COLONOSCOPY;  Surgeon: Mina Roy MD;  Location: [facility]     ESOPHAGOSCOPY, GASTROSCOPY, DUODENOSCOPY (EGD), COMBINED N/A 11/25/2015    Procedure: COMBINED ESOPHAGOSCOPY, GASTROSCOPY, DUODENOSCOPY (EGD), BIOPSY SINGLE OR MULTIPLE;  Surgeon: Mina Roy MD;  Location: [facility]     PAROTIDECTOMY Left 10/31/2019    Procedure: LEFT SUPERFICIAL PAROTIDECTOMY WITH FACIAL NERVE MONITORING;  Surgeon: Atif Salazar MD;  Location: [facility]     TONSILLECTOMY & ADENOIDECTOMY  age 13       FAMILY HISTORY:  Family History   Problem Relation Age of Onset     Liver Disease Mother      Depression/Anxiety Father      Diabetes Father      Diabetes Sister      Liver Disease Sister      Breast Cancer Sister      Cancer Sister      Kidney Cancer Nephew        SOCIAL HISTORY:  Social History     Socioeconomic History     Marital status:    Tobacco Use     Smoking status: Former Smoker     Smokeless tobacco: Never Used   Substance and Sexual Activity     Alcohol use: Yes     Comment: ocassional     Drug use: No     Sexual activity: Not Currently     Partners: Female   Other Topics Concern     Parent/sibling w/ CABG, MI or angioplasty before 65F 55M? Yes     Comment: Father with MI at 55

## 2022-06-21 ENCOUNTER — HOSPITAL ENCOUNTER (OUTPATIENT)
Dept: CARDIAC REHAB | Facility: CLINIC | Age: 72
Discharge: HOME | End: 2022-06-21
Attending: FAMILY MEDICINE
Payer: MEDICARE

## 2022-06-21 ENCOUNTER — OFFICE VISIT (OUTPATIENT)
Dept: CARDIOLOGY | Facility: CLINIC | Age: 72
End: 2022-06-21
Attending: PHYSICIAN ASSISTANT
Payer: COMMERCIAL

## 2022-06-21 VITALS
WEIGHT: 203.8 LBS | SYSTOLIC BLOOD PRESSURE: 138 MMHG | DIASTOLIC BLOOD PRESSURE: 76 MMHG | BODY MASS INDEX: 34.98 KG/M2 | HEART RATE: 67 BPM | OXYGEN SATURATION: 98 %

## 2022-06-21 DIAGNOSIS — I47.10 PAROXYSMAL SUPRAVENTRICULAR TACHYCARDIA (H): ICD-10-CM

## 2022-06-21 PROCEDURE — G0239 OTH RESP PROC, GROUP: HCPCS

## 2022-06-21 PROCEDURE — 99214 OFFICE O/P EST MOD 30 MIN: CPT | Performed by: PHYSICIAN ASSISTANT

## 2022-06-21 NOTE — LETTER
"6/21/2022    Addis House MD  [address] [place] [id number]    RE: Rosita GUTIERREZ Christine       Dear Colleague,     I had the pleasure of seeing Rosita King in the [facility].  [facility]    I had the pleasure of seeing Rosita when she came for follow up of SVT and HTN.  This 71 year old sees Dr. Varma and has seen Dr. Saravia and Dr. Glynn for her history of:    1. Paroxysmal SVT a/w palpitations, SOB and fatigue and first evaluated 2016. EF with normal EF. Zio showed likely AT back in 2016 that continued despite metoprolol. Subsequent ZioPatches with shorter episodes and evidence of pauses.  2. Benign Essential HTN  3. ASHLEY on CPAP  4. Prediabetes - A1c 6.2% 6/2021      I saw Jenna 4/2022 at which time she has \"internal shakiness\" as well as constant HARDWICK.  We reviewed her normal nuc 5/2021, echo 3/2022 with nl LVEF and no sig valve abnls, nl PFTs 8/2021 and Hgb 6/2021. I ordered Pulmonary Rehab and asked her to have a follow-up ZioPatch. Dr. Varma had previously noted cMRI/angiogram would be very low yield, but could consider R/L Heart Cath.    She's now started Pulmonary Rehab.     Interval History:  She notes that with Pulmonary Rehab she feels nothing much has changed, but notes that she is only been to 4-5 sessions of this. She's not been on the treadmill at all to test her breathing b/c she's not had her TKA done yet.    Still notes HARDWICK which hasn't changed. She avoids the heat/humidity when possible.  Denies orthopnea or PND.  She states this \"comes and goes.\"    She does think her \"internal shakiness\" has improved somewhat. BPs at home are \"OK\" in 120/60s with HRs 70-80s.  She was reassured today to know those are normal.    No falls/faints.  No edema.      VITALS:  Vitals: /76 (BP Location: Right arm, Patient Position: Right side, Cuff Size: Adult Large)   Pulse 67   Wt 92.4 kg (203 lb 12.8 oz)   SpO2 98%   BMI 34.98 kg/m      Diagnostic " "Testing:  ZioPatch 4/26-5/7/2022 on Metoprolol XL 50 mg daily showed SR with 11 runs of SVT. In SR, avg HR 67 bpm, range  bpm. Longest SVT episode 9 beats, with avg  bpm (range  bpm). PACs and PVCs noted. PVC/PAC burden <1%  Echo 3/30/2022  LVEF 60-65%. No RWMA. Nl RV. No sig valve abnls. Mild aortic sclerosis.  Nuc Stress Test 5/2021 no ischemia/infarction. Small LV cavity. Low volgage on EKG; thickened myocardium on study with consideration for infiltrative CM  CTA Head/Neck 12/2019 without stenosis bilateral carotids. No FMD. No dissection.   ZioPatch 5/15-5/29/2019 ordered by her Primary showed predominately SR with average HR 72 bpm (range 45 bpm @ 0740 - 105 bpm @ 1718). 63 episodes SVT, with average  bpm (range  bpm). Longest episode lasted ~13 minutes.  1 episode of 2nd degree HB Type I (per Dr. Saravia, NOT Type II) and a 2.5\" pause on 5/25 @ 2337. Symptoms of palpitations typically correlated with sinus rhythm and occasional ectopy.  Rarely correlated with SVT.   ZioPatch 4/2018 showed SR with 13 runs of PSVT (<17 beats) and symptoms which correlated with SR.  Echocardiogram 6/2016 showed EF 55-60% with grade I diastolic dysfunction. Normal RV function and size. 1+ TR but no other significant valvular abnormalities.   Nuclear Treadmill Stress Test 10/2015 showed breast attenuation artifact but a small area of anterior wall ischemia could not be excluded. Reached 79% of max predicted HR (not on BB).        Plan:  1. Consider increasing metoprolol slightly for ectopy  2.  6-month follow-up    Assessment/Plan:    1. MULU    Has now started Pulmonary Rehab - O2 sats remain >96%, but she notes she has not tested her breathing on the treadmill d/t need for L TKA    On the NuStep, she notes that she \"does fine.\"    Denies any evidence of fluid overload.  On exam, no overt heart failure    PLAN:    We again reviewed that Dr. Varma said that we could proceed with right heart cath with " "angiogram if symptoms persisted, but previously noted they may be of low yield.    Given her 's health concerns and her need for L TKA, she would like to hold off on any additional testing at this point    I will see her again in 6 months and see how she is doing.    2. Ectopy    She complained of \"constant internal shakiness\" which she states has improved somewhat    We reviewed her ZioPatch on metoprolol XL 50 mg daily, showing basically SR. her SVT was asymptomatic and brief (longest episode 9 beats)    We reviewed that some of her triggered episodes were related to SR/ectopy but some were also related to only SR    PLAN:    Reviewed that we do not need to treat her infrequent ectopy (less than 1%) however if she wanted to see if this could improve symptoms at all, we would increase metoprolol XL to 50 mg in the morning and 25 mg at night    At this time, she does not want to make any medication changes, but will contact us if she does    6-month follow-up with me    Arelis Deutsch PA-C, MSPAS      Orders Placed This Encounter   Procedures     Follow-Up with Cardiology JENNIFER     No orders of the defined types were placed in this encounter.    There are no discontinued medications.      Encounter Diagnosis   Name Primary?     Paroxysmal supraventricular tachycardia (H)        CURRENT MEDICATIONS:  Current Outpatient Medications   Medication Sig Dispense Refill     acetaminophen (TYLENOL) 325 MG tablet Take 2 tablets (650 mg) by mouth every 4 hours as needed for other (mild pain) 100 tablet 0     fluocinonide (LIDEX) 0.05 % external solution Apply sparingly to affected area twice daily as needed.  Do not apply to face. 60 mL 3     hydrochlorothiazide (HYDRODIURIL) 25 MG tablet Take 1 tablet (25 mg) by mouth every morning 90 tablet 3     lisinopril (ZESTRIL) 10 MG tablet Take 1 tablet (10 mg) by mouth every morning 90 tablet 3     metoprolol succinate ER (TOPROL-XL) 50 MG 24 hr tablet Take 1 tablet (50 mg) by " mouth daily 90 tablet 3     omeprazole (PRILOSEC) 20 MG DR capsule TAKE 1 CAPSULE BY MOUTH ONCE DAILY 90 capsule 2     VITAMIN D, CHOLECALCIFEROL, PO Take 1,000 Units by mouth daily        Multiple Vitamins-Minerals (CENTRUM SILVER) per tablet Take 1 tablet by mouth daily (Patient not taking: Reported on 6/21/2022)         ALLERGIES     Allergies   Allergen Reactions     Penicillins      Pravastatin      Other reaction(s): Myalgia     Simvastatin      Other reaction(s): Myalgia     Sulfa Drugs          Review of Systems:  Skin:  Negative     Eyes:  Negative    ENT:  Negative    Respiratory:  Positive for shortness of breath;dyspnea on exertion  Cardiovascular:  Negative for;chest pain;edema Positive for;fatigue;lightheadedness;palpitations;exercise intolerance  Gastroenterology: Negative for melena;hematochezia  Genitourinary:  Negative    Musculoskeletal:  Positive for joint pain  Neurologic:  Negative    Psychiatric:  Negative    Heme/Lymph/Imm:  Negative    Endocrine:  Negative      Physical Exam:  Vitals: /76 (BP Location: Right arm, Patient Position: Right side, Cuff Size: Adult Large)   Pulse 67   Wt 92.4 kg (203 lb 12.8 oz)   SpO2 98%   BMI 34.98 kg/m      Constitutional:  cooperative, alert and oriented, well developed, well nourished, in no acute distress        Skin:  warm and dry to the touch, no apparent skin lesions or masses noted        Head:  normocephalic, no masses or lesions        Eyes:      watery eyes    ENT:  not assessed this visit        Neck:  not assessed this visit        Chest:  not assessed this visit        Cardiac: regular rhythm, normal S1/S2, no S3 or S4, apical impulse not displaced, no murmurs, gallops or rubs   S4 no presence of murmur            Abdomen:    obese      Vascular: not assessed this visit                                      Extremities and Back:  not assessed this visit        Neurological:  no gross motor deficits            PAST MEDICAL HISTORY:  Past  Medical History:   Diagnosis Date     Essential hypertension with goal blood pressure less than 140/90 8/21/2016     Gastroesophageal reflux disease without esophagitis 8/22/2016     H. pylori infection 12/24/2015     Hyperlipidemia LDL goal <130 10/12/2015     ASHLEY (obstructive sleep apnea) 10/12/2015    Severe ASHLEY  - PSG at 7/27/2006 with undocumented weight, AHI 84.7, RDI 88.9, SpO2 remained above 90%, CPAP 7 optimal.     Paroxysmal supraventricular tachycardia (H) 2/23/2017       PAST SURGICAL HISTORY:  Past Surgical History:   Procedure Laterality Date     ARTHROPLASTY KNEE Right 6/17/2021    Procedure: Right Total Knee Arthroplasty;  Surgeon: Nils Edwards MD;  Location: [facility]     CARPAL TUNNEL RELEASE RT/LT Bilateral 2006, 2009     COLONOSCOPY N/A 11/25/2015    Procedure: COLONOSCOPY;  Surgeon: Mina Roy MD;  Location: [facility]     ESOPHAGOSCOPY, GASTROSCOPY, DUODENOSCOPY (EGD), COMBINED N/A 11/25/2015    Procedure: COMBINED ESOPHAGOSCOPY, GASTROSCOPY, DUODENOSCOPY (EGD), BIOPSY SINGLE OR MULTIPLE;  Surgeon: Mina Roy MD;  Location: [facility]     PAROTIDECTOMY Left 10/31/2019    Procedure: LEFT SUPERFICIAL PAROTIDECTOMY WITH FACIAL NERVE MONITORING;  Surgeon: Atif Salazar MD;  Location: [facility]     TONSILLECTOMY & ADENOIDECTOMY  age 13       FAMILY HISTORY:  Family History   Problem Relation Age of Onset     Liver Disease Mother      Depression/Anxiety Father      Diabetes Father      Diabetes Sister      Liver Disease Sister      Breast Cancer Sister      Cancer Sister      Kidney Cancer Nephew        SOCIAL HISTORY:  Social History     Socioeconomic History     Marital status:    Tobacco Use     Smoking status: Former Smoker     Smokeless tobacco: Never Used   Substance and Sexual Activity     Alcohol use: Yes     Comment: ocassional     Drug use: No     Sexual activity: Not Currently     Partners: Female   Other Topics Concern     Parent/sibling w/ CABG, MI or  angioplasty before 65F 55M? Yes     Comment: Father with MI at 55       Thank you for allowing me to participate in the care of your patient.      Sincerely,     Cassie Deutsch PA-C     [facility]  cc:   Cassie Deutsch PA-C  [address]  [place] [address]

## 2022-06-21 NOTE — PATIENT INSTRUCTIONS
"Jenna - it was nice to see you today!     At your visit today we reviewed:    Pulmonary Rehab hasn't seemed to help breathing much  Reviewed Pham 2022 showing normal rhythm rhythm with avg HR in 60s. You had some fast rhythms from top of heart (all asymptotic and short). When you had sensations of \"pounding, dizziness, anxious, etc\" you had both normal rhythm as well as early beats from top and bottom of heart (PACs and PVCs).  These are not dangerous but can be annoying     Medication Changes:    Agreed no changes to medications now  Consider increasing metoprolol XL if you feel the \"pounding, anxious, lightheadedness\" gets worse or if you want to see if the increased medication helps. CALL us at [phone]    Recommendations:    Let us know if you want to increase the metoprolol     Follow-up:    See us for cardiology follow up in 6 months but CALL Cardiology nurse Ana Cristina @ [phone] for any issues, questions or concerns in the interim.      To schedule a future appointment, we kindly ask that you call cardiology scheduling at [phone] three months prior to requested visit date.    Important Phone Numbers for [facility] ([place]):    [place] [facility] Nurse Ana Cristina: [phone]  Cardiology [phone]  [place] [facility] [phone]  [place] [facility] [phone]  [place] [facility]: [phone]      "

## 2022-06-23 ENCOUNTER — HOSPITAL ENCOUNTER (OUTPATIENT)
Dept: CARDIAC REHAB | Facility: CLINIC | Age: 72
Discharge: HOME | End: 2022-06-23
Attending: FAMILY MEDICINE
Payer: MEDICARE

## 2022-06-23 PROCEDURE — G0239 OTH RESP PROC, GROUP: HCPCS

## 2022-06-28 ENCOUNTER — HOSPITAL ENCOUNTER (OUTPATIENT)
Dept: CARDIAC REHAB | Facility: CLINIC | Age: 72
Discharge: HOME | End: 2022-06-28
Attending: FAMILY MEDICINE
Payer: MEDICARE

## 2022-06-28 PROCEDURE — G0239 OTH RESP PROC, GROUP: HCPCS

## 2022-06-30 ENCOUNTER — HOSPITAL ENCOUNTER (OUTPATIENT)
Dept: CARDIAC REHAB | Facility: CLINIC | Age: 72
Discharge: HOME | End: 2022-06-30
Attending: FAMILY MEDICINE
Payer: MEDICARE

## 2022-06-30 PROCEDURE — G0239 OTH RESP PROC, GROUP: HCPCS

## 2022-07-07 ENCOUNTER — HOSPITAL ENCOUNTER (OUTPATIENT)
Dept: CARDIAC REHAB | Facility: CLINIC | Age: 72
Discharge: HOME | End: 2022-07-07
Attending: FAMILY MEDICINE
Payer: MEDICARE

## 2022-07-07 PROCEDURE — G0239 OTH RESP PROC, GROUP: HCPCS

## 2022-07-12 ENCOUNTER — HOSPITAL ENCOUNTER (OUTPATIENT)
Dept: CARDIAC REHAB | Facility: CLINIC | Age: 72
Discharge: HOME | End: 2022-07-12
Attending: FAMILY MEDICINE
Payer: MEDICARE

## 2022-07-12 PROCEDURE — G0239 OTH RESP PROC, GROUP: HCPCS

## 2022-07-14 ENCOUNTER — HOSPITAL ENCOUNTER (OUTPATIENT)
Dept: CARDIAC REHAB | Facility: CLINIC | Age: 72
Discharge: HOME | End: 2022-07-14
Attending: FAMILY MEDICINE
Payer: MEDICARE

## 2022-07-14 PROCEDURE — G0239 OTH RESP PROC, GROUP: HCPCS

## 2022-07-26 ENCOUNTER — HOSPITAL ENCOUNTER (OUTPATIENT)
Dept: CARDIAC REHAB | Facility: CLINIC | Age: 72
Discharge: HOME | End: 2022-07-26
Attending: FAMILY MEDICINE
Payer: MEDICARE

## 2022-07-26 PROCEDURE — G0239 OTH RESP PROC, GROUP: HCPCS

## 2022-07-28 ENCOUNTER — HOSPITAL ENCOUNTER (OUTPATIENT)
Dept: CARDIAC REHAB | Facility: CLINIC | Age: 72
Discharge: HOME | End: 2022-07-28
Attending: FAMILY MEDICINE
Payer: MEDICARE

## 2022-07-28 PROCEDURE — G0239 OTH RESP PROC, GROUP: HCPCS

## 2022-08-09 ENCOUNTER — HOSPITAL ENCOUNTER (OUTPATIENT)
Dept: CARDIAC REHAB | Facility: CLINIC | Age: 72
Discharge: HOME | End: 2022-08-09
Attending: FAMILY MEDICINE
Payer: MEDICARE

## 2022-08-09 PROCEDURE — G0239 OTH RESP PROC, GROUP: HCPCS

## 2022-08-11 ENCOUNTER — HOSPITAL ENCOUNTER (OUTPATIENT)
Dept: CARDIAC REHAB | Facility: CLINIC | Age: 72
Discharge: HOME | End: 2022-08-11
Attending: FAMILY MEDICINE
Payer: MEDICARE

## 2022-08-11 PROCEDURE — G0239 OTH RESP PROC, GROUP: HCPCS

## 2022-08-16 ENCOUNTER — HOSPITAL ENCOUNTER (OUTPATIENT)
Dept: CARDIAC REHAB | Facility: CLINIC | Age: 72
Discharge: HOME | End: 2022-08-16
Attending: FAMILY MEDICINE
Payer: MEDICARE

## 2022-08-16 PROCEDURE — G0239 OTH RESP PROC, GROUP: HCPCS

## 2022-08-18 ENCOUNTER — HOSPITAL ENCOUNTER (OUTPATIENT)
Dept: CARDIAC REHAB | Facility: CLINIC | Age: 72
Discharge: HOME | End: 2022-08-18
Attending: FAMILY MEDICINE
Payer: MEDICARE

## 2022-08-18 PROCEDURE — G0239 OTH RESP PROC, GROUP: HCPCS

## 2022-08-23 ENCOUNTER — HOSPITAL ENCOUNTER (OUTPATIENT)
Dept: CARDIAC REHAB | Facility: CLINIC | Age: 72
Discharge: HOME | End: 2022-08-23
Attending: FAMILY MEDICINE
Payer: MEDICARE

## 2022-08-23 PROCEDURE — G0239 OTH RESP PROC, GROUP: HCPCS

## 2022-08-25 ENCOUNTER — HOSPITAL ENCOUNTER (OUTPATIENT)
Dept: CARDIAC REHAB | Facility: CLINIC | Age: 72
Discharge: HOME | End: 2022-08-25
Attending: FAMILY MEDICINE
Payer: MEDICARE

## 2022-08-25 PROCEDURE — G0239 OTH RESP PROC, GROUP: HCPCS

## 2022-09-01 ENCOUNTER — HOSPITAL ENCOUNTER (OUTPATIENT)
Dept: CARDIAC REHAB | Facility: CLINIC | Age: 72
Discharge: HOME | End: 2022-09-01
Attending: FAMILY MEDICINE
Payer: MEDICARE

## 2022-09-01 PROCEDURE — G0239 OTH RESP PROC, GROUP: HCPCS

## 2022-09-07 ENCOUNTER — OFFICE VISIT (OUTPATIENT)
Dept: FAMILY MEDICINE | Facility: CLINIC | Age: 72
End: 2022-09-07
Payer: COMMERCIAL

## 2022-09-07 VITALS
HEART RATE: 58 BPM | SYSTOLIC BLOOD PRESSURE: 130 MMHG | WEIGHT: 201 LBS | BODY MASS INDEX: 34.31 KG/M2 | OXYGEN SATURATION: 99 % | DIASTOLIC BLOOD PRESSURE: 80 MMHG | RESPIRATION RATE: 18 BRPM | HEIGHT: 64 IN | TEMPERATURE: 97.3 F

## 2022-09-07 DIAGNOSIS — I10 ESSENTIAL HYPERTENSION WITH GOAL BLOOD PRESSURE LESS THAN 140/90: ICD-10-CM

## 2022-09-07 DIAGNOSIS — Z01.818 PREOP GENERAL PHYSICAL EXAM: Primary | ICD-10-CM

## 2022-09-07 DIAGNOSIS — Z23 NEED FOR PROPHYLACTIC VACCINATION AND INOCULATION AGAINST INFLUENZA: ICD-10-CM

## 2022-09-07 DIAGNOSIS — D32.9 MENINGIOMA (H): ICD-10-CM

## 2022-09-07 DIAGNOSIS — E78.5 HYPERLIPIDEMIA LDL GOAL <130: ICD-10-CM

## 2022-09-07 DIAGNOSIS — G47.33 OSA (OBSTRUCTIVE SLEEP APNEA): ICD-10-CM

## 2022-09-07 DIAGNOSIS — R91.8 PULMONARY NODULES: ICD-10-CM

## 2022-09-07 DIAGNOSIS — M17.12 PRIMARY OSTEOARTHRITIS OF LEFT KNEE: ICD-10-CM

## 2022-09-07 DIAGNOSIS — J44.9 CHRONIC OBSTRUCTIVE PULMONARY DISEASE, UNSPECIFIED COPD TYPE (H): ICD-10-CM

## 2022-09-07 LAB
ANION GAP SERPL CALCULATED.3IONS-SCNC: 13 MMOL/L (ref 7–15)
BUN SERPL-MCNC: 14.9 MG/DL (ref 8–23)
CALCIUM SERPL-MCNC: 9.4 MG/DL (ref 8.8–10.2)
CHLORIDE SERPL-SCNC: 100 MMOL/L (ref 98–107)
CREAT SERPL-MCNC: 0.8 MG/DL (ref 0.51–0.95)
DEPRECATED HCO3 PLAS-SCNC: 26 MMOL/L (ref 22–29)
ERYTHROCYTE [DISTWIDTH] IN BLOOD BY AUTOMATED COUNT: 12.8 % (ref 10–15)
GFR SERPL CREATININE-BSD FRML MDRD: 78 ML/MIN/1.73M2
GLUCOSE SERPL-MCNC: 109 MG/DL (ref 70–99)
HCT VFR BLD AUTO: 40.8 % (ref 35–47)
HGB BLD-MCNC: 13.7 G/DL (ref 11.7–15.7)
MCH RBC QN AUTO: 30.4 PG (ref 26.5–33)
MCHC RBC AUTO-ENTMCNC: 33.6 G/DL (ref 31.5–36.5)
MCV RBC AUTO: 91 FL (ref 78–100)
PLATELET # BLD AUTO: 274 10E3/UL (ref 150–450)
POTASSIUM SERPL-SCNC: 4.1 MMOL/L (ref 3.4–5.3)
RBC # BLD AUTO: 4.51 10E6/UL (ref 3.8–5.2)
SODIUM SERPL-SCNC: 139 MMOL/L (ref 136–145)
WBC # BLD AUTO: 6.4 10E3/UL (ref 4–11)

## 2022-09-07 PROCEDURE — 85027 COMPLETE CBC AUTOMATED: CPT | Performed by: FAMILY MEDICINE

## 2022-09-07 PROCEDURE — 36415 COLL VENOUS BLD VENIPUNCTURE: CPT | Performed by: FAMILY MEDICINE

## 2022-09-07 PROCEDURE — 99214 OFFICE O/P EST MOD 30 MIN: CPT | Mod: 25 | Performed by: FAMILY MEDICINE

## 2022-09-07 PROCEDURE — 90662 IIV NO PRSV INCREASED AG IM: CPT | Performed by: FAMILY MEDICINE

## 2022-09-07 PROCEDURE — G0008 ADMIN INFLUENZA VIRUS VAC: HCPCS | Performed by: FAMILY MEDICINE

## 2022-09-07 PROCEDURE — 80048 BASIC METABOLIC PNL TOTAL CA: CPT | Performed by: FAMILY MEDICINE

## 2022-09-07 ASSESSMENT — PATIENT HEALTH QUESTIONNAIRE - PHQ9
10. IF YOU CHECKED OFF ANY PROBLEMS, HOW DIFFICULT HAVE THESE PROBLEMS MADE IT FOR YOU TO DO YOUR WORK, TAKE CARE OF THINGS AT HOME, OR GET ALONG WITH OTHER PEOPLE: NOT DIFFICULT AT ALL
SUM OF ALL RESPONSES TO PHQ QUESTIONS 1-9: 2
SUM OF ALL RESPONSES TO PHQ QUESTIONS 1-9: 2

## 2022-09-07 ASSESSMENT — PAIN SCALES - GENERAL: PAINLEVEL: NO PAIN (0)

## 2022-09-07 NOTE — PROGRESS NOTES
[facility]  [address] [place] [address]  Phone: [phone]  Fax: [phone]  Primary Provider: Addis Alcocer  Pre-op Performing Provider: NIDHI ORDAZ      PREOPERATIVE EVALUATION:  Today's date: 9/7/2022    Rosita King is a 71 year old female who presents for a preoperative evaluation.    Surgical Information:  Surgery/Procedure: TOTAL Knee Arthroplasty- Left   Surgery Location: [facility]   Surgeon: Jerry  Surgery Date: 9/20/22  Time of Surgery: 3:15 pm   Where patient plans to recover: At home with family  Fax number for surgical facility: Note does not need to be faxed, will be available electronically in Epic.    Type of Anesthesia Anticipated: Spinal    Assessment & Plan     The proposed surgical procedure is considered INTERMEDIATE risk.      ICD-10-CM    1. Preop general physical exam  Z01.818    2. Primary osteoarthritis of left knee  M17.12    3. Essential hypertension with goal blood pressure less than 140/90  I10    4. Chronic obstructive pulmonary disease, unspecified COPD type (H)  J44.9    5. Meningioma (H)  D32.9    6. ASHLEY (obstructive sleep apnea)  G47.33    7. Hyperlipidemia LDL goal <130  E78.5    8. Pulmonary nodules  R91.8            Risks and Recommendations:  The patient has the following additional risks and recommendations for perioperative complications:   - No identified additional risk factors other than previously addressed    Medication Instructions:  Patient is to take all scheduled medications on the day of surgery    RECOMMENDATION:  APPROVAL GIVEN to proceed with proposed procedure, without further diagnostic evaluation.      Subjective   HPI related to upcoming procedure:   71-year-old female presents for a preop physical exam.  Patient is scheduled to have left total knee arthroplasty on September 20, 2022.  She requires evaluation and anesthesia risk assessment prior to undergoing surgery/procedure.  Patient denies any  fever, chills, sore throat, cough, shortness of breath, chest pain, palpitation, diarrhea, constipation, abdominal pain, headache or other relevant systemic symptoms.      Preop Questions 9/7/2022   1. Have you ever had a heart attack or stroke? No   2. Have you ever had surgery on your heart or blood vessels, such as a stent placement, a coronary artery bypass, or surgery on an artery in your head, neck, heart, or legs? No   3. Do you have chest pain with activity? No   4. Do you have a history of  heart failure? No   5. Do you currently have a cold, bronchitis or symptoms of other infection? No   6. Do you have a cough, shortness of breath, or wheezing? No   7. Do you or anyone in your family have previous history of blood clots? No   8. Do you or does anyone in your family have a serious bleeding problem such as prolonged bleeding following surgeries or cuts? No   9. Have you ever had problems with anemia or been told to take iron pills? No   10. Have you had any abnormal blood loss such as black, tarry or bloody stools, or abnormal vaginal bleeding? No   11. Have you ever had a blood transfusion? No   12. Are you willing to have a blood transfusion if it is medically needed before, during, or after your surgery? Yes   13. Have you or any of your relatives ever had problems with anesthesia? No   14. Do you have sleep apnea, excessive snoring or daytime drowsiness? YES -    14a. Do you have a CPAP machine? Yes   15. Do you have any artifical heart valves or other implanted medical devices like a pacemaker, defibrillator, or continuous glucose monitor? No   16. Do you have artificial joints? YES -    17. Are you allergic to latex? No   18. Is there any chance that you may be pregnant? -       Health Care Directive:  Patient does not have a Health Care Directive or Living Will: Patient states has Advance Directive and will bring in a copy to clinic.    Preoperative Review of :   reviewed - controlled  substances reflected in medication list.      Status of Chronic Conditions:  See problem list for active medical problems.  Problems all longstanding and stable, except as noted/documented.  See ROS for pertinent symptoms related to these conditions.      Review of Systems  CONSTITUTIONAL: NEGATIVE for fever, chills, change in weight  INTEGUMENTARY/SKIN: NEGATIVE for worrisome rashes, moles or lesions  EYES: NEGATIVE for vision changes or irritation  ENT/MOUTH: NEGATIVE for ear, mouth and throat problems  RESP: NEGATIVE for significant cough or SOB  CV: NEGATIVE for chest pain, palpitations or peripheral edema  GI: NEGATIVE for nausea, abdominal pain, heartburn, or change in bowel habits  : NEGATIVE for frequency, dysuria, or hematuria  MUSCULOSKELETAL: NEGATIVE for significant arthralgias or myalgia  NEURO: NEGATIVE for weakness, dizziness or paresthesias  ENDOCRINE: NEGATIVE for temperature intolerance, skin/hair changes  HEME: NEGATIVE for bleeding problems  PSYCHIATRIC: NEGATIVE for changes in mood or affect    Patient Active Problem List    Diagnosis Date Noted     Postoperative bradycardia 06/18/2021     Priority: Medium     Right knee DJD 06/17/2021     Priority: Medium     Status post total knee replacement 06/17/2021     Priority: Medium     Prediabetes 06/11/2021     Priority: Medium     Abnormal findings on diagnostic imaging of heart/coronary circulation 06/11/2021     Priority: Medium     COPD (chronic obstructive pulmonary disease) (H) 06/10/2021     Priority: Medium     Pulmonary nodules 04/29/2021     Priority: Medium     Pleomorphic adenoma of parotid gland 11/26/2019     Priority: Medium     Parotid mass 10/09/2019     Priority: Medium     Meningioma (H) 05/13/2019     Priority: Medium     Lichen sclerosus 08/24/2017     Priority: Medium     Paroxysmal supraventricular tachycardia (H) 02/23/2017     Priority: Medium     Gastroesophageal reflux disease without esophagitis 08/22/2016     Priority:  Medium     Essential hypertension with goal blood pressure less than 140/90 08/21/2016     Priority: Medium     Diverticulosis of large intestine without hemorrhage 12/24/2015     Priority: Medium     Vitamin D deficiency 10/12/2015     Priority: Medium     ASHLEY (obstructive sleep apnea) 10/12/2015     Priority: Medium     Severe ASHLEY   - PSG at 7/27/2006 with undocumented weight, AHI 84.7, RDI 88.9, SpO2 remained above 90%, CPAP 7 optimal.       Generalized anxiety disorder 10/12/2015     Priority: Medium     Hyperlipidemia LDL goal <130 10/12/2015     Priority: Medium     CARDIOVASCULAR SCREENING; LDL GOAL LESS THAN 130 02/03/2015     Priority: Medium     Intertrigo 05/19/2011     Priority: Medium     Urge incontinence 05/03/2010     Priority: Medium     Hiatal hernia 01/12/2009     Priority: Medium     Overview:   small sliding hiatal hernia noted on stress echo 2009       Carpal tunnel syndrome 11/27/2006     Priority: Medium     Other psoriasis 06/06/2006     Priority: Medium      Past Medical History:   Diagnosis Date     Essential hypertension with goal blood pressure less than 140/90 8/21/2016     Gastroesophageal reflux disease without esophagitis 8/22/2016     H. pylori infection 12/24/2015     Hyperlipidemia LDL goal <130 10/12/2015     ASHLEY (obstructive sleep apnea) 10/12/2015    Severe ASHLEY  - PSG at 7/27/2006 with undocumented weight, AHI 84.7, RDI 88.9, SpO2 remained above 90%, CPAP 7 optimal.     Paroxysmal supraventricular tachycardia (H) 2/23/2017     Past Surgical History:   Procedure Laterality Date     ARTHROPLASTY KNEE Right 6/17/2021    Procedure: Right Total Knee Arthroplasty;  Surgeon: Nils Edwards MD;  Location: [facility]     CARPAL TUNNEL RELEASE RT/LT Bilateral 2006, 2009     COLONOSCOPY N/A 11/25/2015    Procedure: COLONOSCOPY;  Surgeon: Mina Roy MD;  Location: [facility]     ESOPHAGOSCOPY, GASTROSCOPY, DUODENOSCOPY (EGD), COMBINED N/A 11/25/2015    Procedure: COMBINED  ESOPHAGOSCOPY, GASTROSCOPY, DUODENOSCOPY (EGD), BIOPSY SINGLE OR MULTIPLE;  Surgeon: Mina Roy MD;  Location: [facility]     PAROTIDECTOMY Left 10/31/2019    Procedure: LEFT SUPERFICIAL PAROTIDECTOMY WITH FACIAL NERVE MONITORING;  Surgeon: Atif Salazar MD;  Location: [facility]     TONSILLECTOMY & ADENOIDECTOMY  age 13     Current Outpatient Medications   Medication Sig Dispense Refill     acetaminophen (TYLENOL) 325 MG tablet Take 2 tablets (650 mg) by mouth every 4 hours as needed for other (mild pain) 100 tablet 0     fluocinonide (LIDEX) 0.05 % external solution Apply sparingly to affected area twice daily as needed.  Do not apply to face. 60 mL 3     hydrochlorothiazide (HYDRODIURIL) 25 MG tablet Take 1 tablet (25 mg) by mouth every morning 90 tablet 3     lisinopril (ZESTRIL) 10 MG tablet Take 1 tablet (10 mg) by mouth every morning 90 tablet 3     metoprolol succinate ER (TOPROL-XL) 50 MG 24 hr tablet Take 1 tablet (50 mg) by mouth daily 90 tablet 3     Multiple Vitamins-Minerals (CENTRUM SILVER) per tablet Take 1 tablet by mouth daily       omeprazole (PRILOSEC) 20 MG DR capsule TAKE 1 CAPSULE BY MOUTH ONCE DAILY 90 capsule 2     VITAMIN D, CHOLECALCIFEROL, PO Take 1,000 Units by mouth daily          Allergies   Allergen Reactions     Penicillins      Pravastatin      Other reaction(s): Myalgia     Simvastatin      Other reaction(s): Myalgia     Sulfa Drugs         Social History     Tobacco Use     Smoking status: Former Smoker     Smokeless tobacco: Never Used   Substance Use Topics     Alcohol use: Yes     Comment: ocassional     Family History   Problem Relation Age of Onset     Liver Disease Mother      Depression/Anxiety Father      Diabetes Father      Diabetes Sister      Liver Disease Sister      Breast Cancer Sister      Cancer Sister      Kidney Cancer Nephew      History   Drug Use No         Objective     /80 (Cuff Size: Adult Regular)   Pulse 58   Temp 97.3  F (36.3  C) (Tympanic)  "  Resp 18   Ht 1.626 m (5' 4\")   Wt 91.2 kg (201 lb)   SpO2 99%   Breastfeeding No   BMI 34.50 kg/m      Physical Exam    GENERAL APPEARANCE: alert and active     EYES: EOMI, PERRL     HENT: ear canals and TM's normal and nose and mouth without ulcers or lesions     NECK: no adenopathy, no asymmetry, masses, or scars and thyroid normal to palpation     RESP: lungs clear to auscultation - no rales, rhonchi or wheezes     CV: regular rates and rhythm, normal S1 S2, no S3 or S4 and no murmur, click or rub     ABDOMEN:  soft, nontender, no HSM or masses and bowel sounds normal     MS: chronic OA changes involving multiple joints, left leg 1+ pedal edema      SKIN: no suspicious lesions or rashes     NEURO: Normal strength and tone, sensory exam grossly normal, mentation intact and speech normal     PSYCH: mentation appears normal. and affect normal/bright     LYMPHATICS: No cervical adenopathy    Recent Labs   Lab Test 09/23/21  0930 06/18/21  0451 06/10/21  1123 04/29/21  1046   HGB  --  12.1 13.2 13.5   PLT  --   --  293 264     --  137 134   POTASSIUM 3.9  --  3.8 3.6   CR 0.85  --  0.86 0.76   A1C  --   --  6.2*  --         Diagnostics:  Labs pending at this time.  Results will be reviewed when available.   EKG: Recent echocardiogram and Zio patch results reviewed.    Revised Cardiac Risk Index (RCRI):  The patient has the following serious cardiovascular risks for perioperative complications:   - No serious cardiac risks = 0 points     RCRI Interpretation: 0 points: Class I (very low risk - 0.4% complication rate)      Signed Electronically by: Kiran Marx MD  Copy of this evaluation report is provided to requesting physician.      "

## 2022-09-07 NOTE — NURSING NOTE
"Chief Complaint   Patient presents with     Pre-Op Exam     /80 (Cuff Size: Adult Regular)   Pulse 58   Temp 97.3  F (36.3  C) (Tympanic)   Resp 18   Ht 1.626 m (5' 4\")   Wt 91.2 kg (201 lb)   SpO2 99%   Breastfeeding No   BMI 34.50 kg/m   Estimated body mass index is 34.5 kg/m  as calculated from the following:    Height as of this encounter: 1.626 m (5' 4\").    Weight as of this encounter: 91.2 kg (201 lb).  Patient presents to the clinic using No DME      Health Maintenance that is potentially due pending provider review:    Health Maintenance Due   Topic Date Due     DEXA  Never done     ANNUAL REVIEW OF HM ORDERS  Never done     ADVANCE CARE PLANNING  Never done     COPD ACTION PLAN  Never done     ZOSTER IMMUNIZATION (2 of 3) 07/14/2011     DTAP/TDAP/TD IMMUNIZATION (2 - Td or Tdap) 06/09/2016     MEDICARE ANNUAL WELLNESS VISIT  04/23/2020     COVID-19 Vaccine (4 - Booster for Pfizer series) 03/19/2022     LUNG CANCER SCREENING  05/03/2022     INFLUENZA VACCINE (1) 09/01/2022                "

## 2022-09-07 NOTE — H&P (VIEW-ONLY)
[facility]  [address] [place] [address]  Phone: [phone]  Fax: [phone]  Primary Provider: Addis Alcocer  Pre-op Performing Provider: NIDHI ORDAZ      PREOPERATIVE EVALUATION:  Today's date: 9/7/2022    Rosita King is a 71 year old female who presents for a preoperative evaluation.    Surgical Information:  Surgery/Procedure: TOTAL Knee Arthroplasty- Left   Surgery Location: [facility]   Surgeon: Jerry  Surgery Date: 9/20/22  Time of Surgery: 3:15 pm   Where patient plans to recover: At home with family  Fax number for surgical facility: Note does not need to be faxed, will be available electronically in Epic.    Type of Anesthesia Anticipated: Spinal    Assessment & Plan     The proposed surgical procedure is considered INTERMEDIATE risk.      ICD-10-CM    1. Preop general physical exam  Z01.818    2. Primary osteoarthritis of left knee  M17.12    3. Essential hypertension with goal blood pressure less than 140/90  I10    4. Chronic obstructive pulmonary disease, unspecified COPD type (H)  J44.9    5. Meningioma (H)  D32.9    6. ASHLEY (obstructive sleep apnea)  G47.33    7. Hyperlipidemia LDL goal <130  E78.5    8. Pulmonary nodules  R91.8            Risks and Recommendations:  The patient has the following additional risks and recommendations for perioperative complications:   - No identified additional risk factors other than previously addressed    Medication Instructions:  Patient is to take all scheduled medications on the day of surgery    RECOMMENDATION:  APPROVAL GIVEN to proceed with proposed procedure, without further diagnostic evaluation.      Subjective   HPI related to upcoming procedure:   71-year-old female presents for a preop physical exam.  Patient is scheduled to have left total knee arthroplasty on September 20, 2022.  She requires evaluation and anesthesia risk assessment prior to undergoing surgery/procedure.  Patient denies any  fever, chills, sore throat, cough, shortness of breath, chest pain, palpitation, diarrhea, constipation, abdominal pain, headache or other relevant systemic symptoms.      Preop Questions 9/7/2022   1. Have you ever had a heart attack or stroke? No   2. Have you ever had surgery on your heart or blood vessels, such as a stent placement, a coronary artery bypass, or surgery on an artery in your head, neck, heart, or legs? No   3. Do you have chest pain with activity? No   4. Do you have a history of  heart failure? No   5. Do you currently have a cold, bronchitis or symptoms of other infection? No   6. Do you have a cough, shortness of breath, or wheezing? No   7. Do you or anyone in your family have previous history of blood clots? No   8. Do you or does anyone in your family have a serious bleeding problem such as prolonged bleeding following surgeries or cuts? No   9. Have you ever had problems with anemia or been told to take iron pills? No   10. Have you had any abnormal blood loss such as black, tarry or bloody stools, or abnormal vaginal bleeding? No   11. Have you ever had a blood transfusion? No   12. Are you willing to have a blood transfusion if it is medically needed before, during, or after your surgery? Yes   13. Have you or any of your relatives ever had problems with anesthesia? No   14. Do you have sleep apnea, excessive snoring or daytime drowsiness? YES -    14a. Do you have a CPAP machine? Yes   15. Do you have any artifical heart valves or other implanted medical devices like a pacemaker, defibrillator, or continuous glucose monitor? No   16. Do you have artificial joints? YES -    17. Are you allergic to latex? No   18. Is there any chance that you may be pregnant? -       Health Care Directive:  Patient does not have a Health Care Directive or Living Will: Patient states has Advance Directive and will bring in a copy to clinic.    Preoperative Review of :   reviewed - controlled  substances reflected in medication list.      Status of Chronic Conditions:  See problem list for active medical problems.  Problems all longstanding and stable, except as noted/documented.  See ROS for pertinent symptoms related to these conditions.      Review of Systems  CONSTITUTIONAL: NEGATIVE for fever, chills, change in weight  INTEGUMENTARY/SKIN: NEGATIVE for worrisome rashes, moles or lesions  EYES: NEGATIVE for vision changes or irritation  ENT/MOUTH: NEGATIVE for ear, mouth and throat problems  RESP: NEGATIVE for significant cough or SOB  CV: NEGATIVE for chest pain, palpitations or peripheral edema  GI: NEGATIVE for nausea, abdominal pain, heartburn, or change in bowel habits  : NEGATIVE for frequency, dysuria, or hematuria  MUSCULOSKELETAL: NEGATIVE for significant arthralgias or myalgia  NEURO: NEGATIVE for weakness, dizziness or paresthesias  ENDOCRINE: NEGATIVE for temperature intolerance, skin/hair changes  HEME: NEGATIVE for bleeding problems  PSYCHIATRIC: NEGATIVE for changes in mood or affect    Patient Active Problem List    Diagnosis Date Noted     Postoperative bradycardia 06/18/2021     Priority: Medium     Right knee DJD 06/17/2021     Priority: Medium     Status post total knee replacement 06/17/2021     Priority: Medium     Prediabetes 06/11/2021     Priority: Medium     Abnormal findings on diagnostic imaging of heart/coronary circulation 06/11/2021     Priority: Medium     COPD (chronic obstructive pulmonary disease) (H) 06/10/2021     Priority: Medium     Pulmonary nodules 04/29/2021     Priority: Medium     Pleomorphic adenoma of parotid gland 11/26/2019     Priority: Medium     Parotid mass 10/09/2019     Priority: Medium     Meningioma (H) 05/13/2019     Priority: Medium     Lichen sclerosus 08/24/2017     Priority: Medium     Paroxysmal supraventricular tachycardia (H) 02/23/2017     Priority: Medium     Gastroesophageal reflux disease without esophagitis 08/22/2016     Priority:  Medium     Essential hypertension with goal blood pressure less than 140/90 08/21/2016     Priority: Medium     Diverticulosis of large intestine without hemorrhage 12/24/2015     Priority: Medium     Vitamin D deficiency 10/12/2015     Priority: Medium     ASHLEY (obstructive sleep apnea) 10/12/2015     Priority: Medium     Severe ASHLEY   - PSG at 7/27/2006 with undocumented weight, AHI 84.7, RDI 88.9, SpO2 remained above 90%, CPAP 7 optimal.       Generalized anxiety disorder 10/12/2015     Priority: Medium     Hyperlipidemia LDL goal <130 10/12/2015     Priority: Medium     CARDIOVASCULAR SCREENING; LDL GOAL LESS THAN 130 02/03/2015     Priority: Medium     Intertrigo 05/19/2011     Priority: Medium     Urge incontinence 05/03/2010     Priority: Medium     Hiatal hernia 01/12/2009     Priority: Medium     Overview:   small sliding hiatal hernia noted on stress echo 2009       Carpal tunnel syndrome 11/27/2006     Priority: Medium     Other psoriasis 06/06/2006     Priority: Medium      Past Medical History:   Diagnosis Date     Essential hypertension with goal blood pressure less than 140/90 8/21/2016     Gastroesophageal reflux disease without esophagitis 8/22/2016     H. pylori infection 12/24/2015     Hyperlipidemia LDL goal <130 10/12/2015     ASHLEY (obstructive sleep apnea) 10/12/2015    Severe ASHLEY  - PSG at 7/27/2006 with undocumented weight, AHI 84.7, RDI 88.9, SpO2 remained above 90%, CPAP 7 optimal.     Paroxysmal supraventricular tachycardia (H) 2/23/2017     Past Surgical History:   Procedure Laterality Date     ARTHROPLASTY KNEE Right 6/17/2021    Procedure: Right Total Knee Arthroplasty;  Surgeon: Nils Edwards MD;  Location: [facility]     CARPAL TUNNEL RELEASE RT/LT Bilateral 2006, 2009     COLONOSCOPY N/A 11/25/2015    Procedure: COLONOSCOPY;  Surgeon: Mina Roy MD;  Location: [facility]     ESOPHAGOSCOPY, GASTROSCOPY, DUODENOSCOPY (EGD), COMBINED N/A 11/25/2015    Procedure: COMBINED  ESOPHAGOSCOPY, GASTROSCOPY, DUODENOSCOPY (EGD), BIOPSY SINGLE OR MULTIPLE;  Surgeon: Mina Roy MD;  Location: [facility]     PAROTIDECTOMY Left 10/31/2019    Procedure: LEFT SUPERFICIAL PAROTIDECTOMY WITH FACIAL NERVE MONITORING;  Surgeon: Atif Salazar MD;  Location: [facility]     TONSILLECTOMY & ADENOIDECTOMY  age 13     Current Outpatient Medications   Medication Sig Dispense Refill     acetaminophen (TYLENOL) 325 MG tablet Take 2 tablets (650 mg) by mouth every 4 hours as needed for other (mild pain) 100 tablet 0     fluocinonide (LIDEX) 0.05 % external solution Apply sparingly to affected area twice daily as needed.  Do not apply to face. 60 mL 3     hydrochlorothiazide (HYDRODIURIL) 25 MG tablet Take 1 tablet (25 mg) by mouth every morning 90 tablet 3     lisinopril (ZESTRIL) 10 MG tablet Take 1 tablet (10 mg) by mouth every morning 90 tablet 3     metoprolol succinate ER (TOPROL-XL) 50 MG 24 hr tablet Take 1 tablet (50 mg) by mouth daily 90 tablet 3     Multiple Vitamins-Minerals (CENTRUM SILVER) per tablet Take 1 tablet by mouth daily       omeprazole (PRILOSEC) 20 MG DR capsule TAKE 1 CAPSULE BY MOUTH ONCE DAILY 90 capsule 2     VITAMIN D, CHOLECALCIFEROL, PO Take 1,000 Units by mouth daily          Allergies   Allergen Reactions     Penicillins      Pravastatin      Other reaction(s): Myalgia     Simvastatin      Other reaction(s): Myalgia     Sulfa Drugs         Social History     Tobacco Use     Smoking status: Former Smoker     Smokeless tobacco: Never Used   Substance Use Topics     Alcohol use: Yes     Comment: ocassional     Family History   Problem Relation Age of Onset     Liver Disease Mother      Depression/Anxiety Father      Diabetes Father      Diabetes Sister      Liver Disease Sister      Breast Cancer Sister      Cancer Sister      Kidney Cancer Nephew      History   Drug Use No         Objective     /80 (Cuff Size: Adult Regular)   Pulse 58   Temp 97.3  F (36.3  C) (Tympanic)  "  Resp 18   Ht 1.626 m (5' 4\")   Wt 91.2 kg (201 lb)   SpO2 99%   Breastfeeding No   BMI 34.50 kg/m      Physical Exam    GENERAL APPEARANCE: alert and active     EYES: EOMI, PERRL     HENT: ear canals and TM's normal and nose and mouth without ulcers or lesions     NECK: no adenopathy, no asymmetry, masses, or scars and thyroid normal to palpation     RESP: lungs clear to auscultation - no rales, rhonchi or wheezes     CV: regular rates and rhythm, normal S1 S2, no S3 or S4 and no murmur, click or rub     ABDOMEN:  soft, nontender, no HSM or masses and bowel sounds normal     MS: chronic OA changes involving multiple joints, left leg 1+ pedal edema      SKIN: no suspicious lesions or rashes     NEURO: Normal strength and tone, sensory exam grossly normal, mentation intact and speech normal     PSYCH: mentation appears normal. and affect normal/bright     LYMPHATICS: No cervical adenopathy    Recent Labs   Lab Test 09/23/21  0930 06/18/21  0451 06/10/21  1123 04/29/21  1046   HGB  --  12.1 13.2 13.5   PLT  --   --  293 264     --  137 134   POTASSIUM 3.9  --  3.8 3.6   CR 0.85  --  0.86 0.76   A1C  --   --  6.2*  --         Diagnostics:  Labs pending at this time.  Results will be reviewed when available.   EKG: Recent echocardiogram and Zio patch results reviewed.    Revised Cardiac Risk Index (RCRI):  The patient has the following serious cardiovascular risks for perioperative complications:   - No serious cardiac risks = 0 points     RCRI Interpretation: 0 points: Class I (very low risk - 0.4% complication rate)      Signed Electronically by: Kiran Marx MD  Copy of this evaluation report is provided to requesting physician.      "

## 2022-09-13 ENCOUNTER — HOSPITAL ENCOUNTER (OUTPATIENT)
Dept: CARDIAC REHAB | Facility: CLINIC | Age: 72
Discharge: HOME | End: 2022-09-13
Attending: FAMILY MEDICINE
Payer: MEDICARE

## 2022-09-13 PROCEDURE — G0238 OTH RESP PROC, INDIV: HCPCS

## 2022-09-15 NOTE — PROVIDER NOTIFICATION
Ready for surgery. Lots of pain currently. Had other knee done a year ago. Plans to go home with . Is Inc of urine and would like her underwear and pad on as soon as she can have them.

## 2022-09-16 ENCOUNTER — LAB (OUTPATIENT)
Dept: LAB | Facility: CLINIC | Age: 72
End: 2022-09-16
Payer: COMMERCIAL

## 2022-09-16 DIAGNOSIS — Z20.822 ENCOUNTER FOR LABORATORY TESTING FOR COVID-19 VIRUS: ICD-10-CM

## 2022-09-16 PROCEDURE — U0005 INFEC AGEN DETEC AMPLI PROBE: HCPCS

## 2022-09-16 PROCEDURE — U0003 INFECTIOUS AGENT DETECTION BY NUCLEIC ACID (DNA OR RNA); SEVERE ACUTE RESPIRATORY SYNDROME CORONAVIRUS 2 (SARS-COV-2) (CORONAVIRUS DISEASE [COVID-19]), AMPLIFIED PROBE TECHNIQUE, MAKING USE OF HIGH THROUGHPUT TECHNOLOGIES AS DESCRIBED BY CMS-2020-01-R: HCPCS

## 2022-09-17 LAB — SARS-COV-2 RNA RESP QL NAA+PROBE: NEGATIVE

## 2022-09-19 ENCOUNTER — ANESTHESIA EVENT (OUTPATIENT)
Dept: SURGERY | Facility: CLINIC | Age: 72
End: 2022-09-19
Payer: MEDICARE

## 2022-09-19 ASSESSMENT — COPD QUESTIONNAIRES: COPD: 1

## 2022-09-19 ASSESSMENT — LIFESTYLE VARIABLES: TOBACCO_USE: 1

## 2022-09-19 NOTE — ANESTHESIA PREPROCEDURE EVALUATION
Anesthesia Pre-Procedure Evaluation    Patient: Rosita King   MRN: [id number] : 1950        Procedure : Procedure(s):  TOTAL Knee Arthroplasty          Past Medical History:   Diagnosis Date     Essential hypertension with goal blood pressure less than 140/90 2016     Gastroesophageal reflux disease without esophagitis 2016     H. pylori infection 2015     Hyperlipidemia LDL goal <130 10/12/2015     ASHLEY (obstructive sleep apnea) 10/12/2015    Severe ASHLEY  - PSG at 2006 with undocumented weight, AHI 84.7, RDI 88.9, SpO2 remained above 90%, CPAP 7 optimal.     Paroxysmal supraventricular tachycardia (H) 2017      Past Surgical History:   Procedure Laterality Date     ARTHROPLASTY KNEE Right 2021    Procedure: Right Total Knee Arthroplasty;  Surgeon: Nils Edwards MD;  Location: [facility]     CARPAL TUNNEL RELEASE RT/LT Bilateral ,      COLONOSCOPY N/A 2015    Procedure: COLONOSCOPY;  Surgeon: Mina Roy MD;  Location: [facility]     ESOPHAGOSCOPY, GASTROSCOPY, DUODENOSCOPY (EGD), COMBINED N/A 2015    Procedure: COMBINED ESOPHAGOSCOPY, GASTROSCOPY, DUODENOSCOPY (EGD), BIOPSY SINGLE OR MULTIPLE;  Surgeon: Mina Roy MD;  Location: [facility]     PAROTIDECTOMY Left 10/31/2019    Procedure: LEFT SUPERFICIAL PAROTIDECTOMY WITH FACIAL NERVE MONITORING;  Surgeon: Atif Salazar MD;  Location: [facility]     TONSILLECTOMY & ADENOIDECTOMY  age 13      Allergies   Allergen Reactions     Penicillins      Pravastatin      Other reaction(s): Myalgia     Simvastatin      Other reaction(s): Myalgia     Sulfa Drugs       Social History     Tobacco Use     Smoking status: Former Smoker     Smokeless tobacco: Never Used   Substance Use Topics     Alcohol use: Yes     Comment: ocassional      Wt Readings from Last 1 Encounters:   22 91.2 kg (201 lb)        Anesthesia Evaluation   Pt has had prior anesthetic. Type: General and MAC.        ROS/MED  HX  ENT/Pulmonary: Comment: Pulm nodules      (+) sleep apnea, tobacco use, COPD,     Neurologic:       Cardiovascular:     (+) Dyslipidemia hypertension-----dysrhythmias (SVT), Other, Previous cardiac testing   Echo: Date: 3/30/22 Results:  Interpretation Summary     Technically challenging study due to patient body habitus, even with the use  of contrast imaging.     Left ventricular size, global systolic function, and wall motion are normal,  estimated LVEF 60-65%.  Right ventricular global function is normal.  No significant valvular abnormalities.  The inferior vena cava was normal in size with preserved respiratory  variability.     This study was compared to a TTE from 9/23/2021. There has been no significant  change.  ______________________________________________________________________________  Left Ventricle  The left ventricle is normal in size. There is concentric remodeling present.  Left ventricular systolic function is normal. The visual ejection fraction is  60-65%. Left ventricular diastolic function is normal. No regional wall motion  abnormalities noted.     Right Ventricle  The right ventricle is normal in structure, function and size.     Atria  Normal left atrial size. Right atrial size is normal.     Mitral Valve  The mitral valve is normal in structure and function. There is trace mitral  regurgitation.     Tricuspid Valve  The tricuspid valve is not well visualized, but is grossly normal. There is  physiologic tricuspid regurgitation. Right ventricular systolic pressure could  not be approximated due to inadequate tricuspid regurgitation.     Aortic Valve  There is mild trileaflet aortic sclerosis.     Pulmonic Valve  The pulmonic valve is not well seen, but is grossly normal.     Vessels  The aortic root is normal size. Normal size ascending aorta. The inferior vena  cava was normal in size with preserved respiratory variability.     Pericardium  There is no pericardial effusion.  Stress  Test: Date: Results:    ECG Reviewed: Date: 4/30/21 Results:  Sinus Rhythm   Low voltage in precordial leads.    -Poor R-wave progression -may be secondary to pulmonary disease  consider old anterior infarct.     ABNORMAL   Cath:  Date: Results:      METS/Exercise Tolerance:     Hematologic:       Musculoskeletal: Comment: DJD    (+) arthritis,     GI/Hepatic:     (+) GERD, hiatal hernia, Inflammatory bowel disease,     Renal/Genitourinary:       Endo:     (+) Obesity,     Psychiatric/Substance Use:     (+) anxiety     Infectious Disease:       Malignancy:       Other:            Physical Exam    Airway        Mallampati: II   TM distance: > 3 FB   Neck ROM: full   Mouth opening: > 3 cm    Respiratory Devices and Support         Dental  no notable dental history         Cardiovascular   cardiovascular exam normal          Pulmonary   pulmonary exam normal                OUTSIDE LABS:  CBC:   Lab Results   Component Value Date    WBC 6.4 09/07/2022    WBC 6.4 06/10/2021    HGB 13.7 09/07/2022    HGB 12.1 06/18/2021    HCT 40.8 09/07/2022    HCT 38.9 06/10/2021     09/07/2022     06/10/2021     BMP:   Lab Results   Component Value Date     09/07/2022     09/23/2021    POTASSIUM 4.1 09/07/2022    POTASSIUM 3.9 09/23/2021    CHLORIDE 100 09/07/2022    CHLORIDE 105 09/23/2021    CO2 26 09/07/2022    CO2 30 09/23/2021    BUN 14.9 09/07/2022    BUN 17 09/23/2021    CR 0.80 09/07/2022    CR 0.85 09/23/2021     (H) 09/07/2022     (H) 09/23/2021     COAGS: No results found for: PTT, INR, FIBR  POC:   Lab Results   Component Value Date     (H) 06/17/2021     HEPATIC:   Lab Results   Component Value Date    ALBUMIN 3.8 06/10/2021    PROTTOTAL 7.1 06/10/2021    ALT 37 06/10/2021    AST 18 06/10/2021    ALKPHOS 59 06/10/2021    BILITOTAL 0.4 06/10/2021     OTHER:   Lab Results   Component Value Date    A1C 6.2 (H) 06/10/2021    CORINA 9.4 09/07/2022    MAG 2.3 04/23/2019    TSH 0.87  04/29/2021    CRP 4.2 08/06/2020    SED 7 08/06/2020       Anesthesia Plan    ASA Status:  3      Anesthesia Type: Spinal.   Induction: Propofol.   Maintenance: TIVA.        Consents    Anesthesia Plan(s) and associated risks, benefits, and realistic alternatives discussed. Questions answered and patient/representative(s) expressed understanding.     - Discussed: Risks, Benefits and Alternatives for BOTH SEDATION and the PROCEDURE were discussed     - Discussed with:  Patient         Postoperative Care    Pain management: IV analgesics, Oral pain medications, Peripheral nerve block (Single Shot), Multi-modal analgesia.   PONV prophylaxis: Ondansetron (or other 5HT-3), Dexamethasone or Solumedrol     Comments:                JESUS Saavedra CRNA

## 2022-09-20 ENCOUNTER — HOSPITAL ENCOUNTER (OUTPATIENT)
Facility: CLINIC | Age: 72
Discharge: HOME | End: 2022-09-21
Attending: ORTHOPAEDIC SURGERY | Admitting: ORTHOPAEDIC SURGERY
Payer: MEDICARE

## 2022-09-20 ENCOUNTER — APPOINTMENT (OUTPATIENT)
Dept: GENERAL RADIOLOGY | Facility: CLINIC | Age: 72
End: 2022-09-20
Attending: ORTHOPAEDIC SURGERY
Payer: MEDICARE

## 2022-09-20 ENCOUNTER — ANESTHESIA (OUTPATIENT)
Dept: SURGERY | Facility: CLINIC | Age: 72
End: 2022-09-20
Payer: MEDICARE

## 2022-09-20 DIAGNOSIS — Z96.652 STATUS POST TOTAL LEFT KNEE REPLACEMENT: Primary | ICD-10-CM

## 2022-09-20 PROCEDURE — 250N000013 HC RX MED GY IP 250 OP 250 PS 637: Performed by: ORTHOPAEDIC SURGERY

## 2022-09-20 PROCEDURE — 710N000009 HC RECOVERY PHASE 1, LEVEL 1, PER MIN: Performed by: ORTHOPAEDIC SURGERY

## 2022-09-20 PROCEDURE — 250N000011 HC RX IP 250 OP 636: Performed by: NURSE ANESTHETIST, CERTIFIED REGISTERED

## 2022-09-20 PROCEDURE — 370N000017 HC ANESTHESIA TECHNICAL FEE, PER MIN: Performed by: ORTHOPAEDIC SURGERY

## 2022-09-20 PROCEDURE — 258N000003 HC RX IP 258 OP 636: Performed by: NURSE ANESTHETIST, CERTIFIED REGISTERED

## 2022-09-20 PROCEDURE — 250N000013 HC RX MED GY IP 250 OP 250 PS 637: Performed by: NURSE ANESTHETIST, CERTIFIED REGISTERED

## 2022-09-20 PROCEDURE — C1713 ANCHOR/SCREW BN/BN,TIS/BN: HCPCS | Performed by: ORTHOPAEDIC SURGERY

## 2022-09-20 PROCEDURE — 250N000011 HC RX IP 250 OP 636: Performed by: ORTHOPAEDIC SURGERY

## 2022-09-20 PROCEDURE — 250N000009 HC RX 250: Performed by: ORTHOPAEDIC SURGERY

## 2022-09-20 PROCEDURE — 272N000001 HC OR GENERAL SUPPLY STERILE: Performed by: ORTHOPAEDIC SURGERY

## 2022-09-20 PROCEDURE — 250N000011 HC RX IP 250 OP 636: Performed by: PHYSICIAN ASSISTANT

## 2022-09-20 PROCEDURE — 999N000065 XR KNEE PORT LEFT 1/2 VIEWS: Mod: LT

## 2022-09-20 PROCEDURE — 360N000077 HC SURGERY LEVEL 4, PER MIN: Performed by: ORTHOPAEDIC SURGERY

## 2022-09-20 PROCEDURE — 258N000003 HC RX IP 258 OP 636: Performed by: ORTHOPAEDIC SURGERY

## 2022-09-20 PROCEDURE — 250N000009 HC RX 250: Performed by: NURSE ANESTHETIST, CERTIFIED REGISTERED

## 2022-09-20 PROCEDURE — 999N000141 HC STATISTIC PRE-PROCEDURE NURSING ASSESSMENT: Performed by: ORTHOPAEDIC SURGERY

## 2022-09-20 PROCEDURE — 250N000013 HC RX MED GY IP 250 OP 250 PS 637: Performed by: PHYSICIAN ASSISTANT

## 2022-09-20 PROCEDURE — C1776 JOINT DEVICE (IMPLANTABLE): HCPCS | Performed by: ORTHOPAEDIC SURGERY

## 2022-09-20 PROCEDURE — 271N000001 HC OR GENERAL SUPPLY NON-STERILE: Performed by: ORTHOPAEDIC SURGERY

## 2022-09-20 DEVICE — IMP INSERT ARTICULAR S&N LGN CR XLPE SZ3-4 13MM 71453113: Type: IMPLANTABLE DEVICE | Site: KNEE | Status: FUNCTIONAL

## 2022-09-20 DEVICE — BONE CEMENT RADIOPAQUE SIMPLEX HV FULL DOSE 6194-1-001: Type: IMPLANTABLE DEVICE | Site: KNEE | Status: FUNCTIONAL

## 2022-09-20 DEVICE — IMP BASEPLATE TIBIAL GENESIS II SZ 3 LT TI 71420164: Type: IMPLANTABLE DEVICE | Site: KNEE | Status: FUNCTIONAL

## 2022-09-20 DEVICE — IMP COMP PATELLA SNR GENESIS II 9X32MM 71420576: Type: IMPLANTABLE DEVICE | Site: KNEE | Status: FUNCTIONAL

## 2022-09-20 DEVICE — IMP COMP FEMORAL S&N GENESIS II NP CR SZ 4 LT 71423204: Type: IMPLANTABLE DEVICE | Site: KNEE | Status: FUNCTIONAL

## 2022-09-20 RX ORDER — LIDOCAINE HYDROCHLORIDE 10 MG/ML
INJECTION, SOLUTION INFILTRATION; PERINEURAL PRN
Status: DISCONTINUED | OUTPATIENT
Start: 2022-09-20 | End: 2022-09-20

## 2022-09-20 RX ORDER — NALOXONE HYDROCHLORIDE 0.4 MG/ML
0.2 INJECTION, SOLUTION INTRAMUSCULAR; INTRAVENOUS; SUBCUTANEOUS
Status: DISCONTINUED | OUTPATIENT
Start: 2022-09-20 | End: 2022-09-21 | Stop reason: HOSPADM

## 2022-09-20 RX ORDER — OXYCODONE HYDROCHLORIDE 5 MG/1
5 TABLET ORAL EVERY 4 HOURS PRN
Status: DISCONTINUED | OUTPATIENT
Start: 2022-09-20 | End: 2022-09-21 | Stop reason: HOSPADM

## 2022-09-20 RX ORDER — NALOXONE HYDROCHLORIDE 0.4 MG/ML
0.4 INJECTION, SOLUTION INTRAMUSCULAR; INTRAVENOUS; SUBCUTANEOUS
Status: DISCONTINUED | OUTPATIENT
Start: 2022-09-20 | End: 2022-09-20

## 2022-09-20 RX ORDER — ONDANSETRON 2 MG/ML
INJECTION INTRAMUSCULAR; INTRAVENOUS PRN
Status: DISCONTINUED | OUTPATIENT
Start: 2022-09-20 | End: 2022-09-20

## 2022-09-20 RX ORDER — FENTANYL CITRATE 50 UG/ML
25 INJECTION, SOLUTION INTRAMUSCULAR; INTRAVENOUS EVERY 5 MIN PRN
Status: DISCONTINUED | OUTPATIENT
Start: 2022-09-20 | End: 2022-09-20 | Stop reason: HOSPADM

## 2022-09-20 RX ORDER — CEFAZOLIN SODIUM/WATER 2 G/20 ML
2 SYRINGE (ML) INTRAVENOUS
Status: COMPLETED | OUTPATIENT
Start: 2022-09-20 | End: 2022-09-20

## 2022-09-20 RX ORDER — AMOXICILLIN 250 MG
1-2 CAPSULE ORAL DAILY PRN
Qty: 15 TABLET | Refills: 0 | Status: SHIPPED | OUTPATIENT
Start: 2022-09-20 | End: 2022-09-21

## 2022-09-20 RX ORDER — SODIUM CHLORIDE, SODIUM LACTATE, POTASSIUM CHLORIDE, CALCIUM CHLORIDE 600; 310; 30; 20 MG/100ML; MG/100ML; MG/100ML; MG/100ML
INJECTION, SOLUTION INTRAVENOUS CONTINUOUS
Status: DISCONTINUED | OUTPATIENT
Start: 2022-09-20 | End: 2022-09-20 | Stop reason: HOSPADM

## 2022-09-20 RX ORDER — ONDANSETRON 2 MG/ML
4 INJECTION INTRAMUSCULAR; INTRAVENOUS EVERY 6 HOURS PRN
Status: DISCONTINUED | OUTPATIENT
Start: 2022-09-20 | End: 2022-09-21 | Stop reason: HOSPADM

## 2022-09-20 RX ORDER — AMOXICILLIN 250 MG
1 CAPSULE ORAL 2 TIMES DAILY
Status: DISCONTINUED | OUTPATIENT
Start: 2022-09-20 | End: 2022-09-21 | Stop reason: HOSPADM

## 2022-09-20 RX ORDER — ROPIVACAINE HYDROCHLORIDE 5 MG/ML
INJECTION, SOLUTION EPIDURAL; INFILTRATION; PERINEURAL PRN
Status: DISCONTINUED | OUTPATIENT
Start: 2022-09-20 | End: 2022-09-20

## 2022-09-20 RX ORDER — TRANEXAMIC ACID 650 MG/1
1950 TABLET ORAL ONCE
Status: COMPLETED | OUTPATIENT
Start: 2022-09-20 | End: 2022-09-20

## 2022-09-20 RX ORDER — ACETAMINOPHEN 325 MG/1
650 TABLET ORAL EVERY 4 HOURS PRN
Qty: 100 TABLET | Refills: 0
Start: 2022-09-20

## 2022-09-20 RX ORDER — FENTANYL CITRATE 50 UG/ML
INJECTION, SOLUTION INTRAMUSCULAR; INTRAVENOUS PRN
Status: DISCONTINUED | OUTPATIENT
Start: 2022-09-20 | End: 2022-09-20

## 2022-09-20 RX ORDER — CEFAZOLIN SODIUM/WATER 2 G/20 ML
2 SYRINGE (ML) INTRAVENOUS SEE ADMIN INSTRUCTIONS
Status: DISCONTINUED | OUTPATIENT
Start: 2022-09-20 | End: 2022-09-20 | Stop reason: HOSPADM

## 2022-09-20 RX ORDER — ONDANSETRON 4 MG/1
4 TABLET, ORALLY DISINTEGRATING ORAL EVERY 6 HOURS PRN
Status: DISCONTINUED | OUTPATIENT
Start: 2022-09-20 | End: 2022-09-21 | Stop reason: HOSPADM

## 2022-09-20 RX ORDER — ACETAMINOPHEN 325 MG/1
975 TABLET ORAL EVERY 8 HOURS
Status: DISCONTINUED | OUTPATIENT
Start: 2022-09-20 | End: 2022-09-21 | Stop reason: HOSPADM

## 2022-09-20 RX ORDER — HYDROMORPHONE HCL IN WATER/PF 6 MG/30 ML
0.4 PATIENT CONTROLLED ANALGESIA SYRINGE INTRAVENOUS
Status: DISCONTINUED | OUTPATIENT
Start: 2022-09-20 | End: 2022-09-21 | Stop reason: HOSPADM

## 2022-09-20 RX ORDER — HYDROMORPHONE HCL IN WATER/PF 6 MG/30 ML
0.2 PATIENT CONTROLLED ANALGESIA SYRINGE INTRAVENOUS
Status: DISCONTINUED | OUTPATIENT
Start: 2022-09-20 | End: 2022-09-21 | Stop reason: HOSPADM

## 2022-09-20 RX ORDER — DEXAMETHASONE SODIUM PHOSPHATE 4 MG/ML
INJECTION, SOLUTION INTRA-ARTICULAR; INTRALESIONAL; INTRAMUSCULAR; INTRAVENOUS; SOFT TISSUE PRN
Status: DISCONTINUED | OUTPATIENT
Start: 2022-09-20 | End: 2022-09-20

## 2022-09-20 RX ORDER — LIDOCAINE 40 MG/G
CREAM TOPICAL
Status: DISCONTINUED | OUTPATIENT
Start: 2022-09-20 | End: 2022-09-21 | Stop reason: HOSPADM

## 2022-09-20 RX ORDER — PROCHLORPERAZINE MALEATE 5 MG
5 TABLET ORAL EVERY 6 HOURS PRN
Status: DISCONTINUED | OUTPATIENT
Start: 2022-09-20 | End: 2022-09-21 | Stop reason: HOSPADM

## 2022-09-20 RX ORDER — HYDROMORPHONE HCL IN WATER/PF 6 MG/30 ML
0.2 PATIENT CONTROLLED ANALGESIA SYRINGE INTRAVENOUS EVERY 5 MIN PRN
Status: DISCONTINUED | OUTPATIENT
Start: 2022-09-20 | End: 2022-09-20 | Stop reason: HOSPADM

## 2022-09-20 RX ORDER — OXYCODONE HYDROCHLORIDE 5 MG/1
10 TABLET ORAL EVERY 4 HOURS PRN
Status: DISCONTINUED | OUTPATIENT
Start: 2022-09-20 | End: 2022-09-21 | Stop reason: HOSPADM

## 2022-09-20 RX ORDER — METOPROLOL SUCCINATE 50 MG/1
50 TABLET, EXTENDED RELEASE ORAL DAILY
Status: DISCONTINUED | OUTPATIENT
Start: 2022-09-21 | End: 2022-09-21 | Stop reason: HOSPADM

## 2022-09-20 RX ORDER — ONDANSETRON 4 MG/1
4 TABLET, ORALLY DISINTEGRATING ORAL EVERY 30 MIN PRN
Status: DISCONTINUED | OUTPATIENT
Start: 2022-09-20 | End: 2022-09-20 | Stop reason: HOSPADM

## 2022-09-20 RX ORDER — ONDANSETRON 2 MG/ML
4 INJECTION INTRAMUSCULAR; INTRAVENOUS EVERY 30 MIN PRN
Status: DISCONTINUED | OUTPATIENT
Start: 2022-09-20 | End: 2022-09-20 | Stop reason: HOSPADM

## 2022-09-20 RX ORDER — ACETAMINOPHEN 325 MG/1
650 TABLET ORAL EVERY 4 HOURS PRN
Status: DISCONTINUED | OUTPATIENT
Start: 2022-09-23 | End: 2022-09-21 | Stop reason: HOSPADM

## 2022-09-20 RX ORDER — NALOXONE HYDROCHLORIDE 0.4 MG/ML
0.4 INJECTION, SOLUTION INTRAMUSCULAR; INTRAVENOUS; SUBCUTANEOUS
Status: DISCONTINUED | OUTPATIENT
Start: 2022-09-20 | End: 2022-09-21 | Stop reason: HOSPADM

## 2022-09-20 RX ORDER — NALOXONE HYDROCHLORIDE 0.4 MG/ML
0.2 INJECTION, SOLUTION INTRAMUSCULAR; INTRAVENOUS; SUBCUTANEOUS
Status: DISCONTINUED | OUTPATIENT
Start: 2022-09-20 | End: 2022-09-20

## 2022-09-20 RX ORDER — BUPIVACAINE HYDROCHLORIDE 5 MG/ML
INJECTION, SOLUTION PERINEURAL PRN
Status: DISCONTINUED | OUTPATIENT
Start: 2022-09-20 | End: 2022-09-20 | Stop reason: HOSPADM

## 2022-09-20 RX ORDER — HYDROXYZINE HYDROCHLORIDE 25 MG/1
25 TABLET, FILM COATED ORAL EVERY 6 HOURS PRN
Status: DISCONTINUED | OUTPATIENT
Start: 2022-09-20 | End: 2022-09-20 | Stop reason: HOSPADM

## 2022-09-20 RX ORDER — ACETAMINOPHEN 325 MG/1
975 TABLET ORAL ONCE
Status: COMPLETED | OUTPATIENT
Start: 2022-09-20 | End: 2022-09-20

## 2022-09-20 RX ORDER — CEFAZOLIN SODIUM 2 G/100ML
2 INJECTION, SOLUTION INTRAVENOUS EVERY 8 HOURS
Status: COMPLETED | OUTPATIENT
Start: 2022-09-20 | End: 2022-09-21

## 2022-09-20 RX ORDER — HYDROXYZINE HYDROCHLORIDE 25 MG/1
25 TABLET, FILM COATED ORAL EVERY 6 HOURS PRN
Status: DISCONTINUED | OUTPATIENT
Start: 2022-09-20 | End: 2022-09-21 | Stop reason: HOSPADM

## 2022-09-20 RX ORDER — PANTOPRAZOLE SODIUM 40 MG/1
40 TABLET, DELAYED RELEASE ORAL
Status: DISCONTINUED | OUTPATIENT
Start: 2022-09-21 | End: 2022-09-21 | Stop reason: HOSPADM

## 2022-09-20 RX ORDER — FENTANYL CITRATE 50 UG/ML
50 INJECTION, SOLUTION INTRAMUSCULAR; INTRAVENOUS
Status: DISCONTINUED | OUTPATIENT
Start: 2022-09-20 | End: 2022-09-20 | Stop reason: HOSPADM

## 2022-09-20 RX ORDER — KETOROLAC TROMETHAMINE 30 MG/ML
INJECTION, SOLUTION INTRAMUSCULAR; INTRAVENOUS PRN
Status: DISCONTINUED | OUTPATIENT
Start: 2022-09-20 | End: 2022-09-20

## 2022-09-20 RX ORDER — PROPOFOL 10 MG/ML
INJECTION, EMULSION INTRAVENOUS CONTINUOUS PRN
Status: DISCONTINUED | OUTPATIENT
Start: 2022-09-20 | End: 2022-09-20

## 2022-09-20 RX ORDER — SODIUM CHLORIDE, SODIUM LACTATE, POTASSIUM CHLORIDE, CALCIUM CHLORIDE 600; 310; 30; 20 MG/100ML; MG/100ML; MG/100ML; MG/100ML
INJECTION, SOLUTION INTRAVENOUS CONTINUOUS
Status: DISCONTINUED | OUTPATIENT
Start: 2022-09-20 | End: 2022-09-21 | Stop reason: HOSPADM

## 2022-09-20 RX ORDER — HYDROXYZINE HYDROCHLORIDE 25 MG/1
25 TABLET, FILM COATED ORAL EVERY 6 HOURS PRN
Qty: 33 TABLET | Refills: 0 | Status: SHIPPED | OUTPATIENT
Start: 2022-09-20 | End: 2022-09-21

## 2022-09-20 RX ORDER — KETOROLAC TROMETHAMINE 15 MG/ML
15 INJECTION, SOLUTION INTRAMUSCULAR; INTRAVENOUS EVERY 6 HOURS
Status: DISCONTINUED | OUTPATIENT
Start: 2022-09-20 | End: 2022-09-21 | Stop reason: HOSPADM

## 2022-09-20 RX ORDER — OXYCODONE HYDROCHLORIDE 5 MG/1
5-10 TABLET ORAL EVERY 4 HOURS PRN
Qty: 33 TABLET | Refills: 0 | Status: SHIPPED | OUTPATIENT
Start: 2022-09-20 | End: 2023-01-17

## 2022-09-20 RX ORDER — BISACODYL 10 MG
10 SUPPOSITORY, RECTAL RECTAL DAILY PRN
Status: DISCONTINUED | OUTPATIENT
Start: 2022-09-20 | End: 2022-09-21 | Stop reason: HOSPADM

## 2022-09-20 RX ORDER — MAGNESIUM SULFATE HEPTAHYDRATE 40 MG/ML
2 INJECTION, SOLUTION INTRAVENOUS ONCE
Status: COMPLETED | OUTPATIENT
Start: 2022-09-20 | End: 2022-09-20

## 2022-09-20 RX ORDER — LISINOPRIL 10 MG/1
10 TABLET ORAL EVERY MORNING
Status: DISCONTINUED | OUTPATIENT
Start: 2022-09-21 | End: 2022-09-21 | Stop reason: HOSPADM

## 2022-09-20 RX ORDER — LIDOCAINE 40 MG/G
CREAM TOPICAL
Status: DISCONTINUED | OUTPATIENT
Start: 2022-09-20 | End: 2022-09-20 | Stop reason: HOSPADM

## 2022-09-20 RX ORDER — BUPIVACAINE HYDROCHLORIDE 7.5 MG/ML
INJECTION, SOLUTION INTRASPINAL PRN
Status: DISCONTINUED | OUTPATIENT
Start: 2022-09-20 | End: 2022-09-20

## 2022-09-20 RX ORDER — HYDROXYZINE HYDROCHLORIDE 50 MG/1
50 TABLET, FILM COATED ORAL EVERY 6 HOURS PRN
Status: DISCONTINUED | OUTPATIENT
Start: 2022-09-20 | End: 2022-09-20 | Stop reason: HOSPADM

## 2022-09-20 RX ORDER — LIDOCAINE HYDROCHLORIDE AND EPINEPHRINE BITARTRATE 20; .01 MG/ML; MG/ML
INJECTION, SOLUTION SUBCUTANEOUS PRN
Status: DISCONTINUED | OUTPATIENT
Start: 2022-09-20 | End: 2022-09-20

## 2022-09-20 RX ORDER — HYDROCHLOROTHIAZIDE 25 MG/1
25 TABLET ORAL EVERY MORNING
Status: DISCONTINUED | OUTPATIENT
Start: 2022-09-21 | End: 2022-09-21 | Stop reason: HOSPADM

## 2022-09-20 RX ORDER — POLYETHYLENE GLYCOL 3350 17 G/17G
17 POWDER, FOR SOLUTION ORAL DAILY
Status: DISCONTINUED | OUTPATIENT
Start: 2022-09-21 | End: 2022-09-21 | Stop reason: HOSPADM

## 2022-09-20 RX ADMIN — PHENYLEPHRINE HYDROCHLORIDE 150 MCG: 10 INJECTION INTRAVENOUS at 16:35

## 2022-09-20 RX ADMIN — DEXAMETHASONE SODIUM PHOSPHATE 8 MG: 4 INJECTION, SOLUTION INTRA-ARTICULAR; INTRALESIONAL; INTRAMUSCULAR; INTRAVENOUS; SOFT TISSUE at 15:33

## 2022-09-20 RX ADMIN — MIDAZOLAM 2 MG: 1 INJECTION INTRAMUSCULAR; INTRAVENOUS at 15:20

## 2022-09-20 RX ADMIN — PROPOFOL 100 MCG/KG/MIN: 10 INJECTION, EMULSION INTRAVENOUS at 15:33

## 2022-09-20 RX ADMIN — SODIUM CHLORIDE, POTASSIUM CHLORIDE, SODIUM LACTATE AND CALCIUM CHLORIDE: 600; 310; 30; 20 INJECTION, SOLUTION INTRAVENOUS at 16:47

## 2022-09-20 RX ADMIN — ONDANSETRON 4 MG: 2 INJECTION INTRAMUSCULAR; INTRAVENOUS at 15:20

## 2022-09-20 RX ADMIN — ACETAMINOPHEN 975 MG: 325 TABLET, FILM COATED ORAL at 21:13

## 2022-09-20 RX ADMIN — MAGNESIUM SULFATE HEPTAHYDRATE 2 G: 40 INJECTION, SOLUTION INTRAVENOUS at 15:37

## 2022-09-20 RX ADMIN — LIDOCAINE HYDROCHLORIDE 50 MG: 10 INJECTION, SOLUTION INFILTRATION; PERINEURAL at 15:33

## 2022-09-20 RX ADMIN — Medication 2 G: at 15:14

## 2022-09-20 RX ADMIN — ACETAMINOPHEN 975 MG: 325 TABLET, FILM COATED ORAL at 13:39

## 2022-09-20 RX ADMIN — Medication 5 ML: at 17:27

## 2022-09-20 RX ADMIN — CEFAZOLIN SODIUM 2 G: 2 INJECTION, SOLUTION INTRAVENOUS at 22:40

## 2022-09-20 RX ADMIN — SODIUM CHLORIDE, POTASSIUM CHLORIDE, SODIUM LACTATE AND CALCIUM CHLORIDE: 600; 310; 30; 20 INJECTION, SOLUTION INTRAVENOUS at 14:19

## 2022-09-20 RX ADMIN — KETOROLAC TROMETHAMINE 15 MG: 30 INJECTION, SOLUTION INTRAMUSCULAR at 16:43

## 2022-09-20 RX ADMIN — PHENYLEPHRINE HYDROCHLORIDE 150 MCG: 10 INJECTION INTRAVENOUS at 15:49

## 2022-09-20 RX ADMIN — PHENYLEPHRINE HYDROCHLORIDE 150 MCG: 10 INJECTION INTRAVENOUS at 16:07

## 2022-09-20 RX ADMIN — PHENYLEPHRINE HYDROCHLORIDE 150 MCG: 10 INJECTION INTRAVENOUS at 15:59

## 2022-09-20 RX ADMIN — SODIUM CHLORIDE, POTASSIUM CHLORIDE, SODIUM LACTATE AND CALCIUM CHLORIDE: 600; 310; 30; 20 INJECTION, SOLUTION INTRAVENOUS at 21:23

## 2022-09-20 RX ADMIN — KETOROLAC TROMETHAMINE 15 MG: 15 INJECTION, SOLUTION INTRAMUSCULAR; INTRAVENOUS at 22:40

## 2022-09-20 RX ADMIN — DEXAMETHASONE SODIUM PHOSPHATE 4 MG: 4 INJECTION, SOLUTION INTRA-ARTICULAR; INTRALESIONAL; INTRAMUSCULAR; INTRAVENOUS; SOFT TISSUE at 17:30

## 2022-09-20 RX ADMIN — FENTANYL CITRATE 100 MCG: 50 INJECTION, SOLUTION INTRAMUSCULAR; INTRAVENOUS at 15:25

## 2022-09-20 RX ADMIN — OXYCODONE HYDROCHLORIDE 5 MG: 5 TABLET ORAL at 21:12

## 2022-09-20 RX ADMIN — SENNOSIDES AND DOCUSATE SODIUM 1 TABLET: 50; 8.6 TABLET ORAL at 21:13

## 2022-09-20 RX ADMIN — TRANEXAMIC ACID 1950 MG: 650 TABLET ORAL at 13:39

## 2022-09-20 RX ADMIN — BUPIVACAINE HYDROCHLORIDE IN DEXTROSE 1.6 ML: 7.5 INJECTION, SOLUTION SUBARACHNOID at 15:30

## 2022-09-20 RX ADMIN — ROPIVACAINE HYDROCHLORIDE 20 ML: 5 INJECTION, SOLUTION EPIDURAL; INFILTRATION; PERINEURAL at 17:30

## 2022-09-20 ASSESSMENT — ACTIVITIES OF DAILY LIVING (ADL)
ADLS_ACUITY_SCORE: 22

## 2022-09-20 ASSESSMENT — ENCOUNTER SYMPTOMS: DYSRHYTHMIAS: 1

## 2022-09-20 NOTE — PROGRESS NOTES
"SPIRITUAL HEALTH SERVICES  [facility] - Surgery    Referral Source: Patient request    - Patient had requested prayer prior to surgery.  She said this was her second TKA but \"I still don't like it.\"  We prayed together for all things to work together for good.    Plan:  will remain available for any ongoing support needs during LOS.    Jose Parada M.A., [facility]  Lead   [facility]  Office: [phone]    "

## 2022-09-20 NOTE — ANESTHESIA CARE TRANSFER NOTE
Patient: Rosita King    Procedure: Procedure(s):  left TOTAL Knee Arthroplasty       Diagnosis: Arthritis of left knee [M17.12]  Diagnosis Additional Information: No value filed.    Anesthesia Type:   Spinal     Note:    Oropharynx: oropharynx clear of all foreign objects  Level of Consciousness: awake  Oxygen Supplementation: room air    Independent Airway: airway patency satisfactory and stable  Dentition: dentition unchanged  Vital Signs Stable: post-procedure vital signs reviewed and stable  Report to RN Given: handoff report given  Patient transferred to: PACU    Handoff Report: Identifed the Patient, Identified the Reponsible Provider, Reviewed the pertinent medical history, Discussed the surgical course, Reviewed Intra-OP anesthesia mangement and issues during anesthesia, Set expectations for post-procedure period and Allowed opportunity for questions and acknowledgement of understanding      Vitals:  Vitals Value Taken Time   /61 09/20/22 1700   Temp 37.2  C (98.9  F) 09/20/22 1700   Pulse 57 09/20/22 1706   Resp 24 09/20/22 1706   SpO2 97 % 09/20/22 1706   Vitals shown include unvalidated device data.    Electronically Signed By: JESUS Rodriguez CRNA  September 20, 2022  5:07 PM

## 2022-09-20 NOTE — ANESTHESIA PROCEDURE NOTES
Femoral (adductor canal) Procedure Note    Pre-Procedure   Staff -        CRNA: Yari Comer APRN CRNA       Performed By: CRNA       Location: post-op       Procedure Start/Stop Times: 9/20/2022 5:21 PM and 9/20/2022 5:21 PM       Pre-Anesthestic Checklist: patient identified, IV checked, site marked, risks and benefits discussed, informed consent, monitors and equipment checked, pre-op evaluation, at physician/surgeon's request and post-op pain management  Timeout:       Correct Patient: Yes        Correct Procedure: Yes        Correct Site: Yes        Correct Position: Yes        Correct Laterality: Yes        Site Marked: Yes  Procedure Documentation  Procedure: Femoral (adductor canal)       Diagnosis: PAIN       Laterality: left       Patient Position: supine       Patient Prep/Sterile Barriers: sterile gloves, mask, patient draped       Skin prep: Chloraprep       Needle Type: insulated and short bevel       Needle Gauge: 20.        Needle Length (Inches): 4        Ultrasound guided       1. Ultrasound was used to identify targeted nerve, plexus, vascular marker, or fascial plane and place a needle adjacent to it in real-time.       2. Ultrasound was used to visualize the spread of anesthetic in close proximity to the above referenced structure.       3. A permanent image is entered into the patient's record.       4. The visualized anatomic structures appeared normal.       5. There were no apparent abnormal pathologic findings.    Assessment/Narrative         The placement was negative for: blood aspirated, painful injection and site bleeding       Paresthesias: No.       Test dose of 5 mL lidocaine 2% w/ 1:200,000 epinephrine at 17:27 CDT.         Test dose negative, 3 minutes after injection, for signs of intravascular, subdural, or intrathecal injection.       Bolus given via needle. no blood aspirated via catheter.        Secured via.        Insertion/Infusion Method: Single Shot        Complications: none       Injection made incrementally with aspirations every 5 mL.    Medication(s) Administered   Medication Administration Time: 9/20/2022 5:21 PM

## 2022-09-20 NOTE — PROCEDURES
09/20/22    PREOPERATIVE DIAGNOSES: Left knee arthritis  POSTOPERATIVE DIAGNOSIS: Left knee arthritis  PROCEDURE: Left total knee arthroplasty.   SURGEON: Nils Edwards MD   ASSISTANT: Renetta Villafuerte PA-C The presence of the PA was necessary for safe progression of the case.   ANESTHESIA: Spinal with MAC.   ESTIMATED BLOOD LOSS: 50 mL.   TOURNIQUET TIME: 40 minutes at 250 mmHg.   COMPLICATIONS: None apparent.   DISPOSITION: Stable to PACU.    IMPLANTS USED: [employer] cobalt chromium Legion size 4 CR femoral component with a size 3 tibial component, size 3 by 13mm CR high flex polyethylene and 32 mm patella.     INDICATIONS: Jenna is a 71-year-old female with a history of progressive left knee pain due to end-stage arthritis. Unfortunately, she failed conservative management including therapy and injections. After discussing risks, benefits and surgery, she elected to proceed with a left total knee replacement understanding the risks of infection, damage to vessels and nerves, blood clots, stiffness, ongoing pain, need for revision surgery, need for transfusion.     PROCEDURE: The patient was brought to the preoperative holding area where the left knee was marked. Consent was reviewed. She then was transferred to the operating theater. After induction of successful spinal anesthesia, she was placed supine with a bump under the left hip.  A timeout was performed, verifying correct patient, surgery, location. She received preoperative antibiotics as well as transexemic acid. The left lower extremity was then prepped and draped in standard sterile fashion.     We exsanguinated the leg and inflated the tourniquet. We then made a longitudinal incision over the anterior aspect of the knee. Dissection was carried down through skin and subcutaneous tissue. A medial parapatellar arthrotomy was made, exposing end stage medial compartment arthritis.  Synovial tissue from the suprapatellar pouch excised. The anterior horn  of the medial meniscus was released and a medial release was performed. Then, the remainder of the fat pad was excised. We turned our attention to the patella. Using a free hand technique, this was resected down to 12 mm and sized to a 32mm, then punched and a metal protector plate was placed. We then turned our attention to the femur. Preoperatively, there was a 3 degree flexion contracture.  Therefore, using an intramedullary alignment guide, 10.5 mm of distal femur was resected in 5 degrees of valgus.     We then turned our attention to the tibia.  An extramedullary alignment cut was used to resect 0mm off the low medial side, perpendicular to the mechanical axis.  We then turned our attention back to the distal femur and sized it to a size 4.  The epicondylar axis was established and we placed our 4-in-1 cutting block perpendicular to it, in 2 degrees of external rotation. With this in place, our anterior, posterior and chamfer distal femur cuts were made.  We then used a laminar  to open the joint in order to remove medial and lateral meniscus remnants as well as posterior osteophytes.  The PCL was intact.  A variety of polyethylene were trialed, and we felt a 13mm was best, with range of motion from full extension to 135 of flexion, stability to varus and valgus stress, normal POLO test, and the patella tracked well.  After this, sized our tibial component to a 3.  We then drilled and punched our tibial component, lining it with the medial 1/3 of the tibial tubercle. The knee was thoroughly irrigated using pulse lavage. The final components were then cemented in place. All excess cement was removed and the knee was placed into full extension while the cement was curing. Also, the wound was instilled with dilute Betadine solution. Once the cement had cured, we retrialed our components with a 13mm CR high flex poly with findings as above. We then placed the final poly.  The tourniquet was deflated with  hemostasis achieved.  The capsular layer was closed with #1 Stratafix, at which point there was 130 degrees of flexion with gravity.  Subcutaneous tissues with 2-0 Vicryl, skin with a 3-0 Monocryl. A sterile dressing was applied and the patient was awoken from anesthesia and transferred to PACU in stable condition.     POSTOPERATIVE PLAN:   1. Weightbearing as tolerated left lower extremity with PT for mobilization.   2. Deep venous thrombosis prophylaxis: Aspirin 325mg daily x 30 days  3. Perioperative antibiotics.   4. Follow up in 2 weeks for wound check.     CARLITOS ROSARIO MD

## 2022-09-20 NOTE — ANESTHESIA POSTPROCEDURE EVALUATION
Patient: Rosita King    Procedure: Procedure(s):  left TOTAL Knee Arthroplasty       Anesthesia Type:  Spinal    Note:  Disposition: Outpatient   Postop Pain Control: Uneventful            Sign Out: Well controlled pain   PONV: No   Neuro/Psych: Uneventful            Sign Out: Acceptable/Baseline neuro status   Airway/Respiratory: Uneventful            Sign Out: Acceptable/Baseline resp. status   CV/Hemodynamics: Uneventful            Sign Out: Acceptable CV status; No obvious hypovolemia; No obvious fluid overload   Other NRE: NONE   DID A NON-ROUTINE EVENT OCCUR? No           Last vitals:  Vitals Value Taken Time   /67 09/20/22 1735   Temp 37.2  C (98.9  F) 09/20/22 1700   Pulse 52 09/20/22 1737   Resp 22 09/20/22 1737   SpO2 96 % 09/20/22 1737   Vitals shown include unvalidated device data.    Electronically Signed By: JESUS Morrison CRNA  September 20, 2022  5:38 PM

## 2022-09-20 NOTE — ANESTHESIA PROCEDURE NOTES
Intrathecal injection Procedure Note    Pre-Procedure   Staff -        CRNA: Apple Jaquez APRN CRNA       Performed By: CRNA       Location: OR       Pre-Anesthestic Checklist: patient identified, IV checked, risks and benefits discussed, informed consent, monitors and equipment checked, pre-op evaluation, at physician/surgeon's request and post-op pain management  Timeout:       Correct Patient: Yes        Correct Procedure: Yes        Correct Site: Yes        Correct Position: Yes   Procedure Documentation  Procedure: intrathecal injection       Patient Position: sitting       Patient Prep/Sterile Barriers: sterile gloves, mask, patient draped       Skin prep: Betadine       Insertion Site: L4-5. (midline approach).       Needle Gauge: 22.        Needle Length (Inches): 3.5        Spinal Needle Type: PencanNo introducer used       # of attempts: 1 and  # of redirects:  1    Assessment/Narrative         Paresthesias: No.       CSF fluid: clear.       Opening pressure was cmH2O while  Sitting.

## 2022-09-20 NOTE — PROVIDER NOTIFICATION
"   09/20/22 1803   Discharge/Handoff   Additional Transport Assist Yen   Handoff given to Yanet   Oxygen Therapy   O2 Device None (Room air)   Valuables   Patient Belongings remains with patient   Patient Belongings Remaining with Patient purse/wallet;cell phone/electronics;shoes;clothing;medical device   Number of Belongings Bags 1   Did you bring any home meds/supplements to the hospital?  No   /70   Pulse 57   Temp 98.9  F (37.2  C) (Axillary)   Resp 18   Ht 1.626 m (5' 4\")   Wt 91.2 kg (201 lb)   SpO2 97%   BMI 34.50 kg/m        "

## 2022-09-21 ENCOUNTER — APPOINTMENT (OUTPATIENT)
Dept: PHYSICAL THERAPY | Facility: CLINIC | Age: 72
End: 2022-09-21
Attending: ORTHOPAEDIC SURGERY
Payer: MEDICARE

## 2022-09-21 VITALS
SYSTOLIC BLOOD PRESSURE: 115 MMHG | OXYGEN SATURATION: 93 % | RESPIRATION RATE: 18 BRPM | DIASTOLIC BLOOD PRESSURE: 57 MMHG | TEMPERATURE: 97.3 F | BODY MASS INDEX: 34.31 KG/M2 | WEIGHT: 201 LBS | HEIGHT: 64 IN | HEART RATE: 63 BPM

## 2022-09-21 LAB
GLUCOSE BLDC GLUCOMTR-MCNC: 238 MG/DL (ref 70–99)
GLUCOSE BLDC GLUCOMTR-MCNC: 284 MG/DL (ref 70–99)
GLUCOSE SERPL-MCNC: 256 MG/DL (ref 70–99)
HBA1C MFR BLD: 6 %
HGB BLD-MCNC: 11.6 G/DL (ref 11.7–15.7)
HOLD SPECIMEN: NORMAL

## 2022-09-21 PROCEDURE — 96372 THER/PROPH/DIAG INJ SC/IM: CPT | Performed by: PHYSICIAN ASSISTANT

## 2022-09-21 PROCEDURE — 250N000011 HC RX IP 250 OP 636: Performed by: ORTHOPAEDIC SURGERY

## 2022-09-21 PROCEDURE — 36415 COLL VENOUS BLD VENIPUNCTURE: CPT | Performed by: ORTHOPAEDIC SURGERY

## 2022-09-21 PROCEDURE — 250N000013 HC RX MED GY IP 250 OP 250 PS 637: Performed by: ORTHOPAEDIC SURGERY

## 2022-09-21 PROCEDURE — 83036 HEMOGLOBIN GLYCOSYLATED A1C: CPT | Performed by: PHYSICIAN ASSISTANT

## 2022-09-21 PROCEDURE — 250N000012 HC RX MED GY IP 250 OP 636 PS 637: Performed by: PHYSICIAN ASSISTANT

## 2022-09-21 PROCEDURE — 82947 ASSAY GLUCOSE BLOOD QUANT: CPT | Performed by: ORTHOPAEDIC SURGERY

## 2022-09-21 PROCEDURE — 250N000013 HC RX MED GY IP 250 OP 250 PS 637: Performed by: PHYSICIAN ASSISTANT

## 2022-09-21 PROCEDURE — 85018 HEMOGLOBIN: CPT | Performed by: ORTHOPAEDIC SURGERY

## 2022-09-21 PROCEDURE — 97161 PT EVAL LOW COMPLEX 20 MIN: CPT | Mod: GP | Performed by: PHYSICAL THERAPIST

## 2022-09-21 PROCEDURE — 97110 THERAPEUTIC EXERCISES: CPT | Mod: GP | Performed by: PHYSICAL THERAPIST

## 2022-09-21 PROCEDURE — 82962 GLUCOSE BLOOD TEST: CPT

## 2022-09-21 PROCEDURE — 97116 GAIT TRAINING THERAPY: CPT | Mod: GP | Performed by: PHYSICAL THERAPIST

## 2022-09-21 PROCEDURE — 99214 OFFICE O/P EST MOD 30 MIN: CPT | Performed by: PHYSICIAN ASSISTANT

## 2022-09-21 RX ORDER — NICOTINE POLACRILEX 4 MG
15-30 LOZENGE BUCCAL
Status: DISCONTINUED | OUTPATIENT
Start: 2022-09-21 | End: 2022-09-21 | Stop reason: HOSPADM

## 2022-09-21 RX ORDER — DEXTROSE MONOHYDRATE 25 G/50ML
25-50 INJECTION, SOLUTION INTRAVENOUS
Status: DISCONTINUED | OUTPATIENT
Start: 2022-09-21 | End: 2022-09-21 | Stop reason: HOSPADM

## 2022-09-21 RX ORDER — HYDROXYZINE HYDROCHLORIDE 25 MG/1
25 TABLET, FILM COATED ORAL EVERY 6 HOURS PRN
Qty: 33 TABLET | Refills: 0 | Status: SHIPPED | OUTPATIENT
Start: 2022-09-21 | End: 2023-12-28

## 2022-09-21 RX ORDER — AMOXICILLIN 250 MG
1-2 CAPSULE ORAL DAILY PRN
Qty: 15 TABLET | Refills: 0 | Status: SHIPPED | OUTPATIENT
Start: 2022-09-21 | End: 2023-01-17

## 2022-09-21 RX ADMIN — INSULIN ASPART 3 UNITS: 100 INJECTION, SOLUTION INTRAVENOUS; SUBCUTANEOUS at 12:26

## 2022-09-21 RX ADMIN — LISINOPRIL 10 MG: 10 TABLET ORAL at 09:29

## 2022-09-21 RX ADMIN — KETOROLAC TROMETHAMINE 15 MG: 15 INJECTION, SOLUTION INTRAMUSCULAR; INTRAVENOUS at 04:54

## 2022-09-21 RX ADMIN — ASPIRIN 325 MG: 325 TABLET ORAL at 06:44

## 2022-09-21 RX ADMIN — SENNOSIDES AND DOCUSATE SODIUM 1 TABLET: 50; 8.6 TABLET ORAL at 09:29

## 2022-09-21 RX ADMIN — OXYCODONE HYDROCHLORIDE 10 MG: 5 TABLET ORAL at 13:05

## 2022-09-21 RX ADMIN — HYDROCHLOROTHIAZIDE 25 MG: 25 TABLET ORAL at 09:29

## 2022-09-21 RX ADMIN — PANTOPRAZOLE SODIUM 40 MG: 40 TABLET, DELAYED RELEASE ORAL at 06:44

## 2022-09-21 RX ADMIN — METOPROLOL SUCCINATE 50 MG: 50 TABLET, EXTENDED RELEASE ORAL at 09:28

## 2022-09-21 RX ADMIN — CEFAZOLIN SODIUM 2 G: 2 INJECTION, SOLUTION INTRAVENOUS at 06:44

## 2022-09-21 RX ADMIN — OXYCODONE HYDROCHLORIDE 5 MG: 5 TABLET ORAL at 09:29

## 2022-09-21 RX ADMIN — OXYCODONE HYDROCHLORIDE 5 MG: 5 TABLET ORAL at 01:14

## 2022-09-21 RX ADMIN — OXYCODONE HYDROCHLORIDE 5 MG: 5 TABLET ORAL at 04:55

## 2022-09-21 RX ADMIN — ACETAMINOPHEN 975 MG: 325 TABLET, FILM COATED ORAL at 04:54

## 2022-09-21 RX ADMIN — ACETAMINOPHEN 975 MG: 325 TABLET, FILM COATED ORAL at 13:05

## 2022-09-21 RX ADMIN — POLYETHYLENE GLYCOL 3350 17 G: 17 POWDER, FOR SOLUTION ORAL at 09:29

## 2022-09-21 RX ADMIN — HYDROXYZINE HYDROCHLORIDE 25 MG: 25 TABLET, FILM COATED ORAL at 11:54

## 2022-09-21 RX ADMIN — KETOROLAC TROMETHAMINE 15 MG: 15 INJECTION, SOLUTION INTRAMUSCULAR; INTRAVENOUS at 11:25

## 2022-09-21 ASSESSMENT — ACTIVITIES OF DAILY LIVING (ADL)
ADLS_ACUITY_SCORE: 22
ADLS_ACUITY_SCORE: 27
ADLS_ACUITY_SCORE: 22
ADLS_ACUITY_SCORE: 27
ADLS_ACUITY_SCORE: 22

## 2022-09-21 NOTE — CONSULTS
Care Management Note:     Care Management team received referral from Ortho team to assist pt with Home Care services post surgical services.     Per IDT rounds, EMR review, and discussion with PT/OT staff, it has been determined that pt will discharge to home with pre-planned [facility] (Phone: [phone]) PT cares.     SW met at bedside with pt and discussed home care services, Medicare homebound status and initial visit/call.  Pt is in agreement with plan of care & was provided with a [facility] Care brochure.       Spouse, Derrick, to transport at time of discharge.     SW informed Ortho PA to writer home care orders & updated [facility] intake of discharge orders.    ABY Rodriguez  Care Transitions   Tele: [phone]

## 2022-09-21 NOTE — PROGRESS NOTES
"[facility] Orthopaedics Progress Note      Post-operative Day: 1 Day Post-Op    Procedure(s):  left TOTAL Knee Arthroplasty  Subjective:    Pt reports mild pain in the left knee, therapy went well however and she feels ready to discharge home. She has Home PT scheduled.    Chest pain, SOB:  No  Nausea, vomiting:  No  Lightheadedness, dizziness:  No  Neuro:  Patient denies new onset numbness or paresthesias      Objective:  Blood pressure 115/57, pulse 63, temperature 97.3  F (36.3  C), temperature source Oral, resp. rate 18, height 1.626 m (5' 4\"), weight 91.2 kg (201 lb), SpO2 93 %, not currently breastfeeding.    Patient Vitals for the past 24 hrs:   BP Temp Temp src Pulse Resp SpO2 Height Weight   09/21/22 0751 115/57 97.3  F (36.3  C) Oral 63 18 93 % -- --   09/21/22 0451 (!) 145/71 97.5  F (36.4  C) Oral 66 18 96 % -- --   09/20/22 2206 126/65 98  F (36.7  C) Oral 70 18 97 % -- --   09/20/22 1834 (!) 149/88 -- -- 64 16 98 % -- --   09/20/22 1745 -- -- -- 57 18 97 % -- --   09/20/22 1740 118/70 -- -- 61 10 97 % -- --   09/20/22 1735 129/67 -- -- 56 12 96 % -- --   09/20/22 1730 121/67 -- -- 53 13 94 % -- --   09/20/22 1725 123/68 -- -- 53 14 97 % -- --   09/20/22 1715 122/62 -- -- 52 18 96 % -- --   09/20/22 1700 110/61 98.9  F (37.2  C) Axillary 61 15 94 % -- --   09/20/22 1655 97/54 -- -- 61 -- -- -- --   09/20/22 1259 (!) 145/70 97.6  F (36.4  C) Oral 62 18 99 % 1.626 m (5' 4\") 91.2 kg (201 lb)       Wt Readings from Last 4 Encounters:   09/20/22 91.2 kg (201 lb)   09/07/22 91.2 kg (201 lb)   06/21/22 92.4 kg (203 lb 12.8 oz)   04/19/22 91.4 kg (201 lb 9.6 oz)       Gen: A&O x 3. NAD. Appears comfortable, up to the recliner.   Wound status: Covered, Aquacel dressing is c/d/i.  Circulation, motion and sensation: Dorsiflexion/plantarflexion intact and equal bilaterally; distal lower extremity sensation is intact and equal bilaterally. Foot and toes are warm and well perfused.    Swelling: Mild  Calf " tenderness: calves are soft and non-tender bilaterally     Pertinent Labs   Lab Results: personally reviewed.     Recent Labs   Lab Test 09/21/22  0512 09/07/22  1145 09/23/21  0930 06/18/21  0451 06/10/21  1123 04/29/21  1046 08/06/20  1714 06/18/19  0743 04/23/19  0942   HGB 11.6* 13.7  --  12.1 13.2 13.5 13.0   < > 13.1   HCT  --  40.8  --   --  38.9 39.4 39.0   < > 38.9   MCV  --  91  --   --  91 87 90   < > 91   PLT  --  274  --   --  293 264 292   < > 248   NA  --  139 138  --  137 134 138   < > 137   CRP  --   --   --   --   --   --  4.2  --  <2.9    < > = values in this interval not displayed.       Plan:   Continue current cares and rehabilitation.  Anticoagulation protocol: ASA 325mg daily  x 30  days  Pain medications: Toradol, oxycodone, tylenol and vistaril  Weight bearing status:  WBAT  Disposition:  Orthopedically stable. Plan for discharge to home with home health care as pt is medically stable and pain is controlled, cleared by therapy.              Report completed by:  Mio Kaiser PA-C  Date: 9/21/2022  Time: 9:42 AM

## 2022-09-21 NOTE — DISCHARGE SUMMARY
[facility] Orthopedics Discharge Summary                                  [facility]     MICHAEL MUNOZ [id number]   Age: 71 year old  PCP: Addis Alcocer, [phone] 1950     Date of Admission:  9/20/2022  Date of Discharge::  9/21/2022  Discharge Physician:  Mio Kaiser PA-C    Code status:  Full Code    Admission Information:  Admission Diagnosis:  Arthritis of left knee [M17.12]  Status post total knee replacement [Z96.659]    Post-Operative Day: 1 Day Post-Op     Reason for admission:  The patient was admitted for the following:Procedure(s) (LRB):  left TOTAL Knee Arthroplasty (Left)    Principal Problem:    Status post total knee replacement  Active Problems:    ASHLEY (obstructive sleep apnea)    Hyperlipidemia LDL goal <130    Essential hypertension with goal blood pressure less than 140/90    Gastroesophageal reflux disease without esophagitis    COPD (chronic obstructive pulmonary disease) (H)    Prediabetes      Allergies:  Penicillins, Pravastatin, Simvastatin, and Sulfa drugs    Following the procedure noted above the patient was transferred to the post-op floor and started on:    Therapy:  physical therapy  Anticoagulation Plan: ASA 325mg daily for 30 days  Pain Management: oxycodone, tylenol and vistaril  Weight bearing status: Weight bearing as tolerated     The patient was followed and co-managed by the hospitalist service during the inpatient treatment course  Complications:  None  Consultations:  None     Pertinent Labs   Lab Results: personally reviewed.     Recent Labs   Lab Test 09/21/22  0512 09/07/22  1145 09/23/21  0930 06/18/21  0451 06/10/21  1123 04/29/21  1046 08/06/20  1714 06/18/19  0743 04/23/19  0942   HGB 11.6* 13.7  --  12.1 13.2 13.5 13.0   < > 13.1   HCT  --  40.8  --   --  38.9 39.4 39.0   < > 38.9   MCV  --  91  --   --  91 87 90   < > 91   PLT  --  274  --   --  293 264 292   < > 248   NA  --  139 138  --  137 134 138   < > 137   CRP  --   --    --   --   --   --  4.2  --  <2.9    < > = values in this interval not displayed.          Discharge Information:  Condition at discharge: Stable  Discharge destination:  Admitted to home care:   Agency: [facility]  Discharged to home     Medications at discharge:  Current Discharge Medication List      START taking these medications    Details   aspirin (ASA) 325 MG EC tablet Take 1 tablet (325 mg) by mouth daily  Qty: 30 tablet, Refills: 0    Associated Diagnoses: Status post total left knee replacement      hydrOXYzine (ATARAX) 25 MG tablet Take 1 tablet (25 mg) by mouth every 6 hours as needed for itching or anxiety (with pain, moderate pain)  Qty: 33 tablet, Refills: 0    Associated Diagnoses: Status post total left knee replacement      oxyCODONE (ROXICODONE) 5 MG tablet Take 1-2 tablets (5-10 mg) by mouth every 4 hours as needed for moderate to severe pain  Qty: 33 tablet, Refills: 0    Associated Diagnoses: Status post total left knee replacement      senna-docusate (SENOKOT-S/PERICOLACE) 8.6-50 MG tablet Take 1-2 tablets by mouth daily as needed for constipation Take while on oral narcotics to prevent or treat constipation.  Qty: 15 tablet, Refills: 0    Associated Diagnoses: Status post total left knee replacement         CONTINUE these medications which have CHANGED    Details   acetaminophen (TYLENOL) 325 MG tablet Take 2 tablets (650 mg) by mouth every 4 hours as needed for other (mild pain)  Qty: 100 tablet, Refills: 0    Associated Diagnoses: Status post total left knee replacement         CONTINUE these medications which have NOT CHANGED    Details   fluocinonide (LIDEX) 0.05 % external solution Apply sparingly to affected area twice daily as needed.  Do not apply to face.  Qty: 60 mL, Refills: 3    Associated Diagnoses: Itchy scalp      hydrochlorothiazide (HYDRODIURIL) 25 MG tablet Take 1 tablet (25 mg) by mouth every morning  Qty: 90 tablet, Refills: 3    Associated Diagnoses: Essential  hypertension      lisinopril (ZESTRIL) 10 MG tablet Take 1 tablet (10 mg) by mouth every morning  Qty: 90 tablet, Refills: 3    Associated Diagnoses: Essential hypertension      metoprolol succinate ER (TOPROL-XL) 50 MG 24 hr tablet Take 1 tablet (50 mg) by mouth daily  Qty: 90 tablet, Refills: 3    Associated Diagnoses: Paroxysmal supraventricular tachycardia (H); Essential hypertension with goal blood pressure less than 140/90      Multiple Vitamins-Minerals (CENTRUM SILVER) per tablet Take 1 tablet by mouth daily      omeprazole (PRILOSEC) 20 MG DR capsule TAKE 1 CAPSULE BY MOUTH ONCE DAILY  Qty: 90 capsule, Refills: 2    Comments: This prescription was filled on 12/29/2021. Any refills authorized will be placed on file.  Associated Diagnoses: Gastroesophageal reflux disease without esophagitis      VITAMIN D, CHOLECALCIFEROL, PO Take 1,000 Units by mouth daily                         Follow-Up Care:  Patient should be seen in the office in 14 days by the Orthopedic Surgeon/Physician Assistant.  Call [phone] for appointment or questions.    Mio Kaiser PA-C

## 2022-09-21 NOTE — PROGRESS NOTES
Patient vital signs are at baseline: Yes  Patient able to ambulate as they were prior to admission or with assist devices provided by therapies during their stay:  No,  Reason:  Unable to ambulate right now due to block  Patient MUST void prior to discharge:  No,  Reason:  Due to void  Patient able to tolerate oral intake:  Yes  Pain has adequate pain control using Oral analgesics:  Yes  Does patient have an identified :  Yes  Has goal D/C date and time been discussed with patient:  Yes

## 2022-09-21 NOTE — PROGRESS NOTES
[facility] Medicine Progress Note  Date of Service: 09/21/2022    Assessment & Plan   Rosita King is a 71 year old female who presented on 9/20/2022 for scheduled Procedure(s):  left TOTAL Knee Arthroplasty by Nils Edwards MD and is being followed by the hospital medicine service for co-management of acute and/or chronic perioperative medical problems.    S/p Procedure(s):  left TOTAL Knee Arthroplasty   1 Day Post-Op   Hg 11.6.  Pain tolerable.    - pain control, wound cares, physical therapy, occupational therapy and DVT prophylaxis per orthopedic surgery service    Hyperglycemia  Pre-diabetes  Morning blood sugar elevated at 256. History of pre-diabetes, last a1C 6.2 6/2021. Hyperglycemia may in part be stress related to recent surgery. Will add on a1C to reassess and discuss with patient.  - add on a1c  - moderate SSI  - hypo/hyperglycemia protocol with blood glucose monitoring  - diabetic diet  - will discuss a1c and lifestyle modifications with patient, likely needs PCP follow up    Hypertension  Chronic. Blood pressures reviewed, stable.   - continue home hydrochlorothiazide, lisinopril, metoprolol    Paroxysmal SVT  Follows with cardiology, history of symptomatic pSVT diagnosed in 2016. Per cardiology note subsequent episodes have been asymptomatic. She does also have ectopy which at times was associated with reported shakiness.  - continue home metoprolol    Gastroesophageal reflux disease  Chronic.  - continue home omeprazole    COPD (chronic obstructive pulmonary disease)  Noted in history, not currently on specific therapies for this. Lungs clear.    ASHLEY (obstructive sleep apnea)  - continue home CPAP if available    DVT Prophylaxis: as per orthopedic surgery service - aspirin 325 mg daily  Code Status: Full Code    Lines: peripheral   Boyer catheter: none    Discussion: Medically, the patient appears to be recovering appropriately since surgery.  Vital signs stable. No medical barriers to discharge at this time.    Disposition: Anticipate discharge likely today if mobility and pain goals are met     Attestation:  I have reviewed today's vital signs, notes, medications, labs and imaging.  Total time: 15 minutes    Kelly Galo PA-C   [facility] Medicine    Supervising Physician Dr. Suejy Stanley.     Interval History   Patient was seen this morning. She feels well today. Pain is fluctuating but states it is tolerable. She has been up to the bathroom with minimal assistance, feels she is moving well. Good appetite. Unsure if passing Gas. No BM. Voiding regularly.    Denies headache, lightheadedness, dizziness, fever, chills, chest pain, palpitations, shortness of breath, cough, abdominal pain, nausea, vomiting, numbness or tingling in bilateral lower extremities.         Physical Exam   Temp:  [97.3  F (36.3  C)-98.9  F (37.2  C)] 97.3  F (36.3  C)  Pulse:  [52-70] 63  Resp:  [10-18] 18  BP: ()/(54-88) 115/57  SpO2:  [93 %-99 %] 93 %    Weights:   Vitals:    09/20/22 1259   Weight: 91.2 kg (201 lb)    Body mass index is 34.5 kg/m .    General: Appears well, sitting up in bed eating breakfast. Alert and oriented. Pleasant and cooperative. No distress. Non-toxic. Appears stated age.   CV: Regular rate, normal rhythm. Radial pulses are 2+ bilaterally. No lower extremity edema.  Respiratory: No accessory muscle usage. Clear to auscultation bilaterally. No wheezes, crackles or rhonchi.   GI: Bowel sounds normoactive in all quadrants. Soft, non-tender, non-distended.  Skin: Warm, dry, intact. Did not assess incision, bandage appears clean and dry.  Musculoskeletal: Muscle tone is appropriate. Moves all extremities freely with limited range of motion left lower extremity due to pain and bandage.  Neuro: Sensation to light touch of bilateral lower extremities is grossly intact.     Data   Recent Labs   Lab 09/21/22  0750 09/21/22  0512   HGB  --  11.6*   GLC  238* 256*       Recent Labs   Lab 09/21/22  0750 09/21/22  0512   * 256*        Unresulted Labs Ordered in the Past 30 Days of this Admission     No orders found for last 31 day(s).           Imaging  Recent Results (from the past 24 hour(s))   POC US Guidance Needle Placement    Impression    nml   XR Knee Port Left 1/2 Views    Narrative    EXAM: XR KNEE PORT LEFT 1/2 VIEWS  LOCATION: [facility]  DATE/TIME: 9/20/2022 5:30 PM    INDICATION: Postop Total Knee.  COMPARISON: 8/6/2020      Impression    IMPRESSION: Postop new left total knee arthroplasty with patellar resurfacing. The components project in the expected position. No acute displaced periprosthetic fracture or finding for component failure. Expected postop soft tissue, intra-articular and   intraosseous gas left knee. Knee joint effusion with knee soft tissue swelling.        I reviewed all new labs and imaging results over the last 24 hours. I personally reviewed no images or EKG's today.    Medications     lactated ringers 75 mL/hr at 09/20/22 2123       acetaminophen  975 mg Oral Q8H     aspirin  325 mg Oral Daily     hydrochlorothiazide  25 mg Oral QAM     insulin aspart  1-7 Units Subcutaneous TID AC     insulin aspart  1-5 Units Subcutaneous At Bedtime     ketorolac  15 mg Intravenous Q6H     lisinopril  10 mg Oral QAM     metoprolol succinate ER  50 mg Oral Daily     pantoprazole  40 mg Oral QAM AC     polyethylene glycol  17 g Oral Daily     senna-docusate  1 tablet Oral BID     sodium chloride (PF)  3 mL Intracatheter Q8H

## 2022-09-21 NOTE — PROGRESS NOTES
[facility] DISCHARGE NOTE    Patient discharged to home at 1:55 PM via wheel chair. Accompanied by spouse and staff. Discharge instructions reviewed with patient and spouse, opportunity offered to ask questions. Prescriptions sent to patients preferred pharmacy. All belongings sent with patient.    Julissa Raza RN

## 2022-09-21 NOTE — PLAN OF CARE
Physical Therapy Discharge Summary    Reason for therapy discharge:    All goals and outcomes met, no further needs identified.    Progress towards therapy goal(s). See goals on Care Plan in [facility] electronic health record for goal details.  Goals met    Therapy recommendation(s):    Home with family assist as needed and home PT with anticipated quick progression to OP PT. Pt has L-TKA HEP to start in the meantime.

## 2022-09-21 NOTE — PROGRESS NOTES
"Inpatient Physical Therapy Evaluation       09/21/22 0800   Quick Adds   Type of Visit Initial PT Evaluation       Present no   Living Environment   People in Home spouse   Current Living Arrangements house  (everything on the main floor)   Home Accessibility stairs to enter home   Number of Stairs, Main Entrance 4   Stair Railings, Main Entrance railing on right side (ascending)   Number of Stairs, Within Home, Primary none   Transportation Anticipated family or friend will provide   Living Environment Comments son will also be there as needed, son took the week off of work   Self-Care   Usual Activity Tolerance good   Current Activity Tolerance moderate   Regular Exercise Yes  (was going to pulmonary/cardiac rehab here at the hospital, just stopped on 9/13/22)   Equipment Currently Used at Home grab bar, tub/shower;commode chair;walker, rolling   Fall history within last six months no   Activity/Exercise/Self-Care Comment using a walker just the day before surgery, was doing minimal activity due to L-knee pain   General Information   Onset of Illness/Injury or Date of Surgery 09/20/22   Referring Physician Dr. Jerry MD   Patient/Family Therapy Goals Statement (PT) to go home, family to help as needed   Pertinent History of Current Problem (include personal factors and/or comorbidities that impact the POC) pt had R-TKA June 2021 and did great \"Was chasing pigs 4 weeks after\"; now here for L-TKA on 9/20/22, planning to dc home with home therapy to start out with, family to help   Existing Precautions/Restrictions no known precautions/restrictions   Weight-Bearing Status - LLE weight-bearing as tolerated   General Observations Pt received sitting up in bed at start of session, eager to start therapy and go home today. Reports initially 0/10 pain at rest.   Cognition   Affect/Mental Status (Cognition) WNL   Orientation Status (Cognition) oriented x 3   Follows Commands (Cognition) WNL "   Behavioral Issues   (none)   Safety Deficit (Cognition) ability to follow commands   Pain Assessment   Patient Currently in Pain Yes, see Vital Sign flowsheet  (L-knee 5/10, throbbing)   Posture    Posture Not impaired   Range of Motion (ROM)   ROM Comment grossly -5-90   Strength (Manual Muscle Testing)   Strength (Manual Muscle Testing) strength is WFL   Strength Comments able to perform LAQ   Bed Mobility   Bed Mobility no deficits identified   Assistive Device (Bed Mobility) bed rails   Comment, (Bed Mobility) supervision for supine <-> sit using bed rail   Transfers   Transfers bed-chair transfer;sit-stand transfer   Transfer Safety Concerns Noted   (none)   Comment, (Transfers) STS up to FWW and bed <-> chair transfers using FWW at close supervision, pt demos with ease and safely; initially given vebal cue for safe positioning prior to sitting and then pt able to easily follow-through and perform on own w/o cues, fairly good eccentric control w/sitting with use of arms to assist   Gait/Stairs (Locomotion)   [place] Level (Gait) supervision   Assistive Device (Gait) walker, front-wheeled   Distance in Feet (Required for LE Total Joints) 30   Pattern (Gait) step-through   Comment, (Gait/Stairs) Pt ambulated 30 feet in room using FWW at close supervision demonstrating slight decreased pace, step-through pattern, very slight decreased stance time on LLE vs RLE and increased reliance onto FWW with BUEs; pt safe/steady throughout, no LOB and/or balance deficits, able to negotiate walker around tight spaces in room without assist; pt negoatied safely up/down 5 stairs x2 reps using B railing at supervision   Balance   Balance no deficits were identified   Balance Comments good static and dynamic standing balance using FWW with BUEs; denies any dizziness   Sensory Examination   Sensory Perception WNL   Sensory Perception Comments pt denies numbness/tinging in LLE   Coordination   Coordination no deficits were  identified   Clinical Impression   Criteria for Skilled Therapeutic Intervention Yes, treatment indicated   PT Diagnosis (PT) decreased independence with functional mobility s/p L-TKA   Influenced by the following impairments L-knee pain and decreased ROM   Functional limitations due to impairments bed mobility, transfers, ambulation and stair negotiation   Clinical Presentation (PT Evaluation Complexity) Stable/Uncomplicated   Clinical Presentation Rationale clinical judgement; pt moving around very well POD #1; did great after R-TKA in June 2021; supportive family to help at home as/if needed   Clinical Decision Making (Complexity) low complexity   Planned Therapy Interventions (PT) gait training;home exercise program;patient/family education;stair training;ROM (range of motion);strengthening;transfer training;progressive activity/exercise;risk factor education;home program guidelines   Anticipated Equipment Needs at Discharge (PT)   (none; pt already has a FWW at home)   Risk & Benefits of therapy have been explained evaluation/treatment results reviewed;care plan/treatment goals reviewed;participants voiced agreement with care plan;patient   Clinical Impression Comments Pt moving around very well POD #1 and is ready for discharge home with family's assist as/if needed. Pt has met all her IP PT goals in one session and moving around with FWW at supervision level of assistance.   PT Discharge Planning   PT Discharge Recommendation (DC Rec) home with home care physical therapy  (with expected quick transition to OP PT)   PT Rationale for DC Rec pt moving around very well POD #1 and moving well with FWW and only supervision level of assist; good family support   PT Brief overview of current status all mobilty w/FWW and supervision   Plan of Care Review   Plan of Care Reviewed With patient   Total Evaluation Time   Total Evaluation Time (Minutes) 10   Physical Therapy Goals   PT Frequency Other (see comments)  (1x  eval and treat only; all goals met in one session)   PT Predicted Duration/Target Date for Goal Attainment 09/21/22   PT Goals Bed Mobility;Transfers;Gait;Stairs   PT: Bed Mobility Supervision/stand-by assist;Supine to/from sit;Goal Met   PT: Transfers Supervision/stand-by assist;Sit to/from stand;Bed to/from chair;Assistive device;Goal Met   PT: Gait Supervision/stand-by assist;Rolling walker;150 feet;Goal Met   PT: Stairs Supervision/stand-by assist;4 stairs;Rail on both sides;Goal Met

## 2022-09-21 NOTE — PROGRESS NOTES
"[place] [facility] ADMISSION NOTE    Patient admitted to room 2311 at approximately 1900 via cart from surgery. Patient was accompanied by significant other.     Verbal SBAR report received from Re prior to patient arrival.     Patient trasferred to bed via air anu. Patient alert and oriented X 3. Pain is controlled with current analgesics.  Medication(s) being used: block.  . Admission vital signs: Blood pressure 126/65, pulse 70, temperature 98  F (36.7  C), temperature source Oral, resp. rate 18, height 1.626 m (5' 4\"), weight 91.2 kg (201 lb), SpO2 97 %, not currently breastfeeding. Patient and spouse were oriented to plan of care, call light, bed controls, tv, telephone, bathroom and visiting hours.     Risk Assessment    The following safety risks were identified during admission: fall. Yellow risk band applied: YES.     Skin Initial Assessment    This writer admitted this patient and completed a full skin assessment and Kyle score in the Adult PCS flowsheet. Appropriate interventions initiated as needed.     Secondary skin check completed by Laura GARCIA RN.    Kyle Risk Assessment  Sensory Perception: 3-->slightly limited  Moisture: 3-->occasionally moist  Activity: 3-->walks occasionally  Mobility: 3-->slightly limited  Nutrition: 3-->adequate  Friction and Shear: 3-->no apparent problem  Kyle Score: 18  Moisture Interventions: No brief in bed  Mattress: Standard Hospital Mattress (Foam)  Bed Frame: Standard width and length    Education    Patient has a [place] to Observation order: Yes  Observation education completed and documented: Yes      Yanet Niño RN    "

## 2022-10-03 ENCOUNTER — TRANSFERRED RECORDS (OUTPATIENT)
Dept: FAMILY MEDICINE | Facility: CLINIC | Age: 72
End: 2022-10-03

## 2022-10-17 ENCOUNTER — MYC MEDICAL ADVICE (OUTPATIENT)
Dept: FAMILY MEDICINE | Facility: CLINIC | Age: 72
End: 2022-10-17

## 2022-10-31 ENCOUNTER — TRANSFERRED RECORDS (OUTPATIENT)
Dept: HEALTH INFORMATION MANAGEMENT | Facility: CLINIC | Age: 72
End: 2022-10-31

## 2022-12-22 DIAGNOSIS — G47.33 OBSTRUCTIVE SLEEP APNEA (ADULT) (PEDIATRIC): Primary | ICD-10-CM

## 2023-01-10 ENCOUNTER — ANCILLARY PROCEDURE (OUTPATIENT)
Dept: MAMMOGRAPHY | Facility: CLINIC | Age: 73
End: 2023-01-10
Attending: FAMILY MEDICINE
Payer: COMMERCIAL

## 2023-01-10 DIAGNOSIS — Z12.31 VISIT FOR SCREENING MAMMOGRAM: ICD-10-CM

## 2023-01-10 PROCEDURE — 77067 SCR MAMMO BI INCL CAD: CPT | Mod: TC | Performed by: RADIOLOGY

## 2023-01-10 PROCEDURE — 77063 BREAST TOMOSYNTHESIS BI: CPT | Mod: TC | Performed by: RADIOLOGY

## 2023-01-16 NOTE — PROGRESS NOTES
"[facility]    I had the pleasure of seeing Jenna when she came for follow up of SVT and HTN.  This 72 year old sees Dr. Varma and has seen Dr. Saravia and Dr. Glynn for her history of:       1. Paroxysmal SVT a/w palpitations, SOB and fatigue and first evaluated 2016. EF with normal EF. Zio showed likely AT back in 2016 that continued despite metoprolol. Subsequent ZioPatches with shorter episodes and evidence of pauses.  2. Benign Essential HTN  3. ASHLEY on CPAP  4. Prediabetes - A1c 6.0% 9/2022      I saw Jenna 6/2022 at which time she had started pulmonary rehabilitation, but had not had any improvement in her HARDWICK.  She had not been able to use the treadmill because of significant knee pain.  She continued to note HARDWICK and \"internal shakiness\".  BP/HR is under good control and no changes were made.  We did discuss increasing metoprolol slightly for ectopy noted on ZioPatch (<1%) to see if this would improve her shakiness, but agreed to just have close follow-up in 6 months.    She underwent L TKA 9/2022    Interval History:  Overall thinks she's doing OK heart-wise. Still notes a little \"pounding\" in the head and chest, which is short-lived. No dizziness, lightheadedness associated.    Still with internal \"shakiness,\" mostly when she needs to get somewhere and gets \"a little anxious.\"     Remains fatigued. Using CPAP but doesn't have capability to download. Last virtual visit with Dr. Rodríguez 10/2020 and he suggested getting updated machine with these capabilities.      VITALS:  Vitals: /64 (BP Location: Right arm, Patient Position: Sitting, Cuff Size: Adult Large)   Pulse 59   Temp 97.4  F (36.3  C) (Tympanic)   Wt 89.8 kg (198 lb)   SpO2 96%   BMI 33.99 kg/m      Diagnostic Testing:  ZioPatch 4/26-5/7/2022 on Metoprolol XL 50 mg daily showed SR with 11 runs of SVT. In SR, avg HR 67 bpm, range  bpm. Longest SVT episode 9 beats, with avg  bpm (range  bpm). PACs and PVCs " "noted. PVC/PAC burden <1%  Echo 3/30/2022  LVEF 60-65%. No RWMA. Nl RV. No sig valve abnls. Mild aortic sclerosis.  Nuc Stress Test 5/2021 no ischemia/infarction. Small LV cavity. Low volgage on EKG; thickened myocardium on study with consideration for infiltrative CM  CTA Head/Neck 12/2019 without stenosis bilateral carotids. No FMD. No dissection.   ZioPatch 5/15-5/29/2019 ordered by her Primary showed predominately SR with average HR 72 bpm (range 45 bpm @ 0740 - 105 bpm @ 1718). 63 episodes SVT, with average  bpm (range  bpm). Longest episode lasted ~13 minutes.  1 episode of 2nd degree HB Type I (per Dr. Saravia, NOT Type II) and a 2.5\" pause on 5/25 @ 2337. Symptoms of palpitations typically correlated with sinus rhythm and occasional ectopy.  Rarely correlated with SVT.   ZioPatch 4/2018 showed SR with 13 runs of PSVT (<17 beats) and symptoms which correlated with SR.  Echocardiogram 6/2016 showed EF 55-60% with grade I diastolic dysfunction. Normal RV function and size. 1+ TR but no other significant valvular abnormalities.   Nuclear Treadmill Stress Test 10/2015 showed breast attenuation artifact but a small area of anterior wall ischemia could not be excluded. Reached 79% of max predicted HR (not on BB).       Plan:  1. Annual follow-up       Assessment/Plan:    1. MULU    Started Pulmonary Rehab which she thinks might have helped a little    Reviewed her ZioPatch showing no significant arrhythmias or chronotropic incompetence.  Echocardiogram 3/2022 with just mild aortic sclerosis and normal LVEF.      Normal stress test 5/2021.    She thinks this is stable and does not think additional work up required (Dr. Varma had indicated he'd proceed with MRI vs R/L heart catheterization if desired).    PLAN:    Increase exercise now that knee is done    Get back to doing Pulm Rehab breathing exercises    Call if significant worsening and we can proceed with testing per Dr. Varma    Recommend contact  " "Anne's office to determine if updated visit required to re-discuss changing out CPAP per pt's request    2. Ectopy    Noted \"constant internal shakiness\" and we reviewed her ZioPatch on metoprolol XL 50 mg daily showing basically SR, with asymptomatic SVT and minimal ectopy    At her last visit, I discussed that we could increase metoprolol to see if this improves her symptoms at all, but she agreed that we would make no changes.    PLAN:    Continues to decline increase in metoprolol       3. HTN    BP looks good!  Remains on hydrochlorothiazide 25, lisinopril 10, Metoprolol XL 50    PLAN:    Refills sent    Arelis Deutsch PA-C, MSPAS      Orders Placed This Encounter   Procedures     Follow-Up with Cardiology JENNIFER     EKG 12-LEAD CLINIC READ - [place] Only     Orders Placed This Encounter   Medications     hydrochlorothiazide (HYDRODIURIL) 25 MG tablet     Sig: Take 1 tablet (25 mg) by mouth every morning     Dispense:  90 tablet     Refill:  3     lisinopril (ZESTRIL) 10 MG tablet     Sig: Take 1 tablet (10 mg) by mouth every morning     Dispense:  90 tablet     Refill:  3     metoprolol succinate ER (TOPROL XL) 50 MG 24 hr tablet     Sig: Take 1 tablet (50 mg) by mouth daily     Dispense:  90 tablet     Refill:  3     Medications Discontinued During This Encounter   Medication Reason     senna-docusate (SENOKOT-S/PERICOLACE) 8.6-50 MG tablet      aspirin (ASA) 325 MG EC tablet      hydrochlorothiazide (HYDRODIURIL) 25 MG tablet Reorder     lisinopril (ZESTRIL) 10 MG tablet Reorder     metoprolol succinate ER (TOPROL-XL) 50 MG 24 hr tablet Reorder     oxyCODONE (ROXICODONE) 5 MG tablet Therapy completed         Encounter Diagnoses   Name Primary?     Paroxysmal supraventricular tachycardia (H)      Essential hypertension      Essential hypertension with goal blood pressure less than 140/90        CURRENT MEDICATIONS:  Current Outpatient Medications   Medication Sig Dispense Refill     acetaminophen (TYLENOL) 325 " MG tablet Take 2 tablets (650 mg) by mouth every 4 hours as needed for other (mild pain) 100 tablet 0     fluocinonide (LIDEX) 0.05 % external solution Apply sparingly to affected area twice daily as needed.  Do not apply to face. 60 mL 3     hydrochlorothiazide (HYDRODIURIL) 25 MG tablet Take 1 tablet (25 mg) by mouth every morning 90 tablet 3     hydrOXYzine (ATARAX) 25 MG tablet Take 1 tablet (25 mg) by mouth every 6 hours as needed for itching or anxiety (with pain, moderate pain) 33 tablet 0     lisinopril (ZESTRIL) 10 MG tablet Take 1 tablet (10 mg) by mouth every morning 90 tablet 3     metoprolol succinate ER (TOPROL XL) 50 MG 24 hr tablet Take 1 tablet (50 mg) by mouth daily 90 tablet 3     Multiple Vitamins-Minerals (CENTRUM SILVER) per tablet Take 1 tablet by mouth daily       omeprazole (PRILOSEC) 20 MG DR capsule TAKE 1 CAPSULE BY MOUTH ONCE DAILY (Patient taking differently: Take 20 mg by mouth daily TAKE 1 CAPSULE BY MOUTH ONCE DAILY) 90 capsule 2     VITAMIN D, CHOLECALCIFEROL, PO Take 1,000 Units by mouth daily          ALLERGIES     Allergies   Allergen Reactions     Penicillins Hives     Pravastatin Muscle Pain (Myalgia)     Simvastatin Muscle Pain (Myalgia)     Sulfa Drugs Hives         Review of Systems:  Skin:        Eyes:       ENT:       Respiratory:  Positive for shortness of breath;dyspnea on exertion;dyspnea at rest;cough;sleep apnea;CPAP  Cardiovascular:  Negative;palpitations;chest pain;edema;syncope or near-syncope dizziness;fatigue;Positive for;lightheadedness  Gastroenterology:      Genitourinary:       Musculoskeletal:       Neurologic:       Psychiatric:       Heme/Lymph/Imm:       Endocrine:         Physical Exam:  Vitals: /64 (BP Location: Right arm, Patient Position: Sitting, Cuff Size: Adult Large)   Pulse 59   Temp 97.4  F (36.3  C) (Tympanic)   Wt 89.8 kg (198 lb)   SpO2 96%   BMI 33.99 kg/m      Constitutional:  cooperative, alert and oriented, well developed, well  nourished, in no acute distress        Skin:  warm and dry to the touch, no apparent skin lesions or masses noted        Head:  normocephalic, no masses or lesions        Eyes:      watery eyes    ENT:  not assessed this visit        Neck:  JVP normal;no carotid bruit        Chest:  normal breath sounds, clear to auscultation, normal A-P diameter, normal symmetry, normal respiratory excursion, no use of accessory muscles        Cardiac: regular rhythm, normal S1/S2, no S3 or S4, apical impulse not displaced, no murmurs, gallops or rubs   S4 no presence of murmur            Abdomen:    obese      Vascular: pulses full and equal                                      Extremities and Back:  no edema        Neurological:  no gross motor deficits            PAST MEDICAL HISTORY:  Past Medical History:   Diagnosis Date     Essential hypertension with goal blood pressure less than 140/90 8/21/2016     Gastroesophageal reflux disease without esophagitis 8/22/2016     H. pylori infection 12/24/2015     Hyperlipidemia LDL goal <130 10/12/2015     ASHLEY (obstructive sleep apnea) 10/12/2015    Severe ASHLEY  - PSG at 7/27/2006 with undocumented weight, AHI 84.7, RDI 88.9, SpO2 remained above 90%, CPAP 7 optimal.     Paroxysmal supraventricular tachycardia (H) 2/23/2017       PAST SURGICAL HISTORY:  Past Surgical History:   Procedure Laterality Date     ARTHROPLASTY KNEE Right 6/17/2021    Procedure: Right Total Knee Arthroplasty;  Surgeon: Nils Edwards MD;  Location: [facility]     ARTHROPLASTY KNEE Left 9/20/2022    Procedure: left TOTAL Knee Arthroplasty;  Surgeon: Nils Edwards MD;  Location: [facility]     CARPAL TUNNEL RELEASE RT/LT Bilateral 2006, 2009     COLONOSCOPY N/A 11/25/2015    Procedure: COLONOSCOPY;  Surgeon: Mina Roy MD;  Location: [facility]     ESOPHAGOSCOPY, GASTROSCOPY, DUODENOSCOPY (EGD), COMBINED N/A 11/25/2015    Procedure: COMBINED ESOPHAGOSCOPY, GASTROSCOPY, DUODENOSCOPY (EGD), BIOPSY  SINGLE OR MULTIPLE;  Surgeon: Mina Roy MD;  Location: [facility]     PAROTIDECTOMY Left 10/31/2019    Procedure: LEFT SUPERFICIAL PAROTIDECTOMY WITH FACIAL NERVE MONITORING;  Surgeon: Atif Salazar MD;  Location: [facility]     TONSILLECTOMY & ADENOIDECTOMY  age 13       FAMILY HISTORY:  Family History   Problem Relation Age of Onset     Liver Disease Mother      Depression/Anxiety Father      Diabetes Father      Diabetes Sister      Liver Disease Sister      Breast Cancer Sister      Cancer Sister      Kidney Cancer Nephew        SOCIAL HISTORY:  Social History     Socioeconomic History     Marital status:      Spouse name: None     Number of children: None     Years of education: None     Highest education level: None   Tobacco Use     Smoking status: Former     Smokeless tobacco: Never   Vaping Use     Vaping Use: Never used   Substance and Sexual Activity     Alcohol use: Yes     Comment: ocassional     Drug use: No     Sexual activity: Not Currently     Partners: Female   Other Topics Concern     Parent/sibling w/ CABG, MI or angioplasty before 65F 55M? Yes     Comment: Father with MI at 55

## 2023-01-17 ENCOUNTER — OFFICE VISIT (OUTPATIENT)
Dept: CARDIOLOGY | Facility: CLINIC | Age: 73
End: 2023-01-17
Attending: PHYSICIAN ASSISTANT
Payer: COMMERCIAL

## 2023-01-17 VITALS
DIASTOLIC BLOOD PRESSURE: 64 MMHG | OXYGEN SATURATION: 96 % | TEMPERATURE: 97.4 F | SYSTOLIC BLOOD PRESSURE: 131 MMHG | BODY MASS INDEX: 33.99 KG/M2 | WEIGHT: 198 LBS | HEART RATE: 59 BPM

## 2023-01-17 DIAGNOSIS — I47.10 PAROXYSMAL SUPRAVENTRICULAR TACHYCARDIA (H): ICD-10-CM

## 2023-01-17 DIAGNOSIS — I10 ESSENTIAL HYPERTENSION: ICD-10-CM

## 2023-01-17 DIAGNOSIS — I10 ESSENTIAL HYPERTENSION WITH GOAL BLOOD PRESSURE LESS THAN 140/90: ICD-10-CM

## 2023-01-17 PROCEDURE — 99213 OFFICE O/P EST LOW 20 MIN: CPT | Performed by: PHYSICIAN ASSISTANT

## 2023-01-17 RX ORDER — METOPROLOL SUCCINATE 50 MG/1
50 TABLET, EXTENDED RELEASE ORAL DAILY
Qty: 90 TABLET | Refills: 3 | Status: SHIPPED | OUTPATIENT
Start: 2023-01-17 | End: 2023-12-28

## 2023-01-17 RX ORDER — LISINOPRIL 10 MG/1
10 TABLET ORAL EVERY MORNING
Qty: 90 TABLET | Refills: 3 | Status: SHIPPED | OUTPATIENT
Start: 2023-01-17 | End: 2023-12-28

## 2023-01-17 RX ORDER — HYDROCHLOROTHIAZIDE 25 MG/1
25 TABLET ORAL EVERY MORNING
Qty: 90 TABLET | Refills: 3 | Status: SHIPPED | OUTPATIENT
Start: 2023-01-17 | End: 2023-12-28

## 2023-01-17 NOTE — LETTER
"1/17/2023    Addis House MD  [address] [place] [id number]    RE: Rosita GUTIERREZ Christine       Dear Colleague,     I had the pleasure of seeing Rosita King in the [facility].  [facility]    I had the pleasure of seeing Jenna when she came for follow up of SVT and HTN.  This 72 year old sees Dr. Varma and has seen Dr. Saravia and Dr. Glynn for her history of:       1. Paroxysmal SVT a/w palpitations, SOB and fatigue and first evaluated 2016. EF with normal EF. Zio showed likely AT back in 2016 that continued despite metoprolol. Subsequent ZioPatches with shorter episodes and evidence of pauses.  2. Benign Essential HTN  3. ASHLEY on CPAP  4. Prediabetes - A1c 6.0% 9/2022      I saw Jenna 6/2022 at which time she had started pulmonary rehabilitation, but had not had any improvement in her HARDWICK.  She had not been able to use the treadmill because of significant knee pain.  She continued to note HARDWICK and \"internal shakiness\".  BP/HR is under good control and no changes were made.  We did discuss increasing metoprolol slightly for ectopy noted on ZioPatch (<1%) to see if this would improve her shakiness, but agreed to just have close follow-up in 6 months.    She underwent L TKA 9/2022    Interval History:  Overall thinks she's doing OK heart-wise. Still notes a little \"pounding\" in the head and chest, which is short-lived. No dizziness, lightheadedness associated.    Still with internal \"shakiness,\" mostly when she needs to get somewhere and gets \"a little anxious.\"     Remains fatigued. Using CPAP but doesn't have capability to download. Last virtual visit with Dr. Rodríguez 10/2020 and he suggested getting updated machine with these capabilities.      VITALS:  Vitals: /64 (BP Location: Right arm, Patient Position: Sitting, Cuff Size: Adult Large)   Pulse 59   Temp 97.4  F (36.3  C) (Tympanic)   Wt 89.8 kg (198 lb)   SpO2 96%   BMI 33.99 kg/m      Diagnostic " "Testing:  ZioPatch 4/26-5/7/2022 on Metoprolol XL 50 mg daily showed SR with 11 runs of SVT. In SR, avg HR 67 bpm, range  bpm. Longest SVT episode 9 beats, with avg  bpm (range  bpm). PACs and PVCs noted. PVC/PAC burden <1%  Echo 3/30/2022  LVEF 60-65%. No RWMA. Nl RV. No sig valve abnls. Mild aortic sclerosis.  Nuc Stress Test 5/2021 no ischemia/infarction. Small LV cavity. Low volgage on EKG; thickened myocardium on study with consideration for infiltrative CM  CTA Head/Neck 12/2019 without stenosis bilateral carotids. No FMD. No dissection.   ZioPatch 5/15-5/29/2019 ordered by her Primary showed predominately SR with average HR 72 bpm (range 45 bpm @ 0740 - 105 bpm @ 1718). 63 episodes SVT, with average  bpm (range  bpm). Longest episode lasted ~13 minutes.  1 episode of 2nd degree HB Type I (per Dr. Saravia, NOT Type II) and a 2.5\" pause on 5/25 @ 2337. Symptoms of palpitations typically correlated with sinus rhythm and occasional ectopy.  Rarely correlated with SVT.   ZioPatch 4/2018 showed SR with 13 runs of PSVT (<17 beats) and symptoms which correlated with SR.  Echocardiogram 6/2016 showed EF 55-60% with grade I diastolic dysfunction. Normal RV function and size. 1+ TR but no other significant valvular abnormalities.   Nuclear Treadmill Stress Test 10/2015 showed breast attenuation artifact but a small area of anterior wall ischemia could not be excluded. Reached 79% of max predicted HR (not on BB).       Plan:  1. Annual follow-up       Assessment/Plan:    1. HARDWICK    Started Pulmonary Rehab which she thinks might have helped a little    Reviewed her ZioPatch showing no significant arrhythmias or chronotropic incompetence.  Echocardiogram 3/2022 with just mild aortic sclerosis and normal LVEF.      Normal stress test 5/2021.    She thinks this is stable and does not think additional work up required (Dr. Varma had indicated he'd proceed with MRI vs R/L heart catheterization if " "desired).    PLAN:    Increase exercise now that knee is done    Get back to doing Pulm Rehab breathing exercises    Call if significant worsening and we can proceed with testing per Dr. Varma    Recommend contact Dr. Rodríguez's office to determine if updated visit required to re-discuss changing out CPAP per pt's request    2. Ectopy    Noted \"constant internal shakiness\" and we reviewed her ZioPatch on metoprolol XL 50 mg daily showing basically SR, with asymptomatic SVT and minimal ectopy    At her last visit, I discussed that we could increase metoprolol to see if this improves her symptoms at all, but she agreed that we would make no changes.    PLAN:    Continues to decline increase in metoprolol       3. HTN    BP looks good!  Remains on hydrochlorothiazide 25, lisinopril 10, Metoprolol XL 50    PLAN:    Refills sent    Arelis Deutsch PA-C, MSPAS      Orders Placed This Encounter   Procedures     Follow-Up with Cardiology JENNIFER     EKG 12-LEAD CLINIC READ - [place] Only     Orders Placed This Encounter   Medications     hydrochlorothiazide (HYDRODIURIL) 25 MG tablet     Sig: Take 1 tablet (25 mg) by mouth every morning     Dispense:  90 tablet     Refill:  3     lisinopril (ZESTRIL) 10 MG tablet     Sig: Take 1 tablet (10 mg) by mouth every morning     Dispense:  90 tablet     Refill:  3     metoprolol succinate ER (TOPROL XL) 50 MG 24 hr tablet     Sig: Take 1 tablet (50 mg) by mouth daily     Dispense:  90 tablet     Refill:  3     Medications Discontinued During This Encounter   Medication Reason     senna-docusate (SENOKOT-S/PERICOLACE) 8.6-50 MG tablet      aspirin (ASA) 325 MG EC tablet      hydrochlorothiazide (HYDRODIURIL) 25 MG tablet Reorder     lisinopril (ZESTRIL) 10 MG tablet Reorder     metoprolol succinate ER (TOPROL-XL) 50 MG 24 hr tablet Reorder     oxyCODONE (ROXICODONE) 5 MG tablet Therapy completed         Encounter Diagnoses   Name Primary?     Paroxysmal supraventricular tachycardia (H)      " Essential hypertension      Essential hypertension with goal blood pressure less than 140/90        CURRENT MEDICATIONS:  Current Outpatient Medications   Medication Sig Dispense Refill     acetaminophen (TYLENOL) 325 MG tablet Take 2 tablets (650 mg) by mouth every 4 hours as needed for other (mild pain) 100 tablet 0     fluocinonide (LIDEX) 0.05 % external solution Apply sparingly to affected area twice daily as needed.  Do not apply to face. 60 mL 3     hydrochlorothiazide (HYDRODIURIL) 25 MG tablet Take 1 tablet (25 mg) by mouth every morning 90 tablet 3     hydrOXYzine (ATARAX) 25 MG tablet Take 1 tablet (25 mg) by mouth every 6 hours as needed for itching or anxiety (with pain, moderate pain) 33 tablet 0     lisinopril (ZESTRIL) 10 MG tablet Take 1 tablet (10 mg) by mouth every morning 90 tablet 3     metoprolol succinate ER (TOPROL XL) 50 MG 24 hr tablet Take 1 tablet (50 mg) by mouth daily 90 tablet 3     Multiple Vitamins-Minerals (CENTRUM SILVER) per tablet Take 1 tablet by mouth daily       omeprazole (PRILOSEC) 20 MG DR capsule TAKE 1 CAPSULE BY MOUTH ONCE DAILY (Patient taking differently: Take 20 mg by mouth daily TAKE 1 CAPSULE BY MOUTH ONCE DAILY) 90 capsule 2     VITAMIN D, CHOLECALCIFEROL, PO Take 1,000 Units by mouth daily          ALLERGIES     Allergies   Allergen Reactions     Penicillins Hives     Pravastatin Muscle Pain (Myalgia)     Simvastatin Muscle Pain (Myalgia)     Sulfa Drugs Hives         Review of Systems:  Skin:        Eyes:       ENT:       Respiratory:  Positive for shortness of breath;dyspnea on exertion;dyspnea at rest;cough;sleep apnea;CPAP  Cardiovascular:  Negative;palpitations;chest pain;edema;syncope or near-syncope dizziness;fatigue;Positive for;lightheadedness  Gastroenterology:      Genitourinary:       Musculoskeletal:       Neurologic:       Psychiatric:       Heme/Lymph/Imm:       Endocrine:         Physical Exam:  Vitals: /64 (BP Location: Right arm, Patient  Position: Sitting, Cuff Size: Adult Large)   Pulse 59   Temp 97.4  F (36.3  C) (Tympanic)   Wt 89.8 kg (198 lb)   SpO2 96%   BMI 33.99 kg/m      Constitutional:  cooperative, alert and oriented, well developed, well nourished, in no acute distress        Skin:  warm and dry to the touch, no apparent skin lesions or masses noted        Head:  normocephalic, no masses or lesions        Eyes:      watery eyes    ENT:  not assessed this visit        Neck:  JVP normal;no carotid bruit        Chest:  normal breath sounds, clear to auscultation, normal A-P diameter, normal symmetry, normal respiratory excursion, no use of accessory muscles        Cardiac: regular rhythm, normal S1/S2, no S3 or S4, apical impulse not displaced, no murmurs, gallops or rubs   S4 no presence of murmur            Abdomen:    obese      Vascular: pulses full and equal                                      Extremities and Back:  no edema        Neurological:  no gross motor deficits           PAST MEDICAL HISTORY:  Past Medical History:   Diagnosis Date     Essential hypertension with goal blood pressure less than 140/90 8/21/2016     Gastroesophageal reflux disease without esophagitis 8/22/2016     H. pylori infection 12/24/2015     Hyperlipidemia LDL goal <130 10/12/2015     ASHLEY (obstructive sleep apnea) 10/12/2015    Severe ASHLEY  - PSG at 7/27/2006 with undocumented weight, AHI 84.7, RDI 88.9, SpO2 remained above 90%, CPAP 7 optimal.     Paroxysmal supraventricular tachycardia (H) 2/23/2017       PAST SURGICAL HISTORY:  Past Surgical History:   Procedure Laterality Date     ARTHROPLASTY KNEE Right 6/17/2021    Procedure: Right Total Knee Arthroplasty;  Surgeon: Nils Edwards MD;  Location: [facility]     ARTHROPLASTY KNEE Left 9/20/2022    Procedure: left TOTAL Knee Arthroplasty;  Surgeon: Nils Edwards MD;  Location: [facility]     CARPAL TUNNEL RELEASE RT/LT Bilateral 2006, 2009     COLONOSCOPY N/A 11/25/2015    Procedure:  COLONOSCOPY;  Surgeon: Mina Roy MD;  Location: [facility]     ESOPHAGOSCOPY, GASTROSCOPY, DUODENOSCOPY (EGD), COMBINED N/A 11/25/2015    Procedure: COMBINED ESOPHAGOSCOPY, GASTROSCOPY, DUODENOSCOPY (EGD), BIOPSY SINGLE OR MULTIPLE;  Surgeon: Mina Roy MD;  Location: [facility]     PAROTIDECTOMY Left 10/31/2019    Procedure: LEFT SUPERFICIAL PAROTIDECTOMY WITH FACIAL NERVE MONITORING;  Surgeon: Atif Salazar MD;  Location: [facility]     TONSILLECTOMY & ADENOIDECTOMY  age 13       FAMILY HISTORY:  Family History   Problem Relation Age of Onset     Liver Disease Mother      Depression/Anxiety Father      Diabetes Father      Diabetes Sister      Liver Disease Sister      Breast Cancer Sister      Cancer Sister      Kidney Cancer Nephew        SOCIAL HISTORY:  Social History     Socioeconomic History     Marital status:      Spouse name: None     Number of children: None     Years of education: None     Highest education level: None   Tobacco Use     Smoking status: Former     Smokeless tobacco: Never   Vaping Use     Vaping Use: Never used   Substance and Sexual Activity     Alcohol use: Yes     Comment: ocassional     Drug use: No     Sexual activity: Not Currently     Partners: Female   Other Topics Concern     Parent/sibling w/ CABG, MI or angioplasty before 65F 55M? Yes     Comment: Father with MI at 55            Thank you for allowing me to participate in the care of your patient.      Sincerely,     Cassie Deutsch PA-C     [facility]  cc:   Cassie Deutsch PA-C  [address] [address]

## 2023-01-17 NOTE — NURSING NOTE
Chief Complaint   Patient presents with     Palpitations     Paroxysmal Supraventricular Tachycardia        There were no vitals filed for this visit.  Wt Readings from Last 1 Encounters:   09/20/22 91.2 kg (201 lb)       Halima Espitia MA

## 2023-01-17 NOTE — PATIENT INSTRUCTIONS
Jenna - it was nice to see you today!     At your visit today we reviewed:    Heart-wise, doing OK.   Fatigue is still very bothersome - still using old CPAP     Medication Changes:    No changes - I sent refills for metoprolol XL, lisinopril and hydrochlorothiazide to [facility]    Recommendations:    Contact Dr. Rodríguez's office to see if you need virtual visit to review how to change CPAPs - I think this is their #: [phone]  Do your PT exercises  Do your BREATHING exercises  Increase exercise     Follow-up:    See us for cardiology follow up in 1 year but CALL Cardiology nurses Lu & Ana Cristina @ [phone] for any issues, questions or concerns in the interim.      To schedule a future appointment, we kindly ask that you call cardiology scheduling at [phone] three months prior to requested visit date.    Important Phone Numbers for [facility]):    [place] [facility]: [phone]  Cardiology [phone]  [place] [facility] [phone]  [place] [facility] [phone]  [facility] [facility]: [phone]

## 2023-04-10 DIAGNOSIS — K21.9 GASTROESOPHAGEAL REFLUX DISEASE WITHOUT ESOPHAGITIS: ICD-10-CM

## 2023-04-30 ENCOUNTER — HEALTH MAINTENANCE LETTER (OUTPATIENT)
Age: 73
End: 2023-04-30

## 2023-08-01 ENCOUNTER — TRANSFERRED RECORDS (OUTPATIENT)
Dept: MULTI SPECIALTY CLINIC | Facility: CLINIC | Age: 73
End: 2023-08-01

## 2023-08-01 LAB — RETINOPATHY: NORMAL

## 2023-08-08 ENCOUNTER — TRANSFERRED RECORDS (OUTPATIENT)
Dept: HEALTH INFORMATION MANAGEMENT | Facility: CLINIC | Age: 73
End: 2023-08-08
Payer: COMMERCIAL

## 2023-09-26 ENCOUNTER — OFFICE VISIT (OUTPATIENT)
Dept: FAMILY MEDICINE | Facility: CLINIC | Age: 73
End: 2023-09-26
Payer: COMMERCIAL

## 2023-09-26 VITALS
DIASTOLIC BLOOD PRESSURE: 80 MMHG | HEIGHT: 63 IN | HEART RATE: 68 BPM | SYSTOLIC BLOOD PRESSURE: 126 MMHG | WEIGHT: 207 LBS | RESPIRATION RATE: 20 BRPM | BODY MASS INDEX: 36.68 KG/M2 | OXYGEN SATURATION: 98 % | TEMPERATURE: 97.4 F

## 2023-09-26 DIAGNOSIS — R10.13 EPIGASTRIC PAIN: ICD-10-CM

## 2023-09-26 DIAGNOSIS — D32.9 MENINGIOMA (H): ICD-10-CM

## 2023-09-26 DIAGNOSIS — L29.9 ITCHY SCALP: ICD-10-CM

## 2023-09-26 DIAGNOSIS — E78.5 HYPERLIPIDEMIA LDL GOAL <130: Chronic | ICD-10-CM

## 2023-09-26 DIAGNOSIS — J44.9 CHRONIC OBSTRUCTIVE PULMONARY DISEASE, UNSPECIFIED COPD TYPE (H): ICD-10-CM

## 2023-09-26 DIAGNOSIS — R91.8 PULMONARY NODULES: ICD-10-CM

## 2023-09-26 DIAGNOSIS — E11.9 TYPE 2 DIABETES MELLITUS WITHOUT COMPLICATION, WITHOUT LONG-TERM CURRENT USE OF INSULIN (H): ICD-10-CM

## 2023-09-26 DIAGNOSIS — R73.03 PREDIABETES: ICD-10-CM

## 2023-09-26 DIAGNOSIS — Z00.00 ENCOUNTER FOR MEDICARE ANNUAL WELLNESS EXAM: Primary | ICD-10-CM

## 2023-09-26 DIAGNOSIS — G47.33 OSA (OBSTRUCTIVE SLEEP APNEA): ICD-10-CM

## 2023-09-26 DIAGNOSIS — I47.10 PAROXYSMAL SUPRAVENTRICULAR TACHYCARDIA (H): ICD-10-CM

## 2023-09-26 DIAGNOSIS — R53.83 FATIGUE, UNSPECIFIED TYPE: ICD-10-CM

## 2023-09-26 DIAGNOSIS — E66.812 CLASS 2 SEVERE OBESITY DUE TO EXCESS CALORIES WITH SERIOUS COMORBIDITY AND BODY MASS INDEX (BMI) OF 36.0 TO 36.9 IN ADULT (H): ICD-10-CM

## 2023-09-26 DIAGNOSIS — F40.240 CLAUSTROPHOBIA: ICD-10-CM

## 2023-09-26 DIAGNOSIS — I10 ESSENTIAL HYPERTENSION WITH GOAL BLOOD PRESSURE LESS THAN 140/90: ICD-10-CM

## 2023-09-26 DIAGNOSIS — E66.01 CLASS 2 SEVERE OBESITY DUE TO EXCESS CALORIES WITH SERIOUS COMORBIDITY AND BODY MASS INDEX (BMI) OF 36.0 TO 36.9 IN ADULT (H): ICD-10-CM

## 2023-09-26 LAB
ALBUMIN SERPL BCG-MCNC: 4.4 G/DL (ref 3.5–5.2)
ALBUMIN UR-MCNC: NEGATIVE MG/DL
ALP SERPL-CCNC: 77 U/L (ref 35–104)
ALT SERPL W P-5'-P-CCNC: 29 U/L (ref 0–50)
AMYLASE SERPL-CCNC: 43 U/L (ref 28–100)
ANION GAP SERPL CALCULATED.3IONS-SCNC: 12 MMOL/L (ref 7–15)
APPEARANCE UR: CLEAR
AST SERPL W P-5'-P-CCNC: 23 U/L (ref 0–45)
BASOPHILS # BLD AUTO: 0 10E3/UL (ref 0–0.2)
BASOPHILS NFR BLD AUTO: 1 %
BILIRUB SERPL-MCNC: 0.3 MG/DL
BILIRUB UR QL STRIP: NEGATIVE
BUN SERPL-MCNC: 18.9 MG/DL (ref 8–23)
CALCIUM SERPL-MCNC: 10.1 MG/DL (ref 8.8–10.2)
CHLORIDE SERPL-SCNC: 102 MMOL/L (ref 98–107)
CHOLEST SERPL-MCNC: 257 MG/DL
COLOR UR AUTO: YELLOW
CREAT SERPL-MCNC: 0.86 MG/DL (ref 0.51–0.95)
DEPRECATED HCO3 PLAS-SCNC: 27 MMOL/L (ref 22–29)
EGFRCR SERPLBLD CKD-EPI 2021: 71 ML/MIN/1.73M2
EOSINOPHIL # BLD AUTO: 0.2 10E3/UL (ref 0–0.7)
EOSINOPHIL NFR BLD AUTO: 3 %
ERYTHROCYTE [DISTWIDTH] IN BLOOD BY AUTOMATED COUNT: 13 % (ref 10–15)
GLUCOSE SERPL-MCNC: 138 MG/DL (ref 70–99)
GLUCOSE UR STRIP-MCNC: NEGATIVE MG/DL
HBA1C MFR BLD: 6.6 % (ref 0–5.6)
HCT VFR BLD AUTO: 40.3 % (ref 35–47)
HDLC SERPL-MCNC: 53 MG/DL
HGB BLD-MCNC: 13.5 G/DL (ref 11.7–15.7)
HGB UR QL STRIP: NEGATIVE
IMM GRANULOCYTES # BLD: 0 10E3/UL
IMM GRANULOCYTES NFR BLD: 0 %
KETONES UR STRIP-MCNC: NEGATIVE MG/DL
LDLC SERPL CALC-MCNC: 166 MG/DL
LEUKOCYTE ESTERASE UR QL STRIP: NEGATIVE
LIPASE SERPL-CCNC: 20 U/L (ref 13–60)
LYMPHOCYTES # BLD AUTO: 1.8 10E3/UL (ref 0.8–5.3)
LYMPHOCYTES NFR BLD AUTO: 30 %
MCH RBC QN AUTO: 29.7 PG (ref 26.5–33)
MCHC RBC AUTO-ENTMCNC: 33.5 G/DL (ref 31.5–36.5)
MCV RBC AUTO: 89 FL (ref 78–100)
MONOCYTES # BLD AUTO: 0.4 10E3/UL (ref 0–1.3)
MONOCYTES NFR BLD AUTO: 7 %
NEUTROPHILS # BLD AUTO: 3.6 10E3/UL (ref 1.6–8.3)
NEUTROPHILS NFR BLD AUTO: 60 %
NITRATE UR QL: NEGATIVE
NONHDLC SERPL-MCNC: 204 MG/DL
PH UR STRIP: 7 [PH] (ref 5–7)
PLATELET # BLD AUTO: 271 10E3/UL (ref 150–450)
POTASSIUM SERPL-SCNC: 4.8 MMOL/L (ref 3.4–5.3)
PROT SERPL-MCNC: 7.4 G/DL (ref 6.4–8.3)
RBC # BLD AUTO: 4.55 10E6/UL (ref 3.8–5.2)
SODIUM SERPL-SCNC: 141 MMOL/L (ref 135–145)
SP GR UR STRIP: 1.01 (ref 1–1.03)
TRIGL SERPL-MCNC: 191 MG/DL
TSH SERPL DL<=0.005 MIU/L-ACNC: 1.93 UIU/ML (ref 0.3–4.2)
UROBILINOGEN UR STRIP-ACNC: 0.2 E.U./DL
WBC # BLD AUTO: 6.1 10E3/UL (ref 4–11)

## 2023-09-26 PROCEDURE — 83036 HEMOGLOBIN GLYCOSYLATED A1C: CPT | Performed by: FAMILY MEDICINE

## 2023-09-26 PROCEDURE — 84443 ASSAY THYROID STIM HORMONE: CPT | Performed by: FAMILY MEDICINE

## 2023-09-26 PROCEDURE — 80053 COMPREHEN METABOLIC PANEL: CPT | Performed by: FAMILY MEDICINE

## 2023-09-26 PROCEDURE — 83690 ASSAY OF LIPASE: CPT | Performed by: FAMILY MEDICINE

## 2023-09-26 PROCEDURE — 36415 COLL VENOUS BLD VENIPUNCTURE: CPT | Performed by: FAMILY MEDICINE

## 2023-09-26 PROCEDURE — 81003 URINALYSIS AUTO W/O SCOPE: CPT | Performed by: FAMILY MEDICINE

## 2023-09-26 PROCEDURE — 90662 IIV NO PRSV INCREASED AG IM: CPT | Performed by: FAMILY MEDICINE

## 2023-09-26 PROCEDURE — 85025 COMPLETE CBC W/AUTO DIFF WBC: CPT | Performed by: FAMILY MEDICINE

## 2023-09-26 PROCEDURE — 99214 OFFICE O/P EST MOD 30 MIN: CPT | Mod: 25 | Performed by: FAMILY MEDICINE

## 2023-09-26 PROCEDURE — 82150 ASSAY OF AMYLASE: CPT | Performed by: FAMILY MEDICINE

## 2023-09-26 PROCEDURE — 80061 LIPID PANEL: CPT | Performed by: FAMILY MEDICINE

## 2023-09-26 PROCEDURE — G0008 ADMIN INFLUENZA VIRUS VAC: HCPCS | Performed by: FAMILY MEDICINE

## 2023-09-26 PROCEDURE — G0439 PPPS, SUBSEQ VISIT: HCPCS | Performed by: FAMILY MEDICINE

## 2023-09-26 RX ORDER — LORAZEPAM 0.5 MG/1
TABLET ORAL
Qty: 4 TABLET | Refills: 0 | Status: SHIPPED | OUTPATIENT
Start: 2023-09-26 | End: 2024-06-04

## 2023-09-26 RX ORDER — FLUOCINONIDE TOPICAL SOLUTION USP, 0.05% 0.5 MG/ML
SOLUTION TOPICAL
Qty: 60 ML | Refills: 3 | Status: SHIPPED | OUTPATIENT
Start: 2023-09-26 | End: 2024-06-04

## 2023-09-26 ASSESSMENT — PAIN SCALES - GENERAL: PAINLEVEL: NO PAIN (0)

## 2023-09-26 ASSESSMENT — ENCOUNTER SYMPTOMS
NERVOUS/ANXIOUS: 1
NAUSEA: 0
PALPITATIONS: 1
FREQUENCY: 1
DIZZINESS: 1
HEMATOCHEZIA: 0
WEAKNESS: 1
ARTHRALGIAS: 0
MYALGIAS: 1
HEADACHES: 1
FEVER: 0
DIARRHEA: 0
CONSTIPATION: 0
HEARTBURN: 0
SHORTNESS OF BREATH: 1
ABDOMINAL PAIN: 1
HEMATURIA: 0
PARESTHESIAS: 0
DYSURIA: 0
BREAST MASS: 0
CHILLS: 0
SORE THROAT: 0
COUGH: 0
JOINT SWELLING: 1
EYE PAIN: 1

## 2023-09-26 ASSESSMENT — ANXIETY QUESTIONNAIRES
3. WORRYING TOO MUCH ABOUT DIFFERENT THINGS: SEVERAL DAYS
5. BEING SO RESTLESS THAT IT IS HARD TO SIT STILL: NOT AT ALL
7. FEELING AFRAID AS IF SOMETHING AWFUL MIGHT HAPPEN: NOT AT ALL
IF YOU CHECKED OFF ANY PROBLEMS ON THIS QUESTIONNAIRE, HOW DIFFICULT HAVE THESE PROBLEMS MADE IT FOR YOU TO DO YOUR WORK, TAKE CARE OF THINGS AT HOME, OR GET ALONG WITH OTHER PEOPLE: SOMEWHAT DIFFICULT
6. BECOMING EASILY ANNOYED OR IRRITABLE: NOT AT ALL
GAD7 TOTAL SCORE: 3
GAD7 TOTAL SCORE: 3
2. NOT BEING ABLE TO STOP OR CONTROL WORRYING: SEVERAL DAYS
1. FEELING NERVOUS, ANXIOUS, OR ON EDGE: SEVERAL DAYS

## 2023-09-26 ASSESSMENT — PATIENT HEALTH QUESTIONNAIRE - PHQ9
5. POOR APPETITE OR OVEREATING: NOT AT ALL
SUM OF ALL RESPONSES TO PHQ QUESTIONS 1-9: 5
SUM OF ALL RESPONSES TO PHQ QUESTIONS 1-9: 5
10. IF YOU CHECKED OFF ANY PROBLEMS, HOW DIFFICULT HAVE THESE PROBLEMS MADE IT FOR YOU TO DO YOUR WORK, TAKE CARE OF THINGS AT HOME, OR GET ALONG WITH OTHER PEOPLE: NOT DIFFICULT AT ALL

## 2023-09-26 ASSESSMENT — ACTIVITIES OF DAILY LIVING (ADL): CURRENT_FUNCTION: NO ASSISTANCE NEEDED

## 2023-09-26 NOTE — PATIENT INSTRUCTIONS
Labs and urine today  CT scan  MRI        Patient Education   Personalized Prevention Plan  You are due for the preventive services outlined below.  Your care team is available to assist you in scheduling these services.  If you have already completed any of these items, please share that information with your care team to update in your medical record.  Health Maintenance Due   Topic Date Due    Osteoporosis Screening  Never done    ANNUAL REVIEW OF HM ORDERS  Never done    Discuss Advance Care Planning  Never done    COPD Action Plan  Never done    Zoster (Shingles) Vaccine (2 of 3) 07/14/2011    Diptheria Tetanus Pertussis (DTAP/TDAP/TD) Vaccine (2 - Td or Tdap) 06/09/2016    Annual Wellness Visit  04/23/2020    COVID-19 Vaccine (4 - Pfizer series) 01/14/2022    LUNG CANCER SCREENING  05/03/2022    Flu Vaccine (1) 09/01/2023     Learning About Depression Screening  What is depression screening?  Depression screening is a way to see if you have depression symptoms. It may be done by a doctor or counselor. It's often part of a routine checkup. That's because your mental health is just as important as your physical health.  Depression is a mental health condition that affects how you feel, think, and act. You may:  Have less energy.  Lose interest in your daily activities.  Feel sad and grouchy for a long time.  Depression is very common. It affects people of all ages.  Many things can lead to depression. Some people become depressed after they have a stroke or find out they have a major illness like cancer or heart disease. The death of a loved one or a breakup may lead to depression. It can run in families. Most experts believe that a combination of inherited genes and stressful life events can cause it.  What happens during screening?  You may be asked to fill out a form about your depression symptoms. You and the doctor will discuss your answers. The doctor may ask you more questions to learn more about how you  "think, act, and feel.  What happens after screening?  If you have symptoms of depression, your doctor will talk to you about your options.  Doctors usually treat depression with medicines or counseling. Often, combining the two works best. Many people don't get help because they think that they'll get over the depression on their own. But people with depression may not get better unless they get treatment.  The cause of depression is not well understood. There may be many factors involved. But if you have depression, it's not your fault.  A serious symptom of depression is thinking about death or suicide. If you or someone you care about talks about this or about feeling hopeless, get help right away.  It's important to know that depression can be treated. Medicine, counseling, and self-care may help.  Where can you learn more?  Go to https://www.[employer].net/patiented  Enter T185 in the search box to learn more about \"Learning About Depression Screening.\"  Current as of: October 20, 2022               Content Version: 13.7    1066-9223 [employer].   Care instructions adapted under license by your healthcare professional. If you have questions about a medical condition or this instruction, always ask your healthcare professional. [employer] disclaims any warranty or liability for your use of this information.         "

## 2023-09-26 NOTE — PROGRESS NOTES
"SUBJECTIVE:   Jenna is a 72 year old who presents for Preventive Visit.      9/26/2023     7:46 AM   Additional Questions   Roomed by Tahmina       Are you in the first 12 months of your Medicare coverage?  No    Healthy Habits:     In general, how would you rate your overall health?  Good    Frequency of exercise:  1 day/week    Duration of exercise:  15-30 minutes    Do you usually eat at least 4 servings of fruit and vegetables a day, include whole grains    & fiber and avoid regularly eating high fat or \"junk\" foods?  No    Taking medications regularly:  Yes    Medication side effects:  Not applicable    Ability to successfully perform activities of daily living:  No assistance needed    Home Safety:  No safety concerns identified    Hearing Impairment:  Need to ask people to speak up or repeat themselves and difficulty understanding soft or whispered speech    In the past 6 months, have you been bothered by leaking of urine? Yes    In general, how would you rate your overall mental or emotional health?  Fair    Additional concerns today:  Yes      Today's PHQ-9 Score:       9/26/2023     7:43 AM   PHQ-9 SCORE   PHQ-9 Total Score MyChart 5 (Mild depression)   PHQ-9 Total Score 5           Have you ever done Advance Care Planning? (For example, a Health Directive, POLST, or a discussion with a medical provider or your loved ones about your wishes): Yes, advance care planning is on file.       Fall risk  Fallen 2 or more times in the past year?: No  Any fall with injury in the past year?: No    Cognitive Screening   1) Repeat 3 items (Leader, Season, Table)    2) Clock draw: NORMAL  3) 3 item recall: Recalls 3 objects  Results: 3 items recalled: COGNITIVE IMPAIRMENT LESS LIKELY    Mini-CogTM Copyright SEAN Mora. Licensed by the author for use in [facility]; reprinted with permission (pearl@.[facility]). All rights reserved.      Do you have sleep apnea, excessive snoring or daytime drowsiness? : " "yes    Reviewed and updated as needed this visit by clinical staff   Tobacco  Allergies  Meds  Problems  Med Hx  Surg Hx  Fam Hx          Reviewed and updated as needed this visit by Provider   Tobacco  Allergies  Meds  Problems  Med Hx  Surg Hx  Fam Hx         Social History     Tobacco Use    Smoking status: Former    Smokeless tobacco: Never   Substance Use Topics    Alcohol use: Yes     Comment: daniel             9/26/2023     7:41 AM   Alcohol Use   Prescreen: >3 drinks/day or >7 drinks/week? No     Do you have a current opioid prescription? No  Do you use any other controlled substances or medications that are not prescribed by a provider? None      Scalp follow up on medication    Has been feeling off x 2 months - pain in rt abdomen, ache in lower back, headaches and pounding on chest - nothing today but is concerned since last CT scan  Feels very fatigued, wakes up and after a few hours she wants to take a nap.   Had her eyes checked and they were ok.     right lower quadrant pain x 2 weeks, but now that went away.   Has a lot of pressure in her abdomen, mostly the epigastric region. A dull ache. Can keep her up at night.   Can have chest pain that is tight, goes into neck and back of head and arms feel like \"jelly\". Was happening every day for awhile, now less. Gets shaky, happens when sitting.   She does have paroxysmal supraventricular tachycardia, saw cardiology in Jan.   She feels hungry all the time.     Has taken her blood sugar, 140, blood pressure is ok, pulse is 80 or 90.   Lab Results   Component Value Date    A1C 6.0 09/21/2022    A1C 6.2 06/10/2021    A1C 6.1 08/06/2020    A1C 5.8 11/26/2019    A1C 5.5 08/24/2017      Continues to have shortness of breath all the time.   Negative stress test 2 years ago.   She had a small nodule 2021 CT scan  Does have a history of smoking    Narrative & Impression   5/3/2021 10:25 AM     HISTORY: Follow-up of pulmonary nodule.     CONTRAST:  " 80 mL Isovue 370.     TECHNIQUE: Routine CT of the chest is performed with IV contrast.     Routine assessed structures include the lungs, mediastinal structures,  pleura, chest wall, and upper visualized abdomen.     Radiation dose for this scan is reduced using automated exposure  control, adjustment of the mA and/or kV according to patient size, or  iterative reconstructive technique.     COMPARISON: Abdominal CT dated 8/17/2020.     FINDINGS: The previously seen tiny noncalcified left lower lobe  pulmonary nodule is unchanged. In addition, a 2 mm noncalcified left  upper lobe pulmonary nodule is seen on axial 29. No enlarged lymph  nodes are demonstrated. The thoracic aorta is normal in caliber. The  upper visualized portions of the abdomen show fatty infiltration all  the liver.                                                                       IMPRESSION:  There is a tiny left upper lobe pulmonary nodule which  was not imaged before. If the patient has no smoking history or other  risk factors no further workup is necessary. If the patient has a  smoking history or other risk factors a follow-up chest CT in 12  months is suggested.     SERA MATTHEWS MD     Low back aches across it    Sleep apnea  Uses CPAP every night    Has gained weight  Wt Readings from Last 5 Encounters:   09/26/23 93.9 kg (207 lb)   01/17/23 89.8 kg (198 lb)   09/20/22 91.2 kg (201 lb)   09/07/22 91.2 kg (201 lb)   06/21/22 92.4 kg (203 lb 12.8 oz)      Has urinary incontinence, mostly urge. Has to wear depends and pads all the time. Went through the physical therapy program, wasn't that helpful. In fact is worse.     hypertension   On hydrochlorothiazide, lisinopril, metoprolol  BP Readings from Last 6 Encounters:   09/26/23 126/80   01/17/23 131/64   09/21/22 115/57   09/07/22 130/80   06/21/22 138/76   04/19/22 110/67        Meningioma  MRI in 12/21 showed:   IMPRESSION:  1. A dural-based enhancing mass (presumed meningioma) along  the right  parietal lobe measuring approximately 14 x 9 x 9 mm, similar to  questionably slightly increased in size compared to the prior exam.  Comparison is difficult due to differences in technique (3-D  postcontrast imaging on the current exam versus 2-D on the prior  exam). Minimal mass effect. Follow-up could be considered in 1 to 2  years.  2. Volume loss and scattered nonspecific white matter T2  hyperintensities which likely represent chronic small vessel ischemic  change.     VIDA COLLINS MD     Current providers sharing in care for this patient include:     Patient Care Team:  Addis Alcocer MD as PCP - General (Family Practice)  Cassie Deutsch PA-C as Assigned Heart and Vascular Provider  Kiran Marx MD as Assigned PCP    The following health maintenance items are reviewed in Epic and correct as of today:  Health Maintenance   Topic Date Due    DEXA  Never done    COPD ACTION PLAN  Never done    ZOSTER IMMUNIZATION (2 of 3) 07/14/2011    DTAP/TDAP/TD IMMUNIZATION (2 - Td or Tdap) 06/09/2016    COVID-19 Vaccine (4 - Pfizer series) 01/14/2022    LUNG CANCER SCREENING  05/03/2022    MEDICARE ANNUAL WELLNESS VISIT  09/26/2024    ANNUAL REVIEW OF HM ORDERS  09/26/2024    FALL RISK ASSESSMENT  09/26/2024    MAMMO SCREENING  01/10/2025    COLORECTAL CANCER SCREENING  11/25/2025    LIPID  09/26/2028    ADVANCE CARE PLANNING  09/26/2028    SPIROMETRY  Completed    HEPATITIS C SCREENING  Completed    PHQ-2 (once per calendar year)  Completed    INFLUENZA VACCINE  Completed    Pneumococcal Vaccine: 65+ Years  Completed    IPV IMMUNIZATION  Aged Out    HPV IMMUNIZATION  Aged Out    MENINGITIS IMMUNIZATION  Aged Out       Mammogram Screening: Mammogram Screening: Recommended mammography every 1-2 years with patient discussion and risk factor consideration        Review of Systems   Constitutional:  Negative for chills and fever.   HENT:  Negative for congestion, ear pain, hearing loss and sore  "throat.    Eyes:  Positive for pain and visual disturbance.   Respiratory:  Positive for shortness of breath. Negative for cough.    Cardiovascular:  Positive for chest pain and palpitations. Negative for peripheral edema.   Gastrointestinal:  Positive for abdominal pain. Negative for constipation, diarrhea, heartburn, hematochezia and nausea.   Breasts:  Negative for tenderness, breast mass and discharge.   Genitourinary:  Positive for frequency and urgency. Negative for dysuria, genital sores, hematuria, pelvic pain, vaginal bleeding and vaginal discharge.   Musculoskeletal:  Positive for joint swelling and myalgias. Negative for arthralgias.   Skin:  Negative for rash.   Neurological:  Positive for dizziness, weakness and headaches. Negative for paresthesias.   Psychiatric/Behavioral:  Negative for mood changes. The patient is nervous/anxious.        OBJECTIVE:   /80 (BP Location: Right arm)   Pulse 68   Temp 97.4  F (36.3  C) (Tympanic)   Resp 20   Ht 1.6 m (5' 3\")   Wt 93.9 kg (207 lb)   SpO2 98%   BMI 36.67 kg/m   Estimated body mass index is 36.67 kg/m  as calculated from the following:    Height as of this encounter: 1.6 m (5' 3\").    Weight as of this encounter: 93.9 kg (207 lb).  Physical Exam  GENERAL: healthy, alert and no distress  EYES: Eyes grossly normal to inspection, PERRL and conjunctivae and sclerae normal  HENT: ear canals with moderate cerumen, and TM's normal, nose and mouth without ulcers or lesions  NECK: no adenopathy, no asymmetry, masses, or scars and thyroid normal to palpation  RESP: lungs clear to auscultation - no rales, rhonchi or wheezes  CV: regular rate and rhythm, normal S1 S2, no S3 or S4, no murmur, click or rub, no peripheral edema   ABDOMEN: soft, nontender, no hepatosplenomegaly, no masses and bowel sounds normal  MS: no gross musculoskeletal defects noted, no edema  SKIN: no suspicious lesions or rashes  NEURO: Normal strength and tone, mentation intact and " speech normal  PSYCH: mentation appears normal, affect normal/bright        ASSESSMENT / PLAN:   Rosita was seen today for medicare visit.    Diagnoses and all orders for this visit:    Encounter for Medicare annual wellness exam    Itchy scalp  -     fluocinonide (LIDEX) 0.05 % external solution; Apply sparingly to affected area twice daily as needed.  Do not apply to face.    ASHLEY (obstructive sleep apnea)  Uses CPAP nightly    Meningioma (H)  Recheck MRI  -     MR Brain w/o & w Contrast; Future    Essential hypertension with goal blood pressure less than 140/90  Well controlled  Continue lisinopril, hydrochlorothiazide, metoprolol    Pulmonary nodules  Recheck CT scan  -     CT Chest w Contrast; Future    Paroxysmal supraventricular tachycardia (H)  Well controlled  continue metoprolol    Prediabetes  -     Hemoglobin A1c; Future  -     Hemoglobin A1c    Chronic obstructive pulmonary disease, unspecified COPD type (H)  Consider adding combivent    Hyperlipidemia LDL goal <130  -     Lipid panel reflex to direct LDL Fasting; Future  -     Lipid panel reflex to direct LDL Fasting    Fatigue, unspecified type  Uncertain etiology  -     TSH with free T4 reflex; Future  -     Hemoglobin A1c; Future  -     Comprehensive metabolic panel (BMP + Alb, Alk Phos, ALT, AST, Total. Bili, TP); Future  -     CBC with platelets and differential; Future  -     CBC with platelets and differential  -     Comprehensive metabolic panel (BMP + Alb, Alk Phos, ALT, AST, Total. Bili, TP)  -     Hemoglobin A1c  -     TSH with free T4 reflex    Epigastric pain  -     Comprehensive metabolic panel (BMP + Alb, Alk Phos, ALT, AST, Total. Bili, TP); Future  -     UA Macroscopic with reflex to Microscopic and Culture - Lab Collect; Future  -     Amylase; Future  -     Lipase; Future  -     Lipase  -     Amylase  -     UA Macroscopic with reflex to Microscopic and Culture - Lab Collect  -     Comprehensive metabolic panel (BMP + Alb, Alk Phos,  "ALT, AST, Total. Bili, TP)    Claustrophobia  -     LORazepam (ATIVAN) 0.5 MG tablet; Take one 30-40 min prior to MRI, may repeat    Other orders  -     REVIEW OF HEALTH MAINTENANCE PROTOCOL ORDERS  -     INFLUENZA VACCINE 65+ (FLUZONE HD)  -     PRIMARY CARE FOLLOW-UP SCHEDULING; Future    Labs and urine today  CT scan  MRI    Patient has been advised of split billing requirements and indicates understanding: Yes      COUNSELING:  Reviewed preventive health counseling, as reflected in patient instructions  Special attention given to:       Regular exercise       Healthy diet/nutrition       Vision screening       Bladder control       Colon cancer screening      BMI:   Estimated body mass index is 36.67 kg/m  as calculated from the following:    Height as of this encounter: 1.6 m (5' 3\").    Weight as of this encounter: 93.9 kg (207 lb).   Weight management plan: Discussed healthy diet and exercise guidelines      She reports that she has quit smoking. She has never used smokeless tobacco.      Appropriate preventive services were discussed with this patient, including applicable screening as appropriate for cardiovascular disease, diabetes, osteopenia/osteoporosis, and glaucoma.  As appropriate for age/gender, discussed screening for colorectal cancer, prostate cancer, breast cancer, and cervical cancer. Checklist reviewing preventive services available has been given to the patient.    Reviewed patients plan of care and provided an AVS. The Basic Care Plan (routine screening as documented in Health Maintenance) for Rosita meets the Care Plan requirement. This Care Plan has been established and reviewed with the Patient.          Addis House MD  [facility]    Identified Health Risks:  I have reviewed Opioid Use Disorder and Substance Use Disorder risk factors and made any needed referrals. The patient's PHQ-9 score is consistent with mild depression. She was provided with " information regarding depression and was advised to schedule a follow up appointment in 8 weeks to further address this issue.Answers submitted by the patient for this visit:  Patient Health Questionnaire (Submitted on 9/26/2023)  If you checked off any problems, how difficult have these problems made it for you to do your work, take care of things at home, or get along with other people?: Not difficult at all  PHQ9 TOTAL SCORE: 5

## 2023-09-27 PROBLEM — E11.9 TYPE 2 DIABETES MELLITUS WITHOUT COMPLICATION, WITHOUT LONG-TERM CURRENT USE OF INSULIN (H): Status: ACTIVE | Noted: 2023-09-27

## 2023-09-28 ENCOUNTER — TELEPHONE (OUTPATIENT)
Dept: FAMILY MEDICINE | Facility: CLINIC | Age: 73
End: 2023-09-28
Payer: COMMERCIAL

## 2023-09-28 DIAGNOSIS — E11.9 TYPE 2 DIABETES MELLITUS WITHOUT COMPLICATION, WITHOUT LONG-TERM CURRENT USE OF INSULIN (H): ICD-10-CM

## 2023-09-28 NOTE — TELEPHONE ENCOUNTER
"Rec'd fax from [facility] re: 09-27-23 order for BG monitor device kit.   Need to remove \"or as directed\" from sig for Medicare part B compliance.  Rx reordered.  POLLY Avilez RN      "

## 2023-09-29 ENCOUNTER — TELEPHONE (OUTPATIENT)
Dept: FAMILY MEDICINE | Facility: CLINIC | Age: 73
End: 2023-09-29
Payer: COMMERCIAL

## 2023-09-29 DIAGNOSIS — E11.9 TYPE 2 DIABETES MELLITUS WITHOUT COMPLICATION, WITHOUT LONG-TERM CURRENT USE OF INSULIN (H): Primary | ICD-10-CM

## 2023-09-29 NOTE — TELEPHONE ENCOUNTER
Order Request    Who is requesting: [facility]     Orders being requested: Separate Rx's for Strips and Lancets.  The meter does not come with it.   Please Advise.    Aminah [place] Sec

## 2023-10-02 ENCOUNTER — TELEPHONE (OUTPATIENT)
Dept: FAMILY MEDICINE | Facility: CLINIC | Age: 73
End: 2023-10-02
Payer: COMMERCIAL

## 2023-10-02 DIAGNOSIS — E11.9 TYPE 2 DIABETES MELLITUS WITHOUT COMPLICATION, WITHOUT LONG-TERM CURRENT USE OF INSULIN (H): ICD-10-CM

## 2023-10-02 NOTE — TELEPHONE ENCOUNTER
"[facility] sent fax regarding the Rx they received for Softclix Lancets: and Accu-Guide test strips  Please send a new Rx without \"or as directed\" on the directions .  "

## 2023-10-04 ENCOUNTER — HOSPITAL ENCOUNTER (OUTPATIENT)
Dept: MRI IMAGING | Facility: CLINIC | Age: 73
Discharge: HOME | End: 2023-10-04
Attending: FAMILY MEDICINE
Payer: MEDICARE

## 2023-10-04 ENCOUNTER — HOSPITAL ENCOUNTER (OUTPATIENT)
Dept: CT IMAGING | Facility: CLINIC | Age: 73
Discharge: HOME | End: 2023-10-04
Attending: FAMILY MEDICINE
Payer: MEDICARE

## 2023-10-04 DIAGNOSIS — D32.9 MENINGIOMA (H): ICD-10-CM

## 2023-10-04 DIAGNOSIS — R91.8 PULMONARY NODULES: ICD-10-CM

## 2023-10-04 PROCEDURE — 70553 MRI BRAIN STEM W/O & W/DYE: CPT | Mod: MG

## 2023-10-04 PROCEDURE — 71250 CT THORAX DX C-: CPT | Mod: ME

## 2023-10-04 PROCEDURE — 255N000002 HC RX 255 OP 636: Performed by: FAMILY MEDICINE

## 2023-10-04 PROCEDURE — A9585 GADOBUTROL INJECTION: HCPCS | Performed by: FAMILY MEDICINE

## 2023-10-04 RX ORDER — GADOBUTROL 604.72 MG/ML
9 INJECTION INTRAVENOUS ONCE
Status: COMPLETED | OUTPATIENT
Start: 2023-10-04 | End: 2023-10-04

## 2023-10-04 RX ADMIN — GADOBUTROL 9 ML: 604.72 INJECTION INTRAVENOUS at 08:40

## 2023-10-30 ENCOUNTER — OFFICE VISIT (OUTPATIENT)
Dept: FAMILY MEDICINE | Facility: CLINIC | Age: 73
End: 2023-10-30
Payer: COMMERCIAL

## 2023-10-30 VITALS
HEART RATE: 68 BPM | DIASTOLIC BLOOD PRESSURE: 82 MMHG | BODY MASS INDEX: 36.5 KG/M2 | RESPIRATION RATE: 20 BRPM | SYSTOLIC BLOOD PRESSURE: 138 MMHG | WEIGHT: 206 LBS | TEMPERATURE: 97.7 F | OXYGEN SATURATION: 97 % | HEIGHT: 63 IN

## 2023-10-30 DIAGNOSIS — E11.9 TYPE 2 DIABETES MELLITUS WITHOUT COMPLICATION, WITHOUT LONG-TERM CURRENT USE OF INSULIN (H): Primary | ICD-10-CM

## 2023-10-30 DIAGNOSIS — R10.84 ABDOMINAL PAIN, GENERALIZED: ICD-10-CM

## 2023-10-30 DIAGNOSIS — N39.41 URGE INCONTINENCE OF URINE: ICD-10-CM

## 2023-10-30 DIAGNOSIS — E55.9 VITAMIN D DEFICIENCY: ICD-10-CM

## 2023-10-30 DIAGNOSIS — R35.0 FREQUENT URINATION: ICD-10-CM

## 2023-10-30 LAB
ALBUMIN UR-MCNC: NEGATIVE MG/DL
APPEARANCE UR: CLEAR
BILIRUB UR QL STRIP: NEGATIVE
COLOR UR AUTO: YELLOW
CREAT UR-MCNC: 108 MG/DL
GLUCOSE UR STRIP-MCNC: NEGATIVE MG/DL
HGB UR QL STRIP: NEGATIVE
KETONES UR STRIP-MCNC: NEGATIVE MG/DL
LEUKOCYTE ESTERASE UR QL STRIP: NEGATIVE
MICROALBUMIN UR-MCNC: <12 MG/L
MICROALBUMIN/CREAT UR: NORMAL MG/G{CREAT}
NITRATE UR QL: NEGATIVE
PH UR STRIP: 6 [PH] (ref 5–7)
SP GR UR STRIP: 1.02 (ref 1–1.03)
UROBILINOGEN UR STRIP-ACNC: 1 E.U./DL

## 2023-10-30 PROCEDURE — 81003 URINALYSIS AUTO W/O SCOPE: CPT | Performed by: FAMILY MEDICINE

## 2023-10-30 PROCEDURE — 87086 URINE CULTURE/COLONY COUNT: CPT | Performed by: FAMILY MEDICINE

## 2023-10-30 PROCEDURE — 82570 ASSAY OF URINE CREATININE: CPT | Performed by: FAMILY MEDICINE

## 2023-10-30 PROCEDURE — 82043 UR ALBUMIN QUANTITATIVE: CPT | Performed by: FAMILY MEDICINE

## 2023-10-30 PROCEDURE — 99215 OFFICE O/P EST HI 40 MIN: CPT | Performed by: FAMILY MEDICINE

## 2023-10-30 RX ORDER — RESPIRATORY SYNCYTIAL VIRUS VACCINE 120MCG/0.5
0.5 KIT INTRAMUSCULAR ONCE
Qty: 1 EACH | Refills: 0 | Status: CANCELLED | OUTPATIENT
Start: 2023-10-30 | End: 2023-10-30

## 2023-10-30 ASSESSMENT — PAIN SCALES - GENERAL: PAINLEVEL: MILD PAIN (3)

## 2023-10-30 ASSESSMENT — ENCOUNTER SYMPTOMS: FATIGUE: 1

## 2023-10-30 NOTE — PATIENT INSTRUCTIONS
CT scan abdomen    See urology    Lets do diet and exercise for the diabetes   We can check on Ozempic or Victoza if affordable

## 2023-10-30 NOTE — PROGRESS NOTES
Assessment & Plan     Type 2 diabetes mellitus without complication, without long-term current use of insulin (H)  Discussed diet and exercise  For weight loss, would use Ozempic for both weight loss and diabetes  Gone over benefits and risks, as well as side effects of medication.    is on it.   - Albumin Random Urine Quantitative with Creat Ratio; Future  - Albumin Random Urine Quantitative with Creat Ratio  - semaglutide (OZEMPIC) 2 MG/3ML pen; Inject 0.25 mg Subcutaneous every 7 days For 4 weeks, then go to 0.5 mg    Vitamin D deficiency  Recheck today    Frequent urination  UA negative   - UA Macroscopic with reflex to Microscopic and Culture - Clinic Collect  - Urine Culture Aerobic Bacterial - lab collect; Future  - Adult Urology  Referral; Future  - Urine Culture Aerobic Bacterial - lab collect    Abdominal pain, generalized  Uncertain etiology  Recommend CT  - CT Abdomen Pelvis w Contrast; Future  - Urine Culture Aerobic Bacterial - lab collect; Future  - Urine Culture Aerobic Bacterial - lab collect    Urge incontinence of urine  Recommend she see urology  - Adult Urology  Referral; Future    Patient Instructions   CT scan abdomen    See urology    Lets do diet and exercise for the diabetes   We can check on Ozempic or Victoza if affordable       Recheck 2-3 months    I spent a total of 41 minutes on the day of the visit.   Time spent by me doing chart review, history and exam, documentation and further activities per the note           Addis House MD  [facility]    Prosper West is a 73 year old, presenting for the following health issues:  Fatigue      10/30/2023     9:40 AM   Additional Questions   Roomed by Tahmina       Fatigue  Associated symptoms include fatigue.   History of Present Illness       Reason for visit:  Follow up and new concerns stomach and bladder    She eats 0-1 servings of fruits and vegetables daily.She consumes 1  "sweetened beverage(s) daily.She exercises with enough effort to increase her heart rate 10 to 19 minutes per day.  She exercises with enough effort to increase her heart rate 3 or less days per week.   She is taking medications regularly.       Pre-Provider Visit Orders- Urinalysis UA/UC  Patient reports the following symptoms:  difficulty with urination  and frequent urination   Does the patient report any of the following symptoms: vaginal discharge, vaginal itching, possible yeast infection, has a urinary catheter in place, or unable to void in a specimen cup?  No    Has incontinence, can't hold the urine during the day, and is very frequent.       She has pressure in her abdomen  Epigastric area is sore all the time since I last saw her. Has pain in right lower quadrant that radiates to mid low back, really has pain all through out. Feels pain all the time, eats but food doesn't help.    No constipation, diarrhea, melena or hematochezia   No nausea or vomiting.     diabetes   Just was diagnosed  Not checking blood sugars yet, just got her glucometer  May be why she is tired    Lab Results   Component Value Date    A1C 6.6 09/26/2023    A1C 6.0 09/21/2022    A1C 6.2 06/10/2021    A1C 6.1 08/06/2020    A1C 5.8 11/26/2019    A1C 5.5 08/24/2017   Would like to do diet and exercise      Review of Systems   Constitutional:  Positive for fatigue.          Objective    /82 (BP Location: Right arm)   Pulse 68   Temp 97.7  F (36.5  C) (Tympanic)   Resp 20   Ht 1.6 m (5' 3\")   Wt 93.4 kg (206 lb)   SpO2 97%   BMI 36.49 kg/m    Body mass index is 36.49 kg/m .  Physical Exam   GENERAL: healthy, alert and no distress  NECK: no adenopathy, no asymmetry, masses, or scars and thyroid normal to palpation  RESP: lungs clear to auscultation - no rales, rhonchi or wheezes  CV: regular rate and rhythm, normal S1 S2, no S3 or S4, no murmur, click or rub, no peripheral edema   ABDOMEN: soft, generalized tenderness, with " more pain in epigastric region and central area, as well as right lower quadrant, mild guarding, no peritoneal signs  MS: no gross musculoskeletal defects noted, no edema

## 2023-10-31 LAB — BACTERIA UR CULT: NORMAL

## 2023-11-07 ENCOUNTER — HOSPITAL ENCOUNTER (OUTPATIENT)
Dept: CT IMAGING | Facility: CLINIC | Age: 73
Discharge: HOME | End: 2023-11-07
Attending: FAMILY MEDICINE | Admitting: FAMILY MEDICINE
Payer: MEDICARE

## 2023-11-07 DIAGNOSIS — R10.84 ABDOMINAL PAIN, GENERALIZED: ICD-10-CM

## 2023-11-07 LAB
CREAT BLD-MCNC: 0.9 MG/DL (ref 0.5–1)
EGFRCR SERPLBLD CKD-EPI 2021: >60 ML/MIN/1.73M2

## 2023-11-07 PROCEDURE — G1010 CDSM STANSON: HCPCS

## 2023-11-07 PROCEDURE — 82565 ASSAY OF CREATININE: CPT

## 2023-11-07 PROCEDURE — 250N000011 HC RX IP 250 OP 636: Performed by: RADIOLOGY

## 2023-11-07 PROCEDURE — 74177 CT ABD & PELVIS W/CONTRAST: CPT | Mod: MG

## 2023-11-07 PROCEDURE — 250N000009 HC RX 250: Performed by: RADIOLOGY

## 2023-11-07 RX ORDER — IOPAMIDOL 755 MG/ML
100 INJECTION, SOLUTION INTRAVASCULAR ONCE
Status: COMPLETED | OUTPATIENT
Start: 2023-11-07 | End: 2023-11-07

## 2023-11-07 RX ADMIN — SODIUM CHLORIDE 65 ML: 9 INJECTION, SOLUTION INTRAVENOUS at 13:06

## 2023-11-07 RX ADMIN — IOPAMIDOL 100 ML: 755 INJECTION, SOLUTION INTRAVENOUS at 13:06

## 2023-11-09 ENCOUNTER — OFFICE VISIT (OUTPATIENT)
Dept: UROLOGY | Facility: CLINIC | Age: 73
End: 2023-11-09
Payer: COMMERCIAL

## 2023-11-09 VITALS
HEIGHT: 63 IN | SYSTOLIC BLOOD PRESSURE: 124 MMHG | TEMPERATURE: 97.7 F | OXYGEN SATURATION: 96 % | DIASTOLIC BLOOD PRESSURE: 73 MMHG | HEART RATE: 63 BPM | BODY MASS INDEX: 36.5 KG/M2 | WEIGHT: 206 LBS

## 2023-11-09 DIAGNOSIS — R35.0 FREQUENT URINATION: ICD-10-CM

## 2023-11-09 DIAGNOSIS — N39.41 URGE INCONTINENCE OF URINE: ICD-10-CM

## 2023-11-09 PROCEDURE — 99214 OFFICE O/P EST MOD 30 MIN: CPT | Performed by: STUDENT IN AN ORGANIZED HEALTH CARE EDUCATION/TRAINING PROGRAM

## 2023-11-09 RX ORDER — SOLIFENACIN SUCCINATE 5 MG/1
5 TABLET, FILM COATED ORAL DAILY
Qty: 90 TABLET | Refills: 1 | Status: SHIPPED | OUTPATIENT
Start: 2023-11-09 | End: 2024-04-19

## 2023-11-09 ASSESSMENT — PAIN SCALES - GENERAL: PAINLEVEL: MILD PAIN (2)

## 2023-11-09 NOTE — NURSING NOTE
"Initial /73   Pulse 63   Temp 97.7  F (36.5  C) (Tympanic)   Ht 1.6 m (5' 3\")   Wt 93.4 kg (206 lb)   SpO2 96%   BMI 36.49 kg/m   Estimated body mass index is 36.49 kg/m  as calculated from the following:    Height as of this encounter: 1.6 m (5' 3\").    Weight as of this encounter: 93.4 kg (206 lb). .  Carmina ESTRADA CMA 11/09/23 11:03 AM  "

## 2023-11-09 NOTE — PROGRESS NOTES
Chief Complaint:   Urinary incontinence         History of Present Illness:   Rosita King is a 73 year old female with a history of T2 DM, prediabetes, COPD, lichen sclerosus, HTN, ASHLEY, and HLD who presents for evaluation of urinary incontinence.    The patient reports a three year history of urinary frequency and urgency. She also reports urge incontinence and is wearing 3-4 pads and a diaper per day. She describes some stress incontinence as well. She denies nocturia and nocturnal enuresis. She denies dysuria and gross hematuria.      She reports regular bowel movements.     She drinks water and 2-3 cups of coffee. She does not drink soda. She drinks alcohol occasionally.     She denies a history of urologic surgeries.     She has a history of two vaginal deliveries.          Past Medical History:     Past Medical History:   Diagnosis Date    Essential hypertension with goal blood pressure less than 140/90 8/21/2016    Gastroesophageal reflux disease without esophagitis 8/22/2016    H. pylori infection 12/24/2015    Hyperlipidemia LDL goal <130 10/12/2015    ASHLEY (obstructive sleep apnea) 10/12/2015    Severe ASHLEY  - PSG at 7/27/2006 with undocumented weight, AHI 84.7, RDI 88.9, SpO2 remained above 90%, CPAP 7 optimal.    Paroxysmal supraventricular tachycardia 2/23/2017            Past Surgical History:     Past Surgical History:   Procedure Laterality Date    ARTHROPLASTY KNEE Right 6/17/2021    Procedure: Right Total Knee Arthroplasty;  Surgeon: Nils Edwards MD;  Location: [facility]    ARTHROPLASTY KNEE Left 9/20/2022    Procedure: left TOTAL Knee Arthroplasty;  Surgeon: Nils Edwards MD;  Location: [facility]    CARPAL TUNNEL RELEASE RT/LT Bilateral 2006, 2009    COLONOSCOPY N/A 11/25/2015    Procedure: COLONOSCOPY;  Surgeon: Mina Roy MD;  Location: [facility]    ESOPHAGOSCOPY, GASTROSCOPY, DUODENOSCOPY (EGD), COMBINED N/A 11/25/2015    Procedure: COMBINED ESOPHAGOSCOPY,  "GASTROSCOPY, DUODENOSCOPY (EGD), BIOPSY SINGLE OR MULTIPLE;  Surgeon: Mina Roy MD;  Location: [facility]    PAROTIDECTOMY Left 10/31/2019    Procedure: LEFT SUPERFICIAL PAROTIDECTOMY WITH FACIAL NERVE MONITORING;  Surgeon: Atif Salazar MD;  Location: [facility]    TONSILLECTOMY & ADENOIDECTOMY  age 13            Medications     Current Outpatient Medications   Medication    acetaminophen (TYLENOL) 325 MG tablet    blood glucose (NO BRAND SPECIFIED) lancets standard    blood glucose (NO BRAND SPECIFIED) test strip    blood glucose monitoring (NO BRAND SPECIFIED) meter device kit    fluocinonide (LIDEX) 0.05 % external solution    hydrochlorothiazide (HYDRODIURIL) 25 MG tablet    hydrOXYzine (ATARAX) 25 MG tablet    lisinopril (ZESTRIL) 10 MG tablet    LORazepam (ATIVAN) 0.5 MG tablet    metoprolol succinate ER (TOPROL XL) 50 MG 24 hr tablet    Multiple Vitamins-Minerals (CENTRUM SILVER) per tablet    omeprazole (PRILOSEC) 20 MG DR capsule    semaglutide (OZEMPIC) 2 MG/3ML pen    VITAMIN D, CHOLECALCIFEROL, PO     No current facility-administered medications for this visit.            Allergies:   Penicillins, Pravastatin, Simvastatin, and Sulfa antibiotics         Review of Systems:  From intake questionnaire   Negative 14 system review except as noted on HPI, nurse's note.         Physical Exam:   Patient is a 73 year old  female   Vitals: Blood pressure 124/73, pulse 63, temperature 97.7  F (36.5  C), temperature source Tympanic, height 1.6 m (5' 3\"), weight 93.4 kg (206 lb), SpO2 96%, not currently breastfeeding.  General Appearance Adult: Alert, no acute distress, oriented.  Lungs: Non-labored breathing.  Heart: No obvious jugular venous distension present.  Neuro: Alert, oriented, speech and mentation normal      Labs and Pathology:    I personally reviewed all applicable laboratory data and went over findings with patient  Significant for:    CBC RESULTS:  Recent Labs   Lab Test 09/26/23  0910 " 09/21/22  0512 09/07/22  1145 06/18/21  0451 06/10/21  1123 04/29/21  1046   WBC 6.1  --  6.4  --  6.4 9.1   HGB 13.5 11.6* 13.7 12.1 13.2 13.5     --  274  --  293 264        BMP RESULTS:  Recent Labs   Lab Test 11/07/23  1248 09/26/23  0910 09/21/22  1127 09/21/22  0750 09/21/22  0512 09/07/22  1145 09/07/22  1145 09/23/21  0930 06/10/21  1123 04/29/21  1046 08/06/20  1714 11/26/19  1209   NA  --  141  --   --   --   --  139 138 137 134 138 136   POTASSIUM  --  4.8  --   --   --   --  4.1 3.9 3.8 3.6 3.9 3.9   CHLORIDE  --  102  --   --   --   --  100 105 105 101 103 102   CO2  --  27  --   --   --   --  26 30 29 30 30 31   ANIONGAP  --  12  --   --   --   --  13 3 3 3 5 3   GLC  --  138* 284* 238* 256*   < > 109* 162* 94 116* 99 140*   BUN  --  18.9  --   --   --   --  14.9 17 19 12 16 17   CR 0.9 0.86  --   --   --   --  0.80 0.85 0.86 0.76 0.79 0.75   GFRESTIMATED >60 71  --   --   --   --  78 70 68 79 76 81   GFRESTBLACK  --   --   --   --   --   --   --   --  79 >90 88 >90   CORINA  --  10.1  --   --   --   --  9.4 8.7 9.2 9.3 9.9 9.2    < > = values in this interval not displayed.       UA RESULTS:   Recent Labs   Lab Test 10/30/23  0946 09/26/23  0910 08/06/20  1743   SG 1.025 1.010 1.025   URINEPH 6.0 7.0 5.0   NITRITE Negative Negative Negative   RBCU  --   --  O - 2   WBCU  --   --  0 - 5          Assessment and Plan:     Assessment: 73 year old female seen in evaluation for urinary frequency, urgency, and urge incontinence. She also describes some stress incontinence.     We discussed the etiologies of stress and urge incontinence and how they differ. We discussed that the options of treating stress incontinence to include observation, pelvic floor physical therapy, incontinence pessary, urethral bulking agents, and surgical correction most commonly with midurethral sling or autologous rectus fascial sling. We then discussed that urge incontinence treatments include observation, medications most  commonly anticholinergics, physical therapy, biofeedback, intravesical botulinum toxin, percutaneous tibial nerve stimulation and sacral neuromodulation. She would like to try an anticholinergic.   She wonders if she has prolapse as she has been experiencing epigastric and abdominal pain. We discussed that she could have prolapse, but that I wouldn't expect her to experience abdominal pain as a result. I offered pelvic exam, but the patient declined.     Plan:  Start solifenacin 5 mg daily. Side effects reviewed.   Follow up in three months, sooner if concerns.     MARIA TERESA GREENWOOD PA-C  Department of Urology

## 2023-11-13 ENCOUNTER — TELEPHONE (OUTPATIENT)
Dept: UROLOGY | Facility: CLINIC | Age: 73
End: 2023-11-13
Payer: COMMERCIAL

## 2023-11-13 DIAGNOSIS — N39.41 URGE INCONTINENCE OF URINE: ICD-10-CM

## 2023-11-13 DIAGNOSIS — R35.0 FREQUENT URINATION: Primary | ICD-10-CM

## 2023-11-13 RX ORDER — MIRABEGRON 25 MG/1
25 TABLET, FILM COATED, EXTENDED RELEASE ORAL DAILY
Qty: 90 TABLET | Refills: 1 | Status: SHIPPED | OUTPATIENT
Start: 2023-11-13 | End: 2023-12-28

## 2023-11-13 NOTE — TELEPHONE ENCOUNTER
11/9: Urge incontinence  Vesicare ordered. Patient's insurance requesting alternative    Therapeutic change - Insurance states try step therapy: Oxybut IR/ER, Gemtesa, Myrbetriq, Trospium IR     Please advise which alternative you'd recommend patient to try?   Thank you,   Bernice HINKLE RN  [facility]

## 2023-11-13 NOTE — TELEPHONE ENCOUNTER
Alternative Medication requested: for solifenacin (VESICARE) 5 MG tablet    Ph[facility]: [facility]  Fax: [phone]    Comment:  Therapeutic change - Insurance states try step therapy: Oxybut IR/ER, Gemtesa, Myrbetriq, Trospium IR    Thank you,  Addis WALTESR [facility]  [facility]

## 2023-12-01 DIAGNOSIS — G47.33 OBSTRUCTIVE SLEEP APNEA (ADULT) (PEDIATRIC): Primary | ICD-10-CM

## 2023-12-28 ENCOUNTER — OFFICE VISIT (OUTPATIENT)
Dept: FAMILY MEDICINE | Facility: CLINIC | Age: 73
End: 2023-12-28
Payer: COMMERCIAL

## 2023-12-28 VITALS
TEMPERATURE: 97.6 F | HEIGHT: 63 IN | OXYGEN SATURATION: 98 % | BODY MASS INDEX: 37.03 KG/M2 | DIASTOLIC BLOOD PRESSURE: 80 MMHG | WEIGHT: 209 LBS | HEART RATE: 64 BPM | RESPIRATION RATE: 20 BRPM | SYSTOLIC BLOOD PRESSURE: 120 MMHG

## 2023-12-28 DIAGNOSIS — Z96.652 STATUS POST TOTAL LEFT KNEE REPLACEMENT: ICD-10-CM

## 2023-12-28 DIAGNOSIS — R53.83 FATIGUE, UNSPECIFIED TYPE: ICD-10-CM

## 2023-12-28 DIAGNOSIS — L91.8 SKIN TAG: ICD-10-CM

## 2023-12-28 DIAGNOSIS — I10 ESSENTIAL HYPERTENSION WITH GOAL BLOOD PRESSURE LESS THAN 140/90: ICD-10-CM

## 2023-12-28 DIAGNOSIS — I47.10 PAROXYSMAL SUPRAVENTRICULAR TACHYCARDIA (H): ICD-10-CM

## 2023-12-28 DIAGNOSIS — G57.12 MERALGIA PARAESTHETICA, LEFT: ICD-10-CM

## 2023-12-28 DIAGNOSIS — Z78.0 ASYMPTOMATIC MENOPAUSE: ICD-10-CM

## 2023-12-28 DIAGNOSIS — E11.9 TYPE 2 DIABETES MELLITUS WITHOUT COMPLICATION, WITHOUT LONG-TERM CURRENT USE OF INSULIN (H): Primary | ICD-10-CM

## 2023-12-28 LAB — HBA1C MFR BLD: 6.8 % (ref 0–5.6)

## 2023-12-28 PROCEDURE — 99215 OFFICE O/P EST HI 40 MIN: CPT | Mod: 25 | Performed by: FAMILY MEDICINE

## 2023-12-28 PROCEDURE — 83036 HEMOGLOBIN GLYCOSYLATED A1C: CPT | Performed by: FAMILY MEDICINE

## 2023-12-28 PROCEDURE — 36415 COLL VENOUS BLD VENIPUNCTURE: CPT | Performed by: FAMILY MEDICINE

## 2023-12-28 PROCEDURE — 11200 RMVL SKIN TAGS UP TO&INC 15: CPT | Performed by: FAMILY MEDICINE

## 2023-12-28 RX ORDER — LISINOPRIL 10 MG/1
10 TABLET ORAL EVERY MORNING
Qty: 90 TABLET | Refills: 3 | Status: SHIPPED | OUTPATIENT
Start: 2023-12-28

## 2023-12-28 RX ORDER — METOPROLOL SUCCINATE 50 MG/1
25-50 TABLET, EXTENDED RELEASE ORAL DAILY
Qty: 90 TABLET | Refills: 3 | Status: SHIPPED | OUTPATIENT
Start: 2023-12-28 | End: 2024-06-04

## 2023-12-28 RX ORDER — HYDROXYZINE HYDROCHLORIDE 25 MG/1
25 TABLET, FILM COATED ORAL EVERY 6 HOURS PRN
Qty: 33 TABLET | Refills: 0 | Status: SHIPPED | OUTPATIENT
Start: 2023-12-28 | End: 2024-06-04

## 2023-12-28 RX ORDER — HYDROCHLOROTHIAZIDE 25 MG/1
25 TABLET ORAL EVERY MORNING
Qty: 90 TABLET | Refills: 3 | Status: SHIPPED | OUTPATIENT
Start: 2023-12-28 | End: 2024-06-04

## 2023-12-28 RX ORDER — RESPIRATORY SYNCYTIAL VIRUS VACCINE 120MCG/0.5
0.5 KIT INTRAMUSCULAR ONCE
Qty: 1 EACH | Refills: 0 | Status: CANCELLED | OUTPATIENT
Start: 2023-12-28 | End: 2023-12-28

## 2023-12-28 ASSESSMENT — ENCOUNTER SYMPTOMS: FATIGUE: 1

## 2023-12-28 ASSESSMENT — PAIN SCALES - GENERAL: PAINLEVEL: NO PAIN (0)

## 2023-12-28 NOTE — PROGRESS NOTES
Assessment & Plan     Type 2 diabetes mellitus without complication, without long-term current use of insulin (H)  A1C creeping up, recommend initiating metformin. She would like to try diet and exercise first.   Will see back in 3 months  - HEMOGLOBIN A1C; Future  - HEMOGLOBIN A1C    Essential hypertension with goal blood pressure less than 140/90  Well controlled  - lisinopril (ZESTRIL) 10 MG tablet; Take 1 tablet (10 mg) by mouth every morning  - hydrochlorothiazide (HYDRODIURIL) 25 MG tablet; Take 1 tablet (25 mg) by mouth every morning  - metoprolol succinate ER (TOPROL XL) 50 MG 24 hr tablet; Take 0.5-1 tablets (25-50 mg) by mouth daily    Paroxysmal supraventricular tachycardia  Has been stable  Recommend that we decrease the metoprolol and see if fatigue is better  - metoprolol succinate ER (TOPROL XL) 50 MG 24 hr tablet; Take 0.5-1 tablets (25-50 mg) by mouth daily    Status post total left knee replacement  Doing ok  - hydrOXYzine HCl (ATARAX) 25 MG tablet; Take 1 tablet (25 mg) by mouth every 6 hours as needed for itching or anxiety (with pain, moderate pain)    Meralgia paraesthetica, left  Likely diagnosis vs L3 or 4 radiculopathy  Discussed getting back xray, or doing EMG, or doing physical therapy. She wants to hold off for now, and work on diet and exercise    Skin tag  On neck  Cryotherapy applied x 2    Asymptomatic menopause  - DEXA HIP/PELVIS/SPINE - Future; Future    Fatigue  Can try decreasing metoprolol to 25 mg daily and see if helps    Patient Instructions   Try 1/2 of the metoprolol and see if helps with fatigue and shortness of breath     Work on diet and exercise    See you in 3 months     I spent a total of 56 minutes on the day of the visit.   Time spent by me doing chart review, history and exam, documentation and further activities per the note           Addis House MD  [facility]    Prosper West is a 73 year old, presenting for the following  "health issues:  Fatigue and Diabetes      12/28/2023     9:46 AM   Additional Questions   Roomed by Tahmina       Fatigue  Associated symptoms include fatigue.   History of Present Illness       Diabetes:   She presents for follow up of diabetes.  She is checking home blood glucose a few times a week.   She checks blood glucose before and after meals.  Blood glucose is never over 200 and never under 70. She is aware of hypoglycemia symptoms including none.   She is concerned about other.   She is having weight gain.  The patient has had a diabetic eye exam in the last 12 months. Eye exam performed on august. Location of last eye exam [facility].        Reason for visit:  Follow up    She eats 0-1 servings of fruits and vegetables daily.She consumes 0 sweetened beverage(s) daily.She exercises with enough effort to increase her heart rate 10 to 19 minutes per day.  She exercises with enough effort to increase her heart rate 3 or less days per week.   She is taking medications regularly.     Lab Results   Component Value Date    A1C 6.8 12/28/2023    A1C 6.6 09/26/2023    A1C 6.0 09/21/2022    A1C 6.2 06/10/2021    A1C 6.1 08/06/2020    A1C 5.8 11/26/2019    A1C 5.5 08/24/2017      Her insurance didn't cover Ozempic  Has room for improvement in her diet  She is going to join the gym, wants to really work on diet as well    Has numbness in the outer left thigh, pins and needles. Had some after TKA, but much worse starting 3 months ago.   Has a sore back.   Gets better with stretching.       Review of Systems   Constitutional:  Positive for fatigue.            Objective    /80 (BP Location: Right arm)   Pulse 64   Temp 97.6  F (36.4  C) (Tympanic)   Resp 20   Ht 1.6 m (5' 3\")   Wt 94.8 kg (209 lb)   SpO2 98%   BMI 37.02 kg/m    Body mass index is 37.02 kg/m .  Physical Exam   GENERAL: healthy, alert and no distress  EYES: Eyes grossly normal to inspection, PERRL and conjunctivae and sclerae normal  NECK: " no adenopathy, no asymmetry, masses, or scars and thyroid normal to palpation  RESP: lungs clear to auscultation - no rales, rhonchi or wheezes  CV: regular rate and rhythm, normal S1 S2, no S3 or S4, no murmur, click or rub, no peripheral edema and peripheral pulses strong  ABDOMEN: soft, nontender, no hepatosplenomegaly, no masses and bowel sounds normal  MS: area of numbness is down midlateral left thigh from groin to above knee    Results for orders placed or performed in visit on 12/28/23   HEMOGLOBIN A1C     Status: Abnormal   Result Value Ref Range    Hemoglobin A1C 6.8 (H) 0.0 - 5.6 %

## 2023-12-28 NOTE — PATIENT INSTRUCTIONS
Try 1/2 of the metoprolol and see if helps with fatigue and shortness of breath     Work on diet and exercise    See you in 3 months

## 2024-04-03 ENCOUNTER — HOSPITAL ENCOUNTER (OUTPATIENT)
Dept: BONE DENSITY | Facility: CLINIC | Age: 74
Discharge: HOME | End: 2024-04-03
Attending: FAMILY MEDICINE
Payer: MEDICARE

## 2024-04-03 ENCOUNTER — HOSPITAL ENCOUNTER (OUTPATIENT)
Dept: MAMMOGRAPHY | Facility: CLINIC | Age: 74
Discharge: HOME | End: 2024-04-03
Attending: FAMILY MEDICINE
Payer: MEDICARE

## 2024-04-03 DIAGNOSIS — Z78.0 ASYMPTOMATIC MENOPAUSE: ICD-10-CM

## 2024-04-03 DIAGNOSIS — Z12.31 VISIT FOR SCREENING MAMMOGRAM: ICD-10-CM

## 2024-04-03 PROCEDURE — 77063 BREAST TOMOSYNTHESIS BI: CPT

## 2024-04-03 PROCEDURE — 77080 DXA BONE DENSITY AXIAL: CPT

## 2024-04-06 DIAGNOSIS — M89.8X5 LYTIC BONE LESION OF HIP: Primary | ICD-10-CM

## 2024-04-11 ENCOUNTER — ANCILLARY PROCEDURE (OUTPATIENT)
Dept: GENERAL RADIOLOGY | Facility: CLINIC | Age: 74
End: 2024-04-11
Attending: FAMILY MEDICINE
Payer: COMMERCIAL

## 2024-04-11 DIAGNOSIS — M89.8X5 LYTIC BONE LESION OF HIP: ICD-10-CM

## 2024-04-11 PROCEDURE — 73502 X-RAY EXAM HIP UNI 2-3 VIEWS: CPT | Mod: TC | Performed by: RADIOLOGY

## 2024-04-19 ENCOUNTER — OFFICE VISIT (OUTPATIENT)
Dept: UROLOGY | Facility: CLINIC | Age: 74
End: 2024-04-19
Payer: COMMERCIAL

## 2024-04-19 VITALS
HEART RATE: 65 BPM | HEIGHT: 63 IN | DIASTOLIC BLOOD PRESSURE: 81 MMHG | BODY MASS INDEX: 37.03 KG/M2 | OXYGEN SATURATION: 96 % | SYSTOLIC BLOOD PRESSURE: 160 MMHG | WEIGHT: 209 LBS | TEMPERATURE: 97.6 F

## 2024-04-19 DIAGNOSIS — N39.41 URGE INCONTINENCE OF URINE: ICD-10-CM

## 2024-04-19 PROCEDURE — 99213 OFFICE O/P EST LOW 20 MIN: CPT | Performed by: STUDENT IN AN ORGANIZED HEALTH CARE EDUCATION/TRAINING PROGRAM

## 2024-04-19 RX ORDER — SOLIFENACIN SUCCINATE 5 MG/1
5 TABLET, FILM COATED ORAL DAILY
Qty: 90 TABLET | Refills: 3 | Status: SHIPPED | OUTPATIENT
Start: 2024-04-19

## 2024-04-19 ASSESSMENT — PAIN SCALES - GENERAL: PAINLEVEL: NO PAIN (0)

## 2024-04-19 NOTE — PROGRESS NOTES
"    UROLOGY FOLLOW-UP NOTE          Chief Complaint:   Today I had the pleasure of seeing Ms. Rosita King in follow-up for a chief complaint of urinary incontinence.          Interval Update   Rosita King is a very pleasant 73 year old female T2 DM, prediabetes, COPD, lichen sclerosus, meningioma, HTN, ASHLEY, and HLD.     Brief History: Ms. Rosita King has followed with urology for urinary frequency, urgency, and mixed incontinence. She was started on solifenacin 5 mg daily.     Today's notes: She is doing well today. She has noticed improvement in her urinary symptoms with the medication. She notes mild dry mouth and dry eyes.          Physical Exam:   Patient is a 73 year old  female   Vitals: Blood pressure (!) 160/81, pulse 65, temperature 97.6  F (36.4  C), temperature source Tympanic, height 1.6 m (5' 3\"), weight 94.8 kg (209 lb), SpO2 96%, not currently breastfeeding.  General: Alert and oriented x 3, no acute distress.  Respiratory: Non-labored breathing.  Cardiac: Regular rate.        Labs and Pathology:    I personally reviewed all applicable laboratory data and went over findings with patient  Significant for:    CBC RESULTS:  Recent Labs   Lab Test 09/26/23  0910 09/21/22  0512 09/07/22  1145 06/18/21  0451 06/10/21  1123 04/29/21  1046   WBC 6.1  --  6.4  --  6.4 9.1   HGB 13.5 11.6* 13.7 12.1 13.2 13.5     --  274  --  293 264        BMP RESULTS:  Recent Labs   Lab Test 11/07/23  1248 09/26/23  0910 09/21/22  1127 09/21/22  0750 09/21/22  0512 09/07/22  1145 09/07/22  1145 09/23/21  0930 06/10/21  1123 04/29/21  1046 08/06/20  1714 11/26/19  1209   NA  --  141  --   --   --   --  139 138 137 134 138 136   POTASSIUM  --  4.8  --   --   --   --  4.1 3.9 3.8 3.6 3.9 3.9   CHLORIDE  --  102  --   --   --   --  100 105 105 101 103 102   CO2  --  27  --   --   --   --  26 30 29 30 30 31   ANIONGAP  --  12  --   --   --   --  13 3 3 3 5 3   GLC  --  138* 284* 238* 256*   < > 109* 162* " 94 116* 99 140*   BUN  --  18.9  --   --   --   --  14.9 17 19 12 16 17   CR 0.9 0.86  --   --   --   --  0.80 0.85 0.86 0.76 0.79 0.75   GFRESTIMATED >60 71  --   --   --   --  78 70 68 79 76 81   GFRESTBLACK  --   --   --   --   --   --   --   --  79 >90 88 >90   CORINA  --  10.1  --   --   --   --  9.4 8.7 9.2 9.3 9.9 9.2    < > = values in this interval not displayed.       UA RESULTS:   Recent Labs   Lab Test 10/30/23  0946 09/26/23  0910 08/06/20  1743   SG 1.025 1.010 1.025   URINEPH 6.0 7.0 5.0   NITRITE Negative Negative Negative   RBCU  --   --  O - 2   WBCU  --   --  0 - 5          Assessment/Plan   73 year old female seen in follow up for urinary frequency, urgency, and mixed incontinence. She was started on solifenacin 5 mg daily.     She has noticed improvement in her urinary symptoms with the medication. She notes mild dry mouth and dry eyes. We reviewed the etiology and management options for overactive bladder. She would like to continue with the medication for now.     Plan:  Continue solifenacin 5 mg daily.   Follow up in one year, sooner if concerns.            Past Medical History:     Past Medical History:   Diagnosis Date    Essential hypertension with goal blood pressure less than 140/90 8/21/2016    Gastroesophageal reflux disease without esophagitis 8/22/2016    H. pylori infection 12/24/2015    Hyperlipidemia LDL goal <130 10/12/2015    ASHLEY (obstructive sleep apnea) 10/12/2015    Severe ASHELY  - PSG at 7/27/2006 with undocumented weight, AHI 84.7, RDI 88.9, SpO2 remained above 90%, CPAP 7 optimal.    Paroxysmal supraventricular tachycardia 2/23/2017            Past Surgical History:     Past Surgical History:   Procedure Laterality Date    ARTHROPLASTY KNEE Right 6/17/2021    Procedure: Right Total Knee Arthroplasty;  Surgeon: Nils Edwards MD;  Location: [facility]    ARTHROPLASTY KNEE Left 9/20/2022    Procedure: left TOTAL Knee Arthroplasty;  Surgeon: Nils Edwards MD;   Location: [facility]    CARPAL TUNNEL RELEASE RT/LT Bilateral 2006, 2009    COLONOSCOPY N/A 11/25/2015    Procedure: COLONOSCOPY;  Surgeon: Mina Roy MD;  Location: [facility]    ESOPHAGOSCOPY, GASTROSCOPY, DUODENOSCOPY (EGD), COMBINED N/A 11/25/2015    Procedure: COMBINED ESOPHAGOSCOPY, GASTROSCOPY, DUODENOSCOPY (EGD), BIOPSY SINGLE OR MULTIPLE;  Surgeon: Mina Roy MD;  Location: [facility]    PAROTIDECTOMY Left 10/31/2019    Procedure: LEFT SUPERFICIAL PAROTIDECTOMY WITH FACIAL NERVE MONITORING;  Surgeon: Atif Salazar MD;  Location: [facility]    TONSILLECTOMY & ADENOIDECTOMY  age 13            Medications     Current Outpatient Medications   Medication Sig Dispense Refill    acetaminophen (TYLENOL) 325 MG tablet Take 2 tablets (650 mg) by mouth every 4 hours as needed for other (mild pain) 100 tablet 0    blood glucose (NO BRAND SPECIFIED) lancets standard Use to test blood sugar 1 times daily 100 each 1    blood glucose (NO BRAND SPECIFIED) test strip Use to test blood sugar 1 times daily 100 strip 1    blood glucose monitoring (NO BRAND SPECIFIED) meter device kit Use to test blood sugar 1 times daily 1 kit 0    fluocinonide (LIDEX) 0.05 % external solution Apply sparingly to affected area twice daily as needed.  Do not apply to face. 60 mL 3    hydrochlorothiazide (HYDRODIURIL) 25 MG tablet Take 1 tablet (25 mg) by mouth every morning 90 tablet 3    hydrOXYzine HCl (ATARAX) 25 MG tablet Take 1 tablet (25 mg) by mouth every 6 hours as needed for itching or anxiety (with pain, moderate pain) 33 tablet 0    lisinopril (ZESTRIL) 10 MG tablet Take 1 tablet (10 mg) by mouth every morning 90 tablet 3    LORazepam (ATIVAN) 0.5 MG tablet Take one 30-40 min prior to MRI, may repeat 4 tablet 0    metoprolol succinate ER (TOPROL XL) 50 MG 24 hr tablet Take 0.5-1 tablets (25-50 mg) by mouth daily 90 tablet 3    Multiple Vitamins-Minerals (CENTRUM SILVER) per tablet Take 1 tablet by mouth daily      omeprazole  (PRILOSEC) 20 MG DR capsule Take 1 capsule by mouth once daily 90 capsule 2    solifenacin (VESICARE) 5 MG tablet Take 1 tablet (5 mg) by mouth daily 90 tablet 1    VITAMIN D, CHOLECALCIFEROL, PO Take 1,000 Units by mouth daily        No current facility-administered medications for this visit.            Family History:     Family History   Problem Relation Age of Onset    Liver Disease Mother     Depression/Anxiety Father     Diabetes Father     Diabetes Sister     Liver Disease Sister     Breast Cancer Sister     Cancer Sister     Kidney Cancer Nephew             Social History:     Social History     Socioeconomic History    Marital status:      Spouse name: Not on file    Number of children: Not on file    Years of education: Not on file    Highest education level: Not on file   Occupational History    Not on file   Tobacco Use    Smoking status: Former    Smokeless tobacco: Never   Vaping Use    Vaping status: Never Used   Substance and Sexual Activity    Alcohol use: Yes     Comment: ocassional    Drug use: No    Sexual activity: Not Currently     Partners: Female   Other Topics Concern    Parent/sibling w/ CABG, MI or angioplasty before 65F 55M? Yes     Comment: Father with MI at 55   Social History Narrative    Not on file     Social Determinants of Health     Financial Resource Strain: Low Risk  (12/28/2023)    Financial Resource Strain     Within the past 12 months, have you or your family members you live with been unable to get utilities (heat, electricity) when it was really needed?: No   Food Insecurity: Low Risk  (12/28/2023)    Food Insecurity     Within the past 12 months, did you worry that your food would run out before you got money to buy more?: No     Within the past 12 months, did the food you bought just not last and you didn t have money to get more?: No   Transportation Needs: Low Risk  (12/28/2023)    Transportation Needs     Within the past 12 months, has lack of transportation  kept you from medical appointments, getting your medicines, non-medical meetings or appointments, work, or from getting things that you need?: No   Physical Activity: Not on file   Stress: Not on file   Social Connections: Not on file   Interpersonal Safety: Low Risk  (9/26/2023)    Interpersonal Safety     Do you feel physically and emotionally safe where you currently live?: Yes     Within the past 12 months, have you been hit, slapped, kicked or otherwise physically hurt by someone?: No     Within the past 12 months, have you been humiliated or emotionally abused in other ways by your partner or ex-partner?: No   Housing Stability: Low Risk  (12/28/2023)    Housing Stability     Do you have housing? : Yes     Are you worried about losing your housing?: No            Allergies:   Penicillins, Pravastatin, Simvastatin, and Sulfa antibiotics         Review of Systems:  From intake questionnaire   Negative 14 system review except as noted on HPI, nurse's note.        MARIA TERESA GREENWOOD PA-C  Department of Urology

## 2024-04-28 ENCOUNTER — HEALTH MAINTENANCE LETTER (OUTPATIENT)
Age: 74
End: 2024-04-28

## 2024-05-07 ENCOUNTER — TELEPHONE (OUTPATIENT)
Dept: FAMILY MEDICINE | Facility: CLINIC | Age: 74
End: 2024-05-07
Payer: COMMERCIAL

## 2024-05-07 NOTE — TELEPHONE ENCOUNTER
Patient Quality Outreach    Patient is due for the following:   Diabetes -  A1C and BP Check    Next Steps:   Patient has upcoming appointment, these items will be addressed at that time.    Type of outreach:    Chart review performed, no outreach needed.      Questions for provider review:    None           Tahmina Chang CMA

## 2024-05-19 ENCOUNTER — APPOINTMENT (OUTPATIENT)
Dept: CT IMAGING | Facility: CLINIC | Age: 74
End: 2024-05-19
Attending: FAMILY MEDICINE
Payer: MEDICARE

## 2024-05-19 ENCOUNTER — HOSPITAL ENCOUNTER (EMERGENCY)
Facility: CLINIC | Age: 74
Discharge: HOME | End: 2024-05-19
Attending: FAMILY MEDICINE | Admitting: FAMILY MEDICINE
Payer: MEDICARE

## 2024-05-19 VITALS
DIASTOLIC BLOOD PRESSURE: 81 MMHG | OXYGEN SATURATION: 99 % | HEART RATE: 55 BPM | RESPIRATION RATE: 16 BRPM | TEMPERATURE: 97.5 F | SYSTOLIC BLOOD PRESSURE: 153 MMHG

## 2024-05-19 DIAGNOSIS — R10.9 LEFT FLANK PAIN: ICD-10-CM

## 2024-05-19 LAB
ALBUMIN SERPL BCG-MCNC: 4.3 G/DL (ref 3.5–5.2)
ALBUMIN UR-MCNC: NEGATIVE MG/DL
ALP SERPL-CCNC: 84 U/L (ref 40–150)
ALT SERPL W P-5'-P-CCNC: 30 U/L (ref 0–50)
ANION GAP SERPL CALCULATED.3IONS-SCNC: 13 MMOL/L (ref 7–15)
APPEARANCE UR: CLEAR
AST SERPL W P-5'-P-CCNC: 23 U/L (ref 0–45)
BACTERIA #/AREA URNS HPF: ABNORMAL /HPF
BASOPHILS # BLD AUTO: 0.1 10E3/UL (ref 0–0.2)
BASOPHILS NFR BLD AUTO: 1 %
BILIRUB SERPL-MCNC: 0.3 MG/DL
BILIRUB UR QL STRIP: NEGATIVE
BUN SERPL-MCNC: 15.9 MG/DL (ref 8–23)
CALCIUM SERPL-MCNC: 9.9 MG/DL (ref 8.8–10.2)
CHLORIDE SERPL-SCNC: 101 MMOL/L (ref 98–107)
COLOR UR AUTO: ABNORMAL
CREAT SERPL-MCNC: 0.91 MG/DL (ref 0.51–0.95)
DEPRECATED HCO3 PLAS-SCNC: 24 MMOL/L (ref 22–29)
EGFRCR SERPLBLD CKD-EPI 2021: 66 ML/MIN/1.73M2
EOSINOPHIL # BLD AUTO: 0.2 10E3/UL (ref 0–0.7)
EOSINOPHIL NFR BLD AUTO: 3 %
ERYTHROCYTE [DISTWIDTH] IN BLOOD BY AUTOMATED COUNT: 13.2 % (ref 10–15)
GLUCOSE SERPL-MCNC: 149 MG/DL (ref 70–99)
GLUCOSE UR STRIP-MCNC: NEGATIVE MG/DL
HCT VFR BLD AUTO: 40.5 % (ref 35–47)
HGB BLD-MCNC: 14 G/DL (ref 11.7–15.7)
HGB UR QL STRIP: NEGATIVE
HOLD SPECIMEN: NORMAL
IMM GRANULOCYTES # BLD: 0 10E3/UL
IMM GRANULOCYTES NFR BLD: 0 %
KETONES UR STRIP-MCNC: NEGATIVE MG/DL
LEUKOCYTE ESTERASE UR QL STRIP: NEGATIVE
LIPASE SERPL-CCNC: 18 U/L (ref 13–60)
LYMPHOCYTES # BLD AUTO: 1.5 10E3/UL (ref 0.8–5.3)
LYMPHOCYTES NFR BLD AUTO: 27 %
MCH RBC QN AUTO: 30.2 PG (ref 26.5–33)
MCHC RBC AUTO-ENTMCNC: 34.6 G/DL (ref 31.5–36.5)
MCV RBC AUTO: 87 FL (ref 78–100)
MONOCYTES # BLD AUTO: 0.5 10E3/UL (ref 0–1.3)
MONOCYTES NFR BLD AUTO: 9 %
NEUTROPHILS # BLD AUTO: 3.3 10E3/UL (ref 1.6–8.3)
NEUTROPHILS NFR BLD AUTO: 60 %
NITRATE UR QL: NEGATIVE
NRBC # BLD AUTO: 0 10E3/UL
NRBC BLD AUTO-RTO: 0 /100
PH UR STRIP: 6 [PH] (ref 5–7)
PLATELET # BLD AUTO: 272 10E3/UL (ref 150–450)
POTASSIUM SERPL-SCNC: 3.8 MMOL/L (ref 3.4–5.3)
PROT SERPL-MCNC: 7.2 G/DL (ref 6.4–8.3)
RBC # BLD AUTO: 4.64 10E6/UL (ref 3.8–5.2)
RBC URINE: <1 /HPF
SODIUM SERPL-SCNC: 138 MMOL/L (ref 135–145)
SP GR UR STRIP: 1 (ref 1–1.03)
SQUAMOUS EPITHELIAL: 1 /HPF
UROBILINOGEN UR STRIP-MCNC: NORMAL MG/DL
WBC # BLD AUTO: 5.6 10E3/UL (ref 4–11)
WBC URINE: 1 /HPF

## 2024-05-19 PROCEDURE — 258N000003 HC RX IP 258 OP 636: Performed by: FAMILY MEDICINE

## 2024-05-19 PROCEDURE — 96374 THER/PROPH/DIAG INJ IV PUSH: CPT | Mod: 59

## 2024-05-19 PROCEDURE — 96361 HYDRATE IV INFUSION ADD-ON: CPT

## 2024-05-19 PROCEDURE — 74177 CT ABD & PELVIS W/CONTRAST: CPT | Mod: MG

## 2024-05-19 PROCEDURE — 83690 ASSAY OF LIPASE: CPT | Performed by: FAMILY MEDICINE

## 2024-05-19 PROCEDURE — 85025 COMPLETE CBC W/AUTO DIFF WBC: CPT | Performed by: FAMILY MEDICINE

## 2024-05-19 PROCEDURE — 81001 URINALYSIS AUTO W/SCOPE: CPT | Performed by: FAMILY MEDICINE

## 2024-05-19 PROCEDURE — 250N000009 HC RX 250: Performed by: FAMILY MEDICINE

## 2024-05-19 PROCEDURE — 250N000011 HC RX IP 250 OP 636: Performed by: FAMILY MEDICINE

## 2024-05-19 PROCEDURE — 36415 COLL VENOUS BLD VENIPUNCTURE: CPT | Performed by: FAMILY MEDICINE

## 2024-05-19 PROCEDURE — 99284 EMERGENCY DEPT VISIT MOD MDM: CPT | Performed by: FAMILY MEDICINE

## 2024-05-19 PROCEDURE — 80053 COMPREHEN METABOLIC PANEL: CPT | Performed by: FAMILY MEDICINE

## 2024-05-19 PROCEDURE — 99285 EMERGENCY DEPT VISIT HI MDM: CPT | Mod: 25

## 2024-05-19 RX ORDER — CEPHALEXIN 500 MG/1
500 CAPSULE ORAL 4 TIMES DAILY
Qty: 28 CAPSULE | Refills: 0 | Status: SHIPPED | OUTPATIENT
Start: 2024-05-19 | End: 2024-05-26

## 2024-05-19 RX ORDER — IOPAMIDOL 755 MG/ML
100 INJECTION, SOLUTION INTRAVASCULAR ONCE
Status: COMPLETED | OUTPATIENT
Start: 2024-05-19 | End: 2024-05-19

## 2024-05-19 RX ORDER — KETOROLAC TROMETHAMINE 15 MG/ML
15 INJECTION, SOLUTION INTRAMUSCULAR; INTRAVENOUS ONCE
Status: COMPLETED | OUTPATIENT
Start: 2024-05-19 | End: 2024-05-19

## 2024-05-19 RX ORDER — SODIUM CHLORIDE 9 MG/ML
1000 INJECTION, SOLUTION INTRAVENOUS CONTINUOUS
Status: DISCONTINUED | OUTPATIENT
Start: 2024-05-19 | End: 2024-05-19 | Stop reason: HOSPADM

## 2024-05-19 RX ADMIN — SODIUM CHLORIDE 65 ML: 9 INJECTION, SOLUTION INTRAVENOUS at 16:09

## 2024-05-19 RX ADMIN — KETOROLAC TROMETHAMINE 15 MG: 15 INJECTION, SOLUTION INTRAMUSCULAR; INTRAVENOUS at 15:51

## 2024-05-19 RX ADMIN — SODIUM CHLORIDE 500 ML: 9 INJECTION, SOLUTION INTRAVENOUS at 16:19

## 2024-05-19 RX ADMIN — IOPAMIDOL 100 ML: 755 INJECTION, SOLUTION INTRAVENOUS at 16:09

## 2024-05-19 ASSESSMENT — ENCOUNTER SYMPTOMS
BACK PAIN: 1
WHEEZING: 0
NAUSEA: 0
ABDOMINAL PAIN: 1
SINUS PRESSURE: 0
SORE THROAT: 0
CHILLS: 0
FLANK PAIN: 1
DIARRHEA: 0
COUGH: 0
SHORTNESS OF BREATH: 0
FREQUENCY: 0
DYSURIA: 0
VOMITING: 0
CONSTIPATION: 0
DIAPHORESIS: 0
BLOOD IN STOOL: 0
HEADACHES: 0
PALPITATIONS: 0
FEVER: 0

## 2024-05-19 ASSESSMENT — COLUMBIA-SUICIDE SEVERITY RATING SCALE - C-SSRS
2. HAVE YOU ACTUALLY HAD ANY THOUGHTS OF KILLING YOURSELF IN THE PAST MONTH?: NO
6. HAVE YOU EVER DONE ANYTHING, STARTED TO DO ANYTHING, OR PREPARED TO DO ANYTHING TO END YOUR LIFE?: NO
1. IN THE PAST MONTH, HAVE YOU WISHED YOU WERE DEAD OR WISHED YOU COULD GO TO SLEEP AND NOT WAKE UP?: NO

## 2024-05-19 ASSESSMENT — ACTIVITIES OF DAILY LIVING (ADL)
ADLS_ACUITY_SCORE: 38

## 2024-05-19 NOTE — DISCHARGE INSTRUCTIONS
ICD-10-CM    1. Left flank pain  R10.9     no serious findings.  use the lidoderm patch 4% a needed.  ibuprofen and tylenol scheduled.  maintain back range of motion.  see lat dorsi and low back home exercises.  may use oxycodone for breakthrough pain        armando leon

## 2024-05-19 NOTE — ED PROVIDER NOTES
History     Chief Complaint   Patient presents with    Back Pain     Left side flank pain radiating to the front, pain started yesterday denies n/v and fever. No history of kidney stone. Pt states pain is 8/10 and pain is intermittent. Pt was unable to sleep.      ROLANDO King is a 73 year old female who presents with left flank pain and urinary frequency without dysuria.  She has urgency no hematuria.  The pain radiates around the left side into the lower abdomen.  She has no history of ureterolithiasis.  Does have a history of diverticulitis but feels that the pain with that was different.  She has used ibuprofen earlier this morning about 6 hours ago and had some relief but then worsened again today in the afternoon to an 8 of 10.  There is no associated nausea vomiting.  There is been no significant change in stools.  She has no vomiting.  No significant nausea.  Did tolerate some food today but her appetite is decreased.  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      Allergies:  Allergies   Allergen Reactions    Penicillins Hives    Pravastatin Muscle Pain (Myalgia)    Simvastatin Muscle Pain (Myalgia)    Sulfa Antibiotics Hives       Problem List:    Patient Active Problem List    Diagnosis Date Noted    Type 2 diabetes mellitus without complication, without long-term current use of insulin (H) 09/27/2023     Priority: Medium    Class 2 severe obesity due to excess calories with serious comorbidity in adult (H) 09/26/2023     Priority: Medium    Postoperative bradycardia 06/18/2021     Priority: Medium    Right knee DJD 06/17/2021     Priority: Medium    Status post total knee replacement 06/17/2021     Priority: Medium    Prediabetes 06/11/2021     Priority: Medium    Abnormal findings on diagnostic imaging of heart/coronary circulation 06/11/2021     Priority: Medium    COPD (chronic obstructive pulmonary  disease) (H) 06/10/2021     Priority: Medium    Pulmonary nodules 04/29/2021     Priority: Medium    Pleomorphic adenoma of parotid gland 11/26/2019     Priority: Medium    Parotid mass 10/09/2019     Priority: Medium    Meningioma (H) 05/13/2019     Priority: Medium    Lichen sclerosus 08/24/2017     Priority: Medium    Paroxysmal supraventricular tachycardia (H24) 02/23/2017     Priority: Medium    Gastroesophageal reflux disease without esophagitis 08/22/2016     Priority: Medium    Essential hypertension with goal blood pressure less than 140/90 08/21/2016     Priority: Medium    Diverticulosis of large intestine without hemorrhage 12/24/2015     Priority: Medium    Vitamin D deficiency 10/12/2015     Priority: Medium    ASHLEY (obstructive sleep apnea) 10/12/2015     Priority: Medium     Severe ASHLEY   - PSG at 7/27/2006 with undocumented weight, AHI 84.7, RDI 88.9, SpO2 remained above 90%, CPAP 7 optimal.      Generalized anxiety disorder 10/12/2015     Priority: Medium    Hyperlipidemia LDL goal <130 10/12/2015     Priority: Medium    CARDIOVASCULAR SCREENING; LDL GOAL LESS THAN 130 02/03/2015     Priority: Medium    Intertrigo 05/19/2011     Priority: Medium    Urge incontinence 05/03/2010     Priority: Medium    Hiatal hernia 01/12/2009     Priority: Medium     Overview:   small sliding hiatal hernia noted on stress echo 2009      Carpal tunnel syndrome 11/27/2006     Priority: Medium    Other psoriasis 06/06/2006     Priority: Medium        Past Medical History:    Past Medical History:   Diagnosis Date    Essential hypertension with goal blood pressure less than 140/90 8/21/2016    Gastroesophageal reflux disease without esophagitis 8/22/2016    H. pylori infection 12/24/2015    Hyperlipidemia LDL goal <130 10/12/2015    ASHLEY (obstructive sleep apnea) 10/12/2015    Paroxysmal supraventricular tachycardia 2/23/2017       Past Surgical History:    Past Surgical History:   Procedure Laterality Date    ARTHROPLASTY  KNEE Right 6/17/2021    Procedure: Right Total Knee Arthroplasty;  Surgeon: Nils Edwards MD;  Location: [facility]    ARTHROPLASTY KNEE Left 9/20/2022    Procedure: left TOTAL Knee Arthroplasty;  Surgeon: Nils Edwards MD;  Location: [facility]    CARPAL TUNNEL RELEASE RT/LT Bilateral 2006, 2009    COLONOSCOPY N/A 11/25/2015    Procedure: COLONOSCOPY;  Surgeon: Mina Roy MD;  Location: [facility]    ESOPHAGOSCOPY, GASTROSCOPY, DUODENOSCOPY (EGD), COMBINED N/A 11/25/2015    Procedure: COMBINED ESOPHAGOSCOPY, GASTROSCOPY, DUODENOSCOPY (EGD), BIOPSY SINGLE OR MULTIPLE;  Surgeon: Mina Roy MD;  Location: [facility]    PAROTIDECTOMY Left 10/31/2019    Procedure: LEFT SUPERFICIAL PAROTIDECTOMY WITH FACIAL NERVE MONITORING;  Surgeon: Atif Salazar MD;  Location: [facility]    TONSILLECTOMY & ADENOIDECTOMY  age 13       Family History:    Family History   Problem Relation Age of Onset    Liver Disease Mother     Depression/Anxiety Father     Diabetes Father     Diabetes Sister     Liver Disease Sister     Breast Cancer Sister     Cancer Sister     Kidney Cancer Nephew        Social History:  Marital Status:   [2]  Social History     Tobacco Use    Smoking status: Former    Smokeless tobacco: Never   Vaping Use    Vaping status: Never Used   Substance Use Topics    Alcohol use: Yes     Comment: ocassional    Drug use: No        Medications:    cephALEXin (KEFLEX) 500 MG capsule  acetaminophen (TYLENOL) 325 MG tablet  blood glucose (NO BRAND SPECIFIED) lancets standard  blood glucose (NO BRAND SPECIFIED) test strip  blood glucose monitoring (NO BRAND SPECIFIED) meter device kit  fluocinonide (LIDEX) 0.05 % external solution  hydrochlorothiazide (HYDRODIURIL) 25 MG tablet  hydrOXYzine HCl (ATARAX) 25 MG tablet  lisinopril (ZESTRIL) 10 MG tablet  LORazepam (ATIVAN) 0.5 MG tablet  metoprolol succinate ER (TOPROL XL) 50 MG 24 hr tablet  Multiple Vitamins-Minerals (CENTRUM SILVER) per  tablet  omeprazole (PRILOSEC) 20 MG DR capsule  solifenacin (VESICARE) 5 MG tablet  VITAMIN D, CHOLECALCIFEROL, PO          Review of Systems   Constitutional:  Negative for chills, diaphoresis and fever.   HENT:  Negative for ear pain, sinus pressure and sore throat.    Eyes:  Negative for visual disturbance.   Respiratory:  Negative for cough, shortness of breath and wheezing.    Cardiovascular:  Negative for chest pain and palpitations.   Gastrointestinal:  Positive for abdominal pain. Negative for blood in stool, constipation, diarrhea, nausea and vomiting.   Genitourinary:  Positive for flank pain. Negative for dysuria, frequency and urgency.   Musculoskeletal:  Positive for back pain.   Skin:  Negative for rash.   Neurological:  Negative for headaches.   All other systems reviewed and are negative.      Physical Exam   BP: (!) 150/85  Pulse: 69  Temp: 97.5  F (36.4  C)  Resp: 16  SpO2: 97 %      Physical Exam  Constitutional:       General: She is in acute distress.      Appearance: She is not diaphoretic.   Eyes:      Conjunctiva/sclera: Conjunctivae normal.   Cardiovascular:      Rate and Rhythm: Normal rate and regular rhythm.      Heart sounds: No murmur heard.  Pulmonary:      Effort: Pulmonary effort is normal. No respiratory distress.      Breath sounds: Normal breath sounds. No stridor. No wheezing or rhonchi.   Abdominal:      General: There is no distension.      Palpations: There is no mass.      Tenderness: There is abdominal tenderness in the left lower quadrant. There is left CVA tenderness. There is no guarding.   Musculoskeletal:      Cervical back: Neck supple.      Right lower leg: No edema.      Left lower leg: No edema.   Skin:     Coloration: Skin is not pale.      Findings: No rash.   Neurological:      General: No focal deficit present.      Mental Status: She is alert and oriented to person, place, and time.      Cranial Nerves: No cranial nerve deficit.      Sensory: No sensory  deficit.      Motor: No weakness.      Coordination: Coordination normal.         ED Course        Procedures              Critical Care time:  none               Results for orders placed or performed during the hospital encounter of 05/19/24 (from the past 24 hour(s))   CBC with platelets, differential    Narrative    The following orders were created for panel order CBC with platelets, differential.  Procedure                               Abnormality         Status                     ---------                               -----------         ------                     CBC with platelets and d...[[id number]]                      Final result                 Please view results for these tests on the individual orders.   Comprehensive metabolic panel   Result Value Ref Range    Sodium 138 135 - 145 mmol/L    Potassium 3.8 3.4 - 5.3 mmol/L    Carbon Dioxide (CO2) 24 22 - 29 mmol/L    Anion Gap 13 7 - 15 mmol/L    Urea Nitrogen 15.9 8.0 - 23.0 mg/dL    Creatinine 0.91 0.51 - 0.95 mg/dL    GFR Estimate 66 >60 mL/min/1.73m2    Calcium 9.9 8.8 - 10.2 mg/dL    Chloride 101 98 - 107 mmol/L    Glucose 149 (H) 70 - 99 mg/dL    Alkaline Phosphatase 84 40 - 150 U/L    AST 23 0 - 45 U/L    ALT 30 0 - 50 U/L    Protein Total 7.2 6.4 - 8.3 g/dL    Albumin 4.3 3.5 - 5.2 g/dL    Bilirubin Total 0.3 <=1.2 mg/dL   [place] Draw    Narrative    The following orders were created for panel order [place] Draw.  Procedure                               Abnormality         Status                     ---------                               -----------         ------                     Extra Blue Top Tube[[id number]]                              Final result                 Please view results for these tests on the individual orders.   CBC with platelets and differential   Result Value Ref Range    WBC Count 5.6 4.0 - 11.0 10e3/uL    RBC Count 4.64 3.80 - 5.20 10e6/uL    Hemoglobin 14.0 11.7 - 15.7 g/dL    Hematocrit 40.5 35.0 - 47.0 %     MCV 87 78 - 100 fL    MCH 30.2 26.5 - 33.0 pg    MCHC 34.6 31.5 - 36.5 g/dL    RDW 13.2 10.0 - 15.0 %    Platelet Count 272 150 - 450 10e3/uL    % Neutrophils 60 %    % Lymphocytes 27 %    % Monocytes 9 %    % Eosinophils 3 %    % Basophils 1 %    % Immature Granulocytes 0 %    NRBCs per 100 WBC 0 <1 /100    Absolute Neutrophils 3.3 1.6 - 8.3 10e3/uL    Absolute Lymphocytes 1.5 0.8 - 5.3 10e3/uL    Absolute Monocytes 0.5 0.0 - 1.3 10e3/uL    Absolute Eosinophils 0.2 0.0 - 0.7 10e3/uL    Absolute Basophils 0.1 0.0 - 0.2 10e3/uL    Absolute Immature Granulocytes 0.0 <=0.4 10e3/uL    Absolute NRBCs 0.0 10e3/uL   Extra Blue Top Tube   Result Value Ref Range    Hold Specimen JIC    Lipase   Result Value Ref Range    Lipase 18 13 - 60 U/L   UA with Microscopic reflex to Culture    Specimen: Urine, Clean Catch   Result Value Ref Range    Color Urine Straw Colorless, Straw, Light Yellow, Yellow    Appearance Urine Clear Clear    Glucose Urine Negative Negative mg/dL    Bilirubin Urine Negative Negative    Ketones Urine Negative Negative mg/dL    Specific Gravity Urine 1.005 1.003 - 1.035    Blood Urine Negative Negative    pH Urine 6.0 5.0 - 7.0    Protein Albumin Urine Negative Negative mg/dL    Urobilinogen Urine Normal Normal, 2.0 mg/dL    Nitrite Urine Negative Negative    Leukocyte Esterase Urine Negative Negative    Bacteria Urine Few (A) None Seen /HPF    RBC Urine <1 <=2 /HPF    WBC Urine 1 <=5 /HPF    Squamous Epithelials Urine 1 <=1 /HPF    Narrative    Urine Culture not indicated   CT Abdomen Pelvis w Contrast    Narrative    EXAM: CT ABDOMEN PELVIS W CONTRAST  LOCATION: [facility]  DATE: 5/19/2024    INDICATION: Abdominal and left flank pain.  COMPARISON: 11/7/2023.  TECHNIQUE: CT scan of the abdomen and pelvis was performed following injection of IV contrast. Multiplanar reformats were obtained. Dose reduction techniques were used.  CONTRAST: 100 mL Isovue-370.    FINDINGS:    LOWER  CHEST: A 3 mm left lower lobe pulmonary nodule, series 3 image 14, is unchanged from 5/3/2021; no further follow-up is recommended.    Atherosclerosis of coronary arteries and visualized thoracic aorta. Tiny hiatal hernia.    HEPATOBILIARY: Hepatic steatosis.    Gallbladder is normal.    No intrahepatic or intrahepatic biliary ductal dilatation.    PANCREAS: Enhances normally. No peripancreatic inflammatory fat stranding.    SPLEEN: Enhances normally. Normal size.    ADRENAL GLANDS: Normal.    KIDNEYS: Both kidneys enhance symmetrically, without hydronephrosis.    No nephroureterolithiasis.    Urinary bladder is unremarkable.    PELVIC ORGANS: No suspicious abnormality.    BOWEL: No evidence of acute gastrointestinal inflammation or obstruction. Normal appendix. Colonic diverticulosis.    No intraperitoneal free fluid or free air.    LYMPH NODES: No suspicious abdominal or pelvic lymphadenopathy.    VASCULATURE: No abdominal aortic aneurysm. Mild atheromatous disease.    MUSCULOSKELETAL: No suspicious abnormality.    OTHER: No additionally significant abnormalities.      Impression    IMPRESSION:   1.  No acute CT abnormality of the abdomen/pelvis.  2.  Colonic diverticulosis, without evidence of acute diverticulitis.  3.  Hepatic steatosis.           Medications   sodium chloride 0.9% BOLUS 500 mL (has no administration in time range)   sodium chloride 0.9 % infusion (has no administration in time range)   ketorolac (TORADOL) injection 15 mg (has no administration in time range)   iopamidol (ISOVUE-370) solution 100 mL (has no administration in time range)   sodium chloride 0.9 % bag 500mL for CT scan flush use (has no administration in time range)       Assessments & Plan (with Medical Decision Making)     MDM: Rosita King is a 73 year old female presenting with left flank pain.  No associated nausea vomiting.  History of diverticulitis but this is different she notes.  No signs of urinary tract infection or  symptoms.  Evaluate with CT but use contrast to evaluate differential including ureteral lithiasis.  Broad labs including CBC comprehensive panel UA       Imaging is reassuring.  Discussed management as below precautions are given for return.  Suspect this is musculoskeletal at this point.  They have some tablets of oxycodone still remaining at home from prior surgeries.     We did discuss caution regarding ibuprofen as at age 73 there is a risk of kidney injury and peptic ulcer disease.  Would caution regular use but occasional use of 400 mg should be tolerated.  I have reviewed the nursing notes.    I have reviewed the findings, diagnosis, plan and need for follow up with the patient.           Medical Decision Making  The patient's presentation was of moderate complexity (an acute complicated injury).    The patient's evaluation involved:  ordering and/or review of 3+ test(s) in this encounter (see separate area of note for details)    The patient's management necessitated only low risk treatment.            Final diagnoses:   Left flank pain - no serious findings.  use the lidoderm patch 4% a needed.  ibuprofen and tylenol scheduled.  maintain back range of motion.  see lat dorsi and low back home exercises.  may use oxycodone for breakthrough pain       5/19/2024   [facility] EMERGENCY DEPT       Booker Batista MD  05/19/24 2105       Booker Batista MD  05/19/24 2106

## 2024-05-19 NOTE — ED NOTES
73-year-old female presented initially to the urgent care with concern over left flank pain with urinary frequency noted over the last 24 hours.  No obvious dysuria.  No nausea, vomiting, diarrhea.  No prior history of nephrolithiasis.  She has been using heating pads for pain with some improvement.  Discussed typical scope evaluation available in the urgent care and recommended evaluation in the ER today, patient amenable with plan.  She was transferred to ED triage nurse.      Disclaimer: This note consists of symbols derived from keyboarding, dictation, and/or voice recognition software. As a result, there may be errors in the script that have gone undetected.  Please consider this when interpreting information found in the chart.       Ginny Estevez, NARDA  05/19/24 0943

## 2024-05-19 NOTE — ED TRIAGE NOTES
Left side flank pain radiating to the front, pain started yesterday denies n/v and fever. No history of kidney stone. Pt states pain is 8/10 and pain is intermittent. Pt was unable to sleep.  States she has been peeing more even though she takes a pill that should make her not need to go.     Triage Assessment (Adult)       Row Name 05/19/24 8537          Triage Assessment    Airway WDL WDL        Respiratory WDL    Respiratory WDL WDL        Skin Circulation/Temperature WDL    Skin Circulation/Temperature WDL WDL        Cardiac WDL    Cardiac WDL WDL        Peripheral/Neurovascular WDL    Peripheral Neurovascular WDL WDL        Cognitive/Neuro/Behavioral WDL    Cognitive/Neuro/Behavioral WDL WDL

## 2024-06-04 ENCOUNTER — OFFICE VISIT (OUTPATIENT)
Dept: FAMILY MEDICINE | Facility: CLINIC | Age: 74
End: 2024-06-04
Payer: COMMERCIAL

## 2024-06-04 VITALS
HEART RATE: 60 BPM | DIASTOLIC BLOOD PRESSURE: 62 MMHG | HEIGHT: 63 IN | WEIGHT: 205 LBS | BODY MASS INDEX: 36.32 KG/M2 | TEMPERATURE: 97.9 F | OXYGEN SATURATION: 96 % | SYSTOLIC BLOOD PRESSURE: 120 MMHG | RESPIRATION RATE: 16 BRPM

## 2024-06-04 DIAGNOSIS — Z23 NEED FOR SHINGLES VACCINE: ICD-10-CM

## 2024-06-04 DIAGNOSIS — E11.9 TYPE 2 DIABETES MELLITUS WITHOUT COMPLICATION, WITHOUT LONG-TERM CURRENT USE OF INSULIN (H): Primary | ICD-10-CM

## 2024-06-04 DIAGNOSIS — I10 ESSENTIAL HYPERTENSION WITH GOAL BLOOD PRESSURE LESS THAN 140/90: ICD-10-CM

## 2024-06-04 DIAGNOSIS — Z29.11 NEED FOR VACCINATION AGAINST RESPIRATORY SYNCYTIAL VIRUS: ICD-10-CM

## 2024-06-04 DIAGNOSIS — I47.10 PAROXYSMAL SUPRAVENTRICULAR TACHYCARDIA (H): ICD-10-CM

## 2024-06-04 DIAGNOSIS — Z23 NEED FOR TDAP VACCINATION: ICD-10-CM

## 2024-06-04 LAB — HBA1C MFR BLD: 7.2 % (ref 0–5.6)

## 2024-06-04 PROCEDURE — 36415 COLL VENOUS BLD VENIPUNCTURE: CPT | Performed by: STUDENT IN AN ORGANIZED HEALTH CARE EDUCATION/TRAINING PROGRAM

## 2024-06-04 PROCEDURE — G2211 COMPLEX E/M VISIT ADD ON: HCPCS | Performed by: STUDENT IN AN ORGANIZED HEALTH CARE EDUCATION/TRAINING PROGRAM

## 2024-06-04 PROCEDURE — 99207 PR FOOT EXAM NO CHARGE: CPT | Performed by: STUDENT IN AN ORGANIZED HEALTH CARE EDUCATION/TRAINING PROGRAM

## 2024-06-04 PROCEDURE — 83036 HEMOGLOBIN GLYCOSYLATED A1C: CPT | Performed by: STUDENT IN AN ORGANIZED HEALTH CARE EDUCATION/TRAINING PROGRAM

## 2024-06-04 PROCEDURE — 99214 OFFICE O/P EST MOD 30 MIN: CPT | Performed by: STUDENT IN AN ORGANIZED HEALTH CARE EDUCATION/TRAINING PROGRAM

## 2024-06-04 RX ORDER — HYDROCHLOROTHIAZIDE 12.5 MG/1
12.5 TABLET ORAL EVERY MORNING
Qty: 90 TABLET | Refills: 3 | Status: SHIPPED | OUTPATIENT
Start: 2024-06-04

## 2024-06-04 RX ORDER — METOPROLOL SUCCINATE 25 MG/1
25-50 TABLET, EXTENDED RELEASE ORAL DAILY
Qty: 90 TABLET | Refills: 3 | Status: SHIPPED | OUTPATIENT
Start: 2024-06-04

## 2024-06-04 RX ORDER — RESPIRATORY SYNCYTIAL VIRUS VACCINE 120MCG/0.5
0.5 KIT INTRAMUSCULAR ONCE
Qty: 1 EACH | Refills: 0 | Status: SHIPPED | OUTPATIENT
Start: 2024-06-04 | End: 2024-06-04

## 2024-06-04 ASSESSMENT — PAIN SCALES - GENERAL: PAINLEVEL: NO PAIN (0)

## 2024-06-04 NOTE — PROGRESS NOTES
Assessment & Plan     Type 2 diabetes mellitus without complication, without long-term current use of insulin (H)  Patient is a pleasant 73 year old who presents to establish care and to follow up on chronic conditions. Patient has DM2 history, would prefer to avoid metformin due to concern for development of renal disease.  A1c is increased further this year to 7.2, and increase of 0.4 points. Given her age, technically at goal. For now, will plan recheck A1c in 3 months. If continues to increase, recommend medication intervention.   - HEMOGLOBIN A1C  - FOOT EXAM  - HEMOGLOBIN A1C  - Hemoglobin A1c  - PRIMARY CARE FOLLOW-UP SCHEDULING    Essential hypertension with goal blood pressure less than 140/90  Patient with some episodes of fatigue during the day, occasional shakiness, unclear if this is hypoglycemia, possible hypotension. Also did recently have renal stone. Will trial decreased dose of hydrochlorothiazide. New RX for 25 mg metoprolol so she isn't cutting 50 mg ER tablets in half as well. Monitor BP at home, notify me for uncontrolled pressures.   - hydrochlorothiazide 12.5 MG tablet  Dispense: 90 tablet; Refill: 3  - metoprolol succinate ER (TOPROL XL) 25 MG 24 hr tablet  Dispense: 90 tablet; Refill: 3    Paroxysmal supraventricular tachycardia (H24)  Refill.   - metoprolol succinate ER (TOPROL XL) 25 MG 24 hr tablet  Dispense: 90 tablet; Refill: 3    Need for shingles vaccine  - zoster vaccine recombinant adjuvanted (SHINGRIX) injection  Dispense: 0.5 mL; Refill: 0    Need for Tdap vaccination  - Tdap, tetanus-diptheria-acell pertussis, (BOOSTRIX) 5-2.5-18.5 LF-MCG/0.5 MICKEY injection  Dispense: 0.5 mL; Refill: 0    Need for vaccination against respiratory syncytial virus  - respiratory syncytial virus vaccine, bivalent (ABRYSVO) injection  Dispense: 1 each; Refill: 0    The longitudinal plan of care for the diagnosis(es)/condition(s) as documented were addressed during this visit. Due to the added  "complexity in care, I will continue to support Jenna in the subsequent management and with ongoing continuity of care.  I spent a total of 38 minutes on the day of the visit.   Time spent by me doing chart review, history and exam, documentation and further activities per the note    BMI  Estimated body mass index is 36.31 kg/m  as calculated from the following:    Height as of this encounter: 1.6 m (5' 3\").    Weight as of this encounter: 93 kg (205 lb).       Prosper West is a 73 year old, presenting for the following health issues:  Diabetes and Establish Care        6/4/2024     8:45 AM   Additional Questions   Roomed by Liliya GARCIA   Accompanied by Self     History of Present Illness       Reason for visit:  Follow up    She eats 0-1 servings of fruits and vegetables daily.She consumes 0 sweetened beverage(s) daily.She exercises with enough effort to increase her heart rate 9 or less minutes per day.  She exercises with enough effort to increase her heart rate 3 or less days per week.   She is taking medications regularly.     Diabetes Follow-up    How often are you checking your blood sugar? Not really, sometimes  What concerns do you have today about your diabetes? None   Do you have any of these symptoms? (Select all that apply)  No numbness or tingling in feet.  No redness, sores or blisters on feet.  No complaints of excessive thirst.  No reports of blurry vision.  No significant changes to weight.    Tired out, sleepiness,   At night sleeping \"pretty good\"  Goes to bed around 11, has a cpap.   Wakes up around 5:30.   Eyes gets sore - like if up in the morning for a while, gets like a discomfort, watery eyes. Keeps trying eye drops.   Has seen an eye doctor. In [place]. Less than a year ago.   Taking care of her  and nephew.     Recently seen for presumed kidney stone, better now, better after urinating after she arrived to the ED.     Retired now, worked in payroll/hr/work comp.   Does " "yard/house work.     Doesn't want to take metformin - has noticed issues with kidneys with her  on metformin.     BP Readings from Last 2 Encounters:   06/04/24 120/62   05/19/24 (!) 153/81     Hemoglobin A1C (%)   Date Value   06/04/2024 7.2 (H)   12/28/2023 6.8 (H)   06/10/2021 6.2 (H)   08/06/2020 6.1 (H)     LDL Cholesterol Calculated (mg/dL)   Date Value   09/26/2023 166 (H)   04/29/2021 186 (H)   04/23/2019 182 (H)             Review of Systems  Constitutional, HEENT, cardiovascular, pulmonary, gi and gu systems are negative, except as otherwise noted.      Objective    /62 (BP Location: Right arm, Patient Position: Sitting, Cuff Size: Adult Large)   Pulse 60   Temp 97.9  F (36.6  C) (Tympanic)   Resp 16   Ht 1.6 m (5' 3\")   Wt 93 kg (205 lb)   SpO2 96%   BMI 36.31 kg/m    Body mass index is 36.31 kg/m .  Physical Exam  Constitutional:       Appearance: Normal appearance.   HENT:      Head: Normocephalic.   Eyes:      General: No scleral icterus.     Extraocular Movements: Extraocular movements intact.      Conjunctiva/sclera: Conjunctivae normal.   Cardiovascular:      Rate and Rhythm: Normal rate.   Pulmonary:      Effort: Pulmonary effort is normal.   Neurological:      General: No focal deficit present.      Mental Status: She is alert and oriented to person, place, and time.            Diabetic Foot Screen:  Any complaints of increased pain or numbness ? No  Is there a foot ulcer now or a history of foot ulcer? No  Does the foot have an abnormal shape? No  Are the nails thick, too long or ingrown? No  Are there any redness or open areas? No         Sensation Testing done at all points on the diagram with monofilament     Right Foot: Sensation Normal at all points  Left Foot: Sensation Normal at all points   However, both feet with mild decreased sensation to vibration distally.     Risk Category: 0- No loss of protective sensation  Performed by Carmina Almanza MD       Results from " this visit  Results for orders placed or performed in visit on 06/04/24   HEMOGLOBIN A1C     Status: Abnormal   Result Value Ref Range    Hemoglobin A1C 7.2 (H) 0.0 - 5.6 %             Signed Electronically by: Carmina Almanza MD

## 2024-09-15 ENCOUNTER — HEALTH MAINTENANCE LETTER (OUTPATIENT)
Age: 74
End: 2024-09-15

## 2024-09-23 ENCOUNTER — LAB (OUTPATIENT)
Dept: LAB | Facility: CLINIC | Age: 74
End: 2024-09-23
Attending: STUDENT IN AN ORGANIZED HEALTH CARE EDUCATION/TRAINING PROGRAM
Payer: COMMERCIAL

## 2024-09-23 DIAGNOSIS — E11.9 TYPE 2 DIABETES MELLITUS WITHOUT COMPLICATION, WITHOUT LONG-TERM CURRENT USE OF INSULIN (H): ICD-10-CM

## 2024-09-23 LAB
CHOLEST SERPL-MCNC: 237 MG/DL
EST. AVERAGE GLUCOSE BLD GHB EST-MCNC: 151 MG/DL
FASTING STATUS PATIENT QL REPORTED: ABNORMAL
HBA1C MFR BLD: 6.9 % (ref 0–5.6)
HDLC SERPL-MCNC: 45 MG/DL
LDLC SERPL CALC-MCNC: 154 MG/DL
NONHDLC SERPL-MCNC: 192 MG/DL
TRIGL SERPL-MCNC: 189 MG/DL

## 2024-09-23 PROCEDURE — 83036 HEMOGLOBIN GLYCOSYLATED A1C: CPT

## 2024-09-23 PROCEDURE — 36415 COLL VENOUS BLD VENIPUNCTURE: CPT

## 2024-09-23 PROCEDURE — 80061 LIPID PANEL: CPT

## 2024-10-08 ENCOUNTER — OFFICE VISIT (OUTPATIENT)
Dept: FAMILY MEDICINE | Facility: CLINIC | Age: 74
End: 2024-10-08
Attending: FAMILY MEDICINE
Payer: COMMERCIAL

## 2024-10-08 VITALS
HEART RATE: 58 BPM | BODY MASS INDEX: 35.97 KG/M2 | DIASTOLIC BLOOD PRESSURE: 72 MMHG | TEMPERATURE: 97.9 F | HEIGHT: 63 IN | OXYGEN SATURATION: 98 % | SYSTOLIC BLOOD PRESSURE: 132 MMHG | RESPIRATION RATE: 14 BRPM | WEIGHT: 203 LBS

## 2024-10-08 DIAGNOSIS — J44.9 CHRONIC OBSTRUCTIVE PULMONARY DISEASE, UNSPECIFIED COPD TYPE (H): ICD-10-CM

## 2024-10-08 DIAGNOSIS — D32.9 MENINGIOMA (H): ICD-10-CM

## 2024-10-08 DIAGNOSIS — E11.9 TYPE 2 DIABETES MELLITUS WITHOUT COMPLICATION, WITHOUT LONG-TERM CURRENT USE OF INSULIN (H): ICD-10-CM

## 2024-10-08 DIAGNOSIS — I10 ESSENTIAL HYPERTENSION WITH GOAL BLOOD PRESSURE LESS THAN 140/90: ICD-10-CM

## 2024-10-08 DIAGNOSIS — Z00.00 ENCOUNTER FOR MEDICARE ANNUAL WELLNESS EXAM: Primary | ICD-10-CM

## 2024-10-08 DIAGNOSIS — E78.5 HYPERLIPIDEMIA LDL GOAL <100: ICD-10-CM

## 2024-10-08 DIAGNOSIS — E66.01 CLASS 2 SEVERE OBESITY DUE TO EXCESS CALORIES WITH SERIOUS COMORBIDITY AND BODY MASS INDEX (BMI) OF 35.0 TO 35.9 IN ADULT (H): ICD-10-CM

## 2024-10-08 DIAGNOSIS — Z29.11 NEED FOR VACCINATION AGAINST RESPIRATORY SYNCYTIAL VIRUS: ICD-10-CM

## 2024-10-08 DIAGNOSIS — K76.0 HEPATIC STEATOSIS: ICD-10-CM

## 2024-10-08 DIAGNOSIS — R91.8 PULMONARY NODULES: ICD-10-CM

## 2024-10-08 DIAGNOSIS — E66.812 CLASS 2 SEVERE OBESITY DUE TO EXCESS CALORIES WITH SERIOUS COMORBIDITY AND BODY MASS INDEX (BMI) OF 35.0 TO 35.9 IN ADULT (H): ICD-10-CM

## 2024-10-08 DIAGNOSIS — K29.00 ACUTE GASTRITIS WITHOUT HEMORRHAGE, UNSPECIFIED GASTRITIS TYPE: ICD-10-CM

## 2024-10-08 DIAGNOSIS — Z23 NEED FOR SHINGLES VACCINE: ICD-10-CM

## 2024-10-08 DIAGNOSIS — Z23 NEED FOR TDAP VACCINATION: ICD-10-CM

## 2024-10-08 DIAGNOSIS — R10.13 EPIGASTRIC PAIN: ICD-10-CM

## 2024-10-08 PROBLEM — R73.03 PREDIABETES: Status: RESOLVED | Noted: 2021-06-11 | Resolved: 2024-10-08

## 2024-10-08 PROBLEM — R93.1 ABNORMAL FINDINGS ON DIAGNOSTIC IMAGING OF HEART/CORONARY CIRCULATION: Status: RESOLVED | Noted: 2021-06-11 | Resolved: 2024-10-08

## 2024-10-08 LAB
CREAT UR-MCNC: 36.2 MG/DL
MICROALBUMIN UR-MCNC: <12 MG/L
MICROALBUMIN/CREAT UR: NORMAL MG/G{CREAT}

## 2024-10-08 PROCEDURE — 99214 OFFICE O/P EST MOD 30 MIN: CPT | Mod: 25 | Performed by: STUDENT IN AN ORGANIZED HEALTH CARE EDUCATION/TRAINING PROGRAM

## 2024-10-08 PROCEDURE — 90662 IIV NO PRSV INCREASED AG IM: CPT | Performed by: STUDENT IN AN ORGANIZED HEALTH CARE EDUCATION/TRAINING PROGRAM

## 2024-10-08 PROCEDURE — 82570 ASSAY OF URINE CREATININE: CPT | Performed by: STUDENT IN AN ORGANIZED HEALTH CARE EDUCATION/TRAINING PROGRAM

## 2024-10-08 PROCEDURE — G0008 ADMIN INFLUENZA VIRUS VAC: HCPCS | Performed by: STUDENT IN AN ORGANIZED HEALTH CARE EDUCATION/TRAINING PROGRAM

## 2024-10-08 PROCEDURE — G0439 PPPS, SUBSEQ VISIT: HCPCS | Performed by: STUDENT IN AN ORGANIZED HEALTH CARE EDUCATION/TRAINING PROGRAM

## 2024-10-08 PROCEDURE — 82043 UR ALBUMIN QUANTITATIVE: CPT | Performed by: STUDENT IN AN ORGANIZED HEALTH CARE EDUCATION/TRAINING PROGRAM

## 2024-10-08 RX ORDER — LORAZEPAM 0.5 MG/1
.5-1 TABLET ORAL PRN
Qty: 4 TABLET | Refills: 0 | Status: SHIPPED | OUTPATIENT
Start: 2024-10-08

## 2024-10-08 RX ORDER — LISINOPRIL 5 MG/1
5 TABLET ORAL EVERY MORNING
Qty: 90 TABLET | Refills: 3 | Status: SHIPPED | OUTPATIENT
Start: 2024-10-08

## 2024-10-08 RX ORDER — SUCRALFATE 1 G/1
1 TABLET ORAL 4 TIMES DAILY
Qty: 120 TABLET | Refills: 0 | Status: SHIPPED | OUTPATIENT
Start: 2024-10-08

## 2024-10-08 RX ORDER — FAMOTIDINE 20 MG/1
20 TABLET, FILM COATED ORAL 2 TIMES DAILY
Qty: 60 TABLET | Refills: 0 | Status: SHIPPED | OUTPATIENT
Start: 2024-10-08

## 2024-10-08 RX ORDER — ATORVASTATIN CALCIUM 20 MG/1
TABLET, FILM COATED ORAL
Qty: 90 TABLET | Refills: 0 | Status: SHIPPED | OUTPATIENT
Start: 2024-10-08 | End: 2025-01-06

## 2024-10-08 ASSESSMENT — PAIN SCALES - GENERAL: PAINLEVEL: MILD PAIN (2)

## 2024-10-08 NOTE — PATIENT INSTRUCTIONS
Keep me in the loop in about a month on how the medicine is working, if it is helping or not. Also update me on blood pressures at that time since we are changing the lisinopril dosage.    Patient Education   Preventive Care Advice   This is general advice given by our system to help you stay healthy. However, your care team may have specific advice just for you. Please talk to your care team about your preventive care needs.  Nutrition  Eat 5 or more servings of fruits and vegetables each day.  Try wheat bread, brown rice and whole grain pasta (instead of white bread, rice, and pasta).  Get enough calcium and vitamin D. Check the label on foods and aim for 100% of the RDA (recommended daily allowance).  Lifestyle  Exercise at least 150 minutes each week  (30 minutes a day, 5 days a week).  Do muscle strengthening activities 2 days a week. These help control your weight and prevent disease.  No smoking.  Wear sunscreen to prevent skin cancer.  Have a dental exam and cleaning every 6 months.  Yearly exams  See your health care team every year to talk about:  Any changes in your health.  Any medicines your care team has prescribed.  Preventive care, family planning, and ways to prevent chronic diseases.  Shots (vaccines)   HPV shots (up to age 26), if you've never had them before.  Hepatitis B shots (up to age 59), if you've never had them before.  COVID-19 shot: Get this shot when it's due.  Flu shot: Get a flu shot every year.  Tetanus shot: Get a tetanus shot every 10 years.  Pneumococcal, hepatitis A, and RSV shots: Ask your care team if you need these based on your risk.  Shingles shot (for age 50 and up)  General health tests  Diabetes screening:  Starting at age 35, Get screened for diabetes at least every 3 years.  If you are younger than age 35, ask your care team if you should be screened for diabetes.  Cholesterol test: At age 39, start having a cholesterol test every 5 years, or more often if  advised.  Bone density scan (DEXA): At age 50, ask your care team if you should have this scan for osteoporosis (brittle bones).  Hepatitis C: Get tested at least once in your life.  STIs (sexually transmitted infections)  Before age 24: Ask your care team if you should be screened for STIs.  After age 24: Get screened for STIs if you're at risk. You are at risk for STIs (including HIV) if:  You are sexually active with more than one person.  You don't use condoms every time.  You or a partner was diagnosed with a sexually transmitted infection.  If you are at risk for HIV, ask about PrEP medicine to prevent HIV.  Get tested for HIV at least once in your life, whether you are at risk for HIV or not.  Cancer screening tests  Cervical cancer screening: If you have a cervix, begin getting regular cervical cancer screening tests starting at age 21.  Breast cancer scan (mammogram): If you've ever had breasts, begin having regular mammograms starting at age 40. This is a scan to check for breast cancer.  Colon cancer screening: It is important to start screening for colon cancer at age 45.  Have a colonoscopy test every 10 years (or more often if you're at risk) Or, ask your provider about stool tests like a FIT test every year or Cologuard test every 3 years.  To learn more about your testing options, visit:   .  For help making a decision, visit:   https://bit.ly/bf30015.  Prostate cancer screening test: If you have a prostate, ask your care team if a prostate cancer screening test (PSA) at age 55 is right for you.  Lung cancer screening: If you are a current or former smoker ages 50 to 80, ask your care team if ongoing lung cancer screenings are right for you.  For informational purposes only. Not to replace the advice of your health care provider. Copyright   2023 [employer]. All rights reserved. Clinically reviewed by the [facility]. [employer] [id number] - REV 01/24.  Hearing  Loss: Care Instructions  Overview     Hearing loss is a sudden or slow decrease in how well you hear. It can range from slight to profound. Permanent hearing loss can occur with aging. It also can happen when you are exposed long-term to loud noise. Examples include listening to loud music, riding motorcycles, or being around other loud machines.  Hearing loss can affect your work and home life. It can make you feel lonely or depressed. You may feel that you have lost your independence. But hearing aids and other devices can help you hear better and feel connected to others.  Follow-up care is a key part of your treatment and safety. Be sure to make and go to all appointments, and call your doctor if you are having problems. It's also a good idea to know your test results and keep a list of the medicines you take.  How can you care for yourself at home?  Avoid loud noises whenever possible. This helps keep your hearing from getting worse.  Always wear hearing protection around loud noises.  Wear a hearing aid as directed.  A professional can help you pick a hearing aid that will work best for you.  You can also get hearing aids over the counter for mild to moderate hearing loss.  Have hearing tests as your doctor suggests. They can show whether your hearing has changed. Your hearing aid may need to be adjusted.  Use other devices as needed. These may include:  Telephone amplifiers and hearing aids that can connect to a television, stereo, radio, or microphone.  Devices that use lights or vibrations. These alert you to the doorbell, a ringing telephone, or a baby monitor.  Television closed-captioning. This shows the words at the bottom of the screen. Most new TVs can do this.  TTY (text telephone). This lets you type messages back and forth on the telephone instead of talking or listening. These devices are also called TDD. When messages are typed on the keyboard, they are sent over the phone line to a receiving  "TTY. The message is shown on a monitor.  Use text messaging, social media, and email if it is hard for you to communicate by telephone.  Try to learn a listening technique called speechreading. It is not lipreading. You pay attention to people's gestures, expressions, posture, and tone of voice. These clues can help you understand what a person is saying. Face the person you are talking to, and have them face you. Make sure the lighting is good. You need to see the other person's face clearly.  Think about counseling if you need help to adjust to your hearing loss.  When should you call for help?  Watch closely for changes in your health, and be sure to contact your doctor if:    You think your hearing is getting worse.     You have new symptoms, such as dizziness or nausea.   Where can you learn more?  Go to https://www.[employer].net/patiented  Enter R798 in the search box to learn more about \"Hearing Loss: Care Instructions.\"  Current as of: September 27, 2023  Content Version: 14.2 2024 [employer].   Care instructions adapted under license by your healthcare professional. If you have questions about a medical condition or this instruction, always ask your healthcare professional. [employer] disclaims any warranty or liability for your use of this information.    Learning About Sleeping Well  What does sleeping well mean?     Sleeping well means getting enough sleep to feel good and stay healthy. How much sleep is enough varies among people.  The number of hours you sleep and how you feel when you wake up are both important. If you do not feel refreshed, you probably need more sleep. Another sign of not getting enough sleep is feeling tired during the day.  Experts recommend that adults get at least 7 or more hours of sleep per day. Children and older adults need more sleep.  Why is getting enough sleep important?  Getting enough quality sleep is a basic part of good health. When your " "sleep suffers, your physical health, mood, and your thoughts can suffer too. You may find yourself feeling more grumpy or stressed. Not getting enough sleep also can lead to serious problems, including injury, accidents, anxiety, and depression.  What might cause poor sleeping?  Many things can cause sleep problems, including:  Changes to your sleep schedule.  Stress. Stress can be caused by fear about a single event, such as giving a speech. Or you may have ongoing stress, such as worry about work or school.  Depression, anxiety, and other mental or emotional conditions.  Changes in your sleep habits or surroundings. This includes changes that happen where you sleep, such as noise, light, or sleeping in a different bed. It also includes changes in your sleep pattern, such as having jet lag or working a late shift.  Health problems, such as pain, breathing problems, and restless legs syndrome.  Lack of regular exercise.  Using alcohol, nicotine, or caffeine before bed.  How can you help yourself?  Here are some tips that may help you sleep more soundly and wake up feeling more refreshed.  Your sleeping area   Use your bedroom only for sleeping and sex. A bit of light reading may help you fall asleep. But if it doesn't, do your reading elsewhere in the house. Try not to use your TV, computer, smartphone, or tablet while you are in bed.  Be sure your bed is big enough to stretch out comfortably, especially if you have a sleep partner.  Keep your bedroom quiet, dark, and cool. Use curtains, blinds, or a sleep mask to block out light. To block out noise, use earplugs, soothing music, or a \"white noise\" machine.  Your evening and bedtime routine   Create a relaxing bedtime routine. You might want to take a warm shower or bath, or listen to soothing music.  Go to bed at the same time every night. And get up at the same time every morning, even if you feel tired.  What to avoid   Limit caffeine (coffee, tea, caffeinated " "sodas) during the day, and don't have any for at least 6 hours before bedtime.  Avoid drinking alcohol before bedtime. Alcohol can cause you to wake up more often during the night.  Try not to smoke or use tobacco, especially in the evening. Nicotine can keep you awake.  Limit naps during the day, especially close to bedtime.  Avoid lying in bed awake for too long. If you can't fall asleep or if you wake up in the middle of the night and can't get back to sleep within about 20 minutes, get out of bed and go to another room until you feel sleepy.  Avoid taking medicine right before bed that may keep you awake or make you feel hyper or energized. Your doctor can tell you if your medicine may do this and if you can take it earlier in the day.  If you can't sleep   Imagine yourself in a peaceful, pleasant scene. Focus on the details and feelings of being in a place that is relaxing.  Get up and do a quiet or boring activity until you feel sleepy.  Avoid drinking any liquids before going to bed to help prevent waking up often to use the bathroom.  Where can you learn more?  Go to https://www.[employer].net/patiented  Enter J942 in the search box to learn more about \"Learning About Sleeping Well.\"  Current as of: July 10, 2023  Content Version: 14.2 2024 [employer].   Care instructions adapted under license by your healthcare professional. If you have questions about a medical condition or this instruction, always ask your healthcare professional. Health[employer] Inc[employer] disclaims any warranty or liability for your use of this information.    Bladder Training: Care Instructions  Your Care Instructions     Bladder training is used to treat urge incontinence and stress incontinence. Urge incontinence means that the need to urinate comes on so fast that you can't get to a toilet in time. Stress incontinence means that you leak urine because of pressure on your bladder. For example, it may happen when you " laugh, cough, or lift something heavy.  Bladder training can increase how long you can wait before you have to urinate. It can also help your bladder hold more urine. And it can give you better control over the urge to urinate.  It is important to remember that bladder training takes a few weeks to a few months to make a difference. You may not see results right away, but don't give up.  Follow-up care is a key part of your treatment and safety. Be sure to make and go to all appointments, and call your doctor if you are having problems. It's also a good idea to know your test results and keep a list of the medicines you take.  How can you care for yourself at home?  Work with your doctor to come up with a bladder training program that is right for you. You may use one or more of the following methods.  Delayed urination  In the beginning, try to keep from urinating for 5 minutes after you first feel the need to go.  While you wait, take deep, slow breaths to relax. Kegel exercises can also help you delay the need to go to the bathroom.  After some practice, when you can easily wait 5 minutes to urinate, try to wait 10 minutes before you urinate.  Slowly increase the waiting period until you are able to control when you have to urinate.  Scheduled urination  Empty your bladder when you first wake up in the morning.  Schedule times throughout the day when you will urinate.  Start by going to the bathroom every hour, even if you don't need to go.  Slowly increase the time between trips to the bathroom.  When you have found a schedule that works well for you, keep doing it.  If you wake up during the night and have to urinate, do it. Apply your schedule to waking hours only.  Kegel exercises  These tighten and strengthen pelvic muscles, which can help you control the flow of urine. (If doing these exercises causes pain, stop doing them and talk with your doctor.) To do Kegel exercises:  Squeeze your muscles as if you  "were trying not to pass gas. Or squeeze your muscles as if you were stopping the flow of urine. Your belly, legs, and buttocks shouldn't move.  Hold the squeeze for 3 seconds, then relax for 5 to 10 seconds.  Start with 3 seconds, then add 1 second each week until you are able to squeeze for 10 seconds.  Repeat the exercise 10 times a session. Do 3 to 8 sessions a day.  When should you call for help?  Watch closely for changes in your health, and be sure to contact your doctor if:    Your incontinence is getting worse.     You do not get better as expected.   Where can you learn more?  Go to https://www.[employer].net/patiented  Enter V684 in the search box to learn more about \"Bladder Training: Care Instructions.\"  Current as of: November 15, 2023  Content Version: 14.2 2024 [facility] [employer].   Care instructions adapted under license by your healthcare professional. If you have questions about a medical condition or this instruction, always ask your healthcare professional. Healthwise[employer] Incorporated disclaims any warranty or liability for your use of this information.    Substance Use Disorder: Care Instructions  Overview     You can improve your life and health by stopping your use of alcohol or drugs. When you don't drink or use drugs, you may feel and sleep better. You may get along better with your family, friends, and coworkers. There are medicines and programs that can help with substance use disorder.  How can you care for yourself at home?  Here are some ways to help you stay sober and prevent relapse.  If you have been given medicine to help keep you sober or reduce your cravings, be sure to take it exactly as prescribed.  Talk to your doctor about programs that can help you stop using drugs or drinking alcohol.  Do not keep alcohol or drugs in your home.  Plan ahead. Think about what you'll say if other people ask you to drink or use drugs. Try not to spend time with people who drink or use " drugs.  Use the time and money spent on drinking or drugs to do something that's important to you.  Preventing a relapse  Have a plan to deal with relapse. Learn to recognize changes in your thinking that lead you to drink or use drugs. Get help before you start to drink or use drugs again.  Try to stay away from situations, friends, or places that may lead you to drink or use drugs.  If you feel the need to drink alcohol or use drugs again, seek help right away. Call a trusted friend or family member. Some people get support from organizations such as Narcotics Anonymous or [employer] or from treatment facilities.  If you relapse, get help as soon as you can. Some people make a plan with another person that outlines what they want that person to do for them if they relapse. The plan usually includes how to handle the relapse and who to notify in case of relapse.  Don't give up. Remember that a relapse doesn't mean that you have failed. Use the experience to learn the triggers that lead you to drink or use drugs. Then quit again. Recovery is a lifelong process. Many people have several relapses before they are able to quit for good.  Follow-up care is a key part of your treatment and safety. Be sure to make and go to all appointments, and call your doctor if you are having problems. It's also a good idea to know your test results and keep a list of the medicines you take.  When should you call for help?   Call 911  anytime you think you may need emergency care. For example, call if you or someone else:    Has overdosed or has withdrawal signs. Be sure to tell the emergency workers that you are or someone else is using or trying to quit using drugs. Overdose or withdrawal signs may include:  Losing consciousness.  Seizure.  Seeing or hearing things that aren't there (hallucinations).     Is thinking or talking about suicide or harming others.   Where to get help 24 hours a day, 7 days a week   If you or someone  "you know talks about suicide, self-harm, a mental health crisis, a substance use crisis, or any other kind of emotional distress, get help right away. You can:    Call the Suicide and Crisis Lifeline at 988.     Call 9-314-764-TALK (1-959.516.7938).     Text HOME to 567846 to access the Crisis Text Line.   Consider saving these numbers in your phone.  Go to [employer] for more information or to chat online.  Call your doctor now or seek immediate medical care if:    You are having withdrawal symptoms. These may include nausea or vomiting, sweating, shakiness, and anxiety.   Watch closely for changes in your health, and be sure to contact your doctor if:    You have a relapse.     You need more help or support to stop.   Where can you learn more?  Go to https://www.[employer].net/patiented  Enter H573 in the search box to learn more about \"Substance Use Disorder: Care Instructions.\"  Current as of: November 15, 2023  Content Version: 14.2 2024 [facility] [employer].   Care instructions adapted under license by your healthcare professional. If you have questions about a medical condition or this instruction, always ask your healthcare professional. Health[employer] Inc[employer] disclaims any warranty or liability for your use of this information.       "

## 2024-10-08 NOTE — PROGRESS NOTES
Preventive Care Visit  [facility]  Carmina Almanza MD, Family Medicine  Oct 8, 2024      Assessment & Plan     Encounter for Medicare annual wellness exam  Patient is a 73-year-old female who presents today for Medicare annual wellness visit.  In addition, she has multiple concerns as below.    Type 2 diabetes mellitus without complication, without long-term current use of insulin (H)  For diabetes, she is due for urine.  Refill of glucose strips and lancets sent today.  A1c has been well-controlled with diet alone.    - Albumin Random Urine Quantitative with Creat Ratio  - blood glucose (NO BRAND SPECIFIED) test strip  Dispense: 100 strip; Refill: 3  - blood glucose (NO BRAND SPECIFIED) lancets standard  Dispense: 100 each; Refill: 3  - Albumin Random Urine Quantitative with Creat Ratio    Meningioma (H)  History of meningioma, last MRI was 1 year.  With increasing right sided head pressure including some vision changes on the right, will obtain an MRI again this year.  Requires Ativan for treatment to tolerate MRIs.  - MR Brain w/o & w Contrast  - LORazepam (ATIVAN) 0.5 MG tablet  Dispense: 4 tablet; Refill: 0    Hyperlipidemia LDL goal <100  Hepatic steatosis  We reviewed her elevated cholesterol levels, that hepatic steatosis found on imaging.  Will start her on a statin.  Has previously been reported to have myalgias with pravastatin and simvastatin.  If she is not able to tolerate atorvastatin, plan for Zetia versus Repatha.  - atorvastatin (LIPITOR) 20 MG tablet  Dispense: 90 tablet; Refill: 0    Pulmonary nodules  History of pulmonary nodules, due for recheck.  - CT Chest w/o Contrast    Epigastric pain  Acute gastritis without hemorrhage, unspecified gastritis type  Patient is having some tin taste in her mouth, epigastric abdominal pain, nausea, bowel changes.  We discussed that this might be related to gastritis and GERD, and she was open to trying sucralfate and Pepcid for  "the next month.  If symptoms resolve, this was likely the cause.  Would consider an EGD at that point.  If symptoms are not improving, consider laboratory workup including CMP, lipase, CBC with iron levels and ferritin levels.  - sucralfate (CARAFATE) 1 GM tablet  Dispense: 120 tablet; Refill: 0  - famotidine (PEPCID) 20 MG tablet  Dispense: 60 tablet; Refill: 0    Essential hypertension with goal blood pressure less than 140/90  Continues to have episodes of lightheadedness.  We did decrease her hydrochlorothiazide at her last visit to 12.5 mg.  Metoprolol is only at 25 mg.  And will try a lower dose of lisinopril today down to 5 mg.  She will notify me in 1 month on how blood pressures are looking.  If blood pressures remain elevated become elevated, would increase back lisinopril slightly to 7.5 mg daily.  - lisinopril (ZESTRIL) 5 MG tablet  Dispense: 90 tablet; Refill: 3    Chronic obstructive pulmonary disease, unspecified COPD type (H)  Not on medications, stable.    Class 2 severe obesity due to excess calories with serious comorbidity and body mass index (BMI) of 35.0 to 35.9 in adult (H)  Did not discuss in great detail today.  May benefit from weight loss especially in the context of hepatic steatosis as above.    Need for Tdap vaccination  - Tdap, tetanus-diptheria-acell pertussis, (BOOSTRIX) 5-2.5-18.5 LF-MCG/0.5 MICKEY injection  Dispense: 0.5 mL; Refill: 0    Need for shingles vaccine  - zoster vaccine recombinant adjuvanted (SHINGRIX) injection  Dispense: 0.5 mL; Refill: 0    Need for vaccination against respiratory syncytial virus  - RSV vaccine, bivalent, ABRYSVO, injection  Dispense: 0.5 mL; Refill: 0    Patient has been advised of split billing requirements and indicates understanding: Yes        BMI  Estimated body mass index is 35.96 kg/m  as calculated from the following:    Height as of this encounter: 1.6 m (5' 3\").    Weight as of this encounter: 92.1 kg (203 lb).     Counseling  Appropriate " "preventive services were addressed with this patient via screening, questionnaire, or discussion as appropriate for fall prevention, nutrition, physical activity, Tobacco-use cessation, social engagement, weight loss and cognition.  Checklist reviewing preventive services available has been given to the patient.  Reviewed patient's diet, addressing concerns and/or questions.   Discussed possible causes of fatigue. The patient was provided with written information regarding signs of hearing loss.   Information on urinary incontinence and treatment options given to patient.         Prosper West is a 73 year old, presenting for the following:  Wellness Visit and Diabetes        10/8/2024    10:11 AM   Additional Questions   Roomed by Liliya GARCIA   Accompanied by Self         Health Care Directive  Patient does not have a Health Care Directive or Living Will: Patient states has Advance Directive and will bring in a copy to clinic.    HPI    Been miserable.   \"Hairy throat\" feels like something is there/and when she eats bread it goes away.   At night takes a little bread to feel better.   Sleeping all the time.   Stomach issues = some constipation. Taking metamucil, senna, prunes.   Finally broke loose. But hadn't had that previously.     Wondering solifenacin. Mouth feel \"tin tasting\"   Been for a while,b ut the last 6 weeks more noticeable.     Feel hungry all the time but when she eats the stomach gets sore.     Eyes get sore- has an appointment on Friday.   Has to lay head down and then sleeps all afternoon.     Comes and goes and then forgets about it. But thel ast 6 weeks ahs been complaining more about it. Maybe started this summer sometime.     Today pretty good again.     Epigastrium in particular will start hurting after eating.   A pain that radiates around her back. Mostly wraps around the flank, but not quite sure.   \"Just there to where it's hard to work\"   Doesn't feel like muscle issue.       Spot " on her lung and head     Some shortness of breath - when she is working.   Sometimes just sitting. No chest pain with that.     Solifenacin has helped, but has noticed where soemtimes it is coming back more again.     Pressure back right head, sometimes sharp pains in the eye. The ear as well.       Smoking   Used to, quit 17 years ago.      Sometimes gets dizzy. Sometimes nausea.   Fewer episodes since decreaseing hydrochlorothiazide to 12.5 mg daily.               Diabetes Follow-up    How often are you checking your blood sugar? A few times a week  What time of day are you checking your blood sugars (select all that apply)?  Before and after meals  Have you had any blood sugars above 200?  Yes 213  Have you had any blood sugars below 70?  No  What symptoms do you notice when your blood sugar is low?  Shaky and Blurred vision  What concerns do you have today about your diabetes? None   Do you have any of these symptoms? (Select all that apply)  No numbness or tingling in feet.  No redness, sores or blisters on feet.  No complaints of excessive thirst.  No reports of blurry vision.  No significant changes to weight.  Have you had a diabetic eye exam in the last 12 months? No- apt Friday 10/11/2024        BP Readings from Last 2 Encounters:   10/08/24 132/72   06/04/24 120/62     Hemoglobin A1C (%)   Date Value   09/23/2024 6.9 (H)   06/04/2024 7.2 (H)   06/10/2021 6.2 (H)   08/06/2020 6.1 (H)     LDL Cholesterol Calculated (mg/dL)   Date Value   09/23/2024 154 (H)   09/26/2023 166 (H)   04/29/2021 186 (H)   04/23/2019 182 (H)               10/8/2024   General Health   How would you rate your overall physical health? (!) FAIR   Feel stress (tense, anxious, or unable to sleep) To some extent      (!) STRESS CONCERN      10/8/2024   Nutrition   Diet: I don't know            9/26/2023   Exercise   Frequency of exercise: 1 day/week            10/8/2024   Social Factors   Worry food won't last until get money to buy more  No   Food not last or not have enough money for food? No   Do you have housing? (Housing is defined as stable permanent housing and does not include staying ouside in a car, in a tent, in an abandoned building, in an overnight shelter, or couch-surfing.) Yes   Are you worried about losing your housing? No   Lack of transportation? No   Unable to get utilities (heat,electricity)? No            10/8/2024   Fall Risk   Fallen 2 or more times in the past year? No    No   Trouble with walking or balance? No    No       Multiple values from one day are sorted in reverse-chronological order          10/8/2024   Activities of Daily Living- Home Safety   Needs help with the following daily activites None of the above   Safety concerns in the home None of the above            10/8/2024   Dental   Dentist two times every year? Yes            10/8/2024   Hearing Screening   Hearing concerns? (!) I NEED TO ASK PEOPLE TO SPEAK UP OR REPEAT THEMSELVES.            10/8/2024   Driving Risk Screening   Patient/family members have concerns about driving No            10/8/2024   General Alertness/Fatigue Screening   Have you been more tired than usual lately? (!) YES            10/8/2024   Urinary Incontinence Screening   Bothered by leaking urine in past 6 months Yes            10/8/2024   TB Screening   Were you born outside of the [place]? No            Today's PHQ-2 Score:       10/8/2024     7:30 AM   PHQ-2 ( 1999 [employer])   Q1: Little interest or pleasure in doing things 1   Q2: Feeling down, depressed or hopeless 0   PHQ-2 Score 1   Q1: Little interest or pleasure in doing things Several days   Q2: Feeling down, depressed or hopeless Not at all   PHQ-2 Score 1           10/8/2024   Substance Use   Alcohol more than 3/day or more than 7/wk No   Do you have a current opioid prescription? No   How severe/bad is pain from 1 to 10? 2/10   Do you use any other substances recreationally? (!) OTHER        Social History     Tobacco Use     Smoking status: Former    Smokeless tobacco: Never   Vaping Use    Vaping status: Never Used   Substance Use Topics    Alcohol use: Yes     Comment: ocassional    Drug use: No           4/3/2024   LAST FHS-7 RESULTS   1st degree relative breast or ovarian cancer Yes   Any relative bilateral breast cancer No   Any male have breast cancer No   Any ONE woman have BOTH breast AND ovarian cancer No   Any woman with breast cancer before 50yrs No   2 or more relatives with breast AND/OR ovarian cancer No   2 or more relatives with breast AND/OR bowel cancer No           Mammogram Screening - Mammogram every 1-2 years updated in Health Maintenance based on mutual decision making    ASCVD Risk   The 10-year ASCVD risk score (Luis LOZANO, et al., 2019) is: 33.4%    Values used to calculate the score:      Age: 73 years      Sex: Female      Is Non- : No      Diabetic: Yes      Tobacco smoker: No      Systolic Blood Pressure: 132 mmHg      Is BP treated: Yes      HDL Cholesterol: 45 mg/dL      Total Cholesterol: 237 mg/dL        Reviewed and updated as needed this visit by Provider                    Current providers sharing in care for this patient include:  Patient Care Team:  Carmina Almanza MD as PCP - General (Family Medicine)  Norma Ruelas PA-C as Physician Assistant (Urology)  Norma Ruelas PA-C as Assigned Surgical Provider  Norma Ruelas PA-C as Physician Assistant (Urology)  Carmina Almanza MD as Assigned PCP    The following health maintenance items are reviewed in Epic and correct as of today:  Health Maintenance   Topic Date Due    COPD ACTION PLAN  Never done    RSV VACCINE (1 - Risk 60-74 years 1-dose series) Never done    ZOSTER IMMUNIZATION (2 of 3) 07/14/2011    DTAP/TDAP/TD IMMUNIZATION (2 - Td or Tdap) 06/09/2016    EYE EXAM  08/01/2024    COVID-19 Vaccine (4 - 2024-25 season) 09/01/2024    LUNG CANCER SCREENING  10/04/2024    MICROALBUMIN  10/30/2024    A1C   "03/23/2025    BMP  05/19/2025    DIABETIC FOOT EXAM  06/04/2025    ANNUAL REVIEW OF HM ORDERS  06/04/2025    LIPID  09/23/2025    MEDICARE ANNUAL WELLNESS VISIT  10/08/2025    FALL RISK ASSESSMENT  10/08/2025    COLORECTAL CANCER SCREENING  11/25/2025    MAMMO SCREENING  04/03/2026    ADVANCE CARE PLANNING  10/08/2029    DEXA  04/03/2039    SPIROMETRY  Completed    HEPATITIS C SCREENING  Completed    PHQ-2 (once per calendar year)  Completed    INFLUENZA VACCINE  Completed    Pneumococcal Vaccine: 65+ Years  Completed    HPV IMMUNIZATION  Aged Out    MENINGITIS IMMUNIZATION  Aged Out    RSV MONOCLONAL ANTIBODY  Aged Out         Review of Systems  Constitutional, HEENT, cardiovascular, pulmonary, gi and gu systems are negative, except as otherwise noted.     Objective    Exam  /72 (BP Location: Right arm, Patient Position: Sitting, Cuff Size: Adult Regular)   Pulse 58   Temp 97.9  F (36.6  C) (Tympanic)   Resp 14   Ht 1.6 m (5' 3\")   Wt 92.1 kg (203 lb)   SpO2 98%   BMI 35.96 kg/m     Estimated body mass index is 35.96 kg/m  as calculated from the following:    Height as of this encounter: 1.6 m (5' 3\").    Weight as of this encounter: 92.1 kg (203 lb).    Physical Exam  GENERAL: alert and no distress  EYES: Eyes grossly normal to inspection, and conjunctivae and sclerae normal  HENT: ear canals normal and TM's scarring bilaterally, nose and mouth without ulcers or lesions  NECK: no adenopathy, no asymmetry, masses, or scars  RESP: lungs clear to auscultation - no rales, rhonchi or wheezes  CV: regular rate and rhythm, normal S1 S2, no S3 or S4, no murmur, click or rub, no peripheral edema  ABDOMEN: soft, tender in epigastrium, LUQ and especially in LLQ, no hepatosplenomegaly, no masses and bowel sounds normal  MS: no gross musculoskeletal defects noted, no edema  SKIN: no suspicious lesions or rashes  NEURO: Normal strength and tone, mentation intact and speech normal  PSYCH: mentation appears normal, " affect normal/bright         10/8/2024   Mini Cog   Clock Draw Score 2 Normal   3 Item Recall 3 objects recalled   Mini Cog Total Score 5                 Signed Electronically by: Carmina Almanza MD

## 2024-10-09 ENCOUNTER — TELEPHONE (OUTPATIENT)
Dept: FAMILY MEDICINE | Facility: CLINIC | Age: 74
End: 2024-10-09
Payer: COMMERCIAL

## 2024-10-09 NOTE — TELEPHONE ENCOUNTER
I faxed note to Dr. CIFUENTES's office fax confirmation received.    Prior Authorization Retail Medication Request    Medication/Dose: Atorvastatin Calcium 20MG Tablets  Diagnosis and ICD code (if different than what is on RX):    New/renewal/insurance change PA/secondary ins. PA:  Previously Tried and Failed:    Rationale:      Covermymeds  Key: [id number]      Clinic Information  Preferred routing pool for dept communication: [id number]

## 2024-10-14 ENCOUNTER — MYC MEDICAL ADVICE (OUTPATIENT)
Dept: FAMILY MEDICINE | Facility: CLINIC | Age: 74
End: 2024-10-14
Payer: COMMERCIAL

## 2024-10-14 NOTE — TELEPHONE ENCOUNTER
Central Prior Authorization Team   Phone: [phone]    PA Initiation    Medication: Atorvastatin Calcium 20MG Tablets  Insurance Company: Silver Script Part D - Phone [phone] Fax [phone]  Pharmacy Filling the Rx: [facility] [address] [place] [address]  Filling Pharmacy Phone: [phone]  Filling Pharmacy Fax:    Start Date: 10/14/2024       [Initial Consultation] : an initial consultation for [FreeTextEntry2] : ear pain/ ear wax problem

## 2024-10-15 NOTE — TELEPHONE ENCOUNTER
Prior Authorization Approval    Authorization Effective Date: 1/1/2024  Authorization Expiration Date: 12/31/2024  Medication: Atorvastatin Calcium 20MG Tablets  Reference #:     Insurance Company: Silver Script Part D - Phone [phone] Fax [phone]  Which Pharmacy is filling the prescription (Not needed for infusion/clinic administered): [facility] [address] [place] [address] [address] [facility]  Pharmacy Notified: Yes  Patient Notified: Instructed pharmacy to notify patient when script is ready to /ship.

## 2024-10-29 ENCOUNTER — HOSPITAL ENCOUNTER (OUTPATIENT)
Dept: CT IMAGING | Facility: CLINIC | Age: 74
Discharge: HOME | End: 2024-10-29
Attending: STUDENT IN AN ORGANIZED HEALTH CARE EDUCATION/TRAINING PROGRAM
Payer: MEDICARE

## 2024-10-29 ENCOUNTER — HOSPITAL ENCOUNTER (OUTPATIENT)
Dept: MRI IMAGING | Facility: CLINIC | Age: 74
Discharge: HOME | End: 2024-10-29
Attending: STUDENT IN AN ORGANIZED HEALTH CARE EDUCATION/TRAINING PROGRAM
Payer: MEDICARE

## 2024-10-29 DIAGNOSIS — D32.9 MENINGIOMA (H): ICD-10-CM

## 2024-10-29 DIAGNOSIS — R91.8 PULMONARY NODULES: ICD-10-CM

## 2024-10-29 PROCEDURE — 255N000002 HC RX 255 OP 636: Performed by: STUDENT IN AN ORGANIZED HEALTH CARE EDUCATION/TRAINING PROGRAM

## 2024-10-29 PROCEDURE — A9585 GADOBUTROL INJECTION: HCPCS | Performed by: STUDENT IN AN ORGANIZED HEALTH CARE EDUCATION/TRAINING PROGRAM

## 2024-10-29 PROCEDURE — 71250 CT THORAX DX C-: CPT | Mod: MF

## 2024-10-29 PROCEDURE — 70553 MRI BRAIN STEM W/O & W/DYE: CPT | Mod: MG

## 2024-10-29 RX ORDER — GADOBUTROL 604.72 MG/ML
9 INJECTION INTRAVENOUS ONCE
Status: COMPLETED | OUTPATIENT
Start: 2024-10-29 | End: 2024-10-29

## 2024-10-29 RX ADMIN — GADOBUTROL 9 ML: 604.72 INJECTION INTRAVENOUS at 08:39

## 2024-11-06 DIAGNOSIS — K21.9 GASTROESOPHAGEAL REFLUX DISEASE WITHOUT ESOPHAGITIS: ICD-10-CM

## 2024-11-21 DIAGNOSIS — G47.33 OBSTRUCTIVE SLEEP APNEA SYNDROME: Primary | ICD-10-CM

## 2024-11-30 DIAGNOSIS — E78.5 HYPERLIPIDEMIA LDL GOAL <100: ICD-10-CM

## 2024-11-30 DIAGNOSIS — K76.0 HEPATIC STEATOSIS: ICD-10-CM

## 2024-12-02 RX ORDER — ATORVASTATIN CALCIUM 40 MG/1
40 TABLET, FILM COATED ORAL DAILY
Qty: 90 TABLET | Refills: 3 | Status: SHIPPED | OUTPATIENT
Start: 2024-12-02

## 2024-12-02 NOTE — TELEPHONE ENCOUNTER
Refilled x 1 year, but please check and see if she's able to actually do an annual visit in January - just had one in October, so not sure her insurance would cover that

## 2024-12-02 NOTE — TELEPHONE ENCOUNTER
Pending Prescriptions:                       Disp   Refills    atorvastatin (LIPITOR) 40 MG tablet [Pharm*30 tab*0        Sig: TAKE 1 TABLET BY MOUTH ONCE DAILY .  TAKE  AFTER            COMPLETING  20MG  TABLETS    Routing refill request to provider for review/approval because:  New order needed for atorvastatin 40mg? Currently ordered as follows:    Sig - Route: Take 1 tablet (20 mg) by mouth daily for 30 days, THEN 2 tablets (40 mg) daily. - Oral       Ginny Blackwell RN  [facility]

## 2025-02-03 ENCOUNTER — OFFICE VISIT (OUTPATIENT)
Dept: FAMILY MEDICINE | Facility: CLINIC | Age: 75
End: 2025-02-03
Attending: STUDENT IN AN ORGANIZED HEALTH CARE EDUCATION/TRAINING PROGRAM
Payer: COMMERCIAL

## 2025-02-03 VITALS
SYSTOLIC BLOOD PRESSURE: 130 MMHG | RESPIRATION RATE: 18 BRPM | DIASTOLIC BLOOD PRESSURE: 74 MMHG | TEMPERATURE: 98.5 F | BODY MASS INDEX: 35.97 KG/M2 | HEIGHT: 63 IN | WEIGHT: 203 LBS | OXYGEN SATURATION: 97 % | HEART RATE: 66 BPM

## 2025-02-03 DIAGNOSIS — G56.23 CUBITAL TUNNEL SYNDROME, BILATERAL: ICD-10-CM

## 2025-02-03 DIAGNOSIS — R30.0 DYSURIA: ICD-10-CM

## 2025-02-03 DIAGNOSIS — E11.9 TYPE 2 DIABETES MELLITUS WITHOUT COMPLICATION, WITHOUT LONG-TERM CURRENT USE OF INSULIN (H): Primary | ICD-10-CM

## 2025-02-03 DIAGNOSIS — E78.5 HYPERLIPIDEMIA LDL GOAL <100: ICD-10-CM

## 2025-02-03 LAB
ALBUMIN SERPL BCG-MCNC: 4.4 G/DL (ref 3.5–5.2)
ALBUMIN UR-MCNC: NEGATIVE MG/DL
ALP SERPL-CCNC: 99 U/L (ref 40–150)
ALT SERPL W P-5'-P-CCNC: 25 U/L (ref 0–50)
ANION GAP SERPL CALCULATED.3IONS-SCNC: 9 MMOL/L (ref 7–15)
APPEARANCE UR: CLEAR
AST SERPL W P-5'-P-CCNC: 18 U/L (ref 0–45)
BACTERIA #/AREA URNS HPF: ABNORMAL /HPF
BILIRUB SERPL-MCNC: 0.3 MG/DL
BILIRUB UR QL STRIP: NEGATIVE
BUN SERPL-MCNC: 13.6 MG/DL (ref 8–23)
CALCIUM SERPL-MCNC: 10.1 MG/DL (ref 8.8–10.4)
CHLORIDE SERPL-SCNC: 101 MMOL/L (ref 98–107)
CHOLEST SERPL-MCNC: 194 MG/DL
COLOR UR AUTO: YELLOW
CREAT SERPL-MCNC: 0.93 MG/DL (ref 0.51–0.95)
EGFRCR SERPLBLD CKD-EPI 2021: 64 ML/MIN/1.73M2
EST. AVERAGE GLUCOSE BLD GHB EST-MCNC: 166 MG/DL
FASTING STATUS PATIENT QL REPORTED: YES
FASTING STATUS PATIENT QL REPORTED: YES
GLUCOSE SERPL-MCNC: 149 MG/DL (ref 70–99)
GLUCOSE UR STRIP-MCNC: NEGATIVE MG/DL
HBA1C MFR BLD: 7.4 % (ref 0–5.6)
HCO3 SERPL-SCNC: 31 MMOL/L (ref 22–29)
HDLC SERPL-MCNC: 51 MG/DL
HGB UR QL STRIP: NEGATIVE
KETONES UR STRIP-MCNC: NEGATIVE MG/DL
LDLC SERPL CALC-MCNC: 121 MG/DL
LEUKOCYTE ESTERASE UR QL STRIP: ABNORMAL
NITRATE UR QL: NEGATIVE
NONHDLC SERPL-MCNC: 143 MG/DL
PH UR STRIP: 7 [PH] (ref 5–7)
POTASSIUM SERPL-SCNC: 4.5 MMOL/L (ref 3.4–5.3)
PROT SERPL-MCNC: 7 G/DL (ref 6.4–8.3)
RBC #/AREA URNS AUTO: ABNORMAL /HPF
SODIUM SERPL-SCNC: 141 MMOL/L (ref 135–145)
SP GR UR STRIP: 1.01 (ref 1–1.03)
SQUAMOUS #/AREA URNS AUTO: ABNORMAL /LPF
TRIGL SERPL-MCNC: 110 MG/DL
UROBILINOGEN UR STRIP-ACNC: 0.2 E.U./DL
WBC #/AREA URNS AUTO: ABNORMAL /HPF

## 2025-02-03 PROCEDURE — 80061 LIPID PANEL: CPT | Performed by: STUDENT IN AN ORGANIZED HEALTH CARE EDUCATION/TRAINING PROGRAM

## 2025-02-03 PROCEDURE — 81001 URINALYSIS AUTO W/SCOPE: CPT | Performed by: STUDENT IN AN ORGANIZED HEALTH CARE EDUCATION/TRAINING PROGRAM

## 2025-02-03 PROCEDURE — 83036 HEMOGLOBIN GLYCOSYLATED A1C: CPT | Performed by: STUDENT IN AN ORGANIZED HEALTH CARE EDUCATION/TRAINING PROGRAM

## 2025-02-03 PROCEDURE — 87086 URINE CULTURE/COLONY COUNT: CPT | Performed by: STUDENT IN AN ORGANIZED HEALTH CARE EDUCATION/TRAINING PROGRAM

## 2025-02-03 PROCEDURE — G2211 COMPLEX E/M VISIT ADD ON: HCPCS | Performed by: STUDENT IN AN ORGANIZED HEALTH CARE EDUCATION/TRAINING PROGRAM

## 2025-02-03 PROCEDURE — 80053 COMPREHEN METABOLIC PANEL: CPT | Performed by: STUDENT IN AN ORGANIZED HEALTH CARE EDUCATION/TRAINING PROGRAM

## 2025-02-03 PROCEDURE — 36415 COLL VENOUS BLD VENIPUNCTURE: CPT | Performed by: STUDENT IN AN ORGANIZED HEALTH CARE EDUCATION/TRAINING PROGRAM

## 2025-02-03 PROCEDURE — 99214 OFFICE O/P EST MOD 30 MIN: CPT | Performed by: STUDENT IN AN ORGANIZED HEALTH CARE EDUCATION/TRAINING PROGRAM

## 2025-02-03 ASSESSMENT — PAIN SCALES - GENERAL: PAINLEVEL_OUTOF10: NO PAIN (0)

## 2025-02-03 NOTE — PATIENT INSTRUCTIONS
Diclofenac (Voltaren) gel to the elbows up to 4 times a day.     If that isn't working, then let me know and I can get you a referral for hand therapy ([place] is the closest hand therapy location through [place], or you could see if [place] is closer)

## 2025-02-03 NOTE — PROGRESS NOTES
"  Assessment & Plan     Type 2 diabetes mellitus without complication, without long-term current use of insulin (H)  Patient is a very pleasant 74 year old who presents today for follow up on diabetes. Glucoses checks occasionally, not on medictions. Will obtain A1c today. Follow up 4-9 months.   - Hemoglobin A1c  - Comprehensive metabolic panel    Hyperlipidemia LDL goal <100  Started on atorvastatin, some \"hernesto horses\" but has been able to manage these. Will check LFTs and electrolytes today. Will also recheck fasting lipids.   - Lipid panel reflex to direct LDL Fasting  - Comprehensive metabolic panel    Cubital tunnel syndrome, bilateral  Noting both hands and sometimes forearms go numb at times. Given some exercises in AVS. Will have her try diclofenac. Can try self splinting the elbows straight at night to see if that helps. If not improving, then plan for hand therapy referral.   - diclofenac (VOLTAREN) 1 % topical gel  Dispense: 150 g; Refill: 3    Dysuria  On solifenacin. Some increased frequency in the past few weeks, as well as some dysuria. She requests a UA today, which is completed.   - UA Macroscopic with reflex to Microscopic and Culture - Lab Collect    The longitudinal plan of care for the diagnosis(es)/condition(s) as documented were addressed during this visit. Due to the added complexity in care, I will continue to support Jenna in the subsequent management and with ongoing continuity of care.    BMI  Estimated body mass index is 35.96 kg/m  as calculated from the following:    Height as of this encounter: 1.6 m (5' 3\").    Weight as of this encounter: 92.1 kg (203 lb).       Prosper West is a 74 year old, presenting for the following health issues:  Hypertension, Diabetes, and Lipids        2/3/2025     7:55 AM   Additional Questions   Roomed by Liliya GARCIA   Accompanied by Self     HPI     Diabetes Follow-up    How often are you checking your blood sugar? A few times a week  What time " "of day are you checking your blood sugars (select all that apply)?  Before and after meals  Have you had any blood sugars above 200?  Yes   Have you had any blood sugars below 70?  No  What symptoms do you notice when your blood sugar is low?  Shaky  What concerns do you have today about your diabetes? None   Do you have any of these symptoms? (Select all that apply)  No numbness or tingling in feet.  No redness, sores or blisters on feet.  No complaints of excessive thirst.  No reports of blurry vision.  No significant changes to weight.  Have you had a diabetic eye exam in the last 12 months? No    BP well controlled. Here  Been taking it at home.   A few 130's. Mostly in the 110-120's.   Were higher in October, but lateraly been good.   Glucoses \"not that bad\"       When she'll be sitting she's having both arms go numb up into the forearms.   Started a couple weeks ago when she really started thinking about it.   Started doing it now just sitting here.   The whole hand, both sides. Almost feels hot.   Both sides every time.   Not really above elbow.   Just keeps moving the hands to see if that helps.     Wonder if the cholesterol is causing issues.   But also a lot of stress in her life as well with her .     Increased urinary frequency recently, some dysuria.       BP Readings from Last 2 Encounters:   02/03/25 130/74   10/08/24 132/72     Hemoglobin A1C (%)   Date Value   09/23/2024 6.9 (H)   06/04/2024 7.2 (H)   06/10/2021 6.2 (H)   08/06/2020 6.1 (H)     LDL Cholesterol Calculated (mg/dL)   Date Value   09/23/2024 154 (H)   09/26/2023 166 (H)   04/29/2021 186 (H)   04/23/2019 182 (H)             Hypertension Follow-up    Do you check your blood pressure regularly outside of the clinic? Yes   Are you following a low salt diet? No  Are your blood pressures ever more than 140 on the top number (systolic) OR more   than 90 on the bottom number (diastolic), for example 140/90? No    Hyperlipidemia " "Follow-Up    Are you regularly taking any medication or supplement to lower your cholesterol?   Yes- Atorvastatin  Are you having muscle aches or other side effects that you think could be caused by your cholesterol lowering medication?  Yes-            Review of Systems  Constitutional, HEENT, cardiovascular, pulmonary, gi and gu systems are negative, except as otherwise noted.      Objective    /74 (BP Location: Right arm, Patient Position: Sitting, Cuff Size: Adult Large)   Pulse 66   Temp 98.5  F (36.9  C) (Tympanic)   Resp 18   Ht 1.6 m (5' 3\")   Wt 92.1 kg (203 lb)   SpO2 97%   BMI 35.96 kg/m    Body mass index is 35.96 kg/m .  Physical Exam  Constitutional:       Appearance: Normal appearance.   HENT:      Head: Normocephalic.   Eyes:      General: No scleral icterus.     Extraocular Movements: Extraocular movements intact.      Conjunctiva/sclera: Conjunctivae normal.   Cardiovascular:      Rate and Rhythm: Normal rate.   Pulmonary:      Effort: Pulmonary effort is normal.   Neurological:      General: No focal deficit present.      Mental Status: She is alert and oriented to person, place, and time.           Results from this visitNo results found for any visits on 02/03/25.          Signed Electronically by: Carmina Almanza MD    "

## 2025-02-04 LAB — BACTERIA UR CULT: NORMAL

## 2025-04-07 ENCOUNTER — ANCILLARY ORDERS (OUTPATIENT)
Dept: FAMILY MEDICINE | Facility: CLINIC | Age: 75
End: 2025-04-07
Payer: COMMERCIAL

## 2025-04-07 DIAGNOSIS — Z12.31 ENCOUNTER FOR SCREENING MAMMOGRAM FOR BREAST CANCER: Primary | ICD-10-CM

## 2025-07-21 DIAGNOSIS — I10 ESSENTIAL HYPERTENSION WITH GOAL BLOOD PRESSURE LESS THAN 140/90: ICD-10-CM

## 2025-07-21 DIAGNOSIS — K21.9 GASTROESOPHAGEAL REFLUX DISEASE WITHOUT ESOPHAGITIS: ICD-10-CM

## 2025-07-21 DIAGNOSIS — I47.10 PAROXYSMAL SUPRAVENTRICULAR TACHYCARDIA: ICD-10-CM

## 2025-07-21 RX ORDER — HYDROCHLOROTHIAZIDE 12.5 MG/1
12.5 TABLET ORAL EVERY MORNING
Qty: 90 TABLET | Refills: 0 | Status: SHIPPED | OUTPATIENT
Start: 2025-07-21

## 2025-07-21 RX ORDER — METOPROLOL SUCCINATE 25 MG/1
TABLET, EXTENDED RELEASE ORAL
Qty: 90 TABLET | Refills: 1 | Status: SHIPPED | OUTPATIENT
Start: 2025-07-21

## 2025-07-21 RX ORDER — OMEPRAZOLE 20 MG/1
20 CAPSULE, DELAYED RELEASE ORAL DAILY
Qty: 90 CAPSULE | Refills: 0 | Status: SHIPPED | OUTPATIENT
Start: 2025-07-21

## 2025-07-21 NOTE — TELEPHONE ENCOUNTER
Potassium   Date Value Ref Range Status   02/03/2025 4.5 3.4 - 5.3 mmol/L Final   09/23/2021 3.9 3.4 - 5.3 mmol/L Final   06/10/2021 3.8 3.4 - 5.3 mmol/L Final     GFR Estimate   Date Value Ref Range Status   02/03/2025 64 >60 mL/min/1.73m2 Final     Comment:     eGFR calculated using 2021 CKD-EPI equation.   06/10/2021 68 >60 mL/min/[1.73_m2] Final     Comment:     Non  GFR Calc  Starting 12/18/2018, serum creatinine based estimated GFR (eGFR) will be   calculated using the Chronic Kidney Disease Epidemiology Collaboration   (CKD-EPI) equation.       GFR, ESTIMATED POCT   Date Value Ref Range Status   11/07/2023 >60 >60 mL/min/1.73m2 Final

## 2025-07-24 ENCOUNTER — OFFICE VISIT (OUTPATIENT)
Dept: UROLOGY | Facility: CLINIC | Age: 75
End: 2025-07-24
Payer: COMMERCIAL

## 2025-07-24 VITALS
RESPIRATION RATE: 18 BRPM | SYSTOLIC BLOOD PRESSURE: 131 MMHG | HEART RATE: 68 BPM | OXYGEN SATURATION: 97 % | DIASTOLIC BLOOD PRESSURE: 75 MMHG | TEMPERATURE: 98.2 F

## 2025-07-24 DIAGNOSIS — N39.41 URGE INCONTINENCE OF URINE: ICD-10-CM

## 2025-07-24 RX ORDER — SOLIFENACIN SUCCINATE 10 MG/1
10 TABLET, FILM COATED ORAL DAILY
Qty: 90 TABLET | Refills: 3 | Status: SHIPPED | OUTPATIENT
Start: 2025-07-24

## 2025-07-24 NOTE — PROGRESS NOTES
UROLOGY FOLLOW-UP NOTE          Chief Complaint:   Today I had the pleasure of seeing Ms. Rosita King in follow-up for a chief complaint of LUTS         Interval Update   Rosita King is a very pleasant 74 year old female with a history of T2 DM, prediabetes, COPD, lichen sclerosus, meningioma, HTN, ASHLEY, and HLD.      Brief History: Ms. Rosita King has followed with urology for urinary frequency, urgency, and mixed incontinence. She was started on solifenacin 5 mg daily.     Today's notes: She reports some continued mixed incontinence.          Physical Exam:   Patient is a 74 year old  female   Vitals: Blood pressure 131/75, pulse 68, temperature 98.2  F (36.8  C), temperature source Tympanic, resp. rate 18, SpO2 97%, not currently breastfeeding.  General: Alert and oriented x 3, no acute distress.  Respiratory: Non-labored breathing.  Cardiac: Regular rate.        Labs and Pathology:    I personally reviewed all applicable laboratory data and went over findings with patient  Significant for:    CBC RESULTS:  Recent Labs   Lab Test 05/19/24  1446 09/26/23  0910 09/21/22  0512 09/07/22  1145 06/18/21  0451 06/10/21  1123   WBC 5.6 6.1  --  6.4  --  6.4   HGB 14.0 13.5 11.6* 13.7   < > 13.2    271  --  274  --  293    < > = values in this interval not displayed.        BMP RESULTS:  Recent Labs   Lab Test 02/03/25  0845 05/19/24  1446 11/07/23  1248 09/26/23  0910 09/21/22  1127 09/21/22  0512 09/07/22  1145 09/23/21  0930 06/10/21  1123 04/29/21  1046 08/06/20  1714 11/26/19  1209    138  --  141  --   --  139   < > 137 134 138 136   POTASSIUM 4.5 3.8  --  4.8  --   --  4.1   < > 3.8 3.6 3.9 3.9   CHLORIDE 101 101  --  102  --   --  100   < > 105 101 103 102   CO2 31* 24  --  27  --   --  26   < > 29 30 30 31   ANIONGAP 9 13  --  12  --   --  13   < > 3 3 5 3   * 149*  --  138* 284*   < > 109*   < > 94 116* 99 140*   BUN 13.6 15.9  --  18.9  --   --  14.9   < > 19 12 16 17    CR 0.93 0.91 0.9 0.86  --   --  0.80   < > 0.86 0.76 0.79 0.75   GFRESTIMATED 64 66 >60 71  --   --  78   < > 68 79 76 81   GFRESTBLACK  --   --   --   --   --   --   --   --  79 >90 88 >90   CORINA 10.1 9.9  --  10.1  --   --  9.4   < > 9.2 9.3 9.9 9.2    < > = values in this interval not displayed.       UA RESULTS:   Recent Labs   Lab Test 02/03/25  0845 05/19/24  1559 10/30/23  0946 09/26/23  0910 08/06/20  1743   SG 1.010 1.005 1.025   < > 1.025   URINEPH 7.0 6.0 6.0   < > 5.0   NITRITE Negative Negative Negative   < > Negative   RBCU 0-2 <1  --   --  O - 2   WBCU 5-10* 1  --   --  0 - 5    < > = values in this interval not displayed.            Assessment/Plan   74 year old female seen in follow up for urinary frequency, urgency, and mixed incontinence. She was started on solifenacin 5 mg daily.     She reports some continued mixed incontinence. We discussed continuing with her current regimen, increasing the solifenacin dose, or trying a B3 agonist. She would like to try increasing the solifenacin dose.     Plan:  Increase solifenacin dose to 10 mg daily.   Follow up in one year, sooner if concerns.            Past Medical History:     Past Medical History:   Diagnosis Date    Essential hypertension with goal blood pressure less than 140/90 8/21/2016    Gastroesophageal reflux disease without esophagitis 8/22/2016    H. pylori infection 12/24/2015    Hyperlipidemia LDL goal <130 10/12/2015    ASHLEY (obstructive sleep apnea) 10/12/2015    Severe ASHLEY  - PSG at 7/27/2006 with undocumented weight, AHI 84.7, RDI 88.9, SpO2 remained above 90%, CPAP 7 optimal.    Paroxysmal supraventricular tachycardia 2/23/2017            Past Surgical History:     Past Surgical History:   Procedure Laterality Date    ARTHROPLASTY KNEE Right 6/17/2021    Procedure: Right Total Knee Arthroplasty;  Surgeon: Nils Edwards MD;  Location: [facility]    ARTHROPLASTY KNEE Left 9/20/2022    Procedure: left TOTAL Knee Arthroplasty;   Surgeon: Nils Edwards MD;  Location: [facility]    CARPAL TUNNEL RELEASE RT/LT Bilateral 2006, 2009    COLONOSCOPY N/A 11/25/2015    Procedure: COLONOSCOPY;  Surgeon: Mina Roy MD;  Location: [facility]    ESOPHAGOSCOPY, GASTROSCOPY, DUODENOSCOPY (EGD), COMBINED N/A 11/25/2015    Procedure: COMBINED ESOPHAGOSCOPY, GASTROSCOPY, DUODENOSCOPY (EGD), BIOPSY SINGLE OR MULTIPLE;  Surgeon: Mina Roy MD;  Location: [facility]    PAROTIDECTOMY Left 10/31/2019    Procedure: LEFT SUPERFICIAL PAROTIDECTOMY WITH FACIAL NERVE MONITORING;  Surgeon: Atif Salazar MD;  Location: [facility]    TONSILLECTOMY & ADENOIDECTOMY  age 13            Medications     Current Outpatient Medications   Medication Sig Dispense Refill    acetaminophen (TYLENOL) 325 MG tablet Take 2 tablets (650 mg) by mouth every 4 hours as needed for other (mild pain) 100 tablet 0    atorvastatin (LIPITOR) 40 MG tablet Take 1 tablet (40 mg) by mouth daily. 90 tablet 3    blood glucose (NO BRAND SPECIFIED) lancets standard Use to test blood sugar 1 times daily 100 each 3    blood glucose (NO BRAND SPECIFIED) test strip Use to test blood sugar 1 times daily 100 strip 3    blood glucose monitoring (NO BRAND SPECIFIED) meter device kit Use to test blood sugar 1 times daily 1 kit 0    diclofenac (VOLTAREN) 1 % topical gel Apply 2 g topically 4 times daily. To both elbows. 150 g 3    hydroCHLOROthiazide 12.5 MG tablet TAKE 1 TABLET BY MOUTH IN THE MORNING 90 tablet 0    lisinopril (ZESTRIL) 5 MG tablet Take 1 tablet (5 mg) by mouth every morning. 90 tablet 3    metoprolol succinate ER (TOPROL XL) 25 MG 24 hr tablet TAKE 1 TO 2 TABLETS BY MOUTH ONCE DAILY 90 tablet 1    omeprazole (PRILOSEC) 20 MG DR capsule Take 1 capsule by mouth once daily 90 capsule 0    solifenacin (VESICARE) 5 MG tablet Take 1 tablet (5 mg) by mouth daily. 90 tablet 0    VITAMIN D, CHOLECALCIFEROL, PO Take 1,000 Units by mouth daily        No current facility-administered  medications for this visit.            Family History:     Family History   Problem Relation Age of Onset    Liver Disease Mother     Depression/Anxiety Father     Diabetes Father     Diabetes Sister     Liver Disease Sister     Breast Cancer Sister     Cancer Sister     Kidney Cancer Nephew             Social History:     Social History     Socioeconomic History    Marital status:      Spouse name: Not on file    Number of children: Not on file    Years of education: Not on file    Highest education level: Not on file   Occupational History    Not on file   Tobacco Use    Smoking status: Former    Smokeless tobacco: Never   Vaping Use    Vaping status: Never Used   Substance and Sexual Activity    Alcohol use: Yes     Comment: ocassional    Drug use: No    Sexual activity: Not Currently     Partners: Female   Other Topics Concern    Parent/sibling w/ CABG, MI or angioplasty before 65F 55M? Yes     Comment: Father with MI at 55   Social History Narrative    Not on file     Social Drivers of Health     Financial Resource Strain: Low Risk  (10/8/2024)    Financial Resource Strain     Within the past 12 months, have you or your family members you live with been unable to get utilities (heat, electricity) when it was really needed?: No   Food Insecurity: Low Risk  (10/8/2024)    Food Insecurity     Within the past 12 months, did you worry that your food would run out before you got money to buy more?: No     Within the past 12 months, did the food you bought just not last and you didn t have money to get more?: No   Transportation Needs: Low Risk  (10/8/2024)    Transportation Needs     Within the past 12 months, has lack of transportation kept you from medical appointments, getting your medicines, non-medical meetings or appointments, work, or from getting things that you need?: No   Physical Activity: Not on file   Stress: Stress Concern Present (10/8/2024)    [employer] -  Occupational Stress Questionnaire     Feeling of Stress : To some extent   Social Connections: Not on file   Interpersonal Safety: Low Risk  (10/8/2024)    Interpersonal Safety     Do you feel physically and emotionally safe where you currently live?: Yes     Within the past 12 months, have you been hit, slapped, kicked or otherwise physically hurt by someone?: No     Within the past 12 months, have you been humiliated or emotionally abused in other ways by your partner or ex-partner?: No   Housing Stability: Low Risk  (10/8/2024)    Housing Stability     Do you have housing? : Yes     Are you worried about losing your housing?: No            Allergies:   Penicillins, Pravastatin, Simvastatin, and Sulfa antibiotics         Review of Systems:  From intake questionnaire   Negative 14 system review except as noted on HPI, nurse's note.        Norma Ruelas PA-C  Department of Urology

## 2025-07-24 NOTE — PROGRESS NOTES
"Initial /75 (BP Location: Right arm)   Pulse 68   Temp 98.2  F (36.8  C) (Tympanic)   Resp 18   SpO2 97%  Estimated body mass index is 35.96 kg/m  as calculated from the following:    Height as of 2/3/25: 1.6 m (5' 3\").    Weight as of 2/3/25: 92.1 kg (203 lb). .    "

## 2025-08-24 ENCOUNTER — HEALTH MAINTENANCE LETTER (OUTPATIENT)
Age: 75
End: 2025-08-24

## (undated) DEVICE — NDL COUNTER 20CT 31142493

## (undated) DEVICE — SU VICRYL 1 CT-1 36" UND J947H

## (undated) DEVICE — BONE CLEANING TIP INTERPULSE  0210-010-000

## (undated) DEVICE — BNDG COBAN 6"X5YDS STERILE

## (undated) DEVICE — SU MONOCRYL 3-0 PS-2 18" UND Y497G

## (undated) DEVICE — ESU PENCIL SMOKE EVAC W/ROCKER SWITCH 0703-047-000

## (undated) DEVICE — GOWN IMPERVIOUS SPECIALTY XLG/XLONG 32474

## (undated) DEVICE — BLADE SAW OSCIL/SAG STRK 11X90X1.19MM 4/2000 4111-119-090

## (undated) DEVICE — SU PDO 1 STRATAFIX 36X36CM CTX TAPERPOINT SXPD2B405

## (undated) DEVICE — PREP SKIN SCRUB TRAY 4461A

## (undated) DEVICE — DRSG TELFA 3X8" 1238

## (undated) DEVICE — PREP CHLORAPREP 26ML TINTED ORANGE  260815

## (undated) DEVICE — GOWN XLG DISP 9545

## (undated) DEVICE — SYR BULB IRRIG 50ML LATEX FREE 0035280

## (undated) DEVICE — SOL NACL 0.9% IRRIG 1000ML BOTTLE 07138-09

## (undated) DEVICE — SU STRATAFIX MONOCRYL 3-0 SPIRAL PS-2 30CM SXMP1B106

## (undated) DEVICE — SOL NACL 0.9% IRRIG 3000ML BAG 07972-08

## (undated) DEVICE — ESU PENCIL W/COATED BLADE E2450H

## (undated) DEVICE — DRAPE U SPLIT 74X120" 29440

## (undated) DEVICE — GLOVE PROTEXIS W/NEU-THERA 6.5  2D73TE65

## (undated) DEVICE — SYR 20ML LL W/O NDL

## (undated) DEVICE — BLADE KNIFE SURG 12 371112

## (undated) DEVICE — SU SILK 3-0 SH-1 CR 8X18" C003D

## (undated) DEVICE — DECANTER VIAL 2006S

## (undated) DEVICE — SOL WATER IRRIG 1000ML BOTTLE 07139-09

## (undated) DEVICE — ESU ELEC BLADE 2.75" COATED/INSULATED E1455

## (undated) DEVICE — ADH SKIN CLOSURE PREMIERPRO EXOFIN 1.0ML 3470

## (undated) DEVICE — TOURNIQUET SGL  BLADDER 30" DL PORT BLUE 5921-030-235

## (undated) DEVICE — SUCTION IRR SYSTEM W/TIP INTERPULSE

## (undated) DEVICE — NDL SPINAL 18GA 3.5" 405184

## (undated) DEVICE — DRSG TEGADERM 2 3/8X2 3/4" 1624W

## (undated) DEVICE — DRAIN JACKSON PRATT RESERVOIR 100ML SU130-1305

## (undated) DEVICE — SPONGE RAY-TEC 4X8" 7318

## (undated) DEVICE — PEN MARKING SKIN FINE 31145942

## (undated) DEVICE — ESU CORD BIPOLAR GREEN 10-4000

## (undated) DEVICE — BASIN SET MINOR DISP

## (undated) DEVICE — SUCTION MANIFOLD NEPTUNE 2 SYS 4 PORT 0702-020-000

## (undated) DEVICE — IOM 15 MIN UP TO 7 HOURS

## (undated) DEVICE — SU SILK 2-0 TIE 12X30" A305H

## (undated) DEVICE — SU MONOCRYL 2-0 CT-1 36" UND Y945H

## (undated) DEVICE — PEN MARKING SKIN W/LABELS 31145884

## (undated) DEVICE — DRSG TEGADERM 6X8" 1628

## (undated) DEVICE — SYR 50ML CATH TIP W/O NDL 309620

## (undated) DEVICE — SPONGE KITTNER 31001010

## (undated) DEVICE — DRAIN JACKSON PRATT 07FR ROUND SU130-1320

## (undated) DEVICE — LIGHT HANDLE X2

## (undated) DEVICE — BLADE KNIFE SURG 15 371115

## (undated) DEVICE — GLOVE PROTEXIS W/NEU-THERA 8.0  2D73TE80

## (undated) DEVICE — STOCKING SLEEVE COMPRESSION CALF MED

## (undated) DEVICE — BONE CEMENT KIT BOWL AND SPATULA STRK 6201-3-410

## (undated) DEVICE — GLOVE PROTEXIS BLUE W/NEU-THERA 6.5  2D73EB65

## (undated) DEVICE — Device

## (undated) DEVICE — SU MONOCRYL 4-0 PS-2 18" UND Y496G

## (undated) DEVICE — NDL 27GA 1.25" 305136

## (undated) DEVICE — DRSG AQUACEL AG 3.5X9.75" HYDROFIBER 412011

## (undated) DEVICE — SPONGE COTTONOID 1/2X1/2" 20-04SW

## (undated) DEVICE — GLOVE PROTEXIS W/NEU-THERA 7.5  2D73TE75

## (undated) DEVICE — BLADE SAW SAGITTAL STRK 21X90X1.19MM HD SYS 6 6221-119-090

## (undated) DEVICE — SYR 10ML FINGER CONTROL W/O NDL 309695

## (undated) DEVICE — CLIP APPLIER 09 3/8" SM LIGACLIP MCS20

## (undated) DEVICE — IOM SUPPLIES

## (undated) DEVICE — TUBING SUCTION 12"X1/4" N612

## (undated) DEVICE — STPL SKIN 35W 059037

## (undated) DEVICE — PACK BASIC 9103

## (undated) DEVICE — DRAPE STERI INCISE 1050

## (undated) DEVICE — SU SILK 2-0 SH 30" K833H

## (undated) DEVICE — SU VICRYL 3-0 SH 27" UND J416H

## (undated) RX ORDER — PROPOFOL 10 MG/ML
INJECTION, EMULSION INTRAVENOUS
Status: DISPENSED
Start: 2019-10-31

## (undated) RX ORDER — EPINEPHRINE 1 MG/ML(1)
AMPUL (ML) INJECTION
Status: DISPENSED
Start: 2021-06-17

## (undated) RX ORDER — REGADENOSON 0.08 MG/ML
INJECTION, SOLUTION INTRAVENOUS
Status: DISPENSED
Start: 2021-05-12

## (undated) RX ORDER — DEXAMETHASONE SODIUM PHOSPHATE 4 MG/ML
INJECTION, SOLUTION INTRA-ARTICULAR; INTRALESIONAL; INTRAMUSCULAR; INTRAVENOUS; SOFT TISSUE
Status: DISPENSED
Start: 2022-09-20

## (undated) RX ORDER — CLINDAMYCIN PHOSPHATE 900 MG/50ML
INJECTION, SOLUTION INTRAVENOUS
Status: DISPENSED
Start: 2019-10-31

## (undated) RX ORDER — FENTANYL CITRATE 50 UG/ML
INJECTION, SOLUTION INTRAMUSCULAR; INTRAVENOUS
Status: DISPENSED
Start: 2019-10-31

## (undated) RX ORDER — PROPOFOL 10 MG/ML
INJECTION, EMULSION INTRAVENOUS
Status: DISPENSED
Start: 2021-06-17

## (undated) RX ORDER — LIDOCAINE HYDROCHLORIDE AND EPINEPHRINE 10; 10 MG/ML; UG/ML
INJECTION, SOLUTION INFILTRATION; PERINEURAL
Status: DISPENSED
Start: 2019-10-31

## (undated) RX ORDER — LIDOCAINE HYDROCHLORIDE 10 MG/ML
INJECTION, SOLUTION EPIDURAL; INFILTRATION; INTRACAUDAL; PERINEURAL
Status: DISPENSED
Start: 2022-09-20

## (undated) RX ORDER — LIDOCAINE HCL/EPINEPHRINE/PF 2%-1:200K
VIAL (ML) INJECTION
Status: DISPENSED
Start: 2021-06-17

## (undated) RX ORDER — TRANEXAMIC ACID 650 MG/1
TABLET ORAL
Status: DISPENSED
Start: 2021-06-17

## (undated) RX ORDER — ONDANSETRON 2 MG/ML
INJECTION INTRAMUSCULAR; INTRAVENOUS
Status: DISPENSED
Start: 2022-09-20

## (undated) RX ORDER — ONDANSETRON 2 MG/ML
INJECTION INTRAMUSCULAR; INTRAVENOUS
Status: DISPENSED
Start: 2019-10-31

## (undated) RX ORDER — MAGNESIUM SULFATE HEPTAHYDRATE 40 MG/ML
INJECTION, SOLUTION INTRAVENOUS
Status: DISPENSED
Start: 2022-09-20

## (undated) RX ORDER — CEFAZOLIN SODIUM 2 G/100ML
INJECTION, SOLUTION INTRAVENOUS
Status: DISPENSED
Start: 2021-06-17

## (undated) RX ORDER — FENTANYL CITRATE-0.9 % NACL/PF 10 MCG/ML
PLASTIC BAG, INJECTION (ML) INTRAVENOUS
Status: DISPENSED
Start: 2022-09-20

## (undated) RX ORDER — FENTANYL CITRATE 50 UG/ML
INJECTION, SOLUTION INTRAMUSCULAR; INTRAVENOUS
Status: DISPENSED
Start: 2021-06-17

## (undated) RX ORDER — GLYCOPYRROLATE 0.2 MG/ML
INJECTION, SOLUTION INTRAMUSCULAR; INTRAVENOUS
Status: DISPENSED
Start: 2021-06-17

## (undated) RX ORDER — ROPIVACAINE HYDROCHLORIDE 5 MG/ML
INJECTION, SOLUTION EPIDURAL; INFILTRATION; PERINEURAL
Status: DISPENSED
Start: 2022-09-20

## (undated) RX ORDER — KETOROLAC TROMETHAMINE 30 MG/ML
INJECTION, SOLUTION INTRAMUSCULAR; INTRAVENOUS
Status: DISPENSED
Start: 2022-09-20

## (undated) RX ORDER — PROPOFOL 10 MG/ML
INJECTION, EMULSION INTRAVENOUS
Status: DISPENSED
Start: 2022-09-20

## (undated) RX ORDER — LIDOCAINE HCL/EPINEPHRINE/PF 2%-1:200K
VIAL (ML) INJECTION
Status: DISPENSED
Start: 2022-09-20

## (undated) RX ORDER — EPHEDRINE SULFATE 50 MG/ML
INJECTION, SOLUTION INTRAMUSCULAR; INTRAVENOUS; SUBCUTANEOUS
Status: DISPENSED
Start: 2019-10-31

## (undated) RX ORDER — GINSENG 100 MG
CAPSULE ORAL
Status: DISPENSED
Start: 2019-10-31

## (undated) RX ORDER — FENTANYL CITRATE 50 UG/ML
INJECTION, SOLUTION INTRAMUSCULAR; INTRAVENOUS
Status: DISPENSED
Start: 2022-09-20

## (undated) RX ORDER — DEXAMETHASONE SODIUM PHOSPHATE 10 MG/ML
INJECTION, SOLUTION INTRAMUSCULAR; INTRAVENOUS
Status: DISPENSED
Start: 2021-06-17

## (undated) RX ORDER — ROPIVACAINE HYDROCHLORIDE 5 MG/ML
INJECTION, SOLUTION EPIDURAL; INFILTRATION; PERINEURAL
Status: DISPENSED
Start: 2021-06-17

## (undated) RX ORDER — KETAMINE HCL IN 0.9 % NACL 50 MG/5 ML
SYRINGE (ML) INTRAVENOUS
Status: DISPENSED
Start: 2019-10-31

## (undated) RX ORDER — GABAPENTIN 300 MG/1
CAPSULE ORAL
Status: DISPENSED
Start: 2021-06-17

## (undated) RX ORDER — ACETAMINOPHEN 325 MG/1
TABLET ORAL
Status: DISPENSED
Start: 2019-10-31

## (undated) RX ORDER — EPINEPHRINE 1 MG/ML(1)
AMPUL (ML) INJECTION
Status: DISPENSED
Start: 2019-10-31

## (undated) RX ORDER — HYDROMORPHONE HYDROCHLORIDE 1 MG/ML
INJECTION, SOLUTION INTRAMUSCULAR; INTRAVENOUS; SUBCUTANEOUS
Status: DISPENSED
Start: 2019-10-31

## (undated) RX ORDER — MAGNESIUM SULFATE HEPTAHYDRATE 40 MG/ML
INJECTION, SOLUTION INTRAVENOUS
Status: DISPENSED
Start: 2021-06-17

## (undated) RX ORDER — DEXAMETHASONE SODIUM PHOSPHATE 4 MG/ML
INJECTION, SOLUTION INTRA-ARTICULAR; INTRALESIONAL; INTRAMUSCULAR; INTRAVENOUS; SOFT TISSUE
Status: DISPENSED
Start: 2019-10-31

## (undated) RX ORDER — ACETAMINOPHEN 325 MG/1
TABLET ORAL
Status: DISPENSED
Start: 2021-06-17

## (undated) RX ORDER — ACETAMINOPHEN 325 MG/1
TABLET ORAL
Status: DISPENSED
Start: 2022-09-20

## (undated) RX ORDER — BUPIVACAINE HYDROCHLORIDE 5 MG/ML
INJECTION, SOLUTION EPIDURAL; INTRACAUDAL
Status: DISPENSED
Start: 2022-09-20

## (undated) RX ORDER — LIDOCAINE HYDROCHLORIDE 10 MG/ML
INJECTION, SOLUTION EPIDURAL; INFILTRATION; INTRACAUDAL; PERINEURAL
Status: DISPENSED
Start: 2019-10-31

## (undated) RX ORDER — TRANEXAMIC ACID 650 MG/1
TABLET ORAL
Status: DISPENSED
Start: 2022-09-20

## (undated) RX ORDER — LIDOCAINE HYDROCHLORIDE 10 MG/ML
INJECTION, SOLUTION EPIDURAL; INFILTRATION; INTRACAUDAL; PERINEURAL
Status: DISPENSED
Start: 2021-06-17